# Patient Record
Sex: FEMALE | Race: WHITE | HISPANIC OR LATINO | Employment: OTHER | URBAN - METROPOLITAN AREA
[De-identification: names, ages, dates, MRNs, and addresses within clinical notes are randomized per-mention and may not be internally consistent; named-entity substitution may affect disease eponyms.]

---

## 2017-01-03 ENCOUNTER — ANESTHESIA EVENT (OUTPATIENT)
Dept: SURGERY | Facility: HOSPITAL | Age: 67
DRG: 418 | End: 2017-01-03

## 2017-01-03 ENCOUNTER — ANESTHESIA (OUTPATIENT)
Dept: SURGERY | Facility: HOSPITAL | Age: 67
DRG: 418 | End: 2017-01-03

## 2017-01-03 PROBLEM — K42.0 INCARCERATED UMBILICAL HERNIA: Status: ACTIVE | Noted: 2017-01-03

## 2017-01-03 PROCEDURE — 63600175 PHARM REV CODE 636 W HCPCS: Performed by: NURSE ANESTHETIST, CERTIFIED REGISTERED

## 2017-01-03 PROCEDURE — 63600175 PHARM REV CODE 636 W HCPCS

## 2017-01-03 PROCEDURE — D9220A PRA ANESTHESIA: Mod: ,,, | Performed by: ANESTHESIOLOGY

## 2017-01-03 PROCEDURE — 25000003 PHARM REV CODE 250: Performed by: NURSE ANESTHETIST, CERTIFIED REGISTERED

## 2017-01-03 RX ORDER — ONDANSETRON 2 MG/ML
INJECTION INTRAMUSCULAR; INTRAVENOUS
Status: DISCONTINUED | OUTPATIENT
Start: 2017-01-03 | End: 2017-01-03

## 2017-01-03 RX ORDER — SODIUM CHLORIDE, SODIUM LACTATE, POTASSIUM CHLORIDE, CALCIUM CHLORIDE 600; 310; 30; 20 MG/100ML; MG/100ML; MG/100ML; MG/100ML
INJECTION, SOLUTION INTRAVENOUS CONTINUOUS PRN
Status: DISCONTINUED | OUTPATIENT
Start: 2017-01-03 | End: 2017-01-03

## 2017-01-03 RX ORDER — SUCCINYLCHOLINE CHLORIDE 20 MG/ML
INJECTION INTRAMUSCULAR; INTRAVENOUS
Status: DISCONTINUED | OUTPATIENT
Start: 2017-01-03 | End: 2017-01-03

## 2017-01-03 RX ORDER — GLYCOPYRROLATE 0.2 MG/ML
INJECTION INTRAMUSCULAR; INTRAVENOUS
Status: DISCONTINUED | OUTPATIENT
Start: 2017-01-03 | End: 2017-01-03

## 2017-01-03 RX ORDER — LIDOCAINE HCL/PF 100 MG/5ML
SYRINGE (ML) INTRAVENOUS
Status: DISCONTINUED | OUTPATIENT
Start: 2017-01-03 | End: 2017-01-03

## 2017-01-03 RX ORDER — NEOSTIGMINE METHYLSULFATE 1 MG/ML
INJECTION, SOLUTION INTRAVENOUS
Status: DISCONTINUED | OUTPATIENT
Start: 2017-01-03 | End: 2017-01-03

## 2017-01-03 RX ORDER — ROCURONIUM BROMIDE 10 MG/ML
INJECTION, SOLUTION INTRAVENOUS
Status: DISCONTINUED | OUTPATIENT
Start: 2017-01-03 | End: 2017-01-03

## 2017-01-03 RX ORDER — METOPROLOL TARTRATE 1 MG/ML
INJECTION, SOLUTION INTRAVENOUS
Status: DISCONTINUED | OUTPATIENT
Start: 2017-01-03 | End: 2017-01-03

## 2017-01-03 RX ORDER — PROPOFOL 10 MG/ML
VIAL (ML) INTRAVENOUS
Status: DISCONTINUED | OUTPATIENT
Start: 2017-01-03 | End: 2017-01-03

## 2017-01-03 RX ORDER — HYDRALAZINE HYDROCHLORIDE 20 MG/ML
INJECTION INTRAMUSCULAR; INTRAVENOUS
Status: DISCONTINUED | OUTPATIENT
Start: 2017-01-03 | End: 2017-01-03

## 2017-01-03 RX ADMIN — LIDOCAINE HYDROCHLORIDE 100 MG: 20 INJECTION, SOLUTION INTRAVENOUS at 10:01

## 2017-01-03 RX ADMIN — ONDANSETRON 4 MG: 2 INJECTION, SOLUTION INTRAMUSCULAR; INTRAVENOUS at 10:01

## 2017-01-03 RX ADMIN — GLYCOPYRROLATE 0.2 MG: 0.2 INJECTION, SOLUTION INTRAMUSCULAR; INTRAVENOUS at 10:01

## 2017-01-03 RX ADMIN — NEOSTIGMINE METHYLSULFATE 4 MG: 1 INJECTION INTRAVENOUS at 10:01

## 2017-01-03 RX ADMIN — METOPROLOL TARTRATE 1 MG: 1 INJECTION, SOLUTION INTRAVENOUS at 11:01

## 2017-01-03 RX ADMIN — ROCURONIUM BROMIDE 5 MG: 10 INJECTION, SOLUTION INTRAVENOUS at 10:01

## 2017-01-03 RX ADMIN — HYDRALAZINE HYDROCHLORIDE 5 MG: 20 INJECTION INTRAMUSCULAR; INTRAVENOUS at 11:01

## 2017-01-03 RX ADMIN — SODIUM CHLORIDE, SODIUM LACTATE, POTASSIUM CHLORIDE, AND CALCIUM CHLORIDE: .6; .31; .03; .02 INJECTION, SOLUTION INTRAVENOUS at 10:01

## 2017-01-03 RX ADMIN — ROCURONIUM BROMIDE 15 MG: 10 INJECTION, SOLUTION INTRAVENOUS at 10:01

## 2017-01-03 RX ADMIN — SUCCINYLCHOLINE CHLORIDE 120 MG: 20 INJECTION, SOLUTION INTRAMUSCULAR; INTRAVENOUS at 10:01

## 2017-01-03 RX ADMIN — PROPOFOL 170 MG: 10 INJECTION, EMULSION INTRAVENOUS at 10:01

## 2017-01-03 RX ADMIN — GLYCOPYRROLATE 0.6 MG: 0.2 INJECTION, SOLUTION INTRAMUSCULAR; INTRAVENOUS at 10:01

## 2017-01-03 RX ADMIN — FENTANYL CITRATE 100 MCG: 50 INJECTION INTRAMUSCULAR; INTRAVENOUS at 10:01

## 2017-01-03 RX ADMIN — MIDAZOLAM HYDROCHLORIDE 2 MG: 1 INJECTION, SOLUTION INTRAMUSCULAR; INTRAVENOUS at 10:01

## 2017-01-03 NOTE — ANESTHESIA POSTPROCEDURE EVALUATION
"Anesthesia Post Evaluation    Patient: Alba Reyes    Procedure(s) Performed: Procedure(s):  REPAIR OF INCARCERATED UMBILICAL HERNIA    Final Anesthesia Type: general  Patient location during evaluation: PACU  Patient participation: Yes- Able to Participate  Level of consciousness: awake  Post-procedure vital signs: reviewed and stable  Pain management: adequate  Airway patency: patent  PONV status at discharge: No PONV  Anesthetic complications: no      Cardiovascular status: stable  Respiratory status: unassisted  Hydration status: euvolemic  Follow-up not needed.        Visit Vitals    BP (!) 178/83 (BP Location: Left arm, Patient Position: Lying, BP Method: Automatic)    Pulse 76    Temp 36.8 °C (98.2 °F) (Oral)    Resp 18    Ht 5' 4" (1.626 m)    Wt 58.1 kg (128 lb 1.4 oz)    SpO2 99%    Breastfeeding No    BMI 21.99 kg/m2       Pain/Marina Score: Pain Assessment Performed: Yes (1/3/2017 11:10 AM)  Presence of Pain: denies (1/3/2017 11:10 AM)  Pain Rating Prior to Med Admin: 0 (1/3/2017 11:10 AM)  Pain Rating Post Med Admin: 8 (1/3/2017  7:45 AM)  Marina Score: 7 (1/3/2017 11:10 AM)      "

## 2017-01-03 NOTE — ANESTHESIA PREPROCEDURE EVALUATION
01/03/2017  Alba Reyes is a 66 y.o., female.    [facility] Anesthesia Evaluation    I have reviewed the Patient Summary Reports.     I have reviewed the Medications.     Review of Systems  Anesthesia Hx:  No problems with previous Anesthesia Denies Hx of Anesthetic complications  History of prior surgery of interest to airway management or planning: Denies Family Hx of Anesthesia complications.   Denies Personal Hx of Anesthesia complications.   Social:  Non-Smoker, No Alcohol Use    Hematology/Oncology:     Oncology Normal    -- Anemia:   EENT/Dental:EENT/Dental Normal   Cardiovascular:  Cardiovascular Normal Exercise tolerance: good     Pulmonary:  Pulmonary Normal    Renal/:  Renal/ Normal     Hepatic/GI:  Hepatic/GI Normal    Musculoskeletal:  Musculoskeletal Normal    Neurological:  Neurology Normal    Endocrine:  Endocrine Normal    Dermatological:  Skin Normal    Psych:  Psychiatric Normal           Physical Exam  General:  Well nourished    Airway/Jaw/Neck:  Airway Findings: Mouth Opening: Normal General Airway Assessment: Adult  Mallampati: II  TM Distance: Normal, at least 6 cm         Dental:  DENTAL FINDINGS: Normal   Chest/Lungs:  Chest/Lungs Clear    Heart/Vascular:  Heart Findings: Normal Heart murmur: negative       Mental Status:  Mental Status Findings:  Cooperative, Alert and Oriented         Anesthesia Plan  Type of Anesthesia, risks & benefits discussed:  Anesthesia Type:  general  Patient's Preference:   Intra-op Monitoring Plan:   Intra-op Monitoring Plan Comments:   Post Op Pain Control Plan:   Post Op Pain Control Plan Comments:   Induction:   IV  Beta Blocker:  Patient is not currently on a Beta-Blocker (No further documentation required).       Informed Consent: Patient understands risks and agrees with Anesthesia plan.  Questions answered. Anesthesia consent signed with patient.  ASA  Score: 2     Day of Surgery Review of History & Physical:    H&P update referred to the surgeon.     Anesthesia Plan Notes: npo        Ready For Surgery From Anesthesia Perspective.

## 2017-01-03 NOTE — TRANSFER OF CARE
"Anesthesia Transfer of Care Note    Patient: Alba Reyes    Procedure(s) Performed: Procedure(s):  REPAIR OF INCARCERATED UMBILICAL HERNIA    Patient location: PACU    Anesthesia Type: general    Transport from OR: Transported from OR on room air with adequate spontaneous ventilation    Post pain: adequate analgesia    Post assessment: no apparent anesthetic complications and tolerated procedure well    Post vital signs: stable    Level of consciousness: sedated and responds to stimulation    Nausea/Vomiting: no nausea/vomiting    Complications: none          Last vitals:   Visit Vitals    BP (!) 178/83 (BP Location: Left arm, Patient Position: Lying, BP Method: Automatic)    Pulse 76    Temp 36.8 °C (98.2 °F) (Oral)    Resp 18    Ht 5' 4" (1.626 m)    Wt 58.1 kg (128 lb 1.4 oz)    SpO2 99%    Breastfeeding No    BMI 21.99 kg/m2     "

## 2019-03-13 ENCOUNTER — OFFICE VISIT (OUTPATIENT)
Dept: FAMILY MEDICINE | Facility: CLINIC | Age: 69
End: 2019-03-13
Payer: MEDICARE

## 2019-03-13 VITALS
WEIGHT: 143.06 LBS | HEART RATE: 87 BPM | BODY MASS INDEX: 24.42 KG/M2 | DIASTOLIC BLOOD PRESSURE: 73 MMHG | OXYGEN SATURATION: 96 % | RESPIRATION RATE: 16 BRPM | HEIGHT: 64 IN | TEMPERATURE: 98 F | SYSTOLIC BLOOD PRESSURE: 130 MMHG

## 2019-03-13 DIAGNOSIS — M47.817 FACET ARTHRITIS OF LUMBOSACRAL REGION: ICD-10-CM

## 2019-03-13 DIAGNOSIS — M06.9 RHEUMATOID ARTHRITIS, INVOLVING UNSPECIFIED SITE, UNSPECIFIED RHEUMATOID FACTOR PRESENCE: ICD-10-CM

## 2019-03-13 DIAGNOSIS — M81.0 OSTEOPOROSIS, UNSPECIFIED OSTEOPOROSIS TYPE, UNSPECIFIED PATHOLOGICAL FRACTURE PRESENCE: ICD-10-CM

## 2019-03-13 DIAGNOSIS — M51.37 DDD (DEGENERATIVE DISC DISEASE), LUMBOSACRAL: Primary | ICD-10-CM

## 2019-03-13 DIAGNOSIS — M54.17 LUMBOSACRAL RADICULOPATHY: ICD-10-CM

## 2019-03-13 DIAGNOSIS — M48.07 FORAMINAL STENOSIS OF LUMBOSACRAL REGION: ICD-10-CM

## 2019-03-13 PROCEDURE — 99204 OFFICE O/P NEW MOD 45 MIN: CPT | Mod: S$GLB,,, | Performed by: FAMILY MEDICINE

## 2019-03-13 PROCEDURE — 99999 PR PBB SHADOW E&M-EST. PATIENT-LVL V: CPT | Mod: PBBFAC,,, | Performed by: FAMILY MEDICINE

## 2019-03-13 PROCEDURE — 99204 PR OFFICE/OUTPT VISIT, NEW, LEVL IV, 45-59 MIN: ICD-10-PCS | Mod: S$GLB,,, | Performed by: FAMILY MEDICINE

## 2019-03-13 PROCEDURE — 1101F PT FALLS ASSESS-DOCD LE1/YR: CPT | Mod: CPTII,S$GLB,, | Performed by: FAMILY MEDICINE

## 2019-03-13 PROCEDURE — 99999 PR PBB SHADOW E&M-EST. PATIENT-LVL V: ICD-10-PCS | Mod: PBBFAC,,, | Performed by: FAMILY MEDICINE

## 2019-03-13 PROCEDURE — 1101F PR PT FALLS ASSESS DOC 0-1 FALLS W/OUT INJ PAST YR: ICD-10-PCS | Mod: CPTII,S$GLB,, | Performed by: FAMILY MEDICINE

## 2019-03-13 RX ORDER — METHOTREXATE 2.5 MG/1
TABLET ORAL
COMMUNITY
Start: 2019-03-04 | End: 2021-04-08

## 2019-03-13 RX ORDER — FOLIC ACID 1 MG/1
TABLET ORAL
COMMUNITY
Start: 2019-01-10 | End: 2021-09-23

## 2019-03-13 RX ORDER — HYDROXYCHLOROQUINE SULFATE 200 MG/1
400 TABLET, FILM COATED ORAL
COMMUNITY
Start: 2015-02-12 | End: 2021-04-08

## 2019-03-13 RX ORDER — GABAPENTIN 300 MG/1
CAPSULE ORAL
COMMUNITY
Start: 2019-02-14 | End: 2022-08-08

## 2019-03-13 RX ORDER — LISINOPRIL 20 MG/1
TABLET ORAL
COMMUNITY
Start: 2018-07-19 | End: 2020-03-21 | Stop reason: SDUPTHER

## 2019-03-13 RX ORDER — NAPROXEN 500 MG/1
500 TABLET ORAL
COMMUNITY
Start: 2018-07-16 | End: 2019-07-16

## 2019-03-13 RX ORDER — LOVASTATIN 10 MG/1
TABLET ORAL
COMMUNITY
Start: 2019-03-01 | End: 2020-03-31 | Stop reason: SDUPTHER

## 2019-03-13 RX ORDER — LEVOFLOXACIN 750 MG/1
TABLET ORAL
COMMUNITY
Start: 2018-07-07 | End: 2019-03-13

## 2019-03-13 RX ORDER — GABAPENTIN 100 MG/1
100 CAPSULE ORAL
COMMUNITY
End: 2019-03-13 | Stop reason: DRUGHIGH

## 2019-03-13 RX ORDER — ALENDRONATE SODIUM 70 MG/1
70 TABLET ORAL
COMMUNITY
Start: 2015-02-12 | End: 2020-02-04

## 2019-03-13 RX ORDER — TRAMADOL HYDROCHLORIDE 50 MG/1
50 TABLET ORAL EVERY 6 HOURS PRN
COMMUNITY
End: 2020-02-04

## 2019-03-13 RX ORDER — CETIRIZINE HYDROCHLORIDE 10 MG/1
10 TABLET ORAL
COMMUNITY

## 2019-03-13 RX ORDER — ERGOCALCIFEROL 1.25 MG/1
50000 CAPSULE ORAL
COMMUNITY
Start: 2014-03-27 | End: 2020-01-23 | Stop reason: ALTCHOICE

## 2019-03-13 RX ORDER — HYDROGEN PEROXIDE 3 %
SOLUTION, NON-ORAL MISCELLANEOUS
COMMUNITY
Start: 2019-03-12 | End: 2020-04-22 | Stop reason: SDUPTHER

## 2019-03-13 NOTE — PROGRESS NOTES
Routine Office Visit    Patient Name: Alba Reyes    : 1950  MRN: [id number]    Subjective:  Nichole is a 68 y.o. female who presents today for:   Chief Complaint   Patient presents with    Sciatica     lower back     68-year-old female comes in for evaluation of low back pain radiating into her legs.  She has osteoporosis and rheumatoid arthritis.  She is seen at [facility] by Rheumatology.  She does not take her Plaquenil because she was told about the ophthalmic side effects of the medication.  She has not had an eye exam done.  She also reports that she skips her alendronate often.  She has no reason for this.  The patient has a lumbosacral degenerative disc disease, facet arthritis the lumbosacral region, a foraminal stenosis the lumbosacral region with radiculopathy.  The patient expresses a dissatisfaction with the pain management she has been receiving for her back pain. She is currently on gabapentin 300 mg 3 times a day for this however she skips that medicine often as well.      Past Medical History  Past Medical History:   Diagnosis Date    Arthritis        Past Surgical History  Past Surgical History:   Procedure Laterality Date    CHOLECYSTECTOMY-LAPAROSCOPIC N/A 2016    Per[facility] by [facility]    REPAIR OF INCARCERATED UMBILICAL HERNIA N/A 1/3/2017    Performed by Mahamed Maharaj MD at [facility] OR        Family History  History reviewed. No pertinent family history.    Social History  Social History     Socioeconomic History    Marital status:      Spouse name: Not on file    Number of children: Not on file    Years of education: Not on file    Highest education level: Not on file   Social Needs    Financial resource strain: Not on file    Food insecurity - worry: Not on file    Food insecurity - inability: Not on file    Transportation needs - medical: Not on file    Transportation needs - non-medical: Not on file   Occupational History     Not on file   Tobacco Use    Smoking status: Never Smoker   Substance and Sexual Activity    Alcohol use: Not on file    Drug use: Not on file    Sexual activity: Not on file   Other Topics Concern    Not on file   Social History Narrative    Not on file       Current Medications  Current Outpatient Medications on File Prior to Visit   Medication Sig Dispense Refill    cetirizine (ZYRTEC) 10 MG tablet Take 10 mg by mouth.      ergocalciferol (ERGOCALCIFEROL) 50,000 unit Cap Take 50,000 Units by mouth.      esomeprazole (NEXIUM) 20 MG capsule       folic acid (FOLVITE) 1 MG tablet       gabapentin (NEURONTIN) 300 MG capsule       hydroxychloroquine (PLAQUENIL) 200 mg tablet Take 400 mg by mouth.      lisinopril (PRINIVIL,ZESTRIL) 20 MG tablet       lovastatin (MEVACOR) 10 MG tablet       methotrexate 2.5 MG Tab       methotrexate, PF, (OTREXUP) 25 mg/0.4 mL AtIn Inject 25 mg into the skin.      naproxen (NAPROSYN) 500 MG tablet Take 500 mg by mouth.      traMADol (ULTRAM) 50 mg tablet Take 50 mg by mouth every 6 (six) hours as needed for Pain.      alendronate (FOSAMAX) 70 MG tablet Take 70 mg by mouth.       No current facility-administered medications on file prior to visit.        Allergies   Review of patient's allergies indicates:  No Known Allergies    Review of Systems   Constitutional: Negative for unexpected weight change.   Eyes: Negative for visual disturbance.   Respiratory: Negative for shortness of breath and wheezing.    Cardiovascular: Negative for chest pain and palpitations.   Gastrointestinal: Negative for abdominal pain, blood in stool, constipation, diarrhea and nausea.   Genitourinary: Negative for dysuria.   Musculoskeletal: Positive for arthralgias, back pain and gait problem.   Skin: Negative for rash.   Neurological: Positive for weakness. Negative for seizures.   Hematological: Negative for adenopathy.   Psychiatric/Behavioral: Positive for sleep disturbance. Negative for  "suicidal ideas.       /73 (BP Location: Left arm, Patient Position: Sitting, BP Method: Medium (Automatic))   Pulse 87   Temp 97.9 °F (36.6 °C) (Oral)   Resp 16   Ht 5' 4" (1.626 m)   Wt 64.9 kg (143 lb 1.3 oz)   SpO2 96%   BMI 24.56 kg/m²     Physical Exam   Constitutional: She appears well-developed and well-nourished.   HENT:   Head: Normocephalic and atraumatic.   Right Ear: External ear normal.   Left Ear: External ear normal.   Nose: Nose normal.   Mouth/Throat: Oropharynx is clear and moist. No oropharyngeal exudate.   Eyes: Conjunctivae and EOM are normal. Pupils are equal, round, and reactive to light. Right eye exhibits no discharge. Left eye exhibits no discharge.   Neck: Normal range of motion. Neck supple. No tracheal deviation present.   Cardiovascular: Normal rate, regular rhythm, normal heart sounds and intact distal pulses.   No murmur heard.  Pulmonary/Chest: Effort normal and breath sounds normal. She has no wheezes. She has no rales.   Abdominal: Soft. Bowel sounds are normal. She exhibits no mass. There is no tenderness.   Lymphadenopathy:     She has no cervical adenopathy.   Psychiatric: She has a normal mood and affect.   Vitals reviewed.    Assessment/Plan:  Nichole was seen today for sciatica.    Diagnoses and all orders for this visit:    DDD (degenerative disc disease), lumbosacral  -     Ambulatory Referral to Physical/Occupational Therapy  -     Ambulatory Consult to [facility]  Discussed with patient benefits of physical therapy.  A furthermore I will refer her to the back and Spine Clinic for an evaluation.    Facet arthritis of lumbosacral region  -     Ambulatory Referral to Physical/Occupational Therapy  -     Ambulatory Consult to [facility]    Foraminal stenosis of lumbosacral region  -     Ambulatory Referral to Physical/Occupational Therapy  -     Ambulatory Consult to [facility]    Lumbosacral radiculopathy  -     Ambulatory Referral to " Physical/Occupational Therapy  -     Ambulatory Consult to [facility]    Rheumatoid arthritis, involving unspecified site, unspecified rheumatoid factor presence  -     Ambulatory referral to Optometry  Discussed with patient the dangers of rheumatoid arthritis when not controlled.  I encouraged her to restart her medicine as per her rheumatologist recommendations, and to have an eye exam done.    Osteoporosis, unspecified osteoporosis type, unspecified pathological fracture presence  Discussed with patient how untreated osteoporosis can increase risk of fractures.  Advised patient to discuss this medicine with her prescriber        This office note has been dictated.  This dictation has been generated using M-Modal Fluency Direct dictation; some phonetic errors may occur.

## 2019-03-15 ENCOUNTER — TELEPHONE (OUTPATIENT)
Dept: OPTOMETRY | Facility: CLINIC | Age: 69
End: 2019-03-15

## 2019-03-22 ENCOUNTER — TELEPHONE (OUTPATIENT)
Dept: OPTOMETRY | Facility: CLINIC | Age: 69
End: 2019-03-22

## 2019-03-25 ENCOUNTER — TELEPHONE (OUTPATIENT)
Dept: SPINE | Facility: CLINIC | Age: 69
End: 2019-03-25

## 2019-03-25 DIAGNOSIS — M54.50 LUMBAR BACK PAIN: Primary | ICD-10-CM

## 2019-03-25 NOTE — PROGRESS NOTES
"Subjective:      Patient ID: Alba Reyes is a 68 y.o. female.    Chief Complaint: Low-back Pain      HPI  (Leslie)    She is a poor historian. She speaks English and she did not want an .     History of RA and osteoporosis. Has been seeing rheumatology at [facility] per PCP notes. Had severe left leg pain last year with slight improvement with local steroid injection.     She has constant LBP with constant left lateral leg pain to her knee x 1 year. LBP = left leg pain. No right leg pain. Pain is better with walking. Pain is worse when she gets up after prolonged sitting- she has to limp for awhile. She rates her pain as an 8 on a scale of 1-10. No numbness or tingling. She has some weakness in left leg. Pain is described as "lot of pain night and day."     She is set up to start PT next week. No injections in her back. No surgery on her back. She is on neurontin, naprosyn, and ultram.       Review of Systems   Constitution: Positive for weight gain. Negative for fever, malaise/fatigue, night sweats and weight loss.   HENT: Negative for hearing loss, nosebleeds and odynophagia.    Eyes: Negative for blurred vision and double vision.   Cardiovascular: Negative for chest pain, irregular heartbeat and palpitations.   Respiratory: Negative for cough, hemoptysis, shortness of breath and wheezing.    Endocrine: Negative for cold intolerance and polydipsia.   Hematologic/Lymphatic: Does not bruise/bleed easily.   Skin: Positive for dry skin and rash. Negative for poor wound healing and suspicious lesions.   Musculoskeletal: Positive for back pain.        See HPI for pertinent positives.   Gastrointestinal: Negative for bloating, abdominal pain, constipation, diarrhea, hematochezia, melena, nausea and vomiting.   Genitourinary: Negative for bladder incontinence, dysuria, hematuria, hesitancy and incomplete emptying.   Neurological: Negative for disturbances in coordination, dizziness, focal weakness, headaches, " loss of balance, numbness, paresthesias, seizures and weakness.   Psychiatric/Behavioral: Negative for depression and hallucinations. The patient is not nervous/anxious.            Objective:        General: Nichole is well-developed, well-nourished, appears stated age, in no acute distress, alert and oriented to time, place and person.     General    Vitals reviewed.  Constitutional: She is oriented to person, place, and time. She appears well-developed and well-nourished.   Pulmonary/Chest: Effort normal.   Abdominal: She exhibits no distension.   Neurological: She is alert and oriented to person, place, and time.   Psychiatric: She has a normal mood and affect. Her behavior is normal. Judgment and thought content normal.           Gait: normal and she is able to heel/toe stand.     On exam of the lumbar spine, Inspection of back is normal, mild left sided lower lumbar tenderness.     Skin in lumbar region is warm to the touch without visible rashes.     muscle tone normal without spasm, limited range of motion with pain  Pain in flexion.    Strength testing of the bilateral LEs shows  Right hip abduction:  +5/5  Left hip abduction:  +5/5  Right hip flexion:  +5/5  Left hip flexion:  +5/5  Right hip extensors:  +5/5  Left hip extensors:  +5/5  Right quadriceps:  +5/5  Left quadriceps:  +5/5  Right hamstring:  +5/5  Left hamstring:  +5/5  Right dorsiflexion:  +5/5  Left dorsiflexion:  +5/5  Right plantar flexion:  +5/5  Left plantar flexion:  +5/5   Right EHL:  +5/5   Left EHL:  +5/5    negative clonus of bilateral LEs.     negative straight leg raise on bilateral LEs.     DTRs:  Right patellar:  +2     Left patellar:  +2  Right achilles:  +2   Left achilles:  +2    Sensation is grossly intact in L2, L3, L4, L5, and S1 distribution.    Right hip has no pain with IR/ER. Left hip has mild knee pain with IR/ER of the hip.      On exam of bilateral UEs, patient has full painfree ROM with no signs of clubbing, laxity,  cyanosis, edema, instability, weakness, or tenderness.       XRAY INTERPRETATION:  X-rays of lumbar spine (AP/lat/flex/ext) dated 3/26/19 are personally reviewed and show slip L5-S1, moderate/severe DDD L4-S1 with facet hypertrophy.        Assessment:       1. Chronic bilateral low back pain with left-sided sciatica    2. Other spondylosis with radiculopathy, lumbar region    3. Facet hypertrophy of lumbar region    4. DDD (degenerative disc disease), lumbosacral    5. Thoracic and lumbosacral neuritis    6. Acquired spondylolisthesis           Plan:       Orders Placed This Encounter    Ambulatory referral to Physical Therapy - Lumbar       1 year history of constant LBP with constant left lateral leg pain to her knee. LBP = left leg pain. No right leg pain. Known slip L5-S1, moderate/severe DDD L4-S1 with facet hypertrophy. Pain likely from L5-S1, but L4-L5 may also be contributing. Also with known RA. Treatment options reviewed with patient along with above lumbar XRs. Following plan made:     - Agree with PT for lumbar spine. [facility].   - Continue with neurotin, naprosyn, and ultram from other providers.   - If no improvement with above, consider lumbar MRI and possible injections with pain management. She would want all of this on the [employer].     Follow-up: Follow up in 3 months (on 6/26/2019). If there are any questions prior to this, the patient was instructed to contact the office.

## 2019-03-26 ENCOUNTER — OFFICE VISIT (OUTPATIENT)
Dept: SPINE | Facility: CLINIC | Age: 69
End: 2019-03-26
Payer: MEDICARE

## 2019-03-26 ENCOUNTER — HOSPITAL ENCOUNTER (OUTPATIENT)
Dept: RADIOLOGY | Facility: OTHER | Age: 69
Discharge: HOME | End: 2019-03-26
Attending: PHYSICIAN ASSISTANT
Payer: MEDICARE

## 2019-03-26 VITALS
HEIGHT: 64 IN | HEART RATE: 84 BPM | WEIGHT: 143 LBS | DIASTOLIC BLOOD PRESSURE: 60 MMHG | BODY MASS INDEX: 24.41 KG/M2 | TEMPERATURE: 99 F | SYSTOLIC BLOOD PRESSURE: 132 MMHG

## 2019-03-26 DIAGNOSIS — M51.37 DDD (DEGENERATIVE DISC DISEASE), LUMBOSACRAL: ICD-10-CM

## 2019-03-26 DIAGNOSIS — M54.14 THORACIC AND LUMBOSACRAL NEURITIS: ICD-10-CM

## 2019-03-26 DIAGNOSIS — M47.816 FACET HYPERTROPHY OF LUMBAR REGION: ICD-10-CM

## 2019-03-26 DIAGNOSIS — M43.10 ACQUIRED SPONDYLOLISTHESIS: ICD-10-CM

## 2019-03-26 DIAGNOSIS — M54.42 CHRONIC BILATERAL LOW BACK PAIN WITH LEFT-SIDED SCIATICA: Primary | ICD-10-CM

## 2019-03-26 DIAGNOSIS — M54.17 THORACIC AND LUMBOSACRAL NEURITIS: ICD-10-CM

## 2019-03-26 DIAGNOSIS — M54.50 LUMBAR BACK PAIN: ICD-10-CM

## 2019-03-26 DIAGNOSIS — G89.29 CHRONIC BILATERAL LOW BACK PAIN WITH LEFT-SIDED SCIATICA: Primary | ICD-10-CM

## 2019-03-26 DIAGNOSIS — M47.26 OTHER SPONDYLOSIS WITH RADICULOPATHY, LUMBAR REGION: ICD-10-CM

## 2019-03-26 PROCEDURE — 99999 PR PBB SHADOW E&M-EST. PATIENT-LVL V: ICD-10-PCS | Mod: PBBFAC,,, | Performed by: PHYSICIAN ASSISTANT

## 2019-03-26 PROCEDURE — 72100 X-RAY EXAM L-S SPINE 2/3 VWS: CPT | Mod: 26,,, | Performed by: RADIOLOGY

## 2019-03-26 PROCEDURE — 72100 XR LUMBAR SPINE AP AND LAT WITH FLEX/EXT: ICD-10-PCS | Mod: 26,,, | Performed by: RADIOLOGY

## 2019-03-26 PROCEDURE — 1101F PT FALLS ASSESS-DOCD LE1/YR: CPT | Mod: CPTII,S$GLB,, | Performed by: PHYSICIAN ASSISTANT

## 2019-03-26 PROCEDURE — 99214 PR OFFICE/OUTPT VISIT, EST, LEVL IV, 30-39 MIN: ICD-10-PCS | Mod: S$GLB,,, | Performed by: PHYSICIAN ASSISTANT

## 2019-03-26 PROCEDURE — 72100 X-RAY EXAM L-S SPINE 2/3 VWS: CPT | Mod: TC,FY

## 2019-03-26 PROCEDURE — 72120 XR LUMBAR SPINE AP AND LAT WITH FLEX/EXT: ICD-10-PCS | Mod: 26,,, | Performed by: RADIOLOGY

## 2019-03-26 PROCEDURE — 72120 X-RAY BEND ONLY L-S SPINE: CPT | Mod: 26,,, | Performed by: RADIOLOGY

## 2019-03-26 PROCEDURE — 99214 OFFICE O/P EST MOD 30 MIN: CPT | Mod: S$GLB,,, | Performed by: PHYSICIAN ASSISTANT

## 2019-03-26 PROCEDURE — 1101F PR PT FALLS ASSESS DOC 0-1 FALLS W/OUT INJ PAST YR: ICD-10-PCS | Mod: CPTII,S$GLB,, | Performed by: PHYSICIAN ASSISTANT

## 2019-03-26 PROCEDURE — 99999 PR PBB SHADOW E&M-EST. PATIENT-LVL V: CPT | Mod: PBBFAC,,, | Performed by: PHYSICIAN ASSISTANT

## 2019-03-26 NOTE — LETTER
March 26, 2019      Rosendo Quinones Jr., MD  [address]  [place] [address]           [facility] [address]  [address], [address]  [facility] [address]  Phone: [phone]  Fax: [phone]          Patient: Alba Reyes   MR Number: [id number]   YOB: 1950   Date of Visit: 3/26/2019       Dear Dr. Rosendo Quinones Jr.:    Thank you for referring Alba Reyes to me for evaluation. Attached you will find relevant portions of my assessment and plan of care.    If you have questions, please do not hesitate to call me. I look forward to following Alba Reyes along with you.    Sincerely,    GLADIS Brody  CC:  No Recipients    If you would like to receive this communication electronically, please contact [email or web address] or [phone] to request more information on [facility] access.    For providers and/or their staff who would like to refer a patient to [facility], please contact us through our one-stop-shop provider referral line, [email or web address], at [phone].    If you feel you have received this communication in error or would no longer like to receive these types of communications, please e-mail [email or web address]

## 2019-03-28 ENCOUNTER — TELEPHONE (OUTPATIENT)
Dept: SPINE | Facility: CLINIC | Age: 69
End: 2019-03-28

## 2019-04-03 ENCOUNTER — CLINICAL SUPPORT (OUTPATIENT)
Dept: REHABILITATION | Facility: HOSPITAL | Age: 69
End: 2019-04-03
Attending: FAMILY MEDICINE
Payer: MEDICARE

## 2019-04-03 DIAGNOSIS — R53.1 WEAKNESS: ICD-10-CM

## 2019-04-03 DIAGNOSIS — R68.89 DECREASED FUNCTIONAL ACTIVITY TOLERANCE: ICD-10-CM

## 2019-04-03 DIAGNOSIS — R26.9 ABNORMALITY OF GAIT AND MOBILITY: ICD-10-CM

## 2019-04-03 PROCEDURE — 97162 PT EVAL MOD COMPLEX 30 MIN: CPT | Mod: PN

## 2019-04-03 PROCEDURE — 97140 MANUAL THERAPY 1/> REGIONS: CPT | Mod: PN

## 2019-04-04 NOTE — PLAN OF CARE
Physical Therapy Initial Evaluation     Name: Alba Reyes  [facility] Number: [id number]    PT Diagnosis:   Encounter Diagnoses   Name Primary?    Decreased functional activity tolerance     Weakness     Abnormality of gait and mobility        Physician: Rosendo Quinones Jr., MD    Medical Diagnoses:  M47.26 (ICD-10-CM) - Other spondylosis with radiculopathy, lumbar region  M47.896 (ICD-10-CM) - Facet hypertrophy of lumbar region  M51.37 (ICD-10-CM) - DDD (degenerative disc disease), lumbosacral  M54.14,M54.17 (ICD-10-CM) - Thoracic and lumbosacral neuritis  M43.10 (ICD-10-CM) - Acquired spondylolisthesis  M54.42,G89.29 (ICD-10-CM) - Chronic bilateral low back pain with left-sided sciatica    Treatment Orders: PT Eval and Treat  Note:   Lumbar protocol with home exercises.  ROM, stretching lumbar and abdominal musculature. Aerobic conditioning, aqua therapy, with modalities including ultrasound and massage PRN. Dry needling if helpful. 3 x week for 6 weeks.      Past Medical History:   Diagnosis Date    Arthritis      Current Outpatient Medications   Medication Sig    alendronate (FOSAMAX) 70 MG tablet Take 70 mg by mouth.    cetirizine (ZYRTEC) 10 MG tablet Take 10 mg by mouth.    ergocalciferol (ERGOCALCIFEROL) 50,000 unit Cap Take 50,000 Units by mouth.    esomeprazole (NEXIUM) 20 MG capsule     folic acid (FOLVITE) 1 MG tablet     gabapentin (NEURONTIN) 300 MG capsule     hydroxychloroquine (PLAQUENIL) 200 mg tablet Take 400 mg by mouth.    lisinopril (PRINIVIL,ZESTRIL) 20 MG tablet     lovastatin (MEVACOR) 10 MG tablet     methotrexate 2.5 MG Tab     methotrexate, PF, (OTREXUP) 25 mg/0.4 mL AtIn Inject 25 mg into the skin.    naproxen (NAPROSYN) 500 MG tablet Take 500 mg by mouth.    traMADol (ULTRAM) 50 mg tablet Take 50 mg by mouth every 6 (six) hours as needed for Pain.     No current facility-administered medications for this visit.      Review of  "patient's allergies indicates:  No Known Allergies    Precautions: Fall     Evaluation Date: 4/3/2019  Authorization Period Expiration: 3/25/2020  Plan of Care Expiration: 7/3/19  Reassessment Due: 5/3/19  Visit # / Visits authorized: 1/ 1    Time In: 1405  Time Out: 1500  Total Billable Time: 55  minutes     HISTORY     History of Present Illness: Patient arrives to PT and reports difficulty walking, and severe LBP. It started last June 2018 with sciatica and it has gotten worst since then. The Pain is across the lower back and wrapping at both hip. She has radicular pain to L LE as well. Patient walks with a forward trunk flexion and abnormal gait pattern. She has not work since oct 2018. She has difficulty accessing car in and out. She does not have numbness at her feet, but has tingling at lower legs B. Her pain is worst in the morning. Patient has had 3x falls  Due to legs giving out on her.     MD's Notes:  She is a poor historian. She speaks English and she did not want an .   History of RA and osteoporosis. Has been seeing rheumatology at [facility] per PCP notes. Had severe left leg pain last year with slight improvement with local steroid injection.   She has constant LBP with constant left lateral leg pain to her knee x 1 year. LBP = left leg pain. No right leg pain. Pain is better with walking. Pain is worse when she gets up after prolonged sitting- she has to limp for awhile. She rates her pain as an 8 on a scale of 1-10. No numbness or tingling. She has some weakness in left leg. Pain is described as "lot of pain night and day."   She is set up to start PT next week. No injections in her back. No surgery on her back. She is on neurontin, naprosyn, and ultram.     XRAY INTERPRETATION:  X-rays of lumbar spine (AP/lat/flex/ext) dated 3/26/19 are personally reviewed and show slip L5-S1, moderate/severe DDD L4-S1 with facet hypertrophy.      Pain Scale: Nichole rates pain on a scale of 0-10 to be 10 " "at worst; 9 currently; 8 at best using VAS.     Pain location: Lower back and Both hip    Aggravating factors: Morning, prolonged sitting  Easing Factors: Heat , hot shower, walking movement, tramadol  Disturbed Sleep: Yes significantly     Pattern of pain questions:  1.  Where is your pain the worst? Across lower back and hips.  2.  Is your pain constant or intermittent? Varies  3.  Does bending forward make your typical pain worse? Yes  4.  Since the start of your back pain, has there been a change in your bowel or bladder? Yes Bladder  5.  What can't you do now that you use to be able to do? Work, and all activities     Prior Treatment: NO  Prior functional status: Unlimited  DME owned/used: Cane, walker  Occupation:  Not working now   Leisure: Non significant, TV                     Pts goals:  "get better, improve with pain".     Red Flag Screening:   Cough  Sneeze  Strain: (+)  Bladder/ bowel: (--)  Falls: (+) 3x since last summer.  Night pain: (+)  Unexplained weight loss: (--)  General health: "Good"    OBJECTIVE     Postural examination/scapula alignment: Rounded shoulder, Head forward, Abnormal trunk flexion and Slouched posture     Joint integrity: Lumbosacral joint abnormality per Xray     Skin integrity: Normal skin for age    Edema: Sometimes, but not today.     Correction of posture: no effect with lumbar roll with slim roll    MOVEMENT LOSS    ROM Loss   Flexion major loss   Extension major loss   Side bending Right major loss   Side bending Left major loss   Rotation Right major loss   Rotation Left major loss     Lower Extremity Strength  Right LE  Left LE    Hip flexion: 4/5 Hip flexion: 4/5   Hip extension:  4/5 Hip extension: 4/5   Hip abduction: 4/5 Hip abduction: 4/5   Hip adduction:  4/5 Hip adduction:  4/5   Hip Internal rotation   4/5 Hip Internal rotation 4/5   Knee Flexion 4/5 Knee Flexion 4/5   Knee Extension 4/5 Knee Extension 4/5   Ankle dorsiflexion: 4+/5 Ankle dorsiflexion: 4+/5 "   Ankle plantarflexion: 4+/5 Ankle plantarflexion: 4+/5     Functional Legs Strength with 5x Sit<>Stand test: Attempted, unable due to pain.     Timed up and Go for Mobility, strength and balance: DNT (assess at next visit).    GAIT:  Assistive Device used: none  Level of Assistance: independent  Patient displays the following gait deviations:  unsteady gait, decreased step length, increased base of support, decreased weight shift and antalgic gait.     Special Tests:   Test Name  Test Result   Prone Instability Test (+)   SI Joint Provocation Test (--)   Straight Leg Raise (+)   Neural Tension Test DNT   Crossed Straight Leg Raise DNT   Walking on toes (--)   Walking on heels  (--)     Piriformis and Jennifer (+) B LE's with increased pain.     NEUROLOGICAL SCREENING     Sensory deficit: Sensation dermatomes WFL B.     Reflexes: DNT    REPEATED TEST MOVEMENTS: DNT today.     CMS Impairment/Limitation/Restriction for FOTO Lumbar Spine Survey  Status Limitation G-Code CMS Severity Modifier  Intake 39% 61% Current Status CL - At least 60 percent but less than 80 percent  Predicted 48% 52% Goal Status+ CK - At least 40 percent but less than 60 percent  D/C Status CL **only report if this is a one time visit    Treatment       PT Evaluation Completed? Yes  Discussed Plan of Care with patient: Yes      Home Exercise Program as follows:   Handouts were given to the patient. Pt demo fair understanding of the education provided. Nichole demonstrated fair return demonstration of activities.     Nichole received therapeutic exercises to develop/improve posture, lumbar/cervical ROM, strength and muscular endurance for 5 minutes including the following exercises:     BOYD with ball x15  PPT x10 3-5 s/h  LTR x10    Nichole received the following manual therapy techniques: for 8 minutes.   STM/MFR and Xfibers massage with Rico to lower back in prone    Ice Pack in z-lie to lower back 8 minutes.     Assessment     This is a 68 y.o. female  referred to [facility] and presents with multiple medical diagnoses (see above). Patient was in a lot of pain (9/10) upon arrival, and displayed significant difficulty with ambulation (fwd trunk lean; decrease step/stride length and pelvic rotation, with mild shuffling). She responded fairly well to manual therapy with Rico, as it decreased her pain to 6/10 per report. Imaging studies revealed slip of  L5-S1, and moderate/severe DDD L4-S1 with facet hypertrophy. She displayed major deficit with LE's strength and lumbar spine AROM due to pain and stiffness. Lumbar spine instability was present with assessment. She will benefit greatly from PT to improve aerobic conditioning, lumbopelvic stabilization (with core and back muscles strengthening), functional legs strengthening, and balance/proprioception, to optimize capacity for mobility and functional activities tolerance.        Patient demonstrates limitations as described below in the problem list. Pt rehab potential is Fair to good.     Based on the above history and physical examination an active physical therapy program is recommended.  Pt will continue to benefit from skilled outpatient physical therapy to address the deficits listed below in the chart, provide pt/family education and to maximize pt's level of independence in the home and community environment. .     No environmental, cultural, spiritual, developmental or education needs expressed or noted    Medical necessity is demonstrated by the following problem list.    Pt presents with the following impairments:     History  Co-morbidities and personal factors that may impact the plan of care Co-morbidities:   Age and multiple Dx  M47.26 (ICD-10-CM) - Other spondylosis with radiculopathy, lumbar region  M47.896 (ICD-10-CM) - Facet hypertrophy of lumbar region  M51.37 (ICD-10-CM) - DDD (degenerative disc disease), lumbosacral  M54.14,M54.17 (ICD-10-CM) - Thoracic and lumbosacral  neuritis  M43.10 (ICD-10-CM) - Acquired spondylolisthesis  M54.42,G89.29 (ICD-10-CM) - Chronic bilateral low back pain with left-sided sciatica    Personal Factors:   age  Decreased fucntional activity tolerance     moderate   Examination  Body Structures and Functions, activity limitations and participation restrictions that may impact the plan of care Body Regions:   back  lower extremities  trunk    Body Systems:    ROM  strength  balance  gait  transfers  transitions    Participation Restrictions:   NA    Activity limitations:   Learning and applying knowledge  no deficits    General Tasks and Commands  no deficits    Communication  Speaks-Romansh; but understands English fair to good.    Mobility  lifting and carrying objects  walking  driving (bike, car, motorcycle)  Stairs    Self care  washing oneself (bathing, drying, washing hands)  toileting  dressing Pants, shoes, and socks.    Domestic Life  shopping  cooking  doing house work (cleaning house, washing dishes, laundry)  assisting others    Interactions/Relationships  no deficits    Life Areas  no deficits    Community and Social Life  recreation and leisure         moderate   Clinical Presentation stable and uncomplicated moderate   Decision Making/ Complexity Score: moderate       GOALS: Pt is in agreement with the following goals.    STG 6 Weeks:  1. Patient will display at least 80% competency and adherence to all HEP and progressions, to display progress towards independence with ability to control and reduce their pain through posture positioning and mechanical movements throughout a typical day.   (with minimal cueing or supervision for therapist).  2. Patient will ambulate for 3/4 to 1/2 mile with least restrictive AD (or none), w/o increasing pain, to demonstrate increasing ambulation tolerance w/ decreasing difficulty.  3. Patient will improve all L LE MMT to a 4+/5 or better, to demonstrate progress towards full Lower Extremity strength.  4.  "Patient will demonstrate improving overall function per FOTO Survey progressing towards CK = at least 40% but < 60% impaired, limited or restricted score or less in order to show subjective improvement of condition.  5. Pt will report LBP of less than 4/10 (at worst) when compared to the initial value resulting in improved ability to perform bending, lifting, and carrying activities safely, confidently.    LTG 13 Weeks:  1. Patient will display 100% competency and adherence to all HEP and progressions, to display independence with ability to control and reduce their pain through posture positioning and mechanical movements throughout a typical day. (W/o cueing or supervision).  2. Patient will improve 5x STS test for functional legs strength to 12 seconds or less, w/o increasing pain, to demonstrate improved functional legs strength.  3. Patient will ambulate for 1/2-1 mile with least restrictive AD (or none), w/o increasing pain, to demonstrate increasing ambulation tolerance w/o limitation.  4. Patient will complete TUG activity in less than 10 seconds, to demonstrate improved mobility, balance, walking ability decreased fall risk at functional gait speed.  5. Patient will improve all L LE MMT to 5/5, to demonstrate full Lower Extremity strength.  6. Patient will demonstrate improved overall function per FOTO Survey to CK = at least 40% but < 60% impaired, limited or restricted score or less in order to show subjective improvement of condition.  7. Patient will display WFL (80-90% of full range) Lumbar spine AROM with decreased pain during movement.    Plan   Outpatient physical therapy 2x week for 13 weeks or 20 visits to include the following:   - Patient education  - Therapeutic exercise  - Manual therapy  - Performance testing   - Neuromuscular Re-education  - Therapeutic activity   - Modalities    Pt may be seen by PTA as part of the rehabilitation team.     Therapist: Melina Reynolds, PT  4/3/2019    "I " "certify the need for these services furnished under this plan of treatment and while under my care."    ____________________________________  Physician/Referring Practitioner    _______________  Date of Signature      "

## 2019-04-18 ENCOUNTER — CLINICAL SUPPORT (OUTPATIENT)
Dept: REHABILITATION | Facility: HOSPITAL | Age: 69
End: 2019-04-18
Attending: FAMILY MEDICINE
Payer: MEDICARE

## 2019-04-18 DIAGNOSIS — R68.89 DECREASED FUNCTIONAL ACTIVITY TOLERANCE: ICD-10-CM

## 2019-04-18 DIAGNOSIS — R26.9 ABNORMALITY OF GAIT AND MOBILITY: ICD-10-CM

## 2019-04-18 DIAGNOSIS — R53.1 WEAKNESS: ICD-10-CM

## 2019-04-18 PROCEDURE — 97110 THERAPEUTIC EXERCISES: CPT | Mod: PN

## 2019-04-18 NOTE — PROGRESS NOTES
Physical Therapy Daily Treatment Note     Name: Alba Reyes  Clinic Number: [id number]    Therapy Diagnosis:   Encounter Diagnoses   Name Primary?    Decreased functional activity tolerance     Weakness     Abnormality of gait and mobility      Physician: Rosendo Quinones Jr., MD    Visit Date: 4/18/2019    Physician: Rosendo Quinones Jr., MD     Medical Diagnoses:  M47.26 (ICD-10-CM) - Other spondylosis with radiculopathy, lumbar region  M47.896 (ICD-10-CM) - Facet hypertrophy of lumbar region  M51.37 (ICD-10-CM) - DDD (degenerative disc disease), lumbosacral  M54.14,M54.17 (ICD-10-CM) - Thoracic and lumbosacral neuritis  M43.10 (ICD-10-CM) - Acquired spondylolisthesis  M54.42,G89.29 (ICD-10-CM) - Chronic bilateral low back pain with left-sided sciatica     Treatment Orders: PT Eval and Treat  Note:     Lumbar protocol with home exercises.  ROM, stretching lumbar and abdominal musculature. Aerobic conditioning, aqua therapy, with modalities including ultrasound and massage PRN. Dry needling if helpful. 3 x week for 6 weeks    Evaluation Date: 4/3/2019  Authorization Period Expiration: 3/25/2020  Plan of Care Expiration: 7/3/19  Reassessment Due: 5/3/19  Visit # / Visits authorized: 2 of 20       Time In: 1330  Time Out: 1426  Total Billable Time: 56 minutes    Precautions: Fall    Subjective     Pt reports: that she is having some pain today in her low back and hips.  Pt reports that the cream that was used on her last session made her it and she would not like to have it again.   She was not compliant with home exercise program.  Response to previous treatment: sore, itchy from the Rico  Functional change: none to report    Pain: 5/10  Location: bilateral back  And hips     Objective     Nichole received therapeutic exercises to develop strength, endurance, ROM, flexibility, posture and core stabilization for 46 minutes including:    Nustep x 8'   LTR with PB x 2'   BOYD with ball x 2'   PPT x10 3-5 s/h  Supine  hamstring stretch 3 x 30'' with strap   HL hip abduction Red TB 2'   HL hip adduction ball squeeze x 2'  Piriformis stretch 3 x 30'' ea       Nichole received the following manual therapy techniques: for 0 minutes.   STM/MFR and Xfibers massage with Rico to lower back in prone      Ice Pack in z-lie to lower back/hip 10 minutes.       Home Exercises Provided and Patient Education Provided     Education provided:   - compliance with HEP    Written Home Exercises Provided: Patient instructed to cont prior HEP.  Exercises were reviewed and Nichole was able to demonstrate them prior to the end of the session.  Nichole demonstrated good  understanding of the education provided.     See EMR under Patient Instructions for exercises provided prior visit.    Assessment     Nichole tolerated treatment session fairly.  Patient with fair tolerance to exercises without provocation of pain.  Pt required minimal cues to perform exercises with proper form.   Nichole is progressing well towards her goals.   Pt prognosis is Good.     Pt will continue to benefit from skilled outpatient physical therapy to address the deficits listed in the problem list box on initial evaluation, provide pt/family education and to maximize pt's level of independence in the home and community environment.     Pt's spiritual, cultural and educational needs considered and pt agreeable to plan of care and goals.     Anticipated barriers to physical therapy: none    STG 6 Weeks:  1. Patient will display at least 80% competency and adherence to all HEP and progressions, to display progress towards independence with ability to control and reduce their pain through posture positioning and mechanical movements throughout a typical day.   (with minimal cueing or supervision for therapist).  2. Patient will ambulate for 3/4 to 1/2 mile with least restrictive AD (or none), w/o increasing pain, to demonstrate increasing ambulation tolerance w/ decreasing difficulty.  3. Patient  will improve all L LE MMT to a 4+/5 or better, to demonstrate progress towards full Lower Extremity strength.  4. Patient will demonstrate improving overall function per FOTO Survey progressing towards CK = at least 40% but < 60% impaired, limited or restricted score or less in order to show subjective improvement of condition.  5. Pt will report LBP of less than 4/10 (at worst) when compared to the initial value resulting in improved ability to perform bending, lifting, and carrying activities safely, confidently.     LTG 13 Weeks:  1. Patient will display 100% competency and adherence to all HEP and progressions, to display independence with ability to control and reduce their pain through posture positioning and mechanical movements throughout a typical day. (W/o cueing or supervision).  2. Patient will improve 5x STS test for functional legs strength to 12 seconds or less, w/o increasing pain, to demonstrate improved functional legs strength.  3. Patient will ambulate for 1/2-1 mile with least restrictive AD (or none), w/o increasing pain, to demonstrate increasing ambulation tolerance w/o limitation.  4. Patient will complete TUG activity in less than 10 seconds, to demonstrate improved mobility, balance, walking ability decreased fall risk at functional gait speed.  5. Patient will improve all L LE MMT to 5/5, to demonstrate full Lower Extremity strength.  6. Patient will demonstrate improved overall function per FOTO Survey to CK = at least 40% but < 60% impaired, limited or restricted score or less in order to show subjective improvement of condition.  7. Patient will display WFL (80-90% of full range) Lumbar spine AROM with decreased pain during movement.      Plan     Continue with current Plan of care    Ryann Grande PTA

## 2019-04-25 ENCOUNTER — CLINICAL SUPPORT (OUTPATIENT)
Dept: REHABILITATION | Facility: HOSPITAL | Age: 69
End: 2019-04-25
Attending: FAMILY MEDICINE
Payer: MEDICARE

## 2019-04-25 DIAGNOSIS — R53.1 WEAKNESS: ICD-10-CM

## 2019-04-25 DIAGNOSIS — R26.9 ABNORMALITY OF GAIT AND MOBILITY: ICD-10-CM

## 2019-04-25 DIAGNOSIS — R68.89 DECREASED FUNCTIONAL ACTIVITY TOLERANCE: ICD-10-CM

## 2019-04-25 PROCEDURE — 97110 THERAPEUTIC EXERCISES: CPT | Mod: PN

## 2019-04-25 PROCEDURE — 97140 MANUAL THERAPY 1/> REGIONS: CPT | Mod: PN

## 2019-04-25 NOTE — PROGRESS NOTES
Physical Therapy Daily Treatment Note     Name: Alba Reyes  Clinic Number: [id number]    Therapy Diagnosis:   Encounter Diagnoses   Name Primary?    Decreased functional activity tolerance     Weakness     Abnormality of gait and mobility      Physician: Rosendo Quinones Jr., MD    Visit Date: 4/25/2019    Physician: Rosendo Quinones Jr., MD     Medical Diagnoses:  M47.26 (ICD-10-CM) - Other spondylosis with radiculopathy, lumbar region  M47.896 (ICD-10-CM) - Facet hypertrophy of lumbar region  M51.37 (ICD-10-CM) - DDD (degenerative disc disease), lumbosacral  M54.14,M54.17 (ICD-10-CM) - Thoracic and lumbosacral neuritis  M43.10 (ICD-10-CM) - Acquired spondylolisthesis  M54.42,G89.29 (ICD-10-CM) - Chronic bilateral low back pain with left-sided sciatica     Treatment Orders: PT Eval and Treat  Note:     Lumbar protocol with home exercises.  ROM, stretching lumbar and abdominal musculature. Aerobic conditioning, aqua therapy, with modalities including ultrasound and massage PRN. Dry needling if helpful. 3 x week for 6 weeks    Evaluation Date: 4/3/2019  Authorization Period Expiration: 3/25/2020  Plan of Care Expiration: 7/3/19  Reassessment Due: 5/3/19  Visit # / Visits authorized: 3 of 20       Time In: 1400  Time Out: 1500  Total Billable Time: 30 minutes    Precautions: Fall    Subjective     Pt reports: that she is having a lot of pain today.   She was not compliant with home exercise program.  Response to previous treatment: sore, itchy from the [place]  Functional change: none to report    Pain: 8/10  Location: bilateral back  And hips     Objective     Nichole received therapeutic exercises to develop strength, endurance, ROM, flexibility, posture and core stabilization for 42 minutes including:    Nustep x 8'   LTR with PB x 2'   BOYD with ball x 2'   PPT x10 3-5 s/h  Supine hamstring stretch 3 x 30'' with strap   HL hip abduction Red TB 2'   HL hip adduction ball squeeze x 2'  Piriformis stretch 3 x 30'' ea        Nichole received the following manual therapy techniques: STM to left superior gluteals and IT bandfor 8 minutes.       Ice Pack in z-lie to lower back/hip 10 minutes.       Home Exercises Provided and Patient Education Provided     Education provided:   - compliance with HEP    Written Home Exercises Provided: Patient instructed to cont prior HEP.  Exercises were reviewed and Nichole was able to demonstrate them prior to the end of the session.  Nichole demonstrated good  understanding of the education provided.     See EMR under Patient Instructions for exercises provided prior visit.    Assessment     Nichole tolerated treatment session fairly.  Patient with fair tolerance to exercises with provocation of pain throughout the left hip and IT band.  Pt responded well to manual care with decreased pain achieved.   Pt required minimal cues to perform exercises with proper form.   Nichole is progressing well towards her goals.   Pt prognosis is Good.     Pt will continue to benefit from skilled outpatient physical therapy to address the deficits listed in the problem list box on initial evaluation, provide pt/family education and to maximize pt's level of independence in the home and community environment.     Pt's spiritual, cultural and educational needs considered and pt agreeable to plan of care and goals.     Anticipated barriers to physical therapy: none    STG 6 Weeks:  1. Patient will display at least 80% competency and adherence to all HEP and progressions, to display progress towards independence with ability to control and reduce their pain through posture positioning and mechanical movements throughout a typical day.   (with minimal cueing or supervision for therapist).  2. Patient will ambulate for 3/4 to 1/2 mile with least restrictive AD (or none), w/o increasing pain, to demonstrate increasing ambulation tolerance w/ decreasing difficulty.  3. Patient will improve all L LE MMT to a 4+/5 or better, to  demonstrate progress towards full Lower Extremity strength.  4. Patient will demonstrate improving overall function per FOTO Survey progressing towards CK = at least 40% but < 60% impaired, limited or restricted score or less in order to show subjective improvement of condition.  5. Pt will report LBP of less than 4/10 (at worst) when compared to the initial value resulting in improved ability to perform bending, lifting, and carrying activities safely, confidently.     LTG 13 Weeks:  1. Patient will display 100% competency and adherence to all HEP and progressions, to display independence with ability to control and reduce their pain through posture positioning and mechanical movements throughout a typical day. (W/o cueing or supervision).  2. Patient will improve 5x STS test for functional legs strength to 12 seconds or less, w/o increasing pain, to demonstrate improved functional legs strength.  3. Patient will ambulate for 1/2-1 mile with least restrictive AD (or none), w/o increasing pain, to demonstrate increasing ambulation tolerance w/o limitation.  4. Patient will complete TUG activity in less than 10 seconds, to demonstrate improved mobility, balance, walking ability decreased fall risk at functional gait speed.  5. Patient will improve all L LE MMT to 5/5, to demonstrate full Lower Extremity strength.  6. Patient will demonstrate improved overall function per FOTO Survey to CK = at least 40% but < 60% impaired, limited or restricted score or less in order to show subjective improvement of condition.  7. Patient will display WFL (80-90% of full range) Lumbar spine AROM with decreased pain during movement.      Plan     Continue with current Plan of care    Ryann Grande PTA

## 2019-04-30 ENCOUNTER — CLINICAL SUPPORT (OUTPATIENT)
Dept: REHABILITATION | Facility: HOSPITAL | Age: 69
End: 2019-04-30
Attending: FAMILY MEDICINE
Payer: MEDICARE

## 2019-04-30 DIAGNOSIS — R26.9 ABNORMALITY OF GAIT AND MOBILITY: ICD-10-CM

## 2019-04-30 DIAGNOSIS — R53.1 WEAKNESS: ICD-10-CM

## 2019-04-30 DIAGNOSIS — R68.89 DECREASED FUNCTIONAL ACTIVITY TOLERANCE: ICD-10-CM

## 2019-04-30 PROCEDURE — 97110 THERAPEUTIC EXERCISES: CPT | Mod: PN

## 2019-04-30 NOTE — PROGRESS NOTES
Physical Therapy Daily Treatment Note     Name: Alba Reyes  Clinic Number: [id number]    Therapy Diagnosis:   Encounter Diagnoses   Name Primary?    Decreased functional activity tolerance     Weakness     Abnormality of gait and mobility      Physician: Rosendo Quinones Jr., MD    Visit Date: 4/30/2019    Physician: Rosendo Quinones Jr., MD     Medical Diagnoses:  M47.26 (ICD-10-CM) - Other spondylosis with radiculopathy, lumbar region  M47.896 (ICD-10-CM) - Facet hypertrophy of lumbar region  M51.37 (ICD-10-CM) - DDD (degenerative disc disease), lumbosacral  M54.14,M54.17 (ICD-10-CM) - Thoracic and lumbosacral neuritis  M43.10 (ICD-10-CM) - Acquired spondylolisthesis  M54.42,G89.29 (ICD-10-CM) - Chronic bilateral low back pain with left-sided sciatica     Treatment Orders: PT Eval and Treat  Note:     Lumbar protocol with home exercises.  ROM, stretching lumbar and abdominal musculature. Aerobic conditioning, aqua therapy, with modalities including ultrasound and massage PRN. Dry needling if helpful. 3 x week for 6 weeks    Evaluation Date: 4/3/2019  Authorization Period Expiration: 3/25/2020  Plan of Care Expiration: 7/3/19  Reassessment Due: 5/3/19  Visit # / Visits authorized: 4 of 20       Time In: 1405  Time Out: 1500  Total Billable Time: 39 minutes    Precautions: Fall    Subjective     Nichole arrives to PT and reports 7-8/10 LBP currently, and L quad pain coming from the groin and down to the knee. She reports feeling sore the IT band massage from last visit. She reports having itching with [place].     She was not compliant with home exercise program.  Response to previous treatment: sore, itchy from the [place]  Functional change: none to report    Pain: 7-8/10  Location: bilateral back  And hips     Objective     Nichole received therapeutic exercises to develop strength, endurance, ROM, flexibility, posture and core stabilization for 25 minutes including:    Nustep x 8'      LTR with PB x 2'   BOYD with ball  "x 2'   PPT x10 3-5 s/h  +Bridges 2x 10  Supine hamstring stretch 3 x 30'' with strap   HL hip abduction Red TB 2'   HL hip adduction ball squeeze x 2'  Piriformis stretch 3 x 30'' ea   +Supine hip flex and quad stretches 3x 10"  + Matrix:  -Rows at 20# x10    -HS curls 15# x10  -Quad 10# x10    Nichole received the following manual therapy techniques:   MFR/Xfibers massage with Biofreeze and cocoa butter to lumbosacral region 9 minutes in prone  STM to left superior gluteals and IT [id number] minutes.       Ice Pack in z-lie to lower back/hip 10 minutes.       Home Exercises Provided and Patient Education Provided     Education provided:   - compliance with HEP    Written Home Exercises Provided: Patient instructed to cont prior HEP.  Exercises were reviewed and Nichole was able to demonstrate them prior to the end of the session.  Nichole demonstrated good  understanding of the education provided.     See EMR under Patient Instructions for exercises provided prior visit.    Assessment     Patient reported improved LBP and hip groin pain following manual and stretches. She reported the hip/quad stretch was uncomfortable bu it helped a lot. Patient tolerated today's session well today. Continue with today's exercises at next session.     Nichole is progressing well towards her goals.     Pt prognosis is Good.     Pt will continue to benefit from skilled outpatient physical therapy to address the deficits listed in the problem list box on initial evaluation, provide pt/family education and to maximize pt's level of independence in the home and community environment.     Pt's spiritual, cultural and educational needs considered and pt agreeable to plan of care and goals.     Anticipated barriers to physical therapy: none    STG 6 Weeks:  1. Patient will display at least 80% competency and adherence to all HEP and progressions, to display progress towards independence with ability to control and reduce their pain through posture " positioning and mechanical movements throughout a typical day.   (with minimal cueing or supervision for therapist).  2. Patient will ambulate for 3/4 to 1/2 mile with least restrictive AD (or none), w/o increasing pain, to demonstrate increasing ambulation tolerance w/ decreasing difficulty.  3. Patient will improve all L LE MMT to a 4+/5 or better, to demonstrate progress towards full Lower Extremity strength.  4. Patient will demonstrate improving overall function per FOTO Survey progressing towards CK = at least 40% but < 60% impaired, limited or restricted score or less in order to show subjective improvement of condition.  5. Pt will report LBP of less than 4/10 (at worst) when compared to the initial value resulting in improved ability to perform bending, lifting, and carrying activities safely, confidently.     LTG 13 Weeks:  1. Patient will display 100% competency and adherence to all HEP and progressions, to display independence with ability to control and reduce their pain through posture positioning and mechanical movements throughout a typical day. (W/o cueing or supervision).  2. Patient will improve 5x STS test for functional legs strength to 12 seconds or less, w/o increasing pain, to demonstrate improved functional legs strength.  3. Patient will ambulate for 1/2-1 mile with least restrictive AD (or none), w/o increasing pain, to demonstrate increasing ambulation tolerance w/o limitation.  4. Patient will complete TUG activity in less than 10 seconds, to demonstrate improved mobility, balance, walking ability decreased fall risk at functional gait speed.  5. Patient will improve all L LE MMT to 5/5, to demonstrate full Lower Extremity strength.  6. Patient will demonstrate improved overall function per FOTO Survey to CK = at least 40% but < 60% impaired, limited or restricted score or less in order to show subjective improvement of condition.  7. Patient will display WFL (80-90% of full range)  Lumbar spine AROM with decreased pain during movement.      Plan     Continue with current Plan of care    Melina Reynolds, PT

## 2019-05-02 ENCOUNTER — CLINICAL SUPPORT (OUTPATIENT)
Dept: REHABILITATION | Facility: HOSPITAL | Age: 69
End: 2019-05-02
Attending: FAMILY MEDICINE
Payer: MEDICARE

## 2019-05-02 DIAGNOSIS — R26.9 ABNORMALITY OF GAIT AND MOBILITY: ICD-10-CM

## 2019-05-02 DIAGNOSIS — R53.1 WEAKNESS: ICD-10-CM

## 2019-05-02 DIAGNOSIS — R68.89 DECREASED FUNCTIONAL ACTIVITY TOLERANCE: ICD-10-CM

## 2019-05-02 PROCEDURE — 97110 THERAPEUTIC EXERCISES: CPT | Mod: PN

## 2019-05-02 NOTE — PROGRESS NOTES
Physical Therapy Daily Treatment Note     Name: Alba Reyes  Clinic Number: [id number]    Therapy Diagnosis:   Encounter Diagnoses   Name Primary?    Decreased functional activity tolerance     Weakness     Abnormality of gait and mobility      Physician: Rosendo Quinones Jr., MD    Visit Date: 5/2/2019    Physician: Rosendo Quinones Jr., MD     Medical Diagnoses:  M47.26 (ICD-10-CM) - Other spondylosis with radiculopathy, lumbar region  M47.896 (ICD-10-CM) - Facet hypertrophy of lumbar region  M51.37 (ICD-10-CM) - DDD (degenerative disc disease), lumbosacral  M54.14,M54.17 (ICD-10-CM) - Thoracic and lumbosacral neuritis  M43.10 (ICD-10-CM) - Acquired spondylolisthesis  M54.42,G89.29 (ICD-10-CM) - Chronic bilateral low back pain with left-sided sciatica     Treatment Orders: PT Eval and Treat  Note:     Lumbar protocol with home exercises.  ROM, stretching lumbar and abdominal musculature. Aerobic conditioning, aqua therapy, with modalities including ultrasound and massage PRN. Dry needling if helpful. 3 x week for 6 weeks    Evaluation Date: 4/3/2019  Authorization Period Expiration: 3/25/2020  Plan of Care Expiration: 7/3/19  Reassessment Due: 5/3/19  Visit # / Visits authorized: 5 of 20       Time In: 1340  Time Out: 1430  Total Billable Time: 38 minutes    Precautions: Fall    Subjective     Nichole reports that she is still having the same pain in her back and hips.      She was not compliant with home exercise program.  Response to previous treatment: sore, itchy from the [place]  Functional change: none to report    Pain: 7-8/10  Location: bilateral back  And hips     Objective     Nichole received therapeutic exercises to develop strength, endurance, ROM, flexibility, posture and core stabilization for 25 minutes including:    Nustep x 8'  - np today     LTR with PB x 2'   BOYD with ball x 2'   PPT x10 3-5 s/h  Bridges 2x 10  Supine hamstring stretch 3 x 30'' with strap   HL hip abduction Red TB 2'   HL hip  "adduction ball squeeze x 2'  Piriformis stretch 3 x 30'' ea   Supine hip flex and quad stretches 3x 10"     Matrix:  -Rows at 20# x10    -HS curls 15# x10  -Quad 10# x10    Nichole received the following manual therapy techniques:   MFR/Xfibers massage with Biofreeze and cocoa butter to lumbosacral region 8 minutes in prone  STM to left superior gluteals and IT [id number] minutes.       Ice Pack in z-lie to lower back/hip 10 minutes.       Home Exercises Provided and Patient Education Provided     Education provided:   - compliance with HEP    Written Home Exercises Provided: Patient instructed to cont prior HEP.  Exercises were reviewed and Nichole was able to demonstrate them prior to the end of the session.  Nichole demonstrated good  understanding of the education provided.     See EMR under Patient Instructions for exercises provided prior visit.    Assessment     Patient tolerated treatment session well today.  Pt with good response to exercises with appropriate training effect achieved.  Patient with good tolerance to manual care with increased tissue restrictions palpated throughout the superior gluteals.  Pt with decreased pain and increased tissue mobility post treatment session.     Nichole is progressing well towards her goals.     Pt prognosis is Good.     Pt will continue to benefit from skilled outpatient physical therapy to address the deficits listed in the problem list box on initial evaluation, provide pt/family education and to maximize pt's level of independence in the home and community environment.     Pt's spiritual, cultural and educational needs considered and pt agreeable to plan of care and goals.     Anticipated barriers to physical therapy: none    STG 6 Weeks:  1. Patient will display at least 80% competency and adherence to all HEP and progressions, to display progress towards independence with ability to control and reduce their pain through posture positioning and mechanical movements throughout " a typical day.   (with minimal cueing or supervision for therapist).  2. Patient will ambulate for 3/4 to 1/2 mile with least restrictive AD (or none), w/o increasing pain, to demonstrate increasing ambulation tolerance w/ decreasing difficulty.  3. Patient will improve all L LE MMT to a 4+/5 or better, to demonstrate progress towards full Lower Extremity strength.  4. Patient will demonstrate improving overall function per FOTO Survey progressing towards CK = at least 40% but < 60% impaired, limited or restricted score or less in order to show subjective improvement of condition.  5. Pt will report LBP of less than 4/10 (at worst) when compared to the initial value resulting in improved ability to perform bending, lifting, and carrying activities safely, confidently.     LTG 13 Weeks:  1. Patient will display 100% competency and adherence to all HEP and progressions, to display independence with ability to control and reduce their pain through posture positioning and mechanical movements throughout a typical day. (W/o cueing or supervision).  2. Patient will improve 5x STS test for functional legs strength to 12 seconds or less, w/o increasing pain, to demonstrate improved functional legs strength.  3. Patient will ambulate for 1/2-1 mile with least restrictive AD (or none), w/o increasing pain, to demonstrate increasing ambulation tolerance w/o limitation.  4. Patient will complete TUG activity in less than 10 seconds, to demonstrate improved mobility, balance, walking ability decreased fall risk at functional gait speed.  5. Patient will improve all L LE MMT to 5/5, to demonstrate full Lower Extremity strength.  6. Patient will demonstrate improved overall function per FOTO Survey to CK = at least 40% but < 60% impaired, limited or restricted score or less in order to show subjective improvement of condition.  7. Patient will display WFL (80-90% of full range) Lumbar spine AROM with decreased pain during  movement.      Plan     Continue with current Plan of care    Ryann Grande, PTA

## 2019-05-07 ENCOUNTER — TELEPHONE (OUTPATIENT)
Dept: SPINE | Facility: CLINIC | Age: 69
End: 2019-05-07

## 2019-05-08 ENCOUNTER — CLINICAL SUPPORT (OUTPATIENT)
Dept: REHABILITATION | Facility: HOSPITAL | Age: 69
End: 2019-05-08
Attending: FAMILY MEDICINE
Payer: MEDICARE

## 2019-05-08 DIAGNOSIS — R26.9 ABNORMALITY OF GAIT AND MOBILITY: ICD-10-CM

## 2019-05-08 DIAGNOSIS — R68.89 DECREASED FUNCTIONAL ACTIVITY TOLERANCE: ICD-10-CM

## 2019-05-08 DIAGNOSIS — R53.1 WEAKNESS: ICD-10-CM

## 2019-05-08 PROCEDURE — 97110 THERAPEUTIC EXERCISES: CPT | Mod: PN

## 2019-05-08 NOTE — TELEPHONE ENCOUNTER
----- Message from Danielle Deluca MA sent at 5/7/2019  3:22 PM CDT -----  Patient says she is feeling much better.  ----- Message -----  From: Jillian Sood PA-C  Sent: 5/7/2019   8:08 AM  To: [facility] Staff    Please call and see how she is doing with PT. If no improvement, then recommend MRI and possible injections.     Let me know.     Thanks.

## 2019-05-08 NOTE — PROGRESS NOTES
Physical Therapy Daily Treatment Note     Name: Alba Reyes  [facility] Number: [id number]    Therapy Diagnosis:   Encounter Diagnoses   Name Primary?    Decreased functional activity tolerance     Weakness     Abnormality of gait and mobility      Physician: Rosendo Quinones Jr., MD    Visit Date: 5/8/2019    Physician: Rosendo Quinones Jr., MD     Medical Diagnoses:  M47.26 (ICD-10-CM) - Other spondylosis with radiculopathy, lumbar region  M47.896 (ICD-10-CM) - Facet hypertrophy of lumbar region  M51.37 (ICD-10-CM) - DDD (degenerative disc disease), lumbosacral  M54.14,M54.17 (ICD-10-CM) - Thoracic and lumbosacral neuritis  M43.10 (ICD-10-CM) - Acquired spondylolisthesis  M54.42,G89.29 (ICD-10-CM) - Chronic bilateral low back pain with left-sided sciatica     Treatment Orders: PT Eval and Treat  Note:     Lumbar protocol with home exercises.  ROM, stretching lumbar and abdominal musculature. Aerobic conditioning, aqua therapy, with modalities including ultrasound and massage PRN. Dry needling if helpful. 3 x week for 6 weeks    Evaluation Date: 4/3/2019  Authorization Period Expiration: 3/25/2020  Plan of Care Expiration: 7/3/19  Reassessment Due: 5/3/19  Visit # / Visits authorized: 6 of 20       Time In: 1403  Time Out: 1500  Total Billable Time: 35 minutes    Precautions: Fall    Subjective     Nichole arrives to PT and reports a 9./10 L hip/ITB pain on 9/10. She is having some difficulty with ambulation.     She was not compliant with home exercise program.  Response to previous treatment: sore, itchy from the [place]  Functional change: none to report    Pain: 9/10 L hip/ITB today    Location: bilateral back  And hips     Objective     Nichole received therapeutic exercises to develop strength, endurance, ROM, flexibility, posture and core stabilization for 30 minutes including:    Nustep x 8'  - np today     LTR with PB x 2'    BOYD with ball x 5   PPT x10 3-5 s/h  Bridges 2x 10    Passive Stretches L LE: 8  "Minutes  HS  Piriformis  PKB==>Prone hip ext with PKB  IR    Standing with Tr A: 2x 10 reps ea  Hip flex  Hip Ext  Hip Abd  Hip Ad    Supine hamstring stretch 3 x 30'' with strap   HL hip abduction Red TB 2'   HL hip adduction ball squeeze x 2'  Piriformis stretch 3 x 30'' ea   Supine hip flex and quad stretches 3x 10"     Matrix:  -Rows at 20# x10  -HS curls 15# x10  -Quad 10# x10    Nichole received the following manual therapy techniques:   MFR/Xfibers massage with Biofreeze and cocoa butter to lumbosacral region 00 minutes in prone  Roller massage  to left superior gluteals and IT band for 3 minutes.     Hot Pack in R SL, to L hip/ITB for 6 minutes    Ice Pack in z-lie to lower back/hip 10 minutes.       Home Exercises Provided and Patient Education Provided     Education provided:   - compliance with HEP    Written Home Exercises Provided: Patient instructed to cont prior HEP.  Exercises were reviewed and Nichole was able to demonstrate them prior to the end of the session.  Nichole demonstrated good  understanding of the education provided.     See EMR under Patient Instructions for exercises provided prior visit.    Assessment     Patient reported improved pain today with hot pack and passive stretches.  Additional standing exercises were added w/o significant difficulties. Re-assess patient at next visit.     Nichole is progressing well towards her goals.     Pt prognosis is Good.     Pt will continue to benefit from skilled outpatient physical therapy to address the deficits listed in the problem list box on initial evaluation, provide pt/family education and to maximize pt's level of independence in the home and community environment.     Pt's spiritual, cultural and educational needs considered and pt agreeable to plan of care and goals.     Anticipated barriers to physical therapy: none    STG 6 Weeks:  1. Patient will display at least 80% competency and adherence to all HEP and progressions, to display progress " towards independence with ability to control and reduce their pain through posture positioning and mechanical movements throughout a typical day.   (with minimal cueing or supervision for therapist).  2. Patient will ambulate for 3/4 to 1/2 mile with least restrictive AD (or none), w/o increasing pain, to demonstrate increasing ambulation tolerance w/ decreasing difficulty.  3. Patient will improve all L LE MMT to a 4+/5 or better, to demonstrate progress towards full Lower Extremity strength.  4. Patient will demonstrate improving overall function per FOTO Survey progressing towards CK = at least 40% but < 60% impaired, limited or restricted score or less in order to show subjective improvement of condition.  5. Pt will report LBP of less than 4/10 (at worst) when compared to the initial value resulting in improved ability to perform bending, lifting, and carrying activities safely, confidently.     LTG 13 Weeks:  1. Patient will display 100% competency and adherence to all HEP and progressions, to display independence with ability to control and reduce their pain through posture positioning and mechanical movements throughout a typical day. (W/o cueing or supervision).  2. Patient will improve 5x STS test for functional legs strength to 12 seconds or less, w/o increasing pain, to demonstrate improved functional legs strength.  3. Patient will ambulate for 1/2-1 mile with least restrictive AD (or none), w/o increasing pain, to demonstrate increasing ambulation tolerance w/o limitation.  4. Patient will complete TUG activity in less than 10 seconds, to demonstrate improved mobility, balance, walking ability decreased fall risk at functional gait speed.  5. Patient will improve all L LE MMT to 5/5, to demonstrate full Lower Extremity strength.  6. Patient will demonstrate improved overall function per FOTO Survey to CK = at least 40% but < 60% impaired, limited or restricted score or less in order to show subjective  improvement of condition.  7. Patient will display WFL (80-90% of full range) Lumbar spine AROM with decreased pain during movement.      Plan     Continue with current Plan of care    Melina Reynolds, PT

## 2019-12-02 ENCOUNTER — TELEPHONE (OUTPATIENT)
Dept: FAMILY MEDICINE | Facility: CLINIC | Age: 69
End: 2019-12-02

## 2019-12-02 NOTE — TELEPHONE ENCOUNTER
----- Message from Leigh Ramirez sent at 12/2/2019  9:16 AM CST -----  Contact: pt  Type: Patient Call Back    Who called:pt    What is the request in detail:pt wants to discuss problems with her veins and her leg is red. Call pt    Can the clinic reply by [facility]?    Would the patient rather a call back or a response via [facility]? call    Best call back number:[phone]    Additional Information:

## 2019-12-03 ENCOUNTER — TELEPHONE (OUTPATIENT)
Dept: FAMILY MEDICINE | Facility: CLINIC | Age: 69
End: 2019-12-03

## 2019-12-03 ENCOUNTER — OFFICE VISIT (OUTPATIENT)
Dept: FAMILY MEDICINE | Facility: CLINIC | Age: 69
End: 2019-12-03
Payer: MEDICARE

## 2019-12-03 ENCOUNTER — TELEPHONE (OUTPATIENT)
Dept: ADMINISTRATIVE | Facility: HOSPITAL | Age: 69
End: 2019-12-03

## 2019-12-03 VITALS
HEIGHT: 64 IN | SYSTOLIC BLOOD PRESSURE: 137 MMHG | RESPIRATION RATE: 17 BRPM | HEART RATE: 77 BPM | BODY MASS INDEX: 23.64 KG/M2 | OXYGEN SATURATION: 98 % | WEIGHT: 138.44 LBS | DIASTOLIC BLOOD PRESSURE: 77 MMHG | TEMPERATURE: 98 F

## 2019-12-03 DIAGNOSIS — I87.2 VENOUS INSUFFICIENCY OF BOTH LOWER EXTREMITIES: Primary | ICD-10-CM

## 2019-12-03 PROCEDURE — 1159F PR MEDICATION LIST DOCUMENTED IN MEDICAL RECORD: ICD-10-PCS | Mod: S$GLB,,, | Performed by: NURSE PRACTITIONER

## 2019-12-03 PROCEDURE — 99214 PR OFFICE/OUTPT VISIT, EST, LEVL IV, 30-39 MIN: ICD-10-PCS | Mod: S$GLB,,, | Performed by: NURSE PRACTITIONER

## 2019-12-03 PROCEDURE — 99999 PR PBB SHADOW E&M-EST. PATIENT-LVL IV: ICD-10-PCS | Mod: PBBFAC,,, | Performed by: NURSE PRACTITIONER

## 2019-12-03 PROCEDURE — 99214 OFFICE O/P EST MOD 30 MIN: CPT | Mod: S$GLB,,, | Performed by: NURSE PRACTITIONER

## 2019-12-03 PROCEDURE — 1101F PR PT FALLS ASSESS DOC 0-1 FALLS W/OUT INJ PAST YR: ICD-10-PCS | Mod: CPTII,S$GLB,, | Performed by: NURSE PRACTITIONER

## 2019-12-03 PROCEDURE — 99999 PR PBB SHADOW E&M-EST. PATIENT-LVL IV: CPT | Mod: PBBFAC,,, | Performed by: NURSE PRACTITIONER

## 2019-12-03 PROCEDURE — 1101F PT FALLS ASSESS-DOCD LE1/YR: CPT | Mod: CPTII,S$GLB,, | Performed by: NURSE PRACTITIONER

## 2019-12-03 PROCEDURE — 1159F MED LIST DOCD IN RCRD: CPT | Mod: S$GLB,,, | Performed by: NURSE PRACTITIONER

## 2019-12-03 PROCEDURE — 1125F AMNT PAIN NOTED PAIN PRSNT: CPT | Mod: S$GLB,,, | Performed by: NURSE PRACTITIONER

## 2019-12-03 PROCEDURE — 1125F PR PAIN SEVERITY QUANTIFIED, PAIN PRESENT: ICD-10-PCS | Mod: S$GLB,,, | Performed by: NURSE PRACTITIONER
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
## 2019-12-03 NOTE — TELEPHONE ENCOUNTER
----- Message from Ashli Alejo RN sent at 12/3/2019  2:16 PM CST -----  Good afternoon,    Alba Reyes has been scheduled to see Dr Rob Yates on 12/6/2019.  This is an error Dr Yates is a Pediatric CV surgeon not vascular.  Please reschedule appropriately.    Thank you,  Vandana Alejo RN

## 2019-12-03 NOTE — TELEPHONE ENCOUNTER
Left message for patient to call clinic regarding rescheduling her appt. The initial appt was scheduled incorrectly.

## 2019-12-05 ENCOUNTER — TELEPHONE (OUTPATIENT)
Dept: VASCULAR SURGERY | Facility: CLINIC | Age: 69
End: 2019-12-05

## 2019-12-05 DIAGNOSIS — I87.2 VENOUS INSUFFICIENCY: Primary | ICD-10-CM

## 2019-12-05 NOTE — TELEPHONE ENCOUNTER
----- Message from Yousuf Torrez RN sent at 12/5/2019  2:07 PM CST -----  Contact: Pt   Yaquelin...     This message is for leg problems, which is Vascular Surgery, not CV Surgery.... Thanks.... Diego Torrez RN      Vascular Surgery Support Staff:  Called patient to see what type appt she was seeking.... She is having leg issues.....The message originally came to CV Surgery      ----- Message -----  From: Yaquelin Contreras  Sent: 12/5/2019   1:49 PM CST  To: , #    Reason: Pt is calling to schedule appt she has a order from PRANAY Vallejo     Communication: [phone]

## 2020-01-03 ENCOUNTER — HOSPITAL ENCOUNTER (OUTPATIENT)
Dept: CARDIOLOGY | Facility: HOSPITAL | Age: 70
Discharge: HOME | End: 2020-01-03
Attending: SURGERY
Payer: MEDICARE

## 2020-01-03 DIAGNOSIS — I87.2 VENOUS INSUFFICIENCY: ICD-10-CM

## 2020-01-03 PROCEDURE — 93970 EXTREMITY STUDY: CPT | Mod: 26,HCNC,, | Performed by: SURGERY

## 2020-01-03 PROCEDURE — 93970 EXTREMITY STUDY: CPT | Mod: 50,TC

## 2020-01-03 PROCEDURE — 93970 CV US LOWER VENOUS INSUFFICIENCY BILATERAL (CUPID ONLY): ICD-10-PCS | Mod: 26,HCNC,, | Performed by: SURGERY

## 2020-01-06 ENCOUNTER — OFFICE VISIT (OUTPATIENT)
Dept: VASCULAR SURGERY | Facility: CLINIC | Age: 70
End: 2020-01-06
Payer: MEDICARE

## 2020-01-06 VITALS
HEART RATE: 73 BPM | BODY MASS INDEX: 24.01 KG/M2 | HEIGHT: 64 IN | SYSTOLIC BLOOD PRESSURE: 138 MMHG | DIASTOLIC BLOOD PRESSURE: 80 MMHG | WEIGHT: 140.63 LBS

## 2020-01-06 DIAGNOSIS — I87.2 VENOUS INSUFFICIENCY: Primary | ICD-10-CM

## 2020-01-06 DIAGNOSIS — I83.93 SPIDER VEINS OF BOTH LOWER EXTREMITIES: ICD-10-CM

## 2020-01-06 DIAGNOSIS — I83.813 VARICOSE VEINS OF BILATERAL LOWER EXTREMITIES WITH PAIN: ICD-10-CM

## 2020-01-06 LAB
LEFT GREAT SAPHENOUS DISTAL THIGH DIA: 0.6 CM
LEFT GREAT SAPHENOUS DISTAL THIGH REFLUX: 3109 MS
LEFT GREAT SAPHENOUS JUNCTION DIA: 1.2 CM
LEFT GREAT SAPHENOUS JUNCTION REFLUX: 1039 MS
LEFT GREAT SAPHENOUS KNEE DIA: 0.3 CM
LEFT GREAT SAPHENOUS KNEE REFLUX: 1796 MS
LEFT GREAT SAPHENOUS MIDDLE THIGH DIA: 0.6 CM
LEFT GREAT SAPHENOUS MIDDLE THIGH REFLUX: 2530 MS
LEFT GREAT SAPHENOUS PROXIMAL CALF DIA: 0.2 CM
LEFT GREAT SAPHENOUS PROXIMAL CALF REFLUX: 0 MS
LEFT SMALL SAPHENOUS KNEE DIA: 0.3 CM
LEFT SMALL SAPHENOUS KNEE REFLUX: 0 MS
LEFT SMALL SAPHENOUS SPJ DIA: 0.3 CM
LEFT SMALL SAPHENOUS SPJ REFLUX: 0 MS
RIGHT GREAT SAPHENOUS DISTAL THIGH DIA: 0.6 CM
RIGHT GREAT SAPHENOUS DISTAL THIGH REFLUX: 1807 MS
RIGHT GREAT SAPHENOUS JUNCTION DIA: 1.2 CM
RIGHT GREAT SAPHENOUS JUNCTION REFLUX: 1016 MS
RIGHT GREAT SAPHENOUS KNEE DIA: 0.7 CM
RIGHT GREAT SAPHENOUS KNEE REFLUX: 1743 MS
RIGHT GREAT SAPHENOUS MIDDLE THIGH DIA: 0.6 CM
RIGHT GREAT SAPHENOUS MIDDLE THIGH REFLUX: 897 MS
RIGHT SMALL SAPHENOUS KNEE DIA: 0.2 CM
RIGHT SMALL SAPHENOUS KNEE REFLUX: 0 MS

## 2020-01-06 PROCEDURE — 1125F PR PAIN SEVERITY QUANTIFIED, PAIN PRESENT: ICD-10-PCS | Mod: HCNC,S$GLB,, | Performed by: SURGERY

## 2020-01-06 PROCEDURE — 1101F PT FALLS ASSESS-DOCD LE1/YR: CPT | Mod: HCNC,CPTII,S$GLB, | Performed by: SURGERY

## 2020-01-06 PROCEDURE — 1125F AMNT PAIN NOTED PAIN PRSNT: CPT | Mod: HCNC,S$GLB,, | Performed by: SURGERY

## 2020-01-06 PROCEDURE — 99999 PR PBB SHADOW E&M-EST. PATIENT-LVL III: ICD-10-PCS | Mod: PBBFAC,,, | Performed by: SURGERY

## 2020-01-06 PROCEDURE — 1159F PR MEDICATION LIST DOCUMENTED IN MEDICAL RECORD: ICD-10-PCS | Mod: HCNC,S$GLB,, | Performed by: SURGERY

## 2020-01-06 PROCEDURE — 1101F PR PT FALLS ASSESS DOC 0-1 FALLS W/OUT INJ PAST YR: ICD-10-PCS | Mod: HCNC,CPTII,S$GLB, | Performed by: SURGERY

## 2020-01-06 PROCEDURE — 99204 OFFICE O/P NEW MOD 45 MIN: CPT | Mod: HCNC,S$GLB,, | Performed by: SURGERY

## 2020-01-06 PROCEDURE — 99204 PR OFFICE/OUTPT VISIT, NEW, LEVL IV, 45-59 MIN: ICD-10-PCS | Mod: HCNC,S$GLB,, | Performed by: SURGERY

## 2020-01-06 PROCEDURE — 1159F MED LIST DOCD IN RCRD: CPT | Mod: HCNC,S$GLB,, | Performed by: SURGERY

## 2020-01-06 PROCEDURE — 99999 PR PBB SHADOW E&M-EST. PATIENT-LVL III: CPT | Mod: PBBFAC,,, | Performed by: SURGERY

## 2020-01-06 NOTE — PATIENT INSTRUCTIONS
Putting on Compression Stockings     Turn the stocking inside-out, then fit it over your toes and heel.          Roll the stocking up your leg.            Once stockings are on, make sure the top of the stocking is about two fingers width below the crease of the knee (or the groin if you wear thigh-high stockings).          Use equipment, such as a stocking frantz, or wear rubber gloves to make it easier to put on compression stockings.         Elastic compression stockings are prescribed to treat many vein problems. Wearing them may be the most important thing you do to manage your symptoms. The stockings fit tightly around your ankle, gradually reducing in pressure as they go up your legs. This helps keep blood flowing to your heart. As a result, swelling is reduced. Your healthcare provider will prescribe stockings at a safe pressure for you. He or she will also tell you how often to wear and remove the stockings. Follow these instructions closely. Also, do not buy or wear compression stockings without first seeing your healthcare provider.  Tips for wear and care  To wear stockings safely and to get the most benefit:  · Wear the length prescribed by your healthcare provider.  · Pull them to the designated height and no farther. Dont let them bunch at the top. This can restrict blood flow and increase swelling.  · Wear the stockings for the amount of time your healthcare provider recommends. Replace them when they start to feel loose. This will likely be every 3 to 6 months.  · Remove them as your healthcare provider directs. When removed, wash your legs. Then check your legs and feet for sores. Call your healthcare provider if you find a sore. Dont put the stockings back on unless your healthcare provider directs.   · Wash the stockings as instructed. They may need to be hand-washed.  Date Last Reviewed: 5/1/2016  © 5180-6094 [employer]. [address] [place] [address], [place] [address]. All rights  reserved. This information is not intended as a substitute for professional medical care. Always follow your healthcare professional's instructions.        Understanding Chronic Venous Insufficiency  Problems with the veins in the legs may lead to chronic venous insufficiency (CVI). CVI means that there is a long-term problem with the veins not being able to pump blood back to your heart. When this happens, blood stays in the legs and causes swelling and aching.   Two problems that may lead to chronic venous insufficiency are:  · Damaged valves. Valves keep blood flowing from the legs through the blood vessels and back to the heart. When the valves are damaged, blood does not flow as well.   · Deep vein thrombosis (DVT). Blood clots may form in the deep veins of the legs. This may cause pain, redness, and swelling in the legs. It may also block the flow of blood back to the heart. Seek immediate medical care if you have these symptoms.  · A blood clot in the leg can also break off and travel to the lungs. This is called pulmonary embolism (PE). In the lungs, the clot can cut off the flow of blood. This may cause chest pain, trouble breathing, sweating, a fast heartbeat, coughing (may cough up blood), and fainting. It is a medical emergency and may cause death. Call 911 if you have these symptoms.  · Healthcare providers call the two conditions, DVT and PE, venous thromboembolism (VTE).  CVI cant be cured, but you can control leg swelling to reduce the likelihood of ulcers (sores).  Recognizing the symptoms  Be aware of the following:  · If you stand or sit with your feet down for long periods, your legs may ache or feel heavy.  · Swollen ankles are possibly the most common symptom of CVI.  · As swelling increases, the skin over your ankles may show red spots or a brownish tinge. The skin may feel leathery or scaly, and may start to itch.  · If swelling is not controlled, an ulcer (open wound) may form.  What you  can do  Reduce your risk of developing ulcers by doing the following:  · Increase blood flow back to your heart by elevating your legs, exercising daily, and wearing elastic stockings.  · Boost blood flow in your legs by losing excess weight.  · If you must stand or sit in one place for a period of time, keep your blood moving by wiggling your toes, shifting your body position, and rising up on the balls of your feet.    Date Last Reviewed: 5/1/2016 © 2000-2017 [employer]. [address] [place] [address], [place] [address]. All rights reserved. This information is not intended as a substitute for professional medical care. Always follow your healthcare professional's instructions.        Tips for Using Less Salt    Most people with heart problems need to eat less salt (sodium). Reducing the amount of salt you eat may help control your blood pressure. The higher your blood pressure, the greater your risk for heart disease, stroke, blindness, and kidney problems.  At the store  · Make low-salt choices by reading labels carefully. Look for the total amount of sodium per serving.  · Use more fresh food. Buy more fruits and vegetables. Select lean meats, fish, and poultry.  · Use fewer frozen, canned, and packaged foods which often contain a lot of sodium.  · Use plain frozen vegetables without sauces or toppings. These products are often low- or no-sodium.  · Opt for reduced-sodium or no-salt-added versions of canned vegetables and soups.  In the kitchen  · Don't add salt to food when you're cooking. Season with flavorings such as onion, garlic, pepper, salt-free herbal blends, and lemon or lime juice.  · Use a cookbook containing low-salt recipes. It can give you ideas for tasty meals that are healthy for your heart.  · Sprinkle salt-free herbal blends on vegetables and meat.  · Drain and rinse canned foods, such as canned beans and vegetables, before cooking or eating.  Eating out  · Tell the  you're on a  low-salt diet. Ask questions about the menu.  · Order fish, chicken, and meat broiled, baked, poached, or grilled without salt, butter, or breading.  · Use lemon, pepper, and salt-free herb mixes to add flavor.  · Choose plain steamed rice, boiled noodles, and baked or boiled potatoes. Top potatoes with chives and a little sour cream.     Beware! Salt goes by many other names. Limit foods with these words listed as ingredients: salt, sodium, soy sauce, baking soda, baking powder, MSG, monosodium, Na (the chemical symbol for sodium). Some antacids are also high in salt.   Date Last Reviewed: 6/19/2015 © 2000-2017 [employer]. [address] [place] [address]. All rights reserved. This information is not intended as a substitute for professional medical care. Always follow your healthcare professional's instructions.        Low-Salt Diet  This diet removes foods that are high in salt. It also limits the amount of salt you use when cooking. It is most often used for people with high blood pressure, edema (fluid retention), and kidney, liver, or heart disease.  Table salt contains the mineral sodium. Your body needs sodium to work normally. But too much sodium can make your health problems worse. Your healthcare provider is recommending a low-salt (also called low-sodium) diet for you. Your total daily allowance of salt is 1,500 to 2,300 milligrams (mg). It is less than 1 teaspoon of table salt. This means you can have only about 500 to 700 mg of sodium at each meal. People with certain health problems should limit salt intake to the lower end of the recommended range.    When you cook, dont add much salt. If you can cook without using salt, even better. Dont add salt to your food at the table.  When shopping, read food labels. Salt is often called sodium on the label. Choose foods that are salt-free, low salt, or very low salt. Note that foods with reduced salt may not lower your salt intake  enough.    Beans, potatoes, and pasta  Ok: Dry beans, split peas, lentils, potatoes, rice, macaroni, pasta, spaghetti without added salt  Avoid: Potato chips, tortilla chips, and similar products  Breads and cereals  Ok: Low-sodium breads, rolls, cereals, and cakes; low-salt crackers, matzo crackers  Avoid: Salted crackers, pretzels, popcorn, Belarusian toast, pancakes, muffins  Dairy  Ok: Milk, chocolate milk, hot chocolate mix, low-salt cheeses, and yogurt  Avoid: Processed cheese and cheese spreads; Roquefort, Camembert, and cottage cheese; buttermilk, instant breakfast drink  Desserts  Ok: Ice cream, frozen yogurt, juice bars, gelatin, cookies and pies, sugar, honey, jelly, hard candy  Avoid: Most pies, cakes and cookies prepared or processed with salt; instant pudding  Drinks  Ok: Tea, coffee, fizzy (carbonated) drinks, juices  Avoid: Flavored coffees, electrolyte replacement drinks, sports drinks  Meats  Ok: All fresh meat, fish, poultry, low-salt tuna, eggs, egg substitute  Avoid: Smoked, pickled, brine-cured, or salted meats and fish. This includes ca, chipped beef, corned beef, hot dogs, deli meats, ham, kosher meats, salt pork, sausage, canned tuna, salted codfish, smoked salmon, herring, sardines, or anchovies.  Seasonings and spices  Ok: Most seasonings are okay. Good substitutes for salt include: fresh herb blends, hot sauce, lemon, garlic, ibarra, vinegar, dry mustard, parsley, cilantro, horseradish, tomato paste, regular margarine, mayonnaise, unsalted butter, cream cheese, vegetable oil, cream, low-salt salad dressing and gravy.  Avoid: Regular ketchup, relishes, pickles, soy sauce, teriyaki sauce, Worcestershire sauce, BBQ sauce, tartar sauce, meat tenderizer, chili sauce, regular gravy, regular salad dressing, salted butter  Soups  Ok: Low-salt soups and broths made with allowed foods  Avoid: Bouillon cubes, soups with smoked or salted meats, regular soup and broth  Vegetables  Ok: Most vegetables  are okay; also low-salt tomato and vegetable juices  Avoid: Sauerkraut and other brine-soaked vegetables; pickles and other pickled vegetables; tomato juice, olives  Date Last Reviewed: 8/1/2016 © 2000-2017 [employer]. [address] [place] [address]. All rights reserved. This information is not intended as a substitute for professional medical care. Always follow your healthcare professional's instructions.        Low-Salt Choices  Eating salt (sodium) can make your body retain too much water. Excess water makes your heart work harder. Canned, packaged, and frozen foods are easy to prepare, but they are often high in sodium. Here are some ideas for low-salt foods you can easily prepare yourself.    For breakfast  · Fruit or 100% fruit juice  · Whole-wheat bread or an English muffin. Compare sodium content on labels.  · Low-fat milk or yogurt  · Unsalted eggs  · Shredded wheat  · Corn tortillas  · Unsalted steamed rice  · Regular (not instant) hot cereal, made without salt  Stay away from:  · Sausage, ca, and ham  · Flour tortillas  · Packaged muffins, pancakes, and biscuits  · Instant hot cereals  · Cottage cheese  For lunch and dinner  · Fresh fish, chicken, turkey, or meat--baked, broiled, or roasted without salt  · Dry beans, cooked without salt  · Tofu, stir-fried without salt  · Unsalted fresh fruit and vegetables, or frozen or canned fruit and vegetables with no added salt  Stay away from:  · Lunch or deli meat that is cured or smoked  · Cheese  · Tomato juice and catsup  · Canned vegetables, soups, and fish not labeled as no-salt-added or reduced sodium  · Packaged gravies and sauces  · Olives, pickles, and relish  · Bottled salad dressings  For snacks and desserts  · Yogurt  · Unsalted, air popped popcorn  · Unsalted nuts or seeds  Stay away from:  · Pies and cakes  · Packaged dessert mixes  · Pizza  · Canned and packaged puddings  · Pretzels, chips, crackers, and nuts--unless  the label says unsalted  Date Last Reviewed: 6/17/2015  © 2953-4444 [employer]. [address] [place] [address], [place] [address]. All rights reserved. This information is not intended as a substitute for professional medical care. Always follow your healthcare professional's instructions.

## 2020-01-06 NOTE — PROGRESS NOTES
Yunior Cantor MD, RPVI                                 [facility]                                                        01/06/2020    HPI:  Alba M Reyes is a 69 y.o. female with   Patient Active Problem List   Diagnosis    Right upper quadrant pain    Acute cholecystitis due to biliary calculus    Incarcerated umbilical hernia    Decreased functional activity tolerance    Weakness    Abnormality of gait and mobility    being managed by PCP and specialists who is here today for evaluation of BLE pain and edema.  Patient states location is BLE from knee distally occurring for 5 yrs.  Associated signs and symptoms include discoloration, varicose veins and spider veins.  Quality is throbbing and severity is 9/10.  Symptoms began 2015.  Alleviating factors include antibiotics that she was started on last week.  Worsening factors include high Na diet.  Patient is not wearing compression stockings daily.  No FH of venous disease.  Symptoms do not limit patient's functional status and daily activities.  No DVT history.  No venous interventions.  No low sodium diet.  No excessive water intake.    Migraine with aura: no  PFO/ASD/right to left shunt: no  Pregnant: No  Breastfeeding: No    no MI  no Stroke  Tobacco use: denies    Past Medical History:   Diagnosis Date    Arthritis      History reviewed. No pertinent surgical history.  History reviewed. No pertinent family history.  Social History     Socioeconomic History    Marital status:      Spouse name: Not on file    Number of children: Not on file    Years of education: Not on file    Highest education level: Not on file   Occupational History    Not on file   Social Needs    Financial resource strain: Not on file    Food insecurity:     Worry: Not on file     Inability: Not on file    Transportation needs:     Medical: Not on file     Non-medical: Not on file   Tobacco Use    Smoking status: Never Smoker   Substance  "and Sexual Activity    Alcohol use: Not on file    Drug use: Not on file    Sexual activity: Not on file   Lifestyle    Physical activity:     Days per week: Not on file     Minutes per session: Not on file    Stress: Not on file   Relationships    Social connections:     Talks on phone: Not on file     Gets together: Not on file     Attends Christian service: Not on file     Active member of club or organization: Not on file     Attends meetings of clubs or organizations: Not on file     Relationship status: Not on file   Other Topics Concern    Not on file   Social History Narrative    Not on file       Current Outpatient Medications:     alendronate (FOSAMAX) 70 MG tablet, Take 70 mg by mouth., Disp: , Rfl:     cetirizine (ZYRTEC) 10 MG tablet, Take 10 mg by mouth., Disp: , Rfl:     ergocalciferol (ERGOCALCIFEROL) 50,000 unit Cap, Take 50,000 Units by mouth., Disp: , Rfl:     esomeprazole (NEXIUM) 20 MG capsule, , Disp: , Rfl:     folic acid (FOLVITE) 1 MG tablet, , Disp: , Rfl:     gabapentin (NEURONTIN) 300 MG capsule, , Disp: , Rfl:     hydroxychloroquine (PLAQUENIL) 200 mg tablet, Take 400 mg by mouth., Disp: , Rfl:     lisinopril (PRINIVIL,ZESTRIL) 20 MG tablet, , Disp: , Rfl:     lovastatin (MEVACOR) 10 MG tablet, , Disp: , Rfl:     methotrexate, PF, (OTREXUP) 25 mg/0.4 mL AtIn, Inject 25 mg into the skin., Disp: , Rfl:     sharps bin-insulin syrin-needl 1 mL 29 x 1/2" Syrg, 1 kit., Disp: , Rfl:     traMADol (ULTRAM) 50 mg tablet, Take 50 mg by mouth every 6 (six) hours as needed for Pain., Disp: , Rfl:     methotrexate 2.5 MG Tab, , Disp: , Rfl:     REVIEW OF SYSTEMS:  General: No fevers or chills; ENT: No sore throat; Allergy and Immunology: no persistent infections; Hematological and Lymphatic: No history of bleeding or easy bruising; Endocrine: negative; Respiratory: no cough, shortness of breath, or wheezing; Cardiovascular: no chest pain or dyspnea on exertion; Gastrointestinal: " no abdominal pain/back, change in bowel habits, or bloody stools; Genito-Urinary: no dysuria, trouble voiding, or hematuria; Musculoskeletal: pain and edema; Neurological: no TIA or stroke symptoms; Psychiatric: no nervousness, anxiety or depression.    PHYSICAL EXAM:      Pulse: 73         General appearance:  Alert, well-appearing, and in no distress.  Oriented to person, place, and time                    Neurological: Normal speech, no focal findings noted; CN II - XII grossly intact. RLE with sensation to light touch, LLE with sensation to light touch.            Musculoskeletal: Digits/nail without cyanosis/clubbing.  Strength 5/5 BLE.                    Neck: Supple, no significant adenopathy                  Chest:  No wheezes, symmetric air entry. No use of accessory muscles               Cardiac: Normal rate and regular rhythm            Abdomen: Soft, nontender, nondistended      Extremities:   2+ R DP pulse, 2+ L DP pulse      2+ RLE edema, 2+ LLE edema    Skin:  RLE no ulcer; LLE no ulcer      RLE + spider veins, LLE + spider veins      RLE + varicose veins, LLE + varicose veins    CEAP 3/3    LAB RESULTS:  No results found for: CBC  No results found for: LABPROT, INR  Lab Results   Component Value Date     01/06/2017    K 3.0 (L) 01/06/2017     01/06/2017    CO2 30 (H) 01/06/2017     (H) 01/06/2017    BUN 2 (L) 01/06/2017    CREATININE 0.6 01/06/2017    CALCIUM 8.3 (L) 01/06/2017    ANIONGAP 7 (L) 01/06/2017    EGFRNONAA >60 01/06/2017     Lab Results   Component Value Date    WBC 5.64 01/04/2017    RBC 3.31 (L) 01/04/2017    HGB 10.0 (L) 01/04/2017    HCT 30.5 (L) 01/04/2017    MCV 92 01/04/2017    MCH 30.2 01/04/2017    MCHC 32.8 01/04/2017    RDW 15.7 (H) 01/04/2017     01/04/2017    MPV 9.8 01/04/2017    GRAN 4.0 01/04/2017    GRAN 70.7 01/04/2017    LYMPH 1.0 01/04/2017    LYMPH 18.1 01/04/2017    MONO 0.5 01/04/2017    MONO 8.9 01/04/2017    EOS 0.1 01/04/2017    PHILLIP  0.00 01/04/2017    EOSINOPHIL 2.3 01/04/2017    BASOPHIL 0.0 01/04/2017    DIFFMETHOD Automated 01/04/2017     .No results found for: HGBA1C    IMAGING:  All pertinent imaging has been reviewed and interpreted independently.    Venous US 1/3/20 Impression:  Right lower extremity venous ultrasound shows greater saphenous vein reflux from SFJ to knee.  There is no lesser saphenous vein reflux.  There is no anterior accessory saphenous venous reflux.  There is no DVT.  Left lower extremity venous ultrasound shows greater saphenous vein reflux from SFJ to knee.  There is no lesser saphenous vein reflux.  There is no anterior accessory saphenous venous reflux.  There is no DVT.      IMP/PLAN:  69 y.o. female with   Patient Active Problem List   Diagnosis    Right upper quadrant pain    Acute cholecystitis due to biliary calculus    Incarcerated umbilical hernia    Decreased functional activity tolerance    Weakness    Abnormality of gait and mobility    being managed by PCP and specialists who is here today for evaluation of BLE edema and pain.    -Bilateral GSV insufficiency-recommend compression with Rx stockings, elevation, dietary changes associated with water and sodium intake discussed at length with patient  -Exercise   -RTC 3 months for further evaluation    I spent 20 minutes evaluating this patient and greater than 50% of the time was spent counseling, coordinator care and discussing the plan of care.  All questions were answered and patient stated understanding with agreement with the above treatment plan.    Yunior Cantor MD [facility]  Vascular and Endovascular Surgery

## 2020-01-06 NOTE — LETTER
January 6, 2020      Gustabo Vallejo, NP  [address]  [facility] [address]           [facility]  [address]  [facility] [address]  Phone: [phone]  Fax: [phone]          Patient: Alba M Reyes   MR Number: [id number]   YOB: 1950   Date of Visit: 1/6/2020       Dear Gustabo Vallejo:    Thank you for referring Alba Reyes to me for evaluation. Attached you will find relevant portions of my assessment and plan of care.    If you have questions, please do not hesitate to call me. I look forward to following Alba Reyes along with you.    Sincerely,    Yunior Cantor MD    Enclosure  CC:  No Recipients    If you would like to receive this communication electronically, please contact [email or web address] or [phone] to request more information on [employer] Link access.    For providers and/or their staff who would like to refer a patient to [facility], please contact us through our one-stop-shop provider referral line, [facility], at [phone].    If you feel you have received this communication in error or would no longer like to receive these types of communications, please e-mail [email or web address]

## 2020-01-23 ENCOUNTER — OFFICE VISIT (OUTPATIENT)
Dept: URGENT CARE | Facility: CLINIC | Age: 70
End: 2020-01-23
Payer: MEDICARE

## 2020-01-23 VITALS
RESPIRATION RATE: 16 BRPM | HEART RATE: 82 BPM | BODY MASS INDEX: 23.9 KG/M2 | WEIGHT: 140 LBS | OXYGEN SATURATION: 95 % | TEMPERATURE: 97 F | HEIGHT: 64 IN | SYSTOLIC BLOOD PRESSURE: 152 MMHG | DIASTOLIC BLOOD PRESSURE: 78 MMHG

## 2020-01-23 DIAGNOSIS — J06.9 UPPER RESPIRATORY TRACT INFECTION, UNSPECIFIED TYPE: Primary | ICD-10-CM

## 2020-01-23 PROCEDURE — 99214 PR OFFICE/OUTPT VISIT, EST, LEVL IV, 30-39 MIN: ICD-10-PCS | Mod: 25,S$GLB,, | Performed by: INTERNAL MEDICINE

## 2020-01-23 PROCEDURE — 99214 OFFICE O/P EST MOD 30 MIN: CPT | Mod: 25,S$GLB,, | Performed by: INTERNAL MEDICINE

## 2020-01-23 PROCEDURE — 99499 UNLISTED E&M SERVICE: CPT | Mod: S$GLB,,, | Performed by: INTERNAL MEDICINE

## 2020-01-23 PROCEDURE — 71046 X-RAY EXAM CHEST 2 VIEWS: CPT | Mod: S$GLB,,, | Performed by: RADIOLOGY

## 2020-01-23 PROCEDURE — 96372 THER/PROPH/DIAG INJ SC/IM: CPT | Mod: S$GLB,,, | Performed by: FAMILY MEDICINE

## 2020-01-23 PROCEDURE — 71046 XR CHEST PA AND LATERAL: ICD-10-PCS | Mod: S$GLB,,, | Performed by: RADIOLOGY

## 2020-01-23 PROCEDURE — 99499 RISK ADDL DX/OHS AUDIT: ICD-10-PCS | Mod: S$GLB,,, | Performed by: INTERNAL MEDICINE

## 2020-01-23 PROCEDURE — 96372 PR INJECTION,THERAP/PROPH/DIAG2ST, IM OR SUBCUT: ICD-10-PCS | Mod: S$GLB,,, | Performed by: FAMILY MEDICINE

## 2020-01-23 RX ORDER — BETAMETHASONE SODIUM PHOSPHATE AND BETAMETHASONE ACETATE 3; 3 MG/ML; MG/ML
6 INJECTION, SUSPENSION INTRA-ARTICULAR; INTRALESIONAL; INTRAMUSCULAR; SOFT TISSUE ONCE
Qty: 1 ML | Refills: 0 | Status: SHIPPED | OUTPATIENT
Start: 2020-01-23 | End: 2020-01-23 | Stop reason: CLARIF

## 2020-01-23 RX ORDER — ALBUTEROL SULFATE 90 UG/1
2 AEROSOL, METERED RESPIRATORY (INHALATION) EVERY 6 HOURS PRN
Qty: 6.7 G | Refills: 0 | Status: SHIPPED | OUTPATIENT
Start: 2020-01-23

## 2020-01-23 RX ORDER — BETAMETHASONE SODIUM PHOSPHATE AND BETAMETHASONE ACETATE 3; 3 MG/ML; MG/ML
6 INJECTION, SUSPENSION INTRA-ARTICULAR; INTRALESIONAL; INTRAMUSCULAR; SOFT TISSUE
Status: COMPLETED | OUTPATIENT
Start: 2020-01-23 | End: 2020-01-23

## 2020-01-23 RX ORDER — PROMETHAZINE HYDROCHLORIDE AND CODEINE PHOSPHATE 6.25; 1 MG/5ML; MG/5ML
5 SOLUTION ORAL NIGHTLY PRN
Qty: 25 ML | Refills: 0 | Status: SHIPPED | OUTPATIENT
Start: 2020-01-23 | End: 2020-01-28

## 2020-01-23 RX ADMIN — BETAMETHASONE SODIUM PHOSPHATE AND BETAMETHASONE ACETATE 6 MG: 3; 3 INJECTION, SUSPENSION INTRA-ARTICULAR; INTRALESIONAL; INTRAMUSCULAR; SOFT TISSUE at 04:01

## 2020-01-23 NOTE — PROGRESS NOTES
"Subjective:       Patient ID: Alba M Reyes is a 69 y.o. female.    Vitals:  height is 5' 4" (1.626 m) and weight is 63.5 kg (140 lb). Her temperature is 97 °F (36.1 °C). Her blood pressure is 152/78 (abnormal) and her pulse is 82. Her respiration is 16 and oxygen saturation is 95%.     Chief Complaint: URI    Pt c/o cough and congestion for the past few days, especially at night she has severe coughing and runny nose. Her chest is also hurting from the coughing and congestion. She's tried thera flu with no relief. She feels she has some kind of infection that may require antibiotics.     Provider note begins here:     68 y/o female with no pertinent PMHx presents to urgent care c/o nasal congestion, chest congestion, and productive cough x 3 days. Symptoms have been constant and moderate since onset. Pt has been taking theraflu with no relief of symptoms. Pt denies receiving the flu shot this year. Pt denies recent sick contacts/illness/travel, fever, chills, ear pain, sore throat, difficulty swallowing,  sinus pressure, SOB, chest pain, leg pain/swelling, palpitations, N/V/D, abdominal pain, back pain, neck pain, headache, vision changes, and rash.      URI    This is a new problem. The current episode started in the past 7 days. The problem has been unchanged. There has been no fever. Associated symptoms include congestion and coughing. Pertinent negatives include no abdominal pain, chest pain, diarrhea, dysuria, ear pain, headaches, nausea, neck pain, rash, sore throat, vomiting or wheezing.       Constitution: Negative for chills, fatigue and fever.   HENT: Positive for congestion. Negative for ear pain, sinus pressure, sore throat and trouble swallowing.    Neck: Negative for neck pain and painful lymph nodes.   Cardiovascular: Negative for chest pain, leg swelling, palpitations and sob on exertion.   Eyes: Negative for double vision and blurred vision.   Respiratory: Positive for cough. Negative for shortness " of breath and wheezing.    Gastrointestinal: Negative for abdominal pain, nausea, vomiting and diarrhea.   Genitourinary: Negative for dysuria, frequency, urgency and history of kidney stones.   Musculoskeletal: Negative for joint pain, joint swelling, back pain, muscle cramps and muscle ache.   Skin: Negative for color change, pale, rash and bruising.   Allergic/Immunologic: Negative for seasonal allergies.   Neurological: Negative for dizziness, history of vertigo, light-headedness, passing out and headaches.   Hematologic/Lymphatic: Negative for swollen lymph nodes.   Psychiatric/Behavioral: Negative for nervous/anxious, sleep disturbance and depression. The patient is not nervous/anxious.        Objective:      Physical Exam   Constitutional: She is oriented to person, place, and time. She appears well-developed and well-nourished. She is cooperative.  Non-toxic appearance. She does not have a sickly appearance. She does not appear ill. No distress.   Sitting comfortably on exam table, can speak full sentences.    HENT:   Head: Normocephalic and atraumatic.   Right Ear: Hearing, tympanic membrane, external ear and ear canal normal.   Left Ear: Hearing, tympanic membrane, external ear and ear canal normal.   Nose: Mucosal edema present. No rhinorrhea or nasal deformity. No epistaxis. Right sinus exhibits no maxillary sinus tenderness and no frontal sinus tenderness. Left sinus exhibits no maxillary sinus tenderness and no frontal sinus tenderness.   Mouth/Throat: Uvula is midline, oropharynx is clear and moist and mucous membranes are normal. No trismus in the jaw. Normal dentition. No uvula swelling. No oropharyngeal exudate, posterior oropharyngeal edema or posterior oropharyngeal erythema.   Eyes: Conjunctivae, EOM and lids are normal. No scleral icterus.   Neck: Trachea normal, full passive range of motion without pain and phonation normal. Neck supple. No neck rigidity. No edema and no erythema present.    Cardiovascular: Normal rate, regular rhythm, normal heart sounds and normal pulses.   Pulmonary/Chest: Effort normal and breath sounds normal. No respiratory distress. She has no decreased breath sounds. She has no wheezes. She has no rhonchi. She has no rales. She exhibits no tenderness.   Mildly hypoxic at 95% on RA.    Abdominal: Soft. Normal appearance. She exhibits no distension. There is no tenderness.   Musculoskeletal: Normal range of motion. She exhibits no edema, tenderness or deformity.   Lymphadenopathy:     She has no cervical adenopathy.   Neurological: She is alert and oriented to person, place, and time. She exhibits normal muscle tone. Coordination normal.   Skin: Skin is warm, dry, intact, not diaphoretic and not pale.   Psychiatric: She has a normal mood and affect. Her speech is normal and behavior is normal. Judgment and thought content normal. Cognition and memory are normal.   Nursing note and vitals reviewed.        Assessment:       1. Upper respiratory tract infection, unspecified type        Plan:         Upper respiratory tract infection, unspecified type  -     XR CHEST PA AND LATERAL; Future; Expected date: 01/23/2020  -     promethazine-codeine 6.25-10 mg/5 ml (PHENERGAN WITH CODEINE) 6.25-10 mg/5 mL syrup; Take 5 mLs by mouth nightly as needed.  Dispense: 25 mL; Refill: 0  -     albuterol (VENTOLIN HFA) 90 mcg/actuation inhaler; Inhale 2 puffs into the lungs every 6 (six) hours as needed for Wheezing. Rescue  Dispense: 6.7 g; Refill: 0  -     betamethasone acetate-betamethasone sodium phosphate (CELESTONE) 6 mg/mL injection; Inject 1 mL (6 mg total) into the muscle once. for 1 dose  Dispense: 1 mL; Refill: 0    Xr Chest Pa And Lateral    Result Date: 1/23/2020  EXAMINATION: XR CHEST PA AND LATERAL CLINICAL HISTORY: Acute upper respiratory infection, unspecified TECHNIQUE: PA and lateral views of the chest were performed. COMPARISON: None FINDINGS: Heart size and pulmonary  vascularity are within normal limits.  Mild tortuosity of the thoracic aorta and brachiocephalic vessels.  Lungs are well expanded and appear free of active disease.  No pleural fluid or pneumothorax.  Mild curvature of the thoracic spine.     No active cardiopulmonary disease Electronically signed by: Dontrell Pérez MD Date:    01/23/2020 Time:    15:52       Patient Instructions     Viral Upper Respiratory Illness (Adult)  You have a viral upper respiratory illness (URI), which is another term for the common cold. This illness is contagious during the first few days. It is spread through the air by coughing and sneezing. It may also be spread by direct contact (touching the sick person and then touching your own eyes, nose, or mouth). Frequent handwashing will decrease risk of spread. Most viral illnesses go away within 7 to 10 days with rest and simple home remedies. Sometimes the illness may last for several weeks. Antibiotics will not kill a virus, and they are generally not prescribed for this condition.    Home care  · If symptoms are severe, rest at home for the first 2 to 3 days. When you resume activity, don't let yourself get too tired.  · Avoid being exposed to cigarette smoke (yours or others).  · You may use acetaminophen or ibuprofen to control pain and fever, unless another medicine was prescribed. (Note: If you have chronic liver or kidney disease, have ever had a stomach ulcer or gastrointestinal bleeding, or are taking blood-thinning medicines, talk with your healthcare provider before using these medicines.) Aspirin should never be given to anyone under 18 years of age who is ill with a viral infection or fever. It may cause severe liver or brain damage.  · Your appetite may be poor, so a light diet is fine. Avoid dehydration by drinking 6 to 8 glasses of fluids per day (water, soft drinks, juices, tea, or soup). Extra fluids will help loosen secretions in the nose and lungs.  · Over-the-counter  cold medicines will not shorten the length of time youre sick, but they may be helpful for the following symptoms: cough, sore throat, and nasal and sinus congestion. (Note: Do not use decongestants if you have high blood pressure.)  Follow-up care  Follow up with your healthcare provider, or as advised.  When to seek medical advice  Call your healthcare provider right away if any of these occur:  · Cough with lots of colored sputum (mucus)  · Severe headache; face, neck, or ear pain  · Difficulty swallowing due to throat pain  · Fever of 100.4°F (38°C)  Call 911, or get immediate medical care  Call emergency services right away if any of these occur:  · Chest pain, shortness of breath, wheezing, or difficulty breathing  · Coughing up blood  · Inability to swallow due to throat pain  Date Last Reviewed: 9/13/2015 © 2000-2017 [employer]. [address] [place] [address]. All rights reserved. This information is not intended as a substitute for professional medical care. Always follow your healthcare professional's instructions.    PLEASE READ YOUR DISCHARGE INSTRUCTIONS ENTIRELY AS IT CONTAINS IMPORTANT INFORMATION.    Please drink plenty of fluids.    Please get plenty of rest.    Please return here or go to the Emergency Department for any concerns or worsening of condition.    Please take an over the counter antihistamine medication (allegra/Claritin/Zyrtec) of your choice as directed.    Try an over the counter decongestant like Mucinex D or Sudafed. You buy this behind the pharmacy counter    If you do have Hypertension or palpitations, it is safe to take Coricidin HBP for relief of sinus symptoms.    If not allergic, please take over the counter Tylenol (Acetaminophen) and/or Motrin (Ibuprofen) as directed for control of pain and/or fever.  Please follow up with your primary care doctor or specialist as needed.    Sore throat recommendations: Warm fluids, warm salt water gargles,  throat lozenges, tea, honey, soup, rest, hydration.    Use over the counter flonase: one spray each nostril twice daily OR two sprays each nostril once daily.     Sinus rinses DO NOT USE TAP WATER, if you must, water must be a rolling boil for 1 minute, let it cool, then use.  May use distilled water, or over the counter nasal saline rinses.  Vics vapor rub in shower to help open nasal passages.  May use nasal gel to keep passages moisturized.  May use Nasal saline sprays during the day for added relief of congestion.   For those who go to the gym, please do not use the sauna or steam room now to clear sinuses.    If you smoke, please stop smoking.    Please return or see your primary care doctor if you develop new or worsening symptoms.     Please arrange follow up with your primary medical clinic as soon as possible. You must understand that you've received an Urgent Care treatment only and that you may be released before all of your medical problems are known or treated. You, the patient, will arrange for follow up as instructed. If your symptoms worsen or fail to improve you should go to the Emergency Room.    You received a steroid today - this can elevate your blood pressure, elevate your blood sugar, water weight gain, nervous energy, redness to the face and dimpling of the skin where the shot goes in if you had an injection.   Do not use steroids more than 3 times per year.   If you have diabetes, please check you blood sugar frequently.  If you have high blood pressure, please check your blood pressure frequently.     ORAL STEROIDS   A short course of prednisone or methylprednisolone will almost certainly make you feel better. Steroids boast your energy level, alleviate pain and nausea, block allergies, reduce swelling, shrink nasal polyps, alleviate asthma, and can even restore hearing in some patients with sudden deafness. However, steroids must be used with caution, because they can have significant  addictive potential and cause serious side effects - especially with long-term use. For this reason, oral or systemic steroids are reserved for the most urgent uses, and TOPICAL or LOCAL steroids are preferred.     RISKS OF SYSTEMIC STEROIDS     Steroids are the most effective anti-inflammatory drugs available, and are derivatives of natural hormones which the body creates to help the body cope with injury or stress.  However, prolonged use of oral or systemic steroids can result in suppression of normal steroid levels in the body.  Therefore, these medications should be taken exactly as prescribed, usually in a gradually decreasing dose, to avoid sudden withdrawal.  Withdrawal symptoms are uncommon in patients who have used steroids for less than two weeks at a time.  Continued or repeated use of steroids can reduce your ability to fight infection and can result in weight gain, fluid retention, acne, increased body hair, purple marks on the abdomen, collection of fatty deposits under the skin, and easy bruising.  High doses of steroids will frequently cause nervousness, sleeplessness, excitation, and sometimes depression or confusion.  Steroids can also cause elevation of blood sugar or blood pressure or change in salt balance.  Prolonged steroids can cause thinning of the bones, muscle weakness, glaucoma, and cataracts.  They can aggravate ulcers.  Patients who are pregnant, have a history of stomach ulcers, glaucoma, diabetes, high blood pressure, tuberculosis, osteoporosis, or recent vaccination, should not take steroids unless absolutely necessary.  A very rare complication of steroids is interruption of the blood supply to the hip bone which can result in a fracture that requires a hip replacement.   Fortunately, all of these complications are extremely rare in patients treated with short-term doses of steroids.  If your doctor has prescribed systemic steroids, he or she has likely judged that the risk of  these complications is outweighed by the potential benefit for the treatment of your disease.  If you have any questions about this information or the instructions on how to take your steroids, please speak with your doctor before you begin the medication.   Alternatives to systemic steroids include topical applications to the nose, skin, lung or ear, so that the systemic dose - that which distributes through the body - is greatly reduced. Topical steroids greatly reduce the risk of prolonged use of steroids.

## 2020-01-23 NOTE — PATIENT INSTRUCTIONS
Viral Upper Respiratory Illness (Adult)  You have a viral upper respiratory illness (URI), which is another term for the common cold. This illness is contagious during the first few days. It is spread through the air by coughing and sneezing. It may also be spread by direct contact (touching the sick person and then touching your own eyes, nose, or mouth). Frequent handwashing will decrease risk of spread. Most viral illnesses go away within 7 to 10 days with rest and simple home remedies. Sometimes the illness may last for several weeks. Antibiotics will not kill a virus, and they are generally not prescribed for this condition.    Home care  · If symptoms are severe, rest at home for the first 2 to 3 days. When you resume activity, don't let yourself get too tired.  · Avoid being exposed to cigarette smoke (yours or others).  · You may use acetaminophen or ibuprofen to control pain and fever, unless another medicine was prescribed. (Note: If you have chronic liver or kidney disease, have ever had a stomach ulcer or gastrointestinal bleeding, or are taking blood-thinning medicines, talk with your healthcare provider before using these medicines.) Aspirin should never be given to anyone under 18 years of age who is ill with a viral infection or fever. It may cause severe liver or brain damage.  · Your appetite may be poor, so a light diet is fine. Avoid dehydration by drinking 6 to 8 glasses of fluids per day (water, soft drinks, juices, tea, or soup). Extra fluids will help loosen secretions in the nose and lungs.  · Over-the-counter cold medicines will not shorten the length of time youre sick, but they may be helpful for the following symptoms: cough, sore throat, and nasal and sinus congestion. (Note: Do not use decongestants if you have high blood pressure.)  Follow-up care  Follow up with your healthcare provider, or as advised.  When to seek medical advice  Call your healthcare provider right away if any  of these occur:  · Cough with lots of colored sputum (mucus)  · Severe headache; face, neck, or ear pain  · Difficulty swallowing due to throat pain  · Fever of 100.4°F (38°C)  Call 911, or get immediate medical care  Call emergency services right away if any of these occur:  · Chest pain, shortness of breath, wheezing, or difficulty breathing  · Coughing up blood  · Inability to swallow due to throat pain  Date Last Reviewed: 9/13/2015 © 2000-2017 [employer]. [address] [place] [address]. All rights reserved. This information is not intended as a substitute for professional medical care. Always follow your healthcare professional's instructions.    PLEASE READ YOUR DISCHARGE INSTRUCTIONS ENTIRELY AS IT CONTAINS IMPORTANT INFORMATION.    Please drink plenty of fluids.    Please get plenty of rest.    Please return here or go to the Emergency Department for any concerns or worsening of condition.    Please take an over the counter antihistamine medication (allegra/Claritin/Zyrtec) of your choice as directed.    Try an over the counter decongestant like Mucinex D or Sudafed. You buy this behind the pharmacy counter    If you do have Hypertension or palpitations, it is safe to take Coricidin HBP for relief of sinus symptoms.    If not allergic, please take over the counter Tylenol (Acetaminophen) and/or Motrin (Ibuprofen) as directed for control of pain and/or fever.  Please follow up with your primary care doctor or specialist as needed.    Sore throat recommendations: Warm fluids, warm salt water gargles, throat lozenges, tea, honey, soup, rest, hydration.    Use over the counter flonase: one spray each nostril twice daily OR two sprays each nostril once daily.     Sinus rinses DO NOT USE TAP WATER, if you must, water must be a rolling boil for 1 minute, let it cool, then use.  May use distilled water, or over the counter nasal saline rinses.  Vics vapor rub in shower to help open nasal  passages.  May use nasal gel to keep passages moisturized.  May use Nasal saline sprays during the day for added relief of congestion.   For those who go to the gym, please do not use the sauna or steam room now to clear sinuses.    If you smoke, please stop smoking.    Please return or see your primary care doctor if you develop new or worsening symptoms.     Please arrange follow up with your primary medical clinic as soon as possible. You must understand that you've received an Urgent Care treatment only and that you may be released before all of your medical problems are known or treated. You, the patient, will arrange for follow up as instructed. If your symptoms worsen or fail to improve you should go to the Emergency Room.    You received a steroid today - this can elevate your blood pressure, elevate your blood sugar, water weight gain, nervous energy, redness to the face and dimpling of the skin where the shot goes in if you had an injection.   Do not use steroids more than 3 times per year.   If you have diabetes, please check you blood sugar frequently.  If you have high blood pressure, please check your blood pressure frequently.     ORAL STEROIDS   A short course of prednisone or methylprednisolone will almost certainly make you feel better. Steroids boast your energy level, alleviate pain and nausea, block allergies, reduce swelling, shrink nasal polyps, alleviate asthma, and can even restore hearing in some patients with sudden deafness. However, steroids must be used with caution, because they can have significant addictive potential and cause serious side effects - especially with long-term use. For this reason, oral or systemic steroids are reserved for the most urgent uses, and TOPICAL or LOCAL steroids are preferred.     RISKS OF SYSTEMIC STEROIDS     Steroids are the most effective anti-inflammatory drugs available, and are derivatives of natural hormones which the body creates to help the body  cope with injury or stress.  However, prolonged use of oral or systemic steroids can result in suppression of normal steroid levels in the body.  Therefore, these medications should be taken exactly as prescribed, usually in a gradually decreasing dose, to avoid sudden withdrawal.  Withdrawal symptoms are uncommon in patients who have used steroids for less than two weeks at a time.  Continued or repeated use of steroids can reduce your ability to fight infection and can result in weight gain, fluid retention, acne, increased body hair, purple marks on the abdomen, collection of fatty deposits under the skin, and easy bruising.  High doses of steroids will frequently cause nervousness, sleeplessness, excitation, and sometimes depression or confusion.  Steroids can also cause elevation of blood sugar or blood pressure or change in salt balance.  Prolonged steroids can cause thinning of the bones, muscle weakness, glaucoma, and cataracts.  They can aggravate ulcers.  Patients who are pregnant, have a history of stomach ulcers, glaucoma, diabetes, high blood pressure, tuberculosis, osteoporosis, or recent vaccination, should not take steroids unless absolutely necessary.  A very rare complication of steroids is interruption of the blood supply to the hip bone which can result in a fracture that requires a hip replacement.   Fortunately, all of these complications are extremely rare in patients treated with short-term doses of steroids.  If your doctor has prescribed systemic steroids, he or she has likely judged that the risk of these complications is outweighed by the potential benefit for the treatment of your disease.  If you have any questions about this information or the instructions on how to take your steroids, please speak with your doctor before you begin the medication.   Alternatives to systemic steroids include topical applications to the nose, skin, lung or ear, so that the systemic dose - that which  distributes through the body - is greatly reduced. Topical steroids greatly reduce the risk of prolonged use of steroids.

## 2020-01-28 ENCOUNTER — OFFICE VISIT (OUTPATIENT)
Dept: FAMILY MEDICINE | Facility: CLINIC | Age: 70
End: 2020-01-28
Payer: MEDICARE

## 2020-01-28 VITALS
DIASTOLIC BLOOD PRESSURE: 90 MMHG | BODY MASS INDEX: 23.92 KG/M2 | SYSTOLIC BLOOD PRESSURE: 150 MMHG | OXYGEN SATURATION: 98 % | RESPIRATION RATE: 18 BRPM | TEMPERATURE: 99 F | HEART RATE: 75 BPM | WEIGHT: 139.31 LBS

## 2020-01-28 DIAGNOSIS — N39.46 MIXED STRESS AND URGE URINARY INCONTINENCE: ICD-10-CM

## 2020-01-28 DIAGNOSIS — R06.2 WHEEZING: ICD-10-CM

## 2020-01-28 DIAGNOSIS — R22.9 SOFT TISSUE SWELLING: ICD-10-CM

## 2020-01-28 DIAGNOSIS — J20.9 ACUTE BRONCHITIS, UNSPECIFIED ORGANISM: Primary | ICD-10-CM

## 2020-01-28 DIAGNOSIS — M06.9 RHEUMATOID ARTHRITIS, INVOLVING UNSPECIFIED SITE, UNSPECIFIED RHEUMATOID FACTOR PRESENCE: ICD-10-CM

## 2020-01-28 PROCEDURE — 99499 RISK ADDL DX/OHS AUDIT: ICD-10-PCS | Mod: S$GLB,,, | Performed by: FAMILY MEDICINE

## 2020-01-28 PROCEDURE — 99999 PR PBB SHADOW E&M-EST. PATIENT-LVL IV: CPT | Mod: PBBFAC,HCNC,, | Performed by: FAMILY MEDICINE

## 2020-01-28 PROCEDURE — 94640 PR INHAL RX, AIRWAY OBST/DX SPUTUM INDUCT: ICD-10-PCS | Mod: HCNC,S$GLB,, | Performed by: FAMILY MEDICINE

## 2020-01-28 PROCEDURE — 99214 PR OFFICE/OUTPT VISIT, EST, LEVL IV, 30-39 MIN: ICD-10-PCS | Mod: 25,HCNC,S$GLB, | Performed by: FAMILY MEDICINE

## 2020-01-28 PROCEDURE — 99214 OFFICE O/P EST MOD 30 MIN: CPT | Mod: 25,HCNC,S$GLB, | Performed by: FAMILY MEDICINE

## 2020-01-28 PROCEDURE — 1101F PR PT FALLS ASSESS DOC 0-1 FALLS W/OUT INJ PAST YR: ICD-10-PCS | Mod: HCNC,CPTII,S$GLB, | Performed by: FAMILY MEDICINE

## 2020-01-28 PROCEDURE — 94640 AIRWAY INHALATION TREATMENT: CPT | Mod: HCNC,S$GLB,, | Performed by: FAMILY MEDICINE

## 2020-01-28 PROCEDURE — 96372 PR INJECTION,THERAP/PROPH/DIAG2ST, IM OR SUBCUT: ICD-10-PCS | Mod: 59,HCNC,S$GLB, | Performed by: FAMILY MEDICINE

## 2020-01-28 PROCEDURE — 99499 UNLISTED E&M SERVICE: CPT | Mod: S$GLB,,, | Performed by: FAMILY MEDICINE

## 2020-01-28 PROCEDURE — 1159F MED LIST DOCD IN RCRD: CPT | Mod: HCNC,S$GLB,, | Performed by: FAMILY MEDICINE

## 2020-01-28 PROCEDURE — 1159F PR MEDICATION LIST DOCUMENTED IN MEDICAL RECORD: ICD-10-PCS | Mod: HCNC,S$GLB,, | Performed by: FAMILY MEDICINE

## 2020-01-28 PROCEDURE — 99999 PR PBB SHADOW E&M-EST. PATIENT-LVL IV: ICD-10-PCS | Mod: PBBFAC,HCNC,, | Performed by: FAMILY MEDICINE

## 2020-01-28 PROCEDURE — 96372 THER/PROPH/DIAG INJ SC/IM: CPT | Mod: 59,HCNC,S$GLB, | Performed by: FAMILY MEDICINE

## 2020-01-28 PROCEDURE — 1101F PT FALLS ASSESS-DOCD LE1/YR: CPT | Mod: HCNC,CPTII,S$GLB, | Performed by: FAMILY MEDICINE

## 2020-01-28 RX ORDER — IPRATROPIUM BROMIDE AND ALBUTEROL SULFATE 2.5; .5 MG/3ML; MG/3ML
3 SOLUTION RESPIRATORY (INHALATION)
Status: COMPLETED | OUTPATIENT
Start: 2020-01-28 | End: 2020-01-28

## 2020-01-28 RX ORDER — METHYLPREDNISOLONE SOD SUCC 125 MG
125 VIAL (EA) INJECTION
Status: COMPLETED | OUTPATIENT
Start: 2020-01-28 | End: 2020-01-28

## 2020-01-28 RX ORDER — AZITHROMYCIN 250 MG/1
TABLET, FILM COATED ORAL
Qty: 6 TABLET | Refills: 0 | Status: SHIPPED | OUTPATIENT
Start: 2020-01-28 | End: 2020-02-02

## 2020-01-28 RX ORDER — PROMETHAZINE HYDROCHLORIDE AND CODEINE PHOSPHATE 6.25; 1 MG/5ML; MG/5ML
5 SOLUTION ORAL EVERY 4 HOURS PRN
Qty: 240 ML | Refills: 0 | Status: SHIPPED | OUTPATIENT
Start: 2020-01-28 | End: 2020-02-07

## 2020-01-28 RX ADMIN — Medication 125 MG: at 02:01

## 2020-01-28 RX ADMIN — IPRATROPIUM BROMIDE AND ALBUTEROL SULFATE 3 ML: 2.5; .5 SOLUTION RESPIRATORY (INHALATION) at 02:01

## 2020-01-28 NOTE — PROGRESS NOTES
Pt tolerated Solu-medrol injection well. Instructed to wait in the clinic for 15 minutes and report any adverse effects immediately to the nurse. Verbalized understanding.    Duo-neb given via Nebulizer as ordered. Pt tolerating well.

## 2020-02-03 NOTE — PROGRESS NOTES
Subjective:       Patient ID: Alba M Reyes is a 69 y.o. female.    Chief Complaint: Cough    Cough   This is a new problem. The current episode started 1 to 4 weeks ago. The problem has been gradually worsening. The problem occurs every few minutes. The cough is non-productive. Associated symptoms include chills, a fever, rhinorrhea, shortness of breath and wheezing. Pertinent negatives include no chest pain, ear congestion, headaches, heartburn, hemoptysis, myalgias or sore throat. Treatments tried: medication from urgent care. The treatment provided mild relief.   Went to urgent care last week. Prescribed albuterol and promethazine with codeine.    Review of Systems   Constitutional: Positive for chills and fever.   HENT: Positive for rhinorrhea. Negative for sore throat.    Respiratory: Positive for cough, shortness of breath and wheezing. Negative for hemoptysis.    Cardiovascular: Negative for chest pain.   Gastrointestinal: Negative for heartburn.   Musculoskeletal: Negative for myalgias.   Neurological: Negative for headaches.       Objective:     BP (!) 150/90 (BP Location: Right arm, Patient Position: Sitting, BP Method: Medium (Manual))   Pulse 75   Temp 98.5 °F (36.9 °C) (Oral)   Resp 18   Wt 63.2 kg (139 lb 5.3 oz)   SpO2 98%   BMI 23.92 kg/m²     Physical Exam   Constitutional:  Non-toxic appearance. She appears ill.   HENT:   Right Ear: Tympanic membrane, external ear and ear canal normal.   Left Ear: Tympanic membrane, external ear and ear canal normal.   Nose: Rhinorrhea present.   Mouth/Throat: Posterior oropharyngeal erythema present. No oropharyngeal exudate. No tonsillar exudate.   Neck: Trachea normal and normal range of motion. Neck supple. No thyromegaly present.   Cardiovascular: Normal rate, regular rhythm, S1 normal and S2 normal.   Pulses:       Radial pulses are 2+ on the right side, and 2+ on the left side.   Pulmonary/Chest: No accessory muscle usage. No tachypnea. No respiratory  distress. She has wheezes (scattered). She has rhonchi (scattered). She has no rales.   Abdominal: Soft. Bowel sounds are normal. There is no hepatosplenomegaly. There is no tenderness.   Lymphadenopathy:     She has no cervical adenopathy.   Skin: Capillary refill takes less than 2 seconds.   Vitals reviewed.      Assessment:       1. Acute bronchitis, unspecified organism    2. Wheezing    3. Rheumatoid arthritis, involving unspecified site, unspecified rheumatoid factor presence    4. Mixed stress and urge urinary incontinence    5. Soft tissue swelling        Plan:       Nichole was seen today for cough.    Diagnoses and all orders for this visit:    Acute bronchitis, unspecified organism  -     azithromycin (Z-CANDY) 250 MG tablet; Take 2 tablets by mouth on day 1; Take 1 tablet by mouth on days 2-5  -     promethazine-codeine 6.25-10 mg/5 ml (PHENERGAN WITH CODEINE) 6.25-10 mg/5 mL syrup; Take 5 mLs by mouth every 4 (four) hours as needed for Cough.  Given how long symptoms have been going on, will start on antibiotic.   Patient felt better after nebulizer and her blood pressure also improved.  Continue albuterol prn  Promethazine-codeine for symptom relief.  Chest xray done to rule out other causes    Wheezing  -     X-Ray Chest PA And Lateral; Future  -     albuterol-ipratropium 2.5 mg-0.5 mg/3 mL nebulizer solution 3 mL  -     methylPREDNISolone sodium succinate injection 125 mg  As above    Rheumatoid arthritis, involving unspecified site, unspecified rheumatoid factor presence  Management per rheumatology    Mixed stress and urge urinary incontinence  -     Ambulatory referral to Urology  Patient requested urology consult for mixed urinary incontinence. Previously seen by urologist at [facility]. Wants a second opinion on management as she states she was only offered surgical management.  I discussed with her pelvic physical therapy and she declined     Soft tissue swelling  -     US Soft  "Tissue Head Neck Thyroid; Future  Xray showed soft tissue swelling and recommendation for further studies.  "Prominence of the right paratracheal soft tissues in the region of the thoracic inlet as discussed above.  To rule out a mass a CT scan of the lower neck and mediastinum or an ultrasound of the thyroid is suggested for further evaluation."  US ordered       "

## 2020-02-04 ENCOUNTER — OFFICE VISIT (OUTPATIENT)
Dept: SPINE | Facility: CLINIC | Age: 70
End: 2020-02-04
Payer: MEDICARE

## 2020-02-04 VITALS
HEART RATE: 83 BPM | HEIGHT: 64 IN | SYSTOLIC BLOOD PRESSURE: 181 MMHG | BODY MASS INDEX: 23.73 KG/M2 | WEIGHT: 139 LBS | DIASTOLIC BLOOD PRESSURE: 72 MMHG

## 2020-02-04 DIAGNOSIS — M51.36 DDD (DEGENERATIVE DISC DISEASE), LUMBAR: ICD-10-CM

## 2020-02-04 DIAGNOSIS — M54.42 CHRONIC BILATERAL LOW BACK PAIN WITH LEFT-SIDED SCIATICA: Primary | ICD-10-CM

## 2020-02-04 DIAGNOSIS — M47.26 OTHER SPONDYLOSIS WITH RADICULOPATHY, LUMBAR REGION: ICD-10-CM

## 2020-02-04 DIAGNOSIS — G89.29 CHRONIC BILATERAL LOW BACK PAIN WITH LEFT-SIDED SCIATICA: Primary | ICD-10-CM

## 2020-02-04 DIAGNOSIS — M43.16 SPONDYLOLISTHESIS, LUMBAR REGION: ICD-10-CM

## 2020-02-04 DIAGNOSIS — M25.552 LEFT HIP PAIN: ICD-10-CM

## 2020-02-04 DIAGNOSIS — M54.16 LUMBAR RADICULOPATHY: ICD-10-CM

## 2020-02-04 PROCEDURE — 1101F PR PT FALLS ASSESS DOC 0-1 FALLS W/OUT INJ PAST YR: ICD-10-PCS | Mod: HCNC,CPTII,S$GLB, | Performed by: PHYSICIAN ASSISTANT

## 2020-02-04 PROCEDURE — 1101F PT FALLS ASSESS-DOCD LE1/YR: CPT | Mod: HCNC,CPTII,S$GLB, | Performed by: PHYSICIAN ASSISTANT

## 2020-02-04 PROCEDURE — 1159F MED LIST DOCD IN RCRD: CPT | Mod: HCNC,S$GLB,, | Performed by: PHYSICIAN ASSISTANT

## 2020-02-04 PROCEDURE — 1125F PR PAIN SEVERITY QUANTIFIED, PAIN PRESENT: ICD-10-PCS | Mod: HCNC,S$GLB,, | Performed by: PHYSICIAN ASSISTANT

## 2020-02-04 PROCEDURE — 99214 OFFICE O/P EST MOD 30 MIN: CPT | Mod: HCNC,S$GLB,, | Performed by: PHYSICIAN ASSISTANT

## 2020-02-04 PROCEDURE — 1125F AMNT PAIN NOTED PAIN PRSNT: CPT | Mod: HCNC,S$GLB,, | Performed by: PHYSICIAN ASSISTANT

## 2020-02-04 PROCEDURE — 99214 PR OFFICE/OUTPT VISIT, EST, LEVL IV, 30-39 MIN: ICD-10-PCS | Mod: HCNC,S$GLB,, | Performed by: PHYSICIAN ASSISTANT

## 2020-02-04 PROCEDURE — 99999 PR PBB SHADOW E&M-EST. PATIENT-LVL IV: CPT | Mod: PBBFAC,HCNC,, | Performed by: PHYSICIAN ASSISTANT

## 2020-02-04 PROCEDURE — 1159F PR MEDICATION LIST DOCUMENTED IN MEDICAL RECORD: ICD-10-PCS | Mod: HCNC,S$GLB,, | Performed by: PHYSICIAN ASSISTANT

## 2020-02-04 PROCEDURE — 99999 PR PBB SHADOW E&M-EST. PATIENT-LVL IV: ICD-10-PCS | Mod: PBBFAC,HCNC,, | Performed by: PHYSICIAN ASSISTANT

## 2020-02-04 NOTE — PROGRESS NOTES
"Subjective:     Patient ID:  Alba M Reyes is a 69 y.o. female.    [place]    Chief Complaint: Back and left leg pain. Left hip pain    HPI (Patient is a poor historian.  Encounter done through a )    Alba M Reyes is a 69 y.o. female who presents for follow up.  The patient is a new patient to me today and is a patient of Jillian Sood PA-C [facility].    The PT did not help.  She continues to have pain across the low back into the left hip and groin and down the left lateral leg to the knee.  No right leg pain.  Pain worse with sitting and better with walking.  Back and left leg pain bothers her equally.    No LYNNETTE.  Left knee injection that did not help.  No spine surgery.  Takes gabapentin TID.    Patient denies any recent accidents or trauma, no saddle anesthesias, and no bowel or bladder incontinence.      Review of Systems:  Constitution: Negative for chills, fever, night sweats and weight loss.   Musculoskeletal: Negative for falls.   Gastrointestinal: Negative for bowel incontinence, nausea and vomiting.   Genitourinary: Negative for bladder incontinence.   Neurological: Negative for disturbances in coordination and loss of balance.       Objective:      Vitals:    02/04/20 1306   BP: (!) 181/72   Pulse: 83   Weight: 63 kg (139 lb)   Height: 5' 4" (1.626 m)   PainSc:   8   PainLoc: Leg         Physical Exam:    General:  Alba M Reyes is well-developed, well-nourished, appears stated age, in no acute distress, alert and oriented to person, place, and time.    Pulmonary/Chest:  Respiratory effort normal  Abdominal: Exhibits no distension  Psychiatric:  Normal mood and affect.  Behavior is normal.  Judgement and thought content normal    Musculoskeletal:    Patient arises from a sitting to standing position without difficulty.  Patient walks to the door without evidence of limp, pain, or abnormality of gait. Patient is able to walk on heels and toes without " difficulty.    Lumbar ROM:   Pain in lumbar flexion, extension, right lateral bending, and left lateral bending.    Lumbar Spine Inspection:  Normal with no surgical scars with no visible rashes.    Lumbar Spine Palpation:  No tenderness to low back palpation.    SI Joint Palpation:  No tenderness to left SI Joint palpation.  Mild tenderness to right SI joint palpation.    Straight Leg Raise:  Negative right and positive left SLR.    Pain in the left hip and going in left hip internal and external rotation, adduction, abduction, flexion and extension.    Pain on palpation of the left greater trochanter    Neurological:  Alert and oriented to person, place, and time    Muscle strength against resistance:     Right Left   Hip flexion  5 / 5 5 / 5   Hip extension 5 / 5 5 / 5   Hip abduction 5 / 5 5 / 5   Hip adduction  5 / 5 5 / 5   Knee extension  5 / 5 5 / 5   Knee flexion 5 / 5 5 / 5   Dorsiflexion  5 / 5 5 / 5   EHL  5 / 5 5 / 5   Plantar flexion  5 / 5 5 / 5   Inversion of the feet 5 / 5 5 / 5   Eversion of the feet  5 / 5 5 / 5     Reflexes:     Right Left   Patellar 2+ 2+   Achilles 2+ 2+     Clonus:  Negative bilaterally    On gross examination of the bilateral upper extremities, patient has full painfree ROM with no signs of clubbing, cyanosis, edema, or weakness.     XRAY INTERPRETATION:  X-rays of lumbar spine (AP/lat/flex/ext) dated 3/26/19 are personally reviewed and show slip L5-S1, moderate/severe DDD L4-S1 with facet hypertrophy.    Assessment:          1. Chronic bilateral low back pain with left-sided sciatica    2. DDD (degenerative disc disease), lumbar    3. Other spondylosis with radiculopathy, lumbar region    4. Lumbar radiculopathy    5. Spondylolisthesis, lumbar region    6. Left hip pain            Plan:          Orders Placed This Encounter    MRI Lumbar Spine Without Contrast    Ambulatory referral/consult to Orthopedics       L4-S1 DDD and spondylosis.  Slip L5-S1    -MRI lumbar spine  and will call with results  -Will order L[facility]NNETTE at [facility] if needed    Left hip pain    -States she needed surgery in the past but does not know any details  -Refer to ortho for formal eval  -Seem most of her pain is hip mediated not lumbar radiculopathy    Follow-Up:  Follow up if symptoms worsen or fail to improve. If there are any questions prior to this, the patient was instructed to contact the office.       JUAN Dominguez, PA-C  Neurosurgery  [facility]  [facility]

## 2020-02-05 ENCOUNTER — OFFICE VISIT (OUTPATIENT)
Dept: ORTHOPEDICS | Facility: CLINIC | Age: 70
End: 2020-02-05
Payer: MEDICARE

## 2020-02-05 ENCOUNTER — HOSPITAL ENCOUNTER (OUTPATIENT)
Dept: RADIOLOGY | Facility: HOSPITAL | Age: 70
Discharge: HOME | End: 2020-02-05
Attending: NURSE PRACTITIONER
Payer: MEDICARE

## 2020-02-05 VITALS
HEART RATE: 82 BPM | BODY MASS INDEX: 22.86 KG/M2 | WEIGHT: 133.94 LBS | DIASTOLIC BLOOD PRESSURE: 75 MMHG | SYSTOLIC BLOOD PRESSURE: 127 MMHG | HEIGHT: 64 IN

## 2020-02-05 DIAGNOSIS — Z87.39 HISTORY OF RHEUMATOID ARTHRITIS: ICD-10-CM

## 2020-02-05 DIAGNOSIS — M25.552 LEFT HIP PAIN: Primary | ICD-10-CM

## 2020-02-05 DIAGNOSIS — M25.552 PAIN OF LEFT HIP JOINT: Primary | ICD-10-CM

## 2020-02-05 DIAGNOSIS — G89.29 CHRONIC LEFT SHOULDER PAIN: ICD-10-CM

## 2020-02-05 DIAGNOSIS — M25.512 CHRONIC LEFT SHOULDER PAIN: ICD-10-CM

## 2020-02-05 DIAGNOSIS — M25.552 PAIN OF LEFT HIP JOINT: ICD-10-CM

## 2020-02-05 PROCEDURE — 1101F PT FALLS ASSESS-DOCD LE1/YR: CPT | Mod: HCNC,CPTII,S$GLB, | Performed by: NURSE PRACTITIONER

## 2020-02-05 PROCEDURE — 99999 PR PBB SHADOW E&M-EST. PATIENT-LVL IV: ICD-10-PCS | Mod: PBBFAC,HCNC,, | Performed by: NURSE PRACTITIONER

## 2020-02-05 PROCEDURE — 99214 PR OFFICE/OUTPT VISIT, EST, LEVL IV, 30-39 MIN: ICD-10-PCS | Mod: HCNC,S$GLB,, | Performed by: NURSE PRACTITIONER

## 2020-02-05 PROCEDURE — 73030 X-RAY EXAM OF SHOULDER: CPT | Mod: 26,HCNC,LT, | Performed by: RADIOLOGY

## 2020-02-05 PROCEDURE — 1159F MED LIST DOCD IN RCRD: CPT | Mod: HCNC,S$GLB,, | Performed by: NURSE PRACTITIONER

## 2020-02-05 PROCEDURE — 1125F AMNT PAIN NOTED PAIN PRSNT: CPT | Mod: HCNC,S$GLB,, | Performed by: NURSE PRACTITIONER

## 2020-02-05 PROCEDURE — 73502 XR HIP 2 VIEW LEFT: ICD-10-PCS | Mod: 26,HCNC,LT, | Performed by: RADIOLOGY

## 2020-02-05 PROCEDURE — 73502 X-RAY EXAM HIP UNI 2-3 VIEWS: CPT | Mod: 26,HCNC,LT, | Performed by: RADIOLOGY

## 2020-02-05 PROCEDURE — 73502 X-RAY EXAM HIP UNI 2-3 VIEWS: CPT | Mod: TC,HCNC,LT

## 2020-02-05 PROCEDURE — 1159F PR MEDICATION LIST DOCUMENTED IN MEDICAL RECORD: ICD-10-PCS | Mod: HCNC,S$GLB,, | Performed by: NURSE PRACTITIONER

## 2020-02-05 PROCEDURE — 73030 X-RAY EXAM OF SHOULDER: CPT | Mod: TC,HCNC,LT

## 2020-02-05 PROCEDURE — 99214 OFFICE O/P EST MOD 30 MIN: CPT | Mod: HCNC,S$GLB,, | Performed by: NURSE PRACTITIONER

## 2020-02-05 PROCEDURE — 99999 PR PBB SHADOW E&M-EST. PATIENT-LVL IV: CPT | Mod: PBBFAC,HCNC,, | Performed by: NURSE PRACTITIONER

## 2020-02-05 PROCEDURE — 1125F PR PAIN SEVERITY QUANTIFIED, PAIN PRESENT: ICD-10-PCS | Mod: HCNC,S$GLB,, | Performed by: NURSE PRACTITIONER

## 2020-02-05 PROCEDURE — 73030 XR SHOULDER COMPLETE 2 OR MORE VIEWS LEFT: ICD-10-PCS | Mod: 26,HCNC,LT, | Performed by: RADIOLOGY

## 2020-02-05 PROCEDURE — 1101F PR PT FALLS ASSESS DOC 0-1 FALLS W/OUT INJ PAST YR: ICD-10-PCS | Mod: HCNC,CPTII,S$GLB, | Performed by: NURSE PRACTITIONER

## 2020-02-05 NOTE — LETTER
February 5, 2020      Yuliya Unger PA-C  [address]  [facility] [address]           [facility] - Orthopedics  [address]  [facility] [address]  Phone: [phone]          Patient: Alba M Reyes   MR Number: [id number]   YOB: 1950   Date of Visit: 2/5/2020       Dear Yuliya Unger:    Thank you for referring Alba Reyes to me for evaluation. Attached you will find relevant portions of my assessment and plan of care.    If you have questions, please do not hesitate to call me. I look forward to following Alba Reyes along with you.    Sincerely,    Timo Hansen NP    Enclosure  CC:  No Recipients    If you would like to receive this communication electronically, please contact [email or web address] or [phone] to request more information on [employer] Link access.    For providers and/or their staff who would like to refer a patient to [facility], please contact us through our one-stop-shop provider referral line, [facility] , at [phone].    If you feel you have received this communication in error or would no longer like to receive these types of communications, please e-mail [email or web address]

## 2020-02-05 NOTE — PROGRESS NOTES
SUBJECTIVE:     Chief Complaint & History of Present Illness:  Alba M Reyes is a Established 69 y.o. year old female patient presenting today for constant left hip and shoulder pain which started over 3 months ago.  There is not a history of trauma.  The pain is located in the global aspect of the hip and to the glenohumeral area of the left shoulder.  The pain is described as achy, 9/10.  It is worsens with activity.  There is radiation into the back.  Previous treatments include steroid injections given in urgent care 1 week ago which have provided minimal relief.  There is not a history of previous injury or surgery to the hip.  The patient does not use an assistive device.    In summary, patient with PMH of RA, previously followed by Dr. Muñoz at [facility].  She was treated in past with MTX but it upset her stomach and therefore stopped the medication over a year ago.  She now on plaquenil but still has significant pain to her left shoulder and hip.  She wants to try Enbrel.    Of note, patient primary language is Malay and therefore history was obtained with assistance of an interpretor through [facility].         Past Medical History:   Diagnosis Date    Arthritis        No past surgical history on file.    No family history on file.    Review of patient's allergies indicates:  No Known Allergies      Current Outpatient Medications:     albuterol (VENTOLIN HFA) 90 mcg/actuation inhaler, Inhale 2 puffs into the lungs every 6 (six) hours as needed for Wheezing. Rescue, Disp: 6.7 g, Rfl: 0    cetirizine (ZYRTEC) 10 MG tablet, Take 10 mg by mouth., Disp: , Rfl:     esomeprazole (NEXIUM) 20 MG capsule, , Disp: , Rfl:     folic acid (FOLVITE) 1 MG tablet, , Disp: , Rfl:     gabapentin (NEURONTIN) 300 MG capsule, , Disp: , Rfl:     hydroxychloroquine (PLAQUENIL) 200 mg tablet, Take 400 mg by mouth., Disp: , Rfl:     lisinopril (PRINIVIL,ZESTRIL) 20 MG tablet, , Disp: , Rfl:     lovastatin (MEVACOR) 10 MG tablet,  ", Disp: , Rfl:     methotrexate 2.5 MG Tab, , Disp: , Rfl:     methotrexate, PF, (OTREXUP) 25 mg/0.4 mL AtIn, Inject 25 mg into the skin., Disp: , Rfl:     promethazine-codeine 6.25-10 mg/5 ml (PHENERGAN WITH CODEINE) 6.25-10 mg/5 mL syrup, Take 5 mLs by mouth every 4 (four) hours as needed for Cough. (Patient not taking: Reported on 2/5/2020), Disp: 240 mL, Rfl: 0    sharps bin-insulin syrin-needl 1 mL 29 x 1/2" Syrg, 1 kit., Disp: , Rfl:     Review of Systems:  ROS:  Constitutional: no fever or chills  Eyes: no visual changes  ENT: no nasal congestion or sore throat  Respiratory: no cough or shortness of breath  Cardiovascular: no chest pain or palpitations  Gastrointestinal: no nausea or vomiting, tolerating diet  Genitourinary: no hematuria or dysuria  Integument/Breast: no rash or pruritis  Hematologic/Lymphatic: no easy bruising or lymphadenopathy  Musculoskeletal: positive for arthralgias  Neurological: no seizures or tremors  Behavioral/Psych: no auditory or visual hallucinations  Endocrine: no heat or cold intolerance      PE:  /75   Pulse 82   Ht 5' 4" (1.626 m)   Wt 60.7 kg (133 lb 14.9 oz)   BMI 22.99 kg/m²   General: Pleasant, cooperative, NAD   HEENT: NCAT, sclera nonicteric   Lungs: Respirations are equal and unlabored.   Abdomen: Soft and non-tender.  CV: 2+ bilateral upper and lower extremity pulses.   Skin: Intact throughout LE with no rashes, erythema, or lesions  Extremities: No LE edema, NVI lower extremities      Hip Exam:   leftpositives: pain with flexion and negatives: pulses full    80 degrees flexion  0 degrees extension   20 degrees internal rotation  20 degrees external rotation  10 degrees abduction  10 degrees adduction     Shoulder exam: left  Tenderness: globally  ROM: forward flexion 180/180, extension 45/45  Shoulder Strength: biceps 5/5, triceps 5/5, abduction 5/5, adduction 5/5  non-specific diffuse tenderness about the shoulder, sensory exam normal, motor exam " normal and radial pulse intact  Stability tests: normal      RADIOGRAPHS:  X-ray of left hip and left shoulder obtained and personally reviewed by me.  She has no acute fractures or dislocations seen in the hip or shoulder studies.  There is DJD in both the hip and shoulder region with more noticeable narrowing in the left hip.    ASSESSMENT/PLAN:       ICD-10-CM ICD-9-CM   1. Left hip pain M25.552 719.45   2. Chronic left shoulder pain M25.512 719.41    G89.29 338.29   3. History of rheumatoid arthritis Z87.39 V13.4       Plan: We discussed with the patient at length all the different treatment options available for arthrosis of the hip including anti-inflammatories, acetaminophen, rest, ice, lower extremity strengthening exercise, occasional cortisone injections for temporary relief, and finally total hip arthroplasty.     -Patient presents with chronic pain to left hip and shoulder for several months.  Previously on MTX back in 2014 and did well until medication started to upset her stomach.  Now on Plaquenil.  Was previously seen by Dr. Muñoz at \[facility]".  -X-ray as above.  -Patient wants to start Enbrel.  Advised patient she needs to get this from Rheumatology.   -Will refer to Rheumatology for evaluation and workup as the RA could be causing her symptoms.      Future Appointments   Date Time Provider Department Center   2/5/2020  2:30 PM Timo Hansen NP [facility] ORTHO [facility]   2/6/2020  2:00 PM Carolyn Jolly MD [facility] Cli   2/11/2020  2:00 PM [facility] MRI1 [address] [facility] MRI [facility]   2/17/2020  9:00 AM Ana Luisa Cortes MD [facility] RHEUM [facility] y   4/6/2020  9:40 AM Yunior Cantor MD [facility] VAS LYNN [facility] Cli

## 2020-02-05 NOTE — PROGRESS NOTES
"  Subjective:       Alba M Reyes is a 69 y.o. female who is a new patient who was referred by Dr Quinones for evaluation of JACINTO.      She reports UI - both RITA and UUI. Seeing urology at  ([facility]) but wanted 2nd opinion. She was offered surgery by urology and PFPT by PCP, declined both in the past. Recommended for urodynamics on last note from Dr [facility] (3/19) - she did not get this completed.     Stress and urge component both bothersome. Nocturia x 2-3. Daytime void q3-4h. Leaks "all day." Recent bronchitis has made her RITA worse. Wears pads all day.       , vaginal deliveries. Still has uterus.     PVR (bladder scan) today - 0cc      The following portions of the patient's history were reviewed and updated as appropriate: allergies, current medications, past family history, past medical history, past social history, past surgical history and problem list.    Review of Systems  Constitutional: no fever or chills  ENT: no nasal congestion or sore throat  Respiratory: no cough or shortness of breath  Cardiovascular: no chest pain or palpitations  Gastrointestinal: no nausea or vomiting, tolerating diet  Genitourinary: as per HPI  Hematologic/Lymphatic: no easy bruising or lymphadenopathy  Musculoskeletal: no arthralgias or myalgias  Skin: no rashes or lesions  Neurological: no seizures or tremors  Behavioral/Psych: no auditory or visual hallucinations        Objective:    Vitals: /78 (BP Location: Right arm, Patient Position: Sitting)   Ht 5' 4" (1.626 m)   Wt 62.8 kg (138 lb 7.2 oz)   BMI 23.76 kg/m²     Physical Exam   General: well developed, well nourished in no acute distress  Head: normocephalic, atraumatic  Neck: supple, trachea midline, no obvious enlargement of thyroid  HEENT: EOMI, mucus membranes moist, sclera anicteric, no hearing impairment  Lungs: symmetric expansion, non-labored breathing  Cardiovascular: regular rate and rhythm, normal pulses  Abdomen: soft, non tender, non distended, " no palpable masses, no hepatosplenomegaly, no hernias, no CVA tenderness  Musculoskeletal: no peripheral edema, normal ROM in bilateral upper and lower extremities  Lymphatics: no cervical or inguinal lymphadenopathy  Skin: no rashes or lesions  Neuro: alert and oriented x 3, no gross deficits  Psych: normal judgment and insight, normal mood/affect and non-anxious  Genitourinary:   patient declined exam      Lab Review   Urine analysis today in clinic shows positive for protein trace    Lab Results   Component Value Date    WBC 5.64 01/04/2017    HGB 10.0 (L) 01/04/2017    HCT 30.5 (L) 01/04/2017    MCV 92 01/04/2017     01/04/2017     Lab Results   Component Value Date    CREATININE 0.6 01/06/2017    BUN 2 (L) 01/06/2017       Imaging  NA       Assessment/Plan:      1. Mixed incontinence urge and stress    - Discussed difference of UUI and RITA components. Reviewed etiology and workup of each.   - RITA: Kegels, PFPT, pessary, bulking agent, MUS.   - UUI: Behavioral changes, PFPT, anticholinergics, mirabegron. Botox/InterStim for refractory UUI.   - URI making RITA worse   - Suspect she needs urodynamics - she has been recommended for this in the past. She is not inclined to having a procedure. Briefly discussed MUS.   - PFPT referral placed     2. Nocturia    - PFPT   - Consider ACh, declines today         Follow up in 6 months

## 2020-02-06 ENCOUNTER — OFFICE VISIT (OUTPATIENT)
Dept: UROLOGY | Facility: CLINIC | Age: 70
End: 2020-02-06
Payer: MEDICARE

## 2020-02-06 VITALS
WEIGHT: 138.44 LBS | DIASTOLIC BLOOD PRESSURE: 78 MMHG | HEIGHT: 64 IN | SYSTOLIC BLOOD PRESSURE: 130 MMHG | BODY MASS INDEX: 23.64 KG/M2

## 2020-02-06 DIAGNOSIS — N39.46 MIXED INCONTINENCE URGE AND STRESS: Primary | ICD-10-CM

## 2020-02-06 DIAGNOSIS — R35.1 NOCTURIA: ICD-10-CM

## 2020-02-06 PROCEDURE — 1126F PR PAIN SEVERITY QUANTIFIED, NO PAIN PRESENT: ICD-10-PCS | Mod: HCNC,S$GLB,, | Performed by: UROLOGY

## 2020-02-06 PROCEDURE — 99204 PR OFFICE/OUTPT VISIT, NEW, LEVL IV, 45-59 MIN: ICD-10-PCS | Mod: HCNC,25,S$GLB, | Performed by: UROLOGY

## 2020-02-06 PROCEDURE — 1126F AMNT PAIN NOTED NONE PRSNT: CPT | Mod: HCNC,S$GLB,, | Performed by: UROLOGY

## 2020-02-06 PROCEDURE — 81002 POCT URINE DIPSTICK WITHOUT MICROSCOPE: ICD-10-PCS | Mod: HCNC,S$GLB,, | Performed by: UROLOGY

## 2020-02-06 PROCEDURE — 1159F MED LIST DOCD IN RCRD: CPT | Mod: HCNC,S$GLB,, | Performed by: UROLOGY

## 2020-02-06 PROCEDURE — 99999 PR PBB SHADOW E&M-EST. PATIENT-LVL III: ICD-10-PCS | Mod: PBBFAC,HCNC,, | Performed by: UROLOGY

## 2020-02-06 PROCEDURE — 1159F PR MEDICATION LIST DOCUMENTED IN MEDICAL RECORD: ICD-10-PCS | Mod: HCNC,S$GLB,, | Performed by: UROLOGY

## 2020-02-06 PROCEDURE — 81002 URINALYSIS NONAUTO W/O SCOPE: CPT | Mod: HCNC,S$GLB,, | Performed by: UROLOGY

## 2020-02-06 PROCEDURE — 1101F PT FALLS ASSESS-DOCD LE1/YR: CPT | Mod: HCNC,CPTII,S$GLB, | Performed by: UROLOGY

## 2020-02-06 PROCEDURE — 1101F PR PT FALLS ASSESS DOC 0-1 FALLS W/OUT INJ PAST YR: ICD-10-PCS | Mod: HCNC,CPTII,S$GLB, | Performed by: UROLOGY

## 2020-02-06 PROCEDURE — 51798 POCT BLADDER SCAN: ICD-10-PCS | Mod: HCNC,S$GLB,, | Performed by: UROLOGY

## 2020-02-06 PROCEDURE — 99204 OFFICE O/P NEW MOD 45 MIN: CPT | Mod: HCNC,25,S$GLB, | Performed by: UROLOGY

## 2020-02-06 PROCEDURE — 51798 US URINE CAPACITY MEASURE: CPT | Mod: HCNC,S$GLB,, | Performed by: UROLOGY

## 2020-02-06 PROCEDURE — 99999 PR PBB SHADOW E&M-EST. PATIENT-LVL III: CPT | Mod: PBBFAC,HCNC,, | Performed by: UROLOGY

## 2020-02-06 NOTE — LETTER
February 6, 2020      Rosendo Quinones Jr., MD  [address] [facility]  [place] [address]           [facility]  [address]  [facility] [address]  Phone: [phone]  Fax: [phone]          Patient: Alba M Reyes   MR Number: [id number]   YOB: 1950   Date of Visit: 2/6/2020       Dear Dr. Rosendo Quinones Jr.:    Thank you for referring Alba Reyes to me for evaluation. Attached you will find relevant portions of my assessment and plan of care.    If you have questions, please do not hesitate to call me. I look forward to following Alba Reyes along with you.    Sincerely,    Carolyn Jolly MD    Enclosure  CC:  No Recipients    If you would like to receive this communication electronically, please contact [email or web address] or [phone] to request more information on [employer] Link access.    For providers and/or their staff who would like to refer a patient to [facility], please contact us through our one-stop-shop provider referral line, [facility], at [phone].    If you feel you have received this communication in error or would no longer like to receive these types of communications, please e-mail [email or web address]

## 2020-02-07 ENCOUNTER — TELEPHONE (OUTPATIENT)
Dept: VASCULAR SURGERY | Facility: CLINIC | Age: 70
End: 2020-02-07

## 2020-02-07 LAB
BILIRUB SERPL-MCNC: NORMAL MG/DL
BLOOD URINE, POC: NORMAL
COLOR, POC UA: YELLOW
GLUCOSE UR QL STRIP: NORMAL
KETONES UR QL STRIP: NORMAL
LEUKOCYTE ESTERASE URINE, POC: NORMAL
NITRITE, POC UA: NORMAL
PH, POC UA: 5
POC RESIDUAL URINE VOLUME: 0 ML (ref 0–100)
PROTEIN, POC: NORMAL
SPECIFIC GRAVITY, POC UA: 1025
UROBILINOGEN, POC UA: NORMAL

## 2020-02-07 NOTE — TELEPHONE ENCOUNTER
----- Message from Yousuf Torrez RN sent at 2/6/2020  2:06 PM CST -----  Contact: enriqueta Grayson,    This message is for Vascular Surgery, not CardioVascular surgery...  I have included Vascular Clinical staff on this message.....    Thanks... Diego Torrez RN    ----- Message -----  From: Renuka Tejada  Sent: 2/6/2020   1:35 PM CST  To: , #    REYES, ALBA M          [address]  [place] [address]            MRN:  [id number]  Pt is calling to find a new vascular doctor because the one she got to dont do anything for her, and her legs is really bothering her and she is in a lot of pain and need to be seen today if possible. Pt is stating that her legs are bleeding and has been to the Er and they just stitch her leg up    [phone]

## 2020-02-07 NOTE — TELEPHONE ENCOUNTER
Spoke to the pt , she thought she could come to [facility] to see another provider to speed up the 3 month waiting period that her insurance requires for varicose veins. I explained to her the protocol she understood and the call ended.

## 2020-03-02 PROBLEM — R29.898 WEAKNESS OF BOTH HIPS: Status: ACTIVE | Noted: 2020-03-02

## 2020-03-02 PROBLEM — R27.9 LACK OF COORDINATION: Status: ACTIVE | Noted: 2020-03-02

## 2020-03-02 PROBLEM — R29.3 POSTURE ABNORMALITY: Status: ACTIVE | Noted: 2020-03-02

## 2020-03-23 RX ORDER — LISINOPRIL 20 MG/1
20 TABLET ORAL DAILY
Qty: 90 TABLET | Refills: 0 | Status: SHIPPED | OUTPATIENT
Start: 2020-03-23 | End: 2020-05-14

## 2020-03-24 PROBLEM — R26.9 ABNORMALITY OF GAIT AND MOBILITY: Status: RESOLVED | Noted: 2019-04-03 | Resolved: 2020-03-24

## 2020-03-24 PROBLEM — R53.1 WEAKNESS: Status: RESOLVED | Noted: 2019-04-03 | Resolved: 2020-03-24

## 2020-03-24 PROBLEM — R68.89 DECREASED FUNCTIONAL ACTIVITY TOLERANCE: Status: RESOLVED | Noted: 2019-04-03 | Resolved: 2020-03-24

## 2020-03-31 RX ORDER — LOVASTATIN 10 MG/1
10 TABLET ORAL NIGHTLY
Qty: 90 TABLET | Refills: 3 | Status: SHIPPED | OUTPATIENT
Start: 2020-03-31 | End: 2021-07-12 | Stop reason: SDUPTHER

## 2020-03-31 NOTE — TELEPHONE ENCOUNTER
I will send in her lovastatin but I cannot send in her methotrexate. That is a medication that requires supervision which I do not follow

## 2020-03-31 NOTE — TELEPHONE ENCOUNTER
----- Message from Leigh Ramirez sent at 3/31/2020 10:53 AM CDT -----  Contact: jaydenglen with [facility] [phone]  Type: RX Refill Request    Who Called:shayy with [facility] [phone]    Have you contacted your pharmacy:    Refill or New Rx:lovastatin (MEVACOR) 10 MG tablet     methotrexate, PF, (OTREXUP) 25 mg/0.4 mL AtIn     RX Name and Strength:    How is the patient currently taking it? (ex. 1XDay):    Is this a 30 day or 90 day RX:    Preferred Pharmacy with phone number:    Local or Mail Order:    Ordering Provider:    Would the patient rather a call back or a response via [facility]?     Best Call Back Number:    Additional Information:

## 2020-04-22 RX ORDER — HYDROGEN PEROXIDE 3 %
20 SOLUTION, NON-ORAL MISCELLANEOUS DAILY
Qty: 90 CAPSULE | Refills: 0 | Status: SHIPPED | OUTPATIENT
Start: 2020-04-22 | End: 2021-05-24 | Stop reason: SDUPTHER

## 2020-04-22 NOTE — TELEPHONE ENCOUNTER
----- Message from Selina Christopher sent at 4/22/2020  4:14 PM CDT -----  Contact: self [phone]  .Type: RX Refill Request    Who Called: self    Have you contacted your pharmacy: no    Refill or New Rx: refill     RX Name and Strength: esomeprazole (NEXIUM) 20 MG capsule    Is this a 30 day or 90 day RX: 90 day    Preferred Pharmacy with phone number: .  [facility] - [place], [place] - [address]  [address] ([place] [address]  Phone: [phone] Fax: [phone]    Local or Mail Order: local     Would the patient rather a call back or a response via [facility]?  Call back     Best Call Back Number: [phone]

## 2020-05-14 RX ORDER — LISINOPRIL 20 MG/1
TABLET ORAL
Qty: 90 TABLET | Refills: 0 | Status: SHIPPED | OUTPATIENT
Start: 2020-05-14 | End: 2022-08-08

## 2020-05-15 DIAGNOSIS — Z12.11 COLON CANCER SCREENING: ICD-10-CM

## 2020-05-15 DIAGNOSIS — Z11.59 NEED FOR HEPATITIS C SCREENING TEST: ICD-10-CM

## 2020-08-11 ENCOUNTER — OFFICE VISIT (OUTPATIENT)
Dept: URGENT CARE | Facility: CLINIC | Age: 70
End: 2020-08-11
Payer: MEDICARE

## 2020-08-11 VITALS
OXYGEN SATURATION: 95 % | RESPIRATION RATE: 16 BRPM | SYSTOLIC BLOOD PRESSURE: 131 MMHG | TEMPERATURE: 99 F | HEART RATE: 86 BPM | DIASTOLIC BLOOD PRESSURE: 66 MMHG

## 2020-08-11 DIAGNOSIS — M06.9 RHEUMATOID ARTHRITIS FLARE: Primary | ICD-10-CM

## 2020-08-11 DIAGNOSIS — M25.531 RIGHT WRIST PAIN: ICD-10-CM

## 2020-08-11 PROCEDURE — 99214 OFFICE O/P EST MOD 30 MIN: CPT | Mod: S$GLB,,, | Performed by: NURSE PRACTITIONER

## 2020-08-11 PROCEDURE — 99214 PR OFFICE/OUTPT VISIT, EST, LEVL IV, 30-39 MIN: ICD-10-PCS | Mod: S$GLB,,, | Performed by: NURSE PRACTITIONER

## 2020-08-11 RX ORDER — PREDNISONE 50 MG/1
50 TABLET ORAL DAILY
Qty: 5 TABLET | Refills: 0 | Status: SHIPPED | OUTPATIENT
Start: 2020-08-11 | End: 2020-08-16

## 2020-08-11 NOTE — PATIENT INSTRUCTIONS
"Start prednisone.  Follow up with rheumatologist in 3-5 days      Artritis reumatoide  Usted tiene artritis reumatoide (RA, por trinity siglas en inglés). Esta es perri enfermedad crónica que afecta principalmente las articulaciones. A veces también afecta otras partes del cuerpo. La RA es perri enfermedad autoinmunitaria. [place] significa que el sistema inmunitario del cuerpo, que normalmente lo protege, en cambio le causa daño. Cuando perri persona tiene RA, el sistema inmunitario ataca las articulaciones. Se desconoce cuál es la razón.     En la mayoría de los casos, la RA afecta pares de articulaciones a ambos lados del cuerpo. Pueden incluir las articulaciones de ambos codos, muñecas, vijaya, rodillas, pies o tobillos. Con frecuencia, esta enfermedad comienza lentamente. Los primeros síntomas incluyen rigidez, vinnie musculares y fatiga. Con el tiempo, es posible que comiencen a doler las articulaciones. También pueden calentarse, hincharse o ponerse sensibles. Los síntomas pueden ser más intensos por la mañana después del descanso nocturno y pueden aliviarse con la actividad.     Si usted tiene RA, puede pasar períodos de enfermedad activa (cuando trinity síntomas empeoran) seguidos de períodos de remisión (en los que mejoran o desaparecen). No se conoce perri torey para la artritis reumatoide. Shaggy el tratamiento médico puede retrasar o detener el avance de la enfermedad. También puede ayudar a aliviar los síntomas. En rafat de enfermedad avanzada, la cirugía, jovani el reemplazo de la articulación, puede ser la mejor opción.  Cuidados en la casa  · Si le recetaron medicamentos, tómelos exactamente jovani le indicaron.  · Para ayudar a controlar la hinchazón y el dolor, puede dileep paracetamol (acetaminofeno) o ibuprofeno, a menos que se le haya recetado otro medicamento antiinflamatorio no esteroide ("NSAID", por trinity siglas en inglés). Nota: Si usted tiene perri enfermedad crónica del hígado o de los riñones o ha tenido alguna vez " perri úlcera de estómago o hemorragia gastrointestinal (GI), hable con renteria proveedor de atención médica antes de usar estos medicamentos.  · Algunas personas encuentran alivio con calor (ducha caliente, baño caliente o almohadilla térmica). Otras prefieren compresas frías (hielo en perri bolsa plástica, envuelta en perri toalla). Pruebe con ambos métodos y utilice el que le resulte mejor james 20 minutos varias veces al día.  · El ejercicio es fundamental en el tratamiento de la RA. Ayuda a disminuir el dolor. También puede mejorar la flexibilidad. Nahid todo lo posible por manterse físicamente activo todos los días. Mueva todas las mañanas trinity articulaciones utilizando toda la amplitud de movimiento. Evite permanecer en perri misma posición james períodos prolongados. Tómese descansos a lo sorin del día y muévase. Además, pregunte a renteria proveedor de atención médica o fisioterapeuta cuáles son los ejercicios más adecuados para usted.  · Si está excedido de peso, adelgace. El peso extra aumenta la presión sobre las articulaciones.  · Si usted fuma, deje el hábito. Fumar aumenta el riesgo de complicaciones asociadas con la artritis reumatoide.  · No se ha demostrado con certeza que ningún producto herbal ni suplemento nutricional ayude con la artritis reumatoide. Sin embargo, la acupuntura y el masaje pueden ser un alivio eficaz para el dolor.  · Hable con renteria proveedor de atención médica o terapeuta ocupacional acerca de las maneras más fáciles de llevar a cabo las labores diarias. Wiley puede incluir el uso de dispositivos de asistencia. Son herramientas especiales que pueden ayudarle con tareas jovani vestirse, bañarse, cocinar, conducir y movilizarse.  Visitas de control  Nahid el seguimiento con renteria proveedor de atención médica o jovani le hayan indicado.  Cuándo buscar atención médica  Llame enseguida a renteria proveedor de atención médica si se presenta alguna de las siguientes situaciones:  · Debilidad creciente, color pálido de  la piel o desmayos  · Dolor de pecho o dificultad para respirar  · Dionicio en el vómito o en las heces (de color sarah o christianson)  · Cambios en la visión  · Úlceras en la piel  · Fiebre de 101º F (38.3º C) o más ziyad, o según le haya indicado renteria proveedor de atención médica  · [place] dolor de articulaciones  · [place] salpullido  Recursos  Para obtener más información sobre la artritis reumatoide comuníquese con:  · Arthritis Foundation, [phone], www.arthritis.org  · National [place] of Arthritis and Musculoskeletal and Skin Diseases (NIAMS), 798.261.8307, www.niams.nih.gov  Date Last Reviewed: 4/13/2015  © 9892-7351 [employer], [employer]. [address] [place] [address], [place] [address]. Todos los derechos reservados. Esta información no pretende sustituir la atención médica profesional. Sólo renteria médico puede diagnosticar y tratar un problema de ekaterina.

## 2020-08-30 ENCOUNTER — HOSPITAL ENCOUNTER (EMERGENCY)
Facility: HOSPITAL | Age: 70
Discharge: HOME | End: 2020-08-30
Attending: EMERGENCY MEDICINE
Payer: MEDICARE

## 2020-08-30 VITALS
RESPIRATION RATE: 15 BRPM | SYSTOLIC BLOOD PRESSURE: 158 MMHG | WEIGHT: 138 LBS | TEMPERATURE: 98 F | BODY MASS INDEX: 25.4 KG/M2 | HEIGHT: 62 IN | OXYGEN SATURATION: 99 % | DIASTOLIC BLOOD PRESSURE: 75 MMHG | HEART RATE: 81 BPM

## 2020-08-30 DIAGNOSIS — K59.00 CONSTIPATION, UNSPECIFIED CONSTIPATION TYPE: Primary | ICD-10-CM

## 2020-08-30 DIAGNOSIS — N28.1 RENAL CYST: ICD-10-CM

## 2020-08-30 DIAGNOSIS — R10.9 ABDOMINAL PAIN: ICD-10-CM

## 2020-08-30 DIAGNOSIS — M16.0 BILATERAL HIP JOINT ARTHRITIS: ICD-10-CM

## 2020-08-30 DIAGNOSIS — K57.90 DIVERTICULOSIS: ICD-10-CM

## 2020-08-30 PROBLEM — M81.0 OSTEOPOROSIS: Status: ACTIVE | Noted: 2019-04-15

## 2020-08-30 PROBLEM — K21.9 GERD (GASTROESOPHAGEAL REFLUX DISEASE): Status: ACTIVE | Noted: 2019-04-15

## 2020-08-30 PROBLEM — I83.93 VARICOSE VEINS OF LEGS: Status: ACTIVE | Noted: 2018-11-20

## 2020-08-30 PROBLEM — N39.46 MIXED INCONTINENCE: Status: ACTIVE | Noted: 2019-03-20

## 2020-08-30 LAB
ALBUMIN SERPL-MCNC: 4.1 G/DL (ref 3.3–5.5)
ALBUMIN SERPL-MCNC: 4.1 G/DL (ref 3.3–5.5)
ALLENS TEST: ABNORMAL
ALP SERPL-CCNC: 76 U/L (ref 42–141)
ALP SERPL-CCNC: 80 U/L (ref 42–141)
BILIRUB SERPL-MCNC: 0.9 MG/DL (ref 0.2–1.6)
BILIRUB SERPL-MCNC: 0.9 MG/DL (ref 0.2–1.6)
BILIRUBIN, POC UA: NEGATIVE
BLOOD, POC UA: ABNORMAL
BUN SERPL-MCNC: 14 MG/DL (ref 7–22)
CALCIUM SERPL-MCNC: 9.6 MG/DL (ref 8–10.3)
CHLORIDE SERPL-SCNC: 102 MMOL/L (ref 98–108)
CLARITY, POC UA: CLEAR
COLOR, POC UA: YELLOW
CREAT SERPL-MCNC: 0.5 MG/DL (ref 0.6–1.2)
CTP QC/QA: YES
GLUCOSE SERPL-MCNC: 107 MG/DL (ref 73–118)
GLUCOSE, POC UA: NEGATIVE
HCO3 UR-SCNC: 27.6 MMOL/L (ref 24–28)
KETONES, POC UA: ABNORMAL
LDH SERPL L TO P-CCNC: 1.04 MMOL/L (ref 0.5–2.2)
LEUKOCYTE EST, POC UA: NEGATIVE
NITRITE, POC UA: NEGATIVE
PCO2 BLDA: 41.6 MMHG (ref 35–45)
PH SMN: 7.43 [PH] (ref 7.35–7.45)
PH UR STRIP: 7 [PH]
PO2 BLDA: 36 MMHG (ref 40–60)
POC ALT (SGPT): 14 U/L (ref 10–47)
POC ALT (SGPT): 14 U/L (ref 10–47)
POC AMYLASE: 197 U/L (ref 14–97)
POC AST (SGOT): 26 U/L (ref 11–38)
POC AST (SGOT): 27 U/L (ref 11–38)
POC BE: 3 MMOL/L
POC CARDIAC TROPONIN I: 0.02 NG/ML
POC GGT: 12 U/L (ref 5–65)
POC SATURATED O2: 70 % (ref 95–100)
POC TCO2: 27 MMOL/L (ref 18–33)
POC TCO2: 29 MMOL/L (ref 24–29)
POTASSIUM BLD-SCNC: 3.4 MMOL/L (ref 3.6–5.1)
PROTEIN, POC UA: ABNORMAL
PROTEIN, POC: 7.3 G/DL (ref 6.4–8.1)
PROTEIN, POC: 7.5 G/DL (ref 6.4–8.1)
SAMPLE: ABNORMAL
SAMPLE: NORMAL
SARS-COV-2 RDRP RESP QL NAA+PROBE: NEGATIVE
SITE: ABNORMAL
SODIUM BLD-SCNC: 145 MMOL/L (ref 128–145)
SPECIFIC GRAVITY, POC UA: 1.02
UROBILINOGEN, POC UA: 4 E.U./DL

## 2020-08-30 PROCEDURE — 25000003 PHARM REV CODE 250: Mod: ER | Performed by: NURSE PRACTITIONER

## 2020-08-30 PROCEDURE — 96375 TX/PRO/DX INJ NEW DRUG ADDON: CPT | Mod: ER

## 2020-08-30 PROCEDURE — 85025 COMPLETE CBC W/AUTO DIFF WBC: CPT | Mod: ER

## 2020-08-30 PROCEDURE — 84484 ASSAY OF TROPONIN QUANT: CPT | Mod: ER

## 2020-08-30 PROCEDURE — 63600175 PHARM REV CODE 636 W HCPCS: Mod: ER | Performed by: EMERGENCY MEDICINE

## 2020-08-30 PROCEDURE — 96374 THER/PROPH/DIAG INJ IV PUSH: CPT | Mod: ER

## 2020-08-30 PROCEDURE — 25500020 PHARM REV CODE 255: Mod: ER | Performed by: EMERGENCY MEDICINE

## 2020-08-30 PROCEDURE — 63600175 PHARM REV CODE 636 W HCPCS: Mod: ER | Performed by: NURSE PRACTITIONER

## 2020-08-30 PROCEDURE — 81003 URINALYSIS AUTO W/O SCOPE: CPT | Mod: ER

## 2020-08-30 PROCEDURE — U0002 COVID-19 LAB TEST NON-CDC: HCPCS | Mod: ER | Performed by: NURSE PRACTITIONER

## 2020-08-30 PROCEDURE — 80053 COMPREHEN METABOLIC PANEL: CPT | Mod: ER

## 2020-08-30 PROCEDURE — 99285 EMERGENCY DEPT VISIT HI MDM: CPT | Mod: 25,ER

## 2020-08-30 PROCEDURE — 82150 ASSAY OF AMYLASE: CPT | Mod: ER

## 2020-08-30 PROCEDURE — 82803 BLOOD GASES ANY COMBINATION: CPT | Mod: ER

## 2020-08-30 RX ORDER — LISINOPRIL 20 MG/1
20 TABLET ORAL
Status: DISCONTINUED | OUTPATIENT
Start: 2020-08-30 | End: 2020-08-30

## 2020-08-30 RX ORDER — ONDANSETRON 2 MG/ML
4 INJECTION INTRAMUSCULAR; INTRAVENOUS
Status: COMPLETED | OUTPATIENT
Start: 2020-08-30 | End: 2020-08-30

## 2020-08-30 RX ORDER — MORPHINE SULFATE 4 MG/ML
4 INJECTION, SOLUTION INTRAMUSCULAR; INTRAVENOUS
Status: COMPLETED | OUTPATIENT
Start: 2020-08-30 | End: 2020-08-30

## 2020-08-30 RX ORDER — ONDANSETRON 4 MG/1
4 TABLET, FILM COATED ORAL EVERY 6 HOURS PRN
Qty: 12 TABLET | Refills: 0 | Status: SHIPPED | OUTPATIENT
Start: 2020-08-30 | End: 2021-04-08

## 2020-08-30 RX ORDER — DOCUSATE SODIUM 100 MG/1
100 CAPSULE, LIQUID FILLED ORAL 2 TIMES DAILY PRN
Qty: 60 CAPSULE | Refills: 0 | Status: SHIPPED | OUTPATIENT
Start: 2020-08-30 | End: 2021-04-08

## 2020-08-30 RX ORDER — POLYETHYLENE GLYCOL 3350 17 G/17G
17 POWDER, FOR SOLUTION ORAL DAILY
Qty: 116 G | Refills: 0 | Status: SHIPPED | OUTPATIENT
Start: 2020-08-30 | End: 2021-04-08

## 2020-08-30 RX ADMIN — MORPHINE SULFATE 4 MG: 4 INJECTION INTRAVENOUS at 05:08

## 2020-08-30 RX ADMIN — PROMETHAZINE HYDROCHLORIDE 12.5 MG: 25 INJECTION INTRAMUSCULAR; INTRAVENOUS at 08:08

## 2020-08-30 RX ADMIN — ONDANSETRON 4 MG: 2 INJECTION INTRAMUSCULAR; INTRAVENOUS at 05:08

## 2020-08-30 RX ADMIN — IOHEXOL 75 ML: 350 INJECTION, SOLUTION INTRAVENOUS at 06:08

## 2020-08-30 RX ADMIN — MORPHINE SULFATE 4 MG: 4 INJECTION INTRAVENOUS at 06:08

## 2020-08-30 NOTE — ED PROVIDER NOTES
Encounter Date: 8/30/2020    SCRIBE #1 NOTE: I, Valentine Parisi, am scribing for, and in the presence of,  Sabina Long NP. I have scribed the following portions of the note - Other sections scribed: HPI, ROS, PE.       History     Chief Complaint   Patient presents with    Abdominal Pain     states right sided abdomen pain. states hot/cold flashes. pt states mild nausea and vomiting. DENIES diarrhe and urinary complaints, Tried immodium, seltzers     Alba M Reyes is a 69 y.o. female who presents to the ED complaining of right sided abdominal pain for 2 days. Patient also complains of hot/cold flashes for 2 days, decreased appetite and a headache since yesterday, and nausea and vomiting since earlier today. She reports a decrease in appetite since yesterday.  Attempted treatment with [facility] with no relief. Her last bowel movement was 2 days ago. Patient had her gallbladder removed 3 years ago. She denies alcohol or drug use. No known allergies. Denies fever, chills, dysuria, hematuria, constipation, arm pain, or leg pain.    The history is provided by the patient. No  was used.     Review of patient's allergies indicates:  No Known Allergies  Past Medical History:   Diagnosis Date    Arthritis     Hypertension      No past surgical history on file.  No family history on file.  Social History     Tobacco Use    Smoking status: Never Smoker    Smokeless tobacco: Never Used   Substance Use Topics    Alcohol use: Not Currently    Drug use: Not Currently     Review of Systems   Constitutional: Positive for appetite change (decrease). Negative for chills and fever.   Gastrointestinal: Positive for abdominal pain, nausea and vomiting. Negative for constipation and diarrhea.   Genitourinary: Negative for dysuria and hematuria.   Musculoskeletal: Negative for myalgias (arm or leg pain).   Neurological: Negative for weakness and numbness.   All other systems reviewed and are  negative.      Physical Exam     Initial Vitals [08/30/20 1710]   BP Pulse Resp Temp SpO2   (!) 170/47 74 18 98.1 °F (36.7 °C) 96 %      MAP       --         Physical Exam    Nursing note and vitals reviewed.  Constitutional: She appears well-developed.   HENT:   Head: Normocephalic.   Right Ear: External ear normal.   Left Ear: External ear normal.   Nose: Nose normal.   Mouth/Throat: Oropharynx is clear and moist.   Eyes: Conjunctivae and EOM are normal. Pupils are equal, round, and reactive to light.   Neck: Normal range of motion. Neck supple.   Cardiovascular: Normal rate, regular rhythm, S1 normal, S2 normal and normal heart sounds. Exam reveals no gallop and no friction rub.    No murmur heard.  Pulmonary/Chest: Breath sounds normal. No respiratory distress. She has no wheezes. She has no rhonchi. She has no rales.   Abdominal: Soft. There is no abdominal tenderness.   Musculoskeletal: Normal range of motion.   Neurological: She is alert and oriented to person, place, and time.   Skin: Skin is warm and dry. Capillary refill takes less than 2 seconds.   Psychiatric: She has a normal mood and affect. Her behavior is normal.         ED Course   Procedures  Labs Reviewed   POCT URINALYSIS W/O SCOPE - Abnormal; Notable for the following components:       Result Value    Ketones, UA 3+ (*)     Blood, UA Trace-intact (*)     Protein, UA Trace (*)     Urobilinogen, UA 4.0 (*)     All other components within normal limits   ISTAT PROCEDURE - Abnormal; Notable for the following components:    POC PO2 36 (*)     POC SATURATED O2 70 (*)     All other components within normal limits   POCT CMP - Abnormal; Notable for the following components:    POC Creatinine 0.5 (*)     POC Potassium 3.4 (*)     All other components within normal limits   POCT LIVER PANEL - Abnormal; Notable for the following components:    Amylase,  (*)     All other components within normal limits   TROPONIN ISTAT   POCT CBC   SARS-COV-2 RDRP  GENE   POCT URINALYSIS DIPSTICK (CULTURE IF INDICATED)   POCT CMP   POCT AMYLASE   POCT TROPONIN         EKG Readings: (Independently Interpreted)   No STEMI. Rate of 76. Normal Sinus Rhythm. Left Axis Deviation. Normal EKG. QTc normal at 456. When compared to prior EKG dated 12/20/16 rate increased by 10 bpm today.          Imaging Results          CT Abdomen Pelvis With Contrast (Final result)  Result time 08/30/20 18:46:01    Final result by Casimiro Bueno MD (08/30/20 18:46:01)                 Impression:      1. No acute abnormality.  2. Mild diverticulosis of the sigmoid colon.  3. Few small renal cysts bilaterally.  4. Severe probable osteoarthritic changes of the hips bilaterally, left greater than right.      Electronically signed by: Casimiro Bueno  Date:    08/30/2020  Time:    18:46             Narrative:    EXAMINATION:  CT ABDOMEN PELVIS WITH CONTRAST    CLINICAL HISTORY:  Abdominal pain, acute, nonlocalized;    TECHNIQUE:  Low dose axial images, sagittal and coronal reformations were obtained from the lung bases to the pubic symphysis following the IV administration of 75 mL of Omnipaque 350 .  Oral contrast was not administered.    COMPARISON:  01/01/2017    FINDINGS:  Abdomen:    - Lower thorax:    - Lung bases: No infiltrates and no nodules.    - Liver: No focal mass.    - Gallbladder: Status post cholecystectomy.    - Bile Ducts: No evidence of intra or extra hepatic biliary ductal dilation.    - Spleen: Negative.    - Kidneys: No mass or hydronephrosis.  Few small probable renal cysts bilaterally.    - Adrenals: Unremarkable.    - Pancreas: No mass or peripancreatic fat stranding.    - Retroperitoneum:  No significant adenopathy.    - Vascular: No abdominal aortic aneurysm.    - Abdominal wall:  Unremarkable.    The appendix is within normal limits.    Pelvis:    No pelvic mass, adenopathy, or free fluid.    The uterus is present.    Bowel/Mesentery:    No evidence of bowel obstruction or  inflammation.  Mild diverticulosis of the sigmoid colon.  No evidence of acute diverticulitis.  Retained feces in the colon.    Bones:  No acute osseous abnormality and no suspicious lytic or blastic lesion.    Severe joint space narrowing of the hips bilaterally, left greater than right.  Subcortical cystic changes in the left femoral head and acetabulum suggesting prominent osteoarthritic changes.    Severe disc space narrowing and vacuum disc phenomena at L5-S1.                                 Medical Decision Making:   History:   Old Medical Records: I decided to obtain old medical records.  Independently Interpreted Test(s):   I have ordered and independently interpreted X-rays - see prior notes.  I have ordered and independently interpreted EKG Reading(s) - see prior notes  Clinical Tests:   Lab Tests: Ordered and Reviewed  Radiological Study: Ordered and Reviewed  Medical Tests: Ordered and Reviewed  ED Management:  69-year-old female presenting to ED with right-sided abdominal pain with associated nausea and vomiting.  On exam, she is well-appearing.  Abdomen is soft and nontender without guarding or rigidity.  No tenderness in the right lower quadrant.  Doubt acute appendicitis.  CBC and CMP reassuring.  CT scan was obtained.  No acute intra-abdominal process.  Mild Diverticulosis.  Few small renal cysts bilaterally.  No other acute process.  Patient has significant retained feces in the colon.  She had significant improvement in symptoms following pain medication and antiemetics.  Repeat abdominal exam remains benign.  Patient is hypertensive.  She is not taking her BP meds.  She is asymptomatic.  Patient will take her blood pressure medications when she gets home.  Strict abdominal pain precautions were given and the patient verbalized understanding.  She will return to the emergency department for any new or worsening symptoms.            Scribe Attestation:   Scribe #1: I performed the above scribed  service and the documentation accurately describes the services I performed. I attest to the accuracy of the note.     Scribe attestation: I, ESTUARDO Long, personally performed the services described in this documentation. All medical record entries made by the scribe were at my direction and in my presence.  I have reviewed the chart and agree that the record reflects my personal performance and is accurate and complete.                      Clinical Impression:     1. Constipation, unspecified constipation type    2. Abdominal pain    3. Renal cyst    4. Bilateral hip joint arthritis    5. Diverticulosis            Disposition:   Disposition: Discharged  Condition: Stable     ED Disposition Condition    Discharge Stable        ED Prescriptions     Medication Sig Dispense Start Date End Date Auth. Provider    ondansetron (ZOFRAN) 4 MG tablet Take 1 tablet (4 mg total) by mouth every 6 (six) hours as needed for Nausea. 12 tablet 8/30/2020  Sabina Long NP    polyethylene glycol (GLYCOLAX) 17 gram/dose powder Take 17 g by mouth once daily. 116 g 8/30/2020  Sabina Long NP    docusate sodium (COLACE) 100 MG capsule Take 1 capsule (100 mg total) by mouth 2 (two) times daily as needed for Constipation. 60 capsule 8/30/2020  Sabina Long NP        Follow-up Information     Follow up With Specialties Details Why Contact Info    Rosendo Quinones Jr., MD Family Medicine Schedule an appointment as soon as possible for a visit  For follow-up [address]  [facility] [address]  [phone]      Jim Hermosillo MD General Surgery, Bariatrics Schedule an appointment as soon as possible for a visit  For follow-up [address] [facility] [address]  [phone]      [facility] Emergency Department Emergency Medicine Go to  If symptoms worsen [address]  [facility] [address] [phone]                                     Sabina Long NP  08/30/20 3480

## 2020-08-31 NOTE — DISCHARGE INSTRUCTIONS
Please return to the Emergency Department for any new or worsening symptoms including: worsening abdominal pain, dark\black\bloody bowel movements, vomiting blood, hard abdomen, fever, chest pain, shortness of breath, loss of consciousness or any other concerns.     Please follow up with your Primary Care Provider within in the week. If you do not have a Primary Care Provider, you may contact the one listed on your discharge paperwork or you may also call the [facility] Appointment Desk at [phone] to schedule an appointment with a Primary Care Provider.

## 2021-03-16 ENCOUNTER — IMMUNIZATION (OUTPATIENT)
Dept: OBSTETRICS AND GYNECOLOGY | Facility: CLINIC | Age: 71
End: 2021-03-16
Payer: MEDICARE

## 2021-03-16 DIAGNOSIS — Z23 NEED FOR VACCINATION: Primary | ICD-10-CM

## 2021-03-16 PROCEDURE — 91300 COVID-19, MRNA, LNP-S, PF, 30 MCG/0.3 ML DOSE VACCINE: CPT | Mod: S$GLB,,, | Performed by: FAMILY MEDICINE

## 2021-03-16 PROCEDURE — 0001A COVID-19, MRNA, LNP-S, PF, 30 MCG/0.3 ML DOSE VACCINE: CPT | Mod: CV19,S$GLB,, | Performed by: FAMILY MEDICINE

## 2021-03-16 PROCEDURE — 0001A COVID-19, MRNA, LNP-S, PF, 30 MCG/0.3 ML DOSE VACCINE: ICD-10-PCS | Mod: CV19,S$GLB,, | Performed by: FAMILY MEDICINE

## 2021-03-16 PROCEDURE — 91300 COVID-19, MRNA, LNP-S, PF, 30 MCG/0.3 ML DOSE VACCINE: ICD-10-PCS | Mod: S$GLB,,, | Performed by: FAMILY MEDICINE

## 2021-04-06 ENCOUNTER — IMMUNIZATION (OUTPATIENT)
Dept: OBSTETRICS AND GYNECOLOGY | Facility: CLINIC | Age: 71
End: 2021-04-06
Payer: MEDICARE

## 2021-04-06 DIAGNOSIS — Z23 NEED FOR VACCINATION: Primary | ICD-10-CM

## 2021-04-06 PROCEDURE — 91300 COVID-19, MRNA, LNP-S, PF, 30 MCG/0.3 ML DOSE VACCINE: CPT | Mod: PBBFAC | Performed by: FAMILY MEDICINE

## 2021-04-06 PROCEDURE — 0002A COVID-19, MRNA, LNP-S, PF, 30 MCG/0.3 ML DOSE VACCINE: CPT | Mod: PBBFAC | Performed by: FAMILY MEDICINE

## 2021-04-08 ENCOUNTER — PATIENT OUTREACH (OUTPATIENT)
Dept: ADMINISTRATIVE | Facility: HOSPITAL | Age: 71
End: 2021-04-08

## 2021-04-08 ENCOUNTER — OFFICE VISIT (OUTPATIENT)
Dept: FAMILY MEDICINE | Facility: CLINIC | Age: 71
End: 2021-04-08
Payer: MEDICARE

## 2021-04-08 VITALS
DIASTOLIC BLOOD PRESSURE: 70 MMHG | HEIGHT: 62 IN | OXYGEN SATURATION: 96 % | BODY MASS INDEX: 23.49 KG/M2 | RESPIRATION RATE: 18 BRPM | TEMPERATURE: 99 F | SYSTOLIC BLOOD PRESSURE: 132 MMHG | HEART RATE: 87 BPM | WEIGHT: 127.63 LBS

## 2021-04-08 DIAGNOSIS — Z11.59 NEED FOR HEPATITIS C SCREENING TEST: ICD-10-CM

## 2021-04-08 DIAGNOSIS — I10 HYPERTENSION, BENIGN: ICD-10-CM

## 2021-04-08 DIAGNOSIS — M06.9 RHEUMATOID ARTHRITIS, INVOLVING UNSPECIFIED SITE, UNSPECIFIED WHETHER RHEUMATOID FACTOR PRESENT: Primary | ICD-10-CM

## 2021-04-08 DIAGNOSIS — Z78.0 POSTMENOPAUSAL: ICD-10-CM

## 2021-04-08 DIAGNOSIS — Z12.12 SCREENING FOR COLORECTAL CANCER: ICD-10-CM

## 2021-04-08 DIAGNOSIS — H53.8 BLURRED VISION, BILATERAL: ICD-10-CM

## 2021-04-08 DIAGNOSIS — R35.0 URINARY FREQUENCY: ICD-10-CM

## 2021-04-08 DIAGNOSIS — E78.5 DYSLIPIDEMIA: ICD-10-CM

## 2021-04-08 DIAGNOSIS — Z12.11 SCREENING FOR COLORECTAL CANCER: ICD-10-CM

## 2021-04-08 PROCEDURE — 3008F BODY MASS INDEX DOCD: CPT | Mod: CPTII,S$GLB,, | Performed by: FAMILY MEDICINE

## 2021-04-08 PROCEDURE — 1101F PT FALLS ASSESS-DOCD LE1/YR: CPT | Mod: CPTII,S$GLB,, | Performed by: FAMILY MEDICINE

## 2021-04-08 PROCEDURE — 3008F PR BODY MASS INDEX (BMI) DOCUMENTED: ICD-10-PCS | Mod: CPTII,S$GLB,, | Performed by: FAMILY MEDICINE

## 2021-04-08 PROCEDURE — 1125F PR PAIN SEVERITY QUANTIFIED, PAIN PRESENT: ICD-10-PCS | Mod: S$GLB,,, | Performed by: FAMILY MEDICINE

## 2021-04-08 PROCEDURE — 1101F PR PT FALLS ASSESS DOC 0-1 FALLS W/OUT INJ PAST YR: ICD-10-PCS | Mod: CPTII,S$GLB,, | Performed by: FAMILY MEDICINE

## 2021-04-08 PROCEDURE — 99214 PR OFFICE/OUTPT VISIT, EST, LEVL IV, 30-39 MIN: ICD-10-PCS | Mod: S$GLB,,, | Performed by: FAMILY MEDICINE

## 2021-04-08 PROCEDURE — 99999 PR PBB SHADOW E&M-EST. PATIENT-LVL V: CPT | Mod: PBBFAC,,, | Performed by: FAMILY MEDICINE

## 2021-04-08 PROCEDURE — 99999 PR PBB SHADOW E&M-EST. PATIENT-LVL V: ICD-10-PCS | Mod: PBBFAC,,, | Performed by: FAMILY MEDICINE

## 2021-04-08 PROCEDURE — 3288F PR FALLS RISK ASSESSMENT DOCUMENTED: ICD-10-PCS | Mod: CPTII,S$GLB,, | Performed by: FAMILY MEDICINE

## 2021-04-08 PROCEDURE — 3288F FALL RISK ASSESSMENT DOCD: CPT | Mod: CPTII,S$GLB,, | Performed by: FAMILY MEDICINE

## 2021-04-08 PROCEDURE — 1125F AMNT PAIN NOTED PAIN PRSNT: CPT | Mod: S$GLB,,, | Performed by: FAMILY MEDICINE

## 2021-04-08 PROCEDURE — 1159F PR MEDICATION LIST DOCUMENTED IN MEDICAL RECORD: ICD-10-PCS | Mod: S$GLB,,, | Performed by: FAMILY MEDICINE

## 2021-04-08 PROCEDURE — 1159F MED LIST DOCD IN RCRD: CPT | Mod: S$GLB,,, | Performed by: FAMILY MEDICINE

## 2021-04-08 PROCEDURE — 99214 OFFICE O/P EST MOD 30 MIN: CPT | Mod: S$GLB,,, | Performed by: FAMILY MEDICINE

## 2021-04-08 RX ORDER — ERGOCALCIFEROL 1.25 MG/1
CAPSULE ORAL
COMMUNITY
Start: 2021-03-10

## 2021-04-08 RX ORDER — DULOXETIN HYDROCHLORIDE 20 MG/1
20 CAPSULE, DELAYED RELEASE ORAL 2 TIMES DAILY
Qty: 60 CAPSULE | Refills: 0 | Status: SHIPPED | OUTPATIENT
Start: 2021-04-08 | End: 2022-04-08

## 2021-04-08 RX ORDER — IBUPROFEN 800 MG/1
TABLET ORAL
COMMUNITY
Start: 2021-03-10 | End: 2021-04-08

## 2021-04-08 RX ORDER — TRIAMCINOLONE ACETONIDE 1 MG/G
CREAM TOPICAL
COMMUNITY
Start: 2021-01-20 | End: 2021-04-08

## 2021-04-09 ENCOUNTER — OFFICE VISIT (OUTPATIENT)
Dept: RHEUMATOLOGY | Facility: CLINIC | Age: 71
End: 2021-04-09
Payer: MEDICARE

## 2021-04-09 VITALS
TEMPERATURE: 99 F | HEIGHT: 62 IN | WEIGHT: 129.19 LBS | HEART RATE: 86 BPM | SYSTOLIC BLOOD PRESSURE: 124 MMHG | OXYGEN SATURATION: 97 % | BODY MASS INDEX: 23.77 KG/M2 | DIASTOLIC BLOOD PRESSURE: 68 MMHG | RESPIRATION RATE: 18 BRPM

## 2021-04-09 DIAGNOSIS — M81.0 OSTEOPOROSIS, UNSPECIFIED OSTEOPOROSIS TYPE, UNSPECIFIED PATHOLOGICAL FRACTURE PRESENCE: ICD-10-CM

## 2021-04-09 DIAGNOSIS — M06.9 RHEUMATOID ARTHRITIS, INVOLVING UNSPECIFIED SITE, UNSPECIFIED WHETHER RHEUMATOID FACTOR PRESENT: Primary | ICD-10-CM

## 2021-04-09 DIAGNOSIS — Z79.899 ENCOUNTER FOR LONG-TERM (CURRENT) USE OF OTHER MEDICATIONS: ICD-10-CM

## 2021-04-09 DIAGNOSIS — M75.51 SUBACROMIAL BURSITIS OF RIGHT SHOULDER JOINT: ICD-10-CM

## 2021-04-09 DIAGNOSIS — Z71.89 COUNSELING AND COORDINATION OF CARE: ICD-10-CM

## 2021-04-09 PROCEDURE — 3008F PR BODY MASS INDEX (BMI) DOCUMENTED: ICD-10-PCS | Mod: CPTII,S$GLB,, | Performed by: INTERNAL MEDICINE

## 2021-04-09 PROCEDURE — 99999 PR PBB SHADOW E&M-EST. PATIENT-LVL IV: CPT | Mod: PBBFAC,,, | Performed by: INTERNAL MEDICINE

## 2021-04-09 PROCEDURE — 1101F PR PT FALLS ASSESS DOC 0-1 FALLS W/OUT INJ PAST YR: ICD-10-PCS | Mod: CPTII,S$GLB,, | Performed by: INTERNAL MEDICINE

## 2021-04-09 PROCEDURE — 20605 DRAIN/INJ JOINT/BURSA W/O US: CPT | Mod: RT,S$GLB,, | Performed by: INTERNAL MEDICINE

## 2021-04-09 PROCEDURE — 99204 PR OFFICE/OUTPT VISIT, NEW, LEVL IV, 45-59 MIN: ICD-10-PCS | Mod: 25,S$GLB,, | Performed by: INTERNAL MEDICINE

## 2021-04-09 PROCEDURE — 1125F PR PAIN SEVERITY QUANTIFIED, PAIN PRESENT: ICD-10-PCS | Mod: S$GLB,,, | Performed by: INTERNAL MEDICINE

## 2021-04-09 PROCEDURE — 99204 OFFICE O/P NEW MOD 45 MIN: CPT | Mod: 25,S$GLB,, | Performed by: INTERNAL MEDICINE

## 2021-04-09 PROCEDURE — 20605 PR DRAIN/INJECT INTERMEDIATE JOINT/BURSA: ICD-10-PCS | Mod: RT,S$GLB,, | Performed by: INTERNAL MEDICINE

## 2021-04-09 PROCEDURE — 99999 PR PBB SHADOW E&M-EST. PATIENT-LVL IV: ICD-10-PCS | Mod: PBBFAC,,, | Performed by: INTERNAL MEDICINE

## 2021-04-09 PROCEDURE — 1159F MED LIST DOCD IN RCRD: CPT | Mod: S$GLB,,, | Performed by: INTERNAL MEDICINE

## 2021-04-09 PROCEDURE — 3288F FALL RISK ASSESSMENT DOCD: CPT | Mod: CPTII,S$GLB,, | Performed by: INTERNAL MEDICINE

## 2021-04-09 PROCEDURE — 1125F AMNT PAIN NOTED PAIN PRSNT: CPT | Mod: S$GLB,,, | Performed by: INTERNAL MEDICINE

## 2021-04-09 PROCEDURE — 1101F PT FALLS ASSESS-DOCD LE1/YR: CPT | Mod: CPTII,S$GLB,, | Performed by: INTERNAL MEDICINE

## 2021-04-09 PROCEDURE — 3288F PR FALLS RISK ASSESSMENT DOCUMENTED: ICD-10-PCS | Mod: CPTII,S$GLB,, | Performed by: INTERNAL MEDICINE

## 2021-04-09 PROCEDURE — 1159F PR MEDICATION LIST DOCUMENTED IN MEDICAL RECORD: ICD-10-PCS | Mod: S$GLB,,, | Performed by: INTERNAL MEDICINE

## 2021-04-09 PROCEDURE — 3008F BODY MASS INDEX DOCD: CPT | Mod: CPTII,S$GLB,, | Performed by: INTERNAL MEDICINE

## 2021-04-09 RX ORDER — TRIAMCINOLONE ACETONIDE 40 MG/ML
40 INJECTION, SUSPENSION INTRA-ARTICULAR; INTRAMUSCULAR
Status: COMPLETED | OUTPATIENT
Start: 2021-04-09 | End: 2021-04-09

## 2021-04-09 RX ORDER — LIDOCAINE HYDROCHLORIDE 10 MG/ML
1 INJECTION INFILTRATION; PERINEURAL
Status: COMPLETED | OUTPATIENT
Start: 2021-04-09 | End: 2021-04-09

## 2021-04-09 RX ADMIN — LIDOCAINE HYDROCHLORIDE 1 ML: 10 INJECTION INFILTRATION; PERINEURAL at 02:04

## 2021-04-09 RX ADMIN — TRIAMCINOLONE ACETONIDE 40 MG: 40 INJECTION, SUSPENSION INTRA-ARTICULAR; INTRAMUSCULAR at 02:04

## 2021-04-15 ENCOUNTER — TELEPHONE (OUTPATIENT)
Dept: FAMILY MEDICINE | Facility: CLINIC | Age: 71
End: 2021-04-15

## 2021-05-24 RX ORDER — HYDROGEN PEROXIDE 3 %
20 SOLUTION, NON-ORAL MISCELLANEOUS DAILY
Qty: 90 CAPSULE | Refills: 0 | Status: SHIPPED | OUTPATIENT
Start: 2021-05-24 | End: 2022-01-12

## 2021-07-21 ENCOUNTER — NURSE TRIAGE (OUTPATIENT)
Dept: ADMINISTRATIVE | Facility: CLINIC | Age: 71
End: 2021-07-21

## 2021-07-21 RX ORDER — LOVASTATIN 10 MG/1
10 TABLET ORAL NIGHTLY
Qty: 90 TABLET | Refills: 3 | Status: SHIPPED | OUTPATIENT
Start: 2021-07-21 | End: 2022-08-08

## 2021-09-23 ENCOUNTER — LAB VISIT (OUTPATIENT)
Dept: LAB | Facility: HOSPITAL | Age: 71
End: 2021-09-23
Attending: INTERNAL MEDICINE
Payer: MEDICARE

## 2021-09-23 ENCOUNTER — OFFICE VISIT (OUTPATIENT)
Dept: RHEUMATOLOGY | Facility: CLINIC | Age: 71
End: 2021-09-23
Payer: MEDICARE

## 2021-09-23 VITALS
TEMPERATURE: 98 F | RESPIRATION RATE: 18 BRPM | DIASTOLIC BLOOD PRESSURE: 83 MMHG | HEART RATE: 86 BPM | SYSTOLIC BLOOD PRESSURE: 138 MMHG | OXYGEN SATURATION: 96 %

## 2021-09-23 DIAGNOSIS — M05.79 RHEUMATOID ARTHRITIS INVOLVING MULTIPLE SITES WITH POSITIVE RHEUMATOID FACTOR: ICD-10-CM

## 2021-09-23 DIAGNOSIS — Z79.899 ENCOUNTER FOR LONG-TERM (CURRENT) USE OF OTHER MEDICATIONS: ICD-10-CM

## 2021-09-23 DIAGNOSIS — M81.0 OSTEOPOROSIS WITHOUT CURRENT PATHOLOGICAL FRACTURE, UNSPECIFIED OSTEOPOROSIS TYPE: ICD-10-CM

## 2021-09-23 DIAGNOSIS — M05.79 RHEUMATOID ARTHRITIS INVOLVING MULTIPLE SITES WITH POSITIVE RHEUMATOID FACTOR: Primary | ICD-10-CM

## 2021-09-23 DIAGNOSIS — Z71.89 COUNSELING AND COORDINATION OF CARE: ICD-10-CM

## 2021-09-23 DIAGNOSIS — M15.9 PRIMARY OSTEOARTHRITIS INVOLVING MULTIPLE JOINTS: ICD-10-CM

## 2021-09-23 LAB
ALBUMIN SERPL BCP-MCNC: 3.7 G/DL (ref 3.5–5.2)
ALP SERPL-CCNC: 86 U/L (ref 55–135)
ALT SERPL W/O P-5'-P-CCNC: 14 U/L (ref 10–44)
ANION GAP SERPL CALC-SCNC: 10 MMOL/L (ref 8–16)
AST SERPL-CCNC: 18 U/L (ref 10–40)
BASOPHILS # BLD AUTO: 0.02 K/UL (ref 0–0.2)
BASOPHILS NFR BLD: 0.5 % (ref 0–1.9)
BILIRUB SERPL-MCNC: 0.4 MG/DL (ref 0.1–1)
BUN SERPL-MCNC: 20 MG/DL (ref 8–23)
CALCIUM SERPL-MCNC: 9.1 MG/DL (ref 8.7–10.5)
CHLORIDE SERPL-SCNC: 107 MMOL/L (ref 95–110)
CO2 SERPL-SCNC: 24 MMOL/L (ref 23–29)
CREAT SERPL-MCNC: 0.7 MG/DL (ref 0.5–1.4)
CRP SERPL-MCNC: 10.8 MG/L (ref 0–8.2)
DIFFERENTIAL METHOD: ABNORMAL
EOSINOPHIL # BLD AUTO: 0.2 K/UL (ref 0–0.5)
EOSINOPHIL NFR BLD: 4.1 % (ref 0–8)
ERYTHROCYTE [DISTWIDTH] IN BLOOD BY AUTOMATED COUNT: 14.8 % (ref 11.5–14.5)
ERYTHROCYTE [SEDIMENTATION RATE] IN BLOOD BY WESTERGREN METHOD: 30 MM/HR (ref 0–36)
EST. GFR  (AFRICAN AMERICAN): >60 ML/MIN/1.73 M^2
EST. GFR  (NON AFRICAN AMERICAN): >60 ML/MIN/1.73 M^2
GLUCOSE SERPL-MCNC: 101 MG/DL (ref 70–110)
HCT VFR BLD AUTO: 31.3 % (ref 37–48.5)
HGB BLD-MCNC: 10.3 G/DL (ref 12–16)
IMM GRANULOCYTES # BLD AUTO: 0.01 K/UL (ref 0–0.04)
IMM GRANULOCYTES NFR BLD AUTO: 0.3 % (ref 0–0.5)
LYMPHOCYTES # BLD AUTO: 1.5 K/UL (ref 1–4.8)
LYMPHOCYTES NFR BLD: 40.5 % (ref 18–48)
MCH RBC QN AUTO: 30.3 PG (ref 27–31)
MCHC RBC AUTO-ENTMCNC: 32.9 G/DL (ref 32–36)
MCV RBC AUTO: 92 FL (ref 82–98)
MONOCYTES # BLD AUTO: 0.4 K/UL (ref 0.3–1)
MONOCYTES NFR BLD: 10.1 % (ref 4–15)
NEUTROPHILS # BLD AUTO: 1.6 K/UL (ref 1.8–7.7)
NEUTROPHILS NFR BLD: 44.5 % (ref 38–73)
NRBC BLD-RTO: 0 /100 WBC
PLATELET # BLD AUTO: 128 K/UL (ref 150–450)
PMV BLD AUTO: 12.9 FL (ref 9.2–12.9)
POTASSIUM SERPL-SCNC: 3.8 MMOL/L (ref 3.5–5.1)
PROT SERPL-MCNC: 7.2 G/DL (ref 6–8.4)
RBC # BLD AUTO: 3.4 M/UL (ref 4–5.4)
SODIUM SERPL-SCNC: 141 MMOL/L (ref 136–145)
WBC # BLD AUTO: 3.65 K/UL (ref 3.9–12.7)

## 2021-09-23 PROCEDURE — 80053 COMPREHEN METABOLIC PANEL: CPT | Performed by: INTERNAL MEDICINE

## 2021-09-23 PROCEDURE — 4010F PR ACE/ARB THEARPY RXD/TAKEN: ICD-10-PCS | Mod: CPTII,S$GLB,, | Performed by: INTERNAL MEDICINE

## 2021-09-23 PROCEDURE — 87340 HEPATITIS B SURFACE AG IA: CPT | Performed by: INTERNAL MEDICINE

## 2021-09-23 PROCEDURE — 3079F PR MOST RECENT DIASTOLIC BLOOD PRESSURE 80-89 MM HG: ICD-10-PCS | Mod: CPTII,S$GLB,, | Performed by: INTERNAL MEDICINE

## 2021-09-23 PROCEDURE — 85652 RBC SED RATE AUTOMATED: CPT | Performed by: INTERNAL MEDICINE

## 2021-09-23 PROCEDURE — 3079F DIAST BP 80-89 MM HG: CPT | Mod: CPTII,S$GLB,, | Performed by: INTERNAL MEDICINE

## 2021-09-23 PROCEDURE — 99999 PR PBB SHADOW E&M-EST. PATIENT-LVL III: CPT | Mod: PBBFAC,,, | Performed by: INTERNAL MEDICINE

## 2021-09-23 PROCEDURE — 85025 COMPLETE CBC W/AUTO DIFF WBC: CPT | Performed by: INTERNAL MEDICINE

## 2021-09-23 PROCEDURE — 3075F SYST BP GE 130 - 139MM HG: CPT | Mod: CPTII,S$GLB,, | Performed by: INTERNAL MEDICINE

## 2021-09-23 PROCEDURE — 99214 OFFICE O/P EST MOD 30 MIN: CPT | Mod: S$GLB,,, | Performed by: INTERNAL MEDICINE

## 2021-09-23 PROCEDURE — 86706 HEP B SURFACE ANTIBODY: CPT | Performed by: INTERNAL MEDICINE

## 2021-09-23 PROCEDURE — 99999 PR PBB SHADOW E&M-EST. PATIENT-LVL III: ICD-10-PCS | Mod: PBBFAC,,, | Performed by: INTERNAL MEDICINE

## 2021-09-23 PROCEDURE — 86803 HEPATITIS C AB TEST: CPT | Performed by: INTERNAL MEDICINE

## 2021-09-23 PROCEDURE — 87389 HIV-1 AG W/HIV-1&-2 AB AG IA: CPT | Performed by: INTERNAL MEDICINE

## 2021-09-23 PROCEDURE — 1125F AMNT PAIN NOTED PAIN PRSNT: CPT | Mod: CPTII,S$GLB,, | Performed by: INTERNAL MEDICINE

## 2021-09-23 PROCEDURE — 1101F PT FALLS ASSESS-DOCD LE1/YR: CPT | Mod: CPTII,S$GLB,, | Performed by: INTERNAL MEDICINE

## 2021-09-23 PROCEDURE — 99214 PR OFFICE/OUTPT VISIT, EST, LEVL IV, 30-39 MIN: ICD-10-PCS | Mod: S$GLB,,, | Performed by: INTERNAL MEDICINE

## 2021-09-23 PROCEDURE — 3075F PR MOST RECENT SYSTOLIC BLOOD PRESS GE 130-139MM HG: ICD-10-PCS | Mod: CPTII,S$GLB,, | Performed by: INTERNAL MEDICINE

## 2021-09-23 PROCEDURE — 3288F FALL RISK ASSESSMENT DOCD: CPT | Mod: CPTII,S$GLB,, | Performed by: INTERNAL MEDICINE

## 2021-09-23 PROCEDURE — 1101F PR PT FALLS ASSESS DOC 0-1 FALLS W/OUT INJ PAST YR: ICD-10-PCS | Mod: CPTII,S$GLB,, | Performed by: INTERNAL MEDICINE

## 2021-09-23 PROCEDURE — 36415 COLL VENOUS BLD VENIPUNCTURE: CPT | Mod: PO | Performed by: INTERNAL MEDICINE

## 2021-09-23 PROCEDURE — 1125F PR PAIN SEVERITY QUANTIFIED, PAIN PRESENT: ICD-10-PCS | Mod: CPTII,S$GLB,, | Performed by: INTERNAL MEDICINE

## 2021-09-23 PROCEDURE — 1159F MED LIST DOCD IN RCRD: CPT | Mod: CPTII,S$GLB,, | Performed by: INTERNAL MEDICINE

## 2021-09-23 PROCEDURE — 4010F ACE/ARB THERAPY RXD/TAKEN: CPT | Mod: CPTII,S$GLB,, | Performed by: INTERNAL MEDICINE

## 2021-09-23 PROCEDURE — 1159F PR MEDICATION LIST DOCUMENTED IN MEDICAL RECORD: ICD-10-PCS | Mod: CPTII,S$GLB,, | Performed by: INTERNAL MEDICINE

## 2021-09-23 PROCEDURE — 86140 C-REACTIVE PROTEIN: CPT | Performed by: INTERNAL MEDICINE

## 2021-09-23 PROCEDURE — 3288F PR FALLS RISK ASSESSMENT DOCUMENTED: ICD-10-PCS | Mod: CPTII,S$GLB,, | Performed by: INTERNAL MEDICINE

## 2021-09-23 RX ORDER — FOLIC ACID 1 MG/1
1 TABLET ORAL DAILY
Qty: 30 TABLET | Refills: 4 | Status: SHIPPED | OUTPATIENT
Start: 2021-09-23 | End: 2021-12-14 | Stop reason: SDUPTHER

## 2021-09-23 RX ORDER — METHOTREXATE 20 MG/.4ML
20 INJECTION, SOLUTION SUBCUTANEOUS WEEKLY
Qty: 4 SYRINGE | Refills: 6 | Status: SHIPPED | OUTPATIENT
Start: 2021-09-23 | End: 2021-12-09 | Stop reason: SDUPTHER

## 2021-09-24 LAB
HBV SURFACE AB SER-ACNC: POSITIVE M[IU]/ML
HBV SURFACE AG SERPL QL IA: NEGATIVE
HCV AB SERPL QL IA: NEGATIVE
HIV 1+2 AB+HIV1 P24 AG SERPL QL IA: NEGATIVE

## 2021-09-29 RX ORDER — ADALIMUMAB 40MG/0.4ML
40 KIT SUBCUTANEOUS
Qty: 2 PEN | Refills: 11 | Status: SHIPPED | OUTPATIENT
Start: 2021-09-29 | End: 2021-12-09 | Stop reason: SDUPTHER

## 2021-11-11 ENCOUNTER — TELEPHONE (OUTPATIENT)
Dept: RHEUMATOLOGY | Facility: CLINIC | Age: 71
End: 2021-11-11
Payer: MEDICARE

## 2021-12-07 ENCOUNTER — TELEPHONE (OUTPATIENT)
Dept: FAMILY MEDICINE | Facility: CLINIC | Age: 71
End: 2021-12-07
Payer: MEDICARE

## 2021-12-09 DIAGNOSIS — M05.79 RHEUMATOID ARTHRITIS INVOLVING MULTIPLE SITES WITH POSITIVE RHEUMATOID FACTOR: ICD-10-CM

## 2021-12-09 RX ORDER — ADALIMUMAB 40MG/0.4ML
40 KIT SUBCUTANEOUS
Qty: 2 PEN | Refills: 11 | Status: SHIPPED | OUTPATIENT
Start: 2021-12-09 | End: 2021-12-14 | Stop reason: SDUPTHER

## 2021-12-09 RX ORDER — METHOTREXATE 20 MG/.4ML
20 INJECTION, SOLUTION SUBCUTANEOUS WEEKLY
Qty: 4 EACH | Refills: 6 | Status: SHIPPED | OUTPATIENT
Start: 2021-12-09 | End: 2022-07-08

## 2021-12-14 ENCOUNTER — OFFICE VISIT (OUTPATIENT)
Dept: RHEUMATOLOGY | Facility: CLINIC | Age: 71
End: 2021-12-14
Payer: MEDICARE

## 2021-12-14 VITALS
RESPIRATION RATE: 18 BRPM | DIASTOLIC BLOOD PRESSURE: 69 MMHG | HEART RATE: 85 BPM | OXYGEN SATURATION: 96 % | SYSTOLIC BLOOD PRESSURE: 125 MMHG

## 2021-12-14 DIAGNOSIS — M12.812 ROTATOR CUFF ARTHROPATHY OF BOTH SHOULDERS: ICD-10-CM

## 2021-12-14 DIAGNOSIS — M15.9 PRIMARY OSTEOARTHRITIS INVOLVING MULTIPLE JOINTS: ICD-10-CM

## 2021-12-14 DIAGNOSIS — Z79.899 ENCOUNTER FOR LONG-TERM (CURRENT) USE OF OTHER MEDICATIONS: ICD-10-CM

## 2021-12-14 DIAGNOSIS — M81.0 OSTEOPOROSIS WITHOUT CURRENT PATHOLOGICAL FRACTURE, UNSPECIFIED OSTEOPOROSIS TYPE: ICD-10-CM

## 2021-12-14 DIAGNOSIS — Z71.89 COUNSELING AND COORDINATION OF CARE: ICD-10-CM

## 2021-12-14 DIAGNOSIS — M05.79 RHEUMATOID ARTHRITIS INVOLVING MULTIPLE SITES WITH POSITIVE RHEUMATOID FACTOR: Primary | ICD-10-CM

## 2021-12-14 DIAGNOSIS — M12.811 ROTATOR CUFF ARTHROPATHY OF BOTH SHOULDERS: ICD-10-CM

## 2021-12-14 PROCEDURE — 4010F PR ACE/ARB THEARPY RXD/TAKEN: ICD-10-PCS | Mod: CPTII,S$GLB,, | Performed by: INTERNAL MEDICINE

## 2021-12-14 PROCEDURE — 99214 PR OFFICE/OUTPT VISIT, EST, LEVL IV, 30-39 MIN: ICD-10-PCS | Mod: S$GLB,,, | Performed by: INTERNAL MEDICINE

## 2021-12-14 PROCEDURE — 99999 PR PBB SHADOW E&M-EST. PATIENT-LVL III: ICD-10-PCS | Mod: PBBFAC,,, | Performed by: INTERNAL MEDICINE

## 2021-12-14 PROCEDURE — 4010F ACE/ARB THERAPY RXD/TAKEN: CPT | Mod: CPTII,S$GLB,, | Performed by: INTERNAL MEDICINE

## 2021-12-14 PROCEDURE — 99214 OFFICE O/P EST MOD 30 MIN: CPT | Mod: S$GLB,,, | Performed by: INTERNAL MEDICINE

## 2021-12-14 PROCEDURE — 99999 PR PBB SHADOW E&M-EST. PATIENT-LVL III: CPT | Mod: PBBFAC,,, | Performed by: INTERNAL MEDICINE

## 2021-12-14 RX ORDER — METHOTREXATE 25 MG/ML
25 INJECTION INTRA-ARTERIAL; INTRAMUSCULAR; INTRATHECAL; INTRAVENOUS
Qty: 8 ML | Refills: 6 | Status: SHIPPED | OUTPATIENT
Start: 2021-12-14 | End: 2022-07-08

## 2021-12-14 RX ORDER — FOLIC ACID 1 MG/1
1 TABLET ORAL DAILY
Qty: 30 TABLET | Refills: 4 | Status: SHIPPED | OUTPATIENT
Start: 2021-12-14 | End: 2022-04-08 | Stop reason: SDUPTHER

## 2021-12-14 RX ORDER — ADALIMUMAB 40MG/0.4ML
40 KIT SUBCUTANEOUS
Qty: 2 PEN | Refills: 11 | Status: SHIPPED | OUTPATIENT
Start: 2021-12-14 | End: 2022-07-08 | Stop reason: SDUPTHER

## 2021-12-17 RX ORDER — METHOTREXATE 25 MG/ML
25 INJECTION, SOLUTION INTRA-ARTERIAL; INTRAMUSCULAR; INTRAVENOUS
Qty: 4 ML | Refills: 11 | Status: SHIPPED | OUTPATIENT
Start: 2021-12-17 | End: 2022-04-08 | Stop reason: SDUPTHER

## 2021-12-30 ENCOUNTER — SPECIALTY PHARMACY (OUTPATIENT)
Dept: PHARMACY | Facility: CLINIC | Age: 71
End: 2021-12-30
Payer: MEDICARE

## 2021-12-30 NOTE — TELEPHONE ENCOUNTER
Humira PA submitted via [facility] - (Key: [id number])  PA# [id number]. Humira approved until 12/31/2022. Copay $[id number]. Forwarding to benefits investigation and financial assisatnce

## 2021-12-31 NOTE — TELEPHONE ENCOUNTER
BENEFIT INVESTIGATION:  [facility] Medicare plan.   [facility] in network  Patient is in the donut hole  Estimated co pay: $1872.26  Co pay assistance required.   Forwarded to FA team for review.           LORRAINE

## 2022-01-03 ENCOUNTER — PATIENT MESSAGE (OUTPATIENT)
Dept: RHEUMATOLOGY | Facility: CLINIC | Age: 72
End: 2022-01-03
Payer: MEDICARE

## 2022-01-07 ENCOUNTER — IMMUNIZATION (OUTPATIENT)
Dept: OBSTETRICS AND GYNECOLOGY | Facility: CLINIC | Age: 72
End: 2022-01-07
Payer: MEDICARE

## 2022-01-07 DIAGNOSIS — Z23 NEED FOR VACCINATION: Primary | ICD-10-CM

## 2022-01-07 PROCEDURE — 0003A COVID-19, MRNA, LNP-S, PF, 30 MCG/0.3 ML DOSE VACCINE: CPT | Mod: PBBFAC | Performed by: FAMILY MEDICINE

## 2022-01-11 ENCOUNTER — LAB VISIT (OUTPATIENT)
Dept: PRIMARY CARE CLINIC | Facility: OTHER | Age: 72
End: 2022-01-11
Attending: INTERNAL MEDICINE
Payer: MEDICARE

## 2022-01-11 ENCOUNTER — DOCUMENTATION ONLY (OUTPATIENT)
Dept: REHABILITATION | Facility: HOSPITAL | Age: 72
End: 2022-01-11
Payer: MEDICARE

## 2022-01-11 DIAGNOSIS — Z20.822 ENCOUNTER FOR LABORATORY TESTING FOR COVID-19 VIRUS: ICD-10-CM

## 2022-01-11 PROCEDURE — U0003 INFECTIOUS AGENT DETECTION BY NUCLEIC ACID (DNA OR RNA); SEVERE ACUTE RESPIRATORY SYNDROME CORONAVIRUS 2 (SARS-COV-2) (CORONAVIRUS DISEASE [COVID-19]), AMPLIFIED PROBE TECHNIQUE, MAKING USE OF HIGH THROUGHPUT TECHNOLOGIES AS DESCRIBED BY CMS-2020-01-R: HCPCS | Performed by: INTERNAL MEDICINE

## 2022-01-11 NOTE — PROGRESS NOTES
No Show Note/Documentation    Patient: Alba M Reyes  Date of Session: 1/11/2022  MRN: [id number]    Alba M Reyes did not attend her scheduled therapy evaluation today. She did not call to cancel nor reschedule. This is the first appointment that Nichole has not attended.  No charges have been posted today.     Contacted Nichole stating she has been in much pain with her back and being exhausted.  She was unaware of her appointment today that was scheduled back in December.  Phone number provided to call to reschedule her evaluation.    LISETH Kirkpatrick  1/11/2022

## 2022-01-13 ENCOUNTER — PATIENT MESSAGE (OUTPATIENT)
Dept: FAMILY MEDICINE | Facility: CLINIC | Age: 72
End: 2022-01-13
Payer: MEDICARE

## 2022-01-13 DIAGNOSIS — U07.1 COVID-19 VIRUS DETECTED: ICD-10-CM

## 2022-01-13 LAB
SARS-COV-2 RNA RESP QL NAA+PROBE: DETECTED
SARS-COV-2- CYCLE NUMBER: 16

## 2022-02-22 NOTE — TELEPHONE ENCOUNTER
----- Message from Janessa Laureano sent at 2/22/2022 11:48 AM CST -----  Contact: Patient [phone]  Type: RX Refill Request    Who Called: Patient     Have you contacted your pharmacy: Yes. Was told to call Dr. Ross or New Rx: Refill     RX Name and Strength: lisinopriL (PRINIVIL,ZESTRIL) 20 MG tablet, esomeprazole (NEXIUM) 20 MG capsule,     Is this a 30 day or 90 day RX: 90 day    Preferred Pharmacy with phone number: .  [facility] [address] [place] [address] [address] [facility]  [address] [facility] [address]  Phone: [phone] Fax: [phone]    Local or Mail Order: Local    Would the patient rather a call back or a response via [facility]? Call back    Best Call Back Number: [phone]

## 2022-02-22 NOTE — TELEPHONE ENCOUNTER
Care Due:                  Date            Visit Type   Department     Provider  --------------------------------------------------------------------------------                                [facility] FAMILY                              PRIMARY      MEDICINE/  Last Visit: 04-      CARE ([facility])   INTERNAL MED   Rosendo Quinones  Next Visit: None Scheduled  None         None Found                                                            Last  Test          Frequency    Reason                     Performed    Due Date  --------------------------------------------------------------------------------    Lipid Panel.  12 months..  lovastatin...............  04- 04-    Powered by [employer]. Reference number: [id number].   2/22/2022 1:20:39 PM CST

## 2022-02-23 RX ORDER — LISINOPRIL 20 MG/1
20 TABLET ORAL DAILY
Qty: 90 TABLET | Refills: 0 | OUTPATIENT
Start: 2022-02-23

## 2022-02-23 RX ORDER — HYDROGEN PEROXIDE 3 %
20 SOLUTION, NON-ORAL MISCELLANEOUS DAILY
Qty: 90 CAPSULE | Refills: 0 | OUTPATIENT
Start: 2022-02-23

## 2022-02-23 NOTE — TELEPHONE ENCOUNTER
Shaheed   Called patient and informed for further refills an ov is required . Offered first opening 5/31 at 3 pm . Patient accept and explained hadn't been able to come into the office due her  . He was ill and she had to tend to him while also dealing with arthritis pains .

## 2022-02-24 ENCOUNTER — PATIENT MESSAGE (OUTPATIENT)
Dept: PHARMACY | Facility: CLINIC | Age: 72
End: 2022-02-24
Payer: MEDICARE

## 2022-02-24 NOTE — TELEPHONE ENCOUNTER
Reached out to patient's daughter to see if they were able to get approved for Humira PAP. States that she needed to date the application as well as fax one document that was missing. [facility] will assist in faxing completed application and follow up to make sure patient is approved.

## 2022-03-14 ENCOUNTER — HOSPITAL ENCOUNTER (OUTPATIENT)
Dept: RADIOLOGY | Facility: CLINIC | Age: 72
Discharge: HOME | End: 2022-03-14
Attending: INTERNAL MEDICINE
Payer: MEDICARE

## 2022-03-14 DIAGNOSIS — M81.0 OSTEOPOROSIS WITHOUT CURRENT PATHOLOGICAL FRACTURE, UNSPECIFIED OSTEOPOROSIS TYPE: ICD-10-CM

## 2022-03-14 PROCEDURE — 77080 DXA BONE DENSITY AXIAL: CPT | Mod: 26,,, | Performed by: INTERNAL MEDICINE

## 2022-03-14 PROCEDURE — 77080 DXA BONE DENSITY AXIAL: CPT | Mod: TC,PO

## 2022-03-14 PROCEDURE — 77080 DEXA BONE DENSITY SPINE HIP: ICD-10-PCS | Mod: 26,,, | Performed by: INTERNAL MEDICINE

## 2022-03-18 ENCOUNTER — PATIENT MESSAGE (OUTPATIENT)
Dept: PHARMACY | Facility: CLINIC | Age: 72
End: 2022-03-18
Payer: MEDICARE

## 2022-03-21 ENCOUNTER — PATIENT MESSAGE (OUTPATIENT)
Dept: ADMINISTRATIVE | Facility: HOSPITAL | Age: 72
End: 2022-03-21
Payer: MEDICARE

## 2022-03-21 NOTE — TELEPHONE ENCOUNTER
Received patient portion of PAP application and income documents. Provider portion completed. MDO will fax to [facility]. Will fax all documents to [employer] once received.

## 2022-03-28 ENCOUNTER — PATIENT MESSAGE (OUTPATIENT)
Dept: RHEUMATOLOGY | Facility: CLINIC | Age: 72
End: 2022-03-28
Payer: MEDICARE

## 2022-04-07 NOTE — TELEPHONE ENCOUNTER
Patient has been approved for Humira PAP through 12/31/2022. Has already scheduled shipment, arriving 4/22. Requested approval letter faxed to [facility] to have for documentation. Closing patient out at [facility].

## 2022-04-08 ENCOUNTER — OFFICE VISIT (OUTPATIENT)
Dept: RHEUMATOLOGY | Facility: CLINIC | Age: 72
End: 2022-04-08
Payer: MEDICARE

## 2022-04-08 ENCOUNTER — LAB VISIT (OUTPATIENT)
Dept: LAB | Facility: HOSPITAL | Age: 72
End: 2022-04-08
Attending: INTERNAL MEDICINE
Payer: MEDICARE

## 2022-04-08 VITALS
HEART RATE: 93 BPM | RESPIRATION RATE: 20 BRPM | WEIGHT: 132.06 LBS | DIASTOLIC BLOOD PRESSURE: 68 MMHG | SYSTOLIC BLOOD PRESSURE: 117 MMHG | HEIGHT: 62 IN | BODY MASS INDEX: 24.3 KG/M2 | OXYGEN SATURATION: 97 %

## 2022-04-08 DIAGNOSIS — M05.79 RHEUMATOID ARTHRITIS INVOLVING MULTIPLE SITES WITH POSITIVE RHEUMATOID FACTOR: ICD-10-CM

## 2022-04-08 DIAGNOSIS — M05.79 RHEUMATOID ARTHRITIS INVOLVING MULTIPLE SITES WITH POSITIVE RHEUMATOID FACTOR: Primary | ICD-10-CM

## 2022-04-08 DIAGNOSIS — Z71.89 COUNSELING AND COORDINATION OF CARE: ICD-10-CM

## 2022-04-08 DIAGNOSIS — M19.91 PRIMARY OSTEOARTHRITIS, UNSPECIFIED SITE: ICD-10-CM

## 2022-04-08 DIAGNOSIS — Z79.899 ENCOUNTER FOR LONG-TERM (CURRENT) USE OF OTHER MEDICATIONS: ICD-10-CM

## 2022-04-08 DIAGNOSIS — M85.80 OSTEOPENIA, UNSPECIFIED LOCATION: ICD-10-CM

## 2022-04-08 LAB
ALBUMIN SERPL BCP-MCNC: 3.6 G/DL (ref 3.5–5.2)
ALP SERPL-CCNC: 91 U/L (ref 55–135)
ALT SERPL W/O P-5'-P-CCNC: 16 U/L (ref 10–44)
ANION GAP SERPL CALC-SCNC: 7 MMOL/L (ref 8–16)
AST SERPL-CCNC: 24 U/L (ref 10–40)
BASOPHILS # BLD AUTO: 0.01 K/UL (ref 0–0.2)
BASOPHILS NFR BLD: 0.4 % (ref 0–1.9)
BILIRUB SERPL-MCNC: 0.4 MG/DL (ref 0.1–1)
BUN SERPL-MCNC: 23 MG/DL (ref 8–23)
CALCIUM SERPL-MCNC: 9.7 MG/DL (ref 8.7–10.5)
CHLORIDE SERPL-SCNC: 104 MMOL/L (ref 95–110)
CO2 SERPL-SCNC: 28 MMOL/L (ref 23–29)
CREAT SERPL-MCNC: 0.7 MG/DL (ref 0.5–1.4)
DIFFERENTIAL METHOD: ABNORMAL
EOSINOPHIL # BLD AUTO: 0 K/UL (ref 0–0.5)
EOSINOPHIL NFR BLD: 1.1 % (ref 0–8)
ERYTHROCYTE [DISTWIDTH] IN BLOOD BY AUTOMATED COUNT: 15.2 % (ref 11.5–14.5)
EST. GFR  (AFRICAN AMERICAN): >60 ML/MIN/1.73 M^2
EST. GFR  (NON AFRICAN AMERICAN): >60 ML/MIN/1.73 M^2
GLUCOSE SERPL-MCNC: 103 MG/DL (ref 70–110)
HCT VFR BLD AUTO: 34.7 % (ref 37–48.5)
HGB BLD-MCNC: 11 G/DL (ref 12–16)
IMM GRANULOCYTES # BLD AUTO: 0.01 K/UL (ref 0–0.04)
IMM GRANULOCYTES NFR BLD AUTO: 0.4 % (ref 0–0.5)
LYMPHOCYTES # BLD AUTO: 0.8 K/UL (ref 1–4.8)
LYMPHOCYTES NFR BLD: 29.4 % (ref 18–48)
MCH RBC QN AUTO: 30.4 PG (ref 27–31)
MCHC RBC AUTO-ENTMCNC: 31.7 G/DL (ref 32–36)
MCV RBC AUTO: 96 FL (ref 82–98)
MONOCYTES # BLD AUTO: 0.2 K/UL (ref 0.3–1)
MONOCYTES NFR BLD: 7 % (ref 4–15)
NEUTROPHILS # BLD AUTO: 1.7 K/UL (ref 1.8–7.7)
NEUTROPHILS NFR BLD: 61.7 % (ref 38–73)
NRBC BLD-RTO: 0 /100 WBC
PLATELET # BLD AUTO: 129 K/UL (ref 150–450)
PMV BLD AUTO: 13 FL (ref 9.2–12.9)
POTASSIUM SERPL-SCNC: 4.1 MMOL/L (ref 3.5–5.1)
PROT SERPL-MCNC: 7.8 G/DL (ref 6–8.4)
RBC # BLD AUTO: 3.62 M/UL (ref 4–5.4)
SODIUM SERPL-SCNC: 139 MMOL/L (ref 136–145)
WBC # BLD AUTO: 2.72 K/UL (ref 3.9–12.7)

## 2022-04-08 PROCEDURE — 1100F PTFALLS ASSESS-DOCD GE2>/YR: CPT | Mod: CPTII,S$GLB,, | Performed by: INTERNAL MEDICINE

## 2022-04-08 PROCEDURE — 99999 PR PBB SHADOW E&M-EST. PATIENT-LVL IV: CPT | Mod: PBBFAC,,, | Performed by: INTERNAL MEDICINE

## 2022-04-08 PROCEDURE — 80053 COMPREHEN METABOLIC PANEL: CPT | Performed by: INTERNAL MEDICINE

## 2022-04-08 PROCEDURE — 3078F DIAST BP <80 MM HG: CPT | Mod: CPTII,S$GLB,, | Performed by: INTERNAL MEDICINE

## 2022-04-08 PROCEDURE — 1100F PR PT FALLS ASSESS DOC 2+ FALLS/FALL W/INJURY/YR: ICD-10-PCS | Mod: CPTII,S$GLB,, | Performed by: INTERNAL MEDICINE

## 2022-04-08 PROCEDURE — 1125F AMNT PAIN NOTED PAIN PRSNT: CPT | Mod: CPTII,S$GLB,, | Performed by: INTERNAL MEDICINE

## 2022-04-08 PROCEDURE — 99214 OFFICE O/P EST MOD 30 MIN: CPT | Mod: S$GLB,,, | Performed by: INTERNAL MEDICINE

## 2022-04-08 PROCEDURE — 3008F BODY MASS INDEX DOCD: CPT | Mod: CPTII,S$GLB,, | Performed by: INTERNAL MEDICINE

## 2022-04-08 PROCEDURE — 3074F PR MOST RECENT SYSTOLIC BLOOD PRESSURE < 130 MM HG: ICD-10-PCS | Mod: CPTII,S$GLB,, | Performed by: INTERNAL MEDICINE

## 2022-04-08 PROCEDURE — 1125F PR PAIN SEVERITY QUANTIFIED, PAIN PRESENT: ICD-10-PCS | Mod: CPTII,S$GLB,, | Performed by: INTERNAL MEDICINE

## 2022-04-08 PROCEDURE — 4010F ACE/ARB THERAPY RXD/TAKEN: CPT | Mod: CPTII,S$GLB,, | Performed by: INTERNAL MEDICINE

## 2022-04-08 PROCEDURE — 3074F SYST BP LT 130 MM HG: CPT | Mod: CPTII,S$GLB,, | Performed by: INTERNAL MEDICINE

## 2022-04-08 PROCEDURE — 1159F MED LIST DOCD IN RCRD: CPT | Mod: CPTII,S$GLB,, | Performed by: INTERNAL MEDICINE

## 2022-04-08 PROCEDURE — 4010F PR ACE/ARB THEARPY RXD/TAKEN: ICD-10-PCS | Mod: CPTII,S$GLB,, | Performed by: INTERNAL MEDICINE

## 2022-04-08 PROCEDURE — 99214 PR OFFICE/OUTPT VISIT, EST, LEVL IV, 30-39 MIN: ICD-10-PCS | Mod: S$GLB,,, | Performed by: INTERNAL MEDICINE

## 2022-04-08 PROCEDURE — 85025 COMPLETE CBC W/AUTO DIFF WBC: CPT | Performed by: INTERNAL MEDICINE

## 2022-04-08 PROCEDURE — 3078F PR MOST RECENT DIASTOLIC BLOOD PRESSURE < 80 MM HG: ICD-10-PCS | Mod: CPTII,S$GLB,, | Performed by: INTERNAL MEDICINE

## 2022-04-08 PROCEDURE — 3288F FALL RISK ASSESSMENT DOCD: CPT | Mod: CPTII,S$GLB,, | Performed by: INTERNAL MEDICINE

## 2022-04-08 PROCEDURE — 99999 PR PBB SHADOW E&M-EST. PATIENT-LVL IV: ICD-10-PCS | Mod: PBBFAC,,, | Performed by: INTERNAL MEDICINE

## 2022-04-08 PROCEDURE — 1159F PR MEDICATION LIST DOCUMENTED IN MEDICAL RECORD: ICD-10-PCS | Mod: CPTII,S$GLB,, | Performed by: INTERNAL MEDICINE

## 2022-04-08 PROCEDURE — 36415 COLL VENOUS BLD VENIPUNCTURE: CPT | Mod: PN | Performed by: INTERNAL MEDICINE

## 2022-04-08 PROCEDURE — 3008F PR BODY MASS INDEX (BMI) DOCUMENTED: ICD-10-PCS | Mod: CPTII,S$GLB,, | Performed by: INTERNAL MEDICINE

## 2022-04-08 PROCEDURE — 3288F PR FALLS RISK ASSESSMENT DOCUMENTED: ICD-10-PCS | Mod: CPTII,S$GLB,, | Performed by: INTERNAL MEDICINE

## 2022-04-08 RX ORDER — FOLIC ACID 1 MG/1
1 TABLET ORAL DAILY
Qty: 30 TABLET | Refills: 4 | Status: SHIPPED | OUTPATIENT
Start: 2022-04-08 | End: 2023-04-08

## 2022-04-08 RX ORDER — METHOTREXATE 25 MG/ML
25 INJECTION, SOLUTION INTRA-ARTERIAL; INTRAMUSCULAR; INTRAVENOUS
Qty: 4 ML | Refills: 11 | Status: SHIPPED | OUTPATIENT
Start: 2022-04-08 | End: 2022-07-08 | Stop reason: SDUPTHER

## 2022-04-08 NOTE — PROGRESS NOTES
RHEUMATOLOGY OUTPATIENT CLINIC NOTE    4/8/2022    Attending Rheumatologist: Kyle Youssef  Primary Care Provider: Rosendo Quinones Jr, MD   Physician Requesting Consultation: No referring provider defined for this encounter.  Chief Complaint/Reason For Consultation:  No chief complaint on file.      Subjective:       HPI  Alba M Reyes is a 71 y.o. White female with medical history noted below presents for evaluation of rheumatoid arthritis.  Has seen by Rheumatology at [facility] April 2019 at which point methotrexate continued.  Patient reports since then having to stop methotrexate approximately January of this year due to lab abnormalities she reports.  She notes only taking ibuprofen for pain.  She reports her right wrist right shoulder and knees.  Are worst areas.  Reports swelling of her wrists and hands.  Notes about an hour morning stiffness.  In addition to this she does not recall a therapy for osteoporosis.    Today  Patient here for follow up.   Last visit restarted on MTX and Humira. She notes she finally got approved the Humira. She notes her main is is L-spine pain. At times radiates down her legs and make it difficult to walk, she would like to know her options. Tolerating meds. No other issues.       Review of Systems   Constitutional: Negative for appetite change, chills, fatigue, fever and unexpected weight change.   HENT: Negative for nasal congestion, ear discharge, ear pain, hearing loss, mouth sores, nosebleeds, sneezing, sore throat, tinnitus and trouble swallowing.    Eyes: Negative for photophobia, pain, discharge, redness, itching and visual disturbance.   Respiratory: Negative for cough, chest tightness, shortness of breath and wheezing.    Cardiovascular: Negative for chest pain, palpitations and leg swelling.   Gastrointestinal: Negative for abdominal distention, abdominal pain, blood in stool, constipation, diarrhea, nausea and vomiting.   Endocrine: Negative for cold intolerance,  heat intolerance, polydipsia, polyphagia and polyuria.   Genitourinary: Negative for difficulty urinating, dyspareunia, dysuria, flank pain, frequency, genital sores, hematuria, menstrual problem, pelvic pain, urgency, vaginal bleeding, vaginal discharge, vaginal pain and vaginal dryness.   Musculoskeletal: Positive for back pain. Negative for arthralgias, gait problem, joint swelling, leg pain, myalgias, neck pain, neck stiffness and joint deformity.   Integumentary:  Negative for pallor and rash.   Neurological: Negative for dizziness, seizures, weakness, light-headedness, numbness and headaches.   Hematological: Negative for adenopathy. Does not bruise/bleed easily.   Psychiatric/Behavioral: Negative for confusion, decreased concentration and sleep disturbance. The patient is not nervous/anxious.    All other systems reviewed and are negative.       Chronic comorbid conditions affecting medical decision making today:  Past Medical History:   Diagnosis Date    Arthritis     Hypertension      No past surgical history on file.  No family history on file.  Social History     Substance and Sexual Activity   Alcohol Use Not Currently     Social History     Tobacco Use   Smoking Status Never Smoker   Smokeless Tobacco Never Used     Social History     Substance and Sexual Activity   Drug Use Not Currently       Current Outpatient Medications:     adalimumab (HUMIRA,CF, PEN) 40 mg/0.4 mL PnKt, Inject 0.4 mLs (40 mg total) into the skin every 14 (fourteen) days., Disp: 2 pen, Rfl: 11    albuterol (VENTOLIN HFA) 90 mcg/actuation inhaler, Inhale 2 puffs into the lungs every 6 (six) hours as needed for Wheezing. Rescue, Disp: 6.7 g, Rfl: 0    cetirizine (ZYRTEC) 10 MG tablet, Take 10 mg by mouth., Disp: , Rfl:     DULoxetine (CYMBALTA) 20 MG capsule, Take 1 capsule (20 mg total) by mouth 2 (two) times daily., Disp: 60 capsule, Rfl: 0    ergocalciferol (ERGOCALCIFEROL) 50,000 unit Cap, , Disp: , Rfl:     esomeprazole  (NEXIUM) 20 MG capsule, Take 1 capsule by mouth once daily, Disp: 90 capsule, Rfl: 0    folic acid (FOLVITE) 1 MG tablet, Take 1 tablet (1 mg total) by mouth once daily., Disp: 30 tablet, Rfl: 4    gabapentin (NEURONTIN) 300 MG capsule, , Disp: , Rfl:     lisinopriL (PRINIVIL,ZESTRIL) 20 MG tablet, TAKE 1 TABLET EVERY DAY, Disp: 90 tablet, Rfl: 0    lovastatin (MEVACOR) 10 MG tablet, Take 1 tablet (10 mg total) by mouth every evening., Disp: 90 tablet, Rfl: 3    methotrexate 25 mg/mL injection, Inject 1 mL (25 mg total) into the skin every 7 days., Disp: 4 mL, Rfl: 11    methotrexate, PF, (RASUVO, PF,) 20 mg/0.4 mL AtIn, Inject 0.4 mLs (20 mg total) into the skin once a week., Disp: 4 each, Rfl: 6    methotrexate, PF, 25 mg/mL Soln, Inject 1 mL (25 mg total) into the skin every 7 days., Disp: 8 mL, Rfl: 6     Objective:         Vitals:    04/08/22 1037   BP: 117/68   Pulse: 93   Resp: 20     Physical Exam   Musculoskeletal:      Comments: Can make fist, no synovitis  Prominent ulnar styloid   Knee crepitus        Reviewed old and all outside pertinent medical records available.    All lab results personally reviewed and interpreted by me.  Lab Results   Component Value Date    WBC 4.74 03/14/2022    HGB 10.2 (L) 03/14/2022    HCT 33.0 (L) 03/14/2022    MCV 99 (H) 03/14/2022    MCH 30.4 03/14/2022    MCHC 30.9 (L) 03/14/2022    RDW 15.9 (H) 03/14/2022     (L) 03/14/2022    MPV 12.7 03/14/2022    PLTEST Clumped (A) 04/08/2021       Lab Results   Component Value Date     03/14/2022    K 3.9 03/14/2022     03/14/2022    CO2 28 03/14/2022    GLU 97 03/14/2022    BUN 21 03/14/2022    CALCIUM 9.2 03/14/2022    PROT 6.7 03/14/2022    ALBUMIN 3.5 03/14/2022    BILITOT 0.3 03/14/2022    AST 17 03/14/2022    ALKPHOS 76 03/14/2022    ALT 14 03/14/2022       Lab Results   Component Value Date    COLORU Yellow 04/08/2021    APPEARANCEUA Clear 04/08/2021    SPECGRAV >1.030 (A) 04/08/2021    PHUR 6.0  04/08/2021    PROTEINUA Negative 04/08/2021    KETONESU Negative 04/08/2021    LEUKOCYTESUR Negative 04/08/2021    NITRITE Negative 04/08/2021    UROBILINOGEN Negative 04/08/2021       Lab Results   Component Value Date    CRP 10.8 (H) 09/23/2021       Lab Results   Component Value Date    SEDRATE 30 09/23/2021       Lab Results   Component Value Date    .0 (H) 04/08/2021    SEDRATE 30 09/23/2021       No components found for: 25OHVITDTOT, 29JGQSGU0, 61RMSMGZ2, METHODNOTE    No results found for: URICACID    No components found for: TSPOTTB        Imaging:  All imaging reviewed and independently interpreted by me.         ASSESSMENT / PLAN:     Alba M Reyes is a 71 y.o. White female with:      1. Rheumatoid arthritis, involving unspecified site, unspecified whether rheumatoid factor present  - patient with Hx of RA  - she has been doing well since restarting medications  - discussed medication complaince  - continue MTX 25mg subq, Humira, Daily FA  - update labs  - reassurance    2. Osteoporosis, unspecified osteoporosis type, unspecified pathological fracture presence   - New DEXA with Osteopenia   - continue Brennen +D  - wt bearing exercise  - reassurance     3. Osteoarthritis  - in addition to RA she has OA  - she notes l-spine pain, update films  - refer to Pain for Spinal Injection eval.   - NSAIDs PRN  - reassurance and exercise     4. DMARD Toxicity Monitoring  - daily FA  - labs today   - no live vaccines     5. Other specified counseling  - over 10 minutes spent regarding below topics:  - Immunization counseling done.  - Weight loss counseling done.  - Nutrition and exercise counseling.  - Limitation of alcohol consumption.  - Regular exercise:  Aerobic and resistance.  - Medication counseling provided.      Follow up in about 3 months (around 7/8/2022).    Method of contact with patient concerns: Abby attn Rheumatology    Disclaimer:  This note is prepared using voice recognition software and as such  is likely to have errors and has not been proof read. Please contact me for questions.     Time spent: 30 minutes in face to face discussion concerning diagnosis, prognosis, review of lab and test results, benefits of treatment as well as management of disease, counseling of patient and coordination of care between various health care providers.  Greater than half the time spent was used for coordination of care and counseling of patient.    Kyle Youssef M.D.  Rheumatology Department   [facility]

## 2022-04-14 ENCOUNTER — PATIENT MESSAGE (OUTPATIENT)
Dept: RHEUMATOLOGY | Facility: CLINIC | Age: 72
End: 2022-04-14
Payer: MEDICARE

## 2022-04-18 ENCOUNTER — PATIENT MESSAGE (OUTPATIENT)
Dept: ADMINISTRATIVE | Facility: OTHER | Age: 72
End: 2022-04-18
Payer: MEDICARE

## 2022-05-31 ENCOUNTER — PATIENT MESSAGE (OUTPATIENT)
Dept: ADMINISTRATIVE | Facility: HOSPITAL | Age: 72
End: 2022-05-31
Payer: MEDICARE

## 2022-06-08 DIAGNOSIS — Z12.11 COLON CANCER SCREENING: ICD-10-CM

## 2022-07-08 ENCOUNTER — LAB VISIT (OUTPATIENT)
Dept: LAB | Facility: HOSPITAL | Age: 72
End: 2022-07-08
Attending: INTERNAL MEDICINE
Payer: MEDICARE

## 2022-07-08 ENCOUNTER — OFFICE VISIT (OUTPATIENT)
Dept: RHEUMATOLOGY | Facility: CLINIC | Age: 72
End: 2022-07-08
Payer: MEDICARE

## 2022-07-08 VITALS
WEIGHT: 136.88 LBS | BODY MASS INDEX: 25.19 KG/M2 | HEART RATE: 75 BPM | HEIGHT: 62 IN | DIASTOLIC BLOOD PRESSURE: 62 MMHG | OXYGEN SATURATION: 96 % | SYSTOLIC BLOOD PRESSURE: 100 MMHG | RESPIRATION RATE: 20 BRPM

## 2022-07-08 DIAGNOSIS — Z79.899 ENCOUNTER FOR LONG-TERM (CURRENT) USE OF OTHER MEDICATIONS: ICD-10-CM

## 2022-07-08 DIAGNOSIS — M05.79 RHEUMATOID ARTHRITIS INVOLVING MULTIPLE SITES WITH POSITIVE RHEUMATOID FACTOR: ICD-10-CM

## 2022-07-08 DIAGNOSIS — Z71.89 COUNSELING AND COORDINATION OF CARE: ICD-10-CM

## 2022-07-08 DIAGNOSIS — M05.79 RHEUMATOID ARTHRITIS INVOLVING MULTIPLE SITES WITH POSITIVE RHEUMATOID FACTOR: Primary | ICD-10-CM

## 2022-07-08 DIAGNOSIS — M15.9 PRIMARY OSTEOARTHRITIS INVOLVING MULTIPLE JOINTS: ICD-10-CM

## 2022-07-08 DIAGNOSIS — M85.80 OSTEOPENIA, UNSPECIFIED LOCATION: ICD-10-CM

## 2022-07-08 LAB
ALBUMIN SERPL BCP-MCNC: 3.9 G/DL (ref 3.5–5.2)
ALP SERPL-CCNC: 62 U/L (ref 55–135)
ALT SERPL W/O P-5'-P-CCNC: 22 U/L (ref 10–44)
ANION GAP SERPL CALC-SCNC: 6 MMOL/L (ref 8–16)
AST SERPL-CCNC: 30 U/L (ref 10–40)
BASOPHILS # BLD AUTO: 0.02 K/UL (ref 0–0.2)
BASOPHILS NFR BLD: 0.9 % (ref 0–1.9)
BILIRUB SERPL-MCNC: 1 MG/DL (ref 0.1–1)
BUN SERPL-MCNC: 18 MG/DL (ref 8–23)
CALCIUM SERPL-MCNC: 9.7 MG/DL (ref 8.7–10.5)
CHLORIDE SERPL-SCNC: 106 MMOL/L (ref 95–110)
CO2 SERPL-SCNC: 28 MMOL/L (ref 23–29)
CREAT SERPL-MCNC: 0.7 MG/DL (ref 0.5–1.4)
DIFFERENTIAL METHOD: ABNORMAL
EOSINOPHIL # BLD AUTO: 0 K/UL (ref 0–0.5)
EOSINOPHIL NFR BLD: 1.8 % (ref 0–8)
ERYTHROCYTE [DISTWIDTH] IN BLOOD BY AUTOMATED COUNT: 15.5 % (ref 11.5–14.5)
EST. GFR  (AFRICAN AMERICAN): >60 ML/MIN/1.73 M^2
EST. GFR  (NON AFRICAN AMERICAN): >60 ML/MIN/1.73 M^2
GLUCOSE SERPL-MCNC: 106 MG/DL (ref 70–110)
HCT VFR BLD AUTO: 32.2 % (ref 37–48.5)
HGB BLD-MCNC: 10.4 G/DL (ref 12–16)
IMM GRANULOCYTES # BLD AUTO: 0.01 K/UL (ref 0–0.04)
IMM GRANULOCYTES NFR BLD AUTO: 0.5 % (ref 0–0.5)
LYMPHOCYTES # BLD AUTO: 1.2 K/UL (ref 1–4.8)
LYMPHOCYTES NFR BLD: 55.5 % (ref 18–48)
MCH RBC QN AUTO: 30.8 PG (ref 27–31)
MCHC RBC AUTO-ENTMCNC: 32.3 G/DL (ref 32–36)
MCV RBC AUTO: 95 FL (ref 82–98)
MONOCYTES # BLD AUTO: 0.3 K/UL (ref 0.3–1)
MONOCYTES NFR BLD: 15.6 % (ref 4–15)
NEUTROPHILS # BLD AUTO: 0.6 K/UL (ref 1.8–7.7)
NEUTROPHILS NFR BLD: 25.7 % (ref 38–73)
NRBC BLD-RTO: 0 /100 WBC
PLATELET # BLD AUTO: 124 K/UL (ref 150–450)
PMV BLD AUTO: 12.9 FL (ref 9.2–12.9)
POTASSIUM SERPL-SCNC: 3.7 MMOL/L (ref 3.5–5.1)
PROT SERPL-MCNC: 7.6 G/DL (ref 6–8.4)
RBC # BLD AUTO: 3.38 M/UL (ref 4–5.4)
SODIUM SERPL-SCNC: 140 MMOL/L (ref 136–145)
WBC # BLD AUTO: 2.18 K/UL (ref 3.9–12.7)

## 2022-07-08 PROCEDURE — 3008F BODY MASS INDEX DOCD: CPT | Mod: CPTII,S$GLB,, | Performed by: INTERNAL MEDICINE

## 2022-07-08 PROCEDURE — 3008F PR BODY MASS INDEX (BMI) DOCUMENTED: ICD-10-PCS | Mod: CPTII,S$GLB,, | Performed by: INTERNAL MEDICINE

## 2022-07-08 PROCEDURE — 99214 PR OFFICE/OUTPT VISIT, EST, LEVL IV, 30-39 MIN: ICD-10-PCS | Mod: S$GLB,,, | Performed by: INTERNAL MEDICINE

## 2022-07-08 PROCEDURE — 80053 COMPREHEN METABOLIC PANEL: CPT | Performed by: INTERNAL MEDICINE

## 2022-07-08 PROCEDURE — 99214 OFFICE O/P EST MOD 30 MIN: CPT | Mod: S$GLB,,, | Performed by: INTERNAL MEDICINE

## 2022-07-08 PROCEDURE — 99999 PR PBB SHADOW E&M-EST. PATIENT-LVL III: ICD-10-PCS | Mod: PBBFAC,,, | Performed by: INTERNAL MEDICINE

## 2022-07-08 PROCEDURE — 1125F AMNT PAIN NOTED PAIN PRSNT: CPT | Mod: CPTII,S$GLB,, | Performed by: INTERNAL MEDICINE

## 2022-07-08 PROCEDURE — 3074F PR MOST RECENT SYSTOLIC BLOOD PRESSURE < 130 MM HG: ICD-10-PCS | Mod: CPTII,S$GLB,, | Performed by: INTERNAL MEDICINE

## 2022-07-08 PROCEDURE — 36415 COLL VENOUS BLD VENIPUNCTURE: CPT | Mod: PN | Performed by: INTERNAL MEDICINE

## 2022-07-08 PROCEDURE — 99999 PR PBB SHADOW E&M-EST. PATIENT-LVL III: CPT | Mod: PBBFAC,,, | Performed by: INTERNAL MEDICINE

## 2022-07-08 PROCEDURE — 3078F DIAST BP <80 MM HG: CPT | Mod: CPTII,S$GLB,, | Performed by: INTERNAL MEDICINE

## 2022-07-08 PROCEDURE — 4010F ACE/ARB THERAPY RXD/TAKEN: CPT | Mod: CPTII,S$GLB,, | Performed by: INTERNAL MEDICINE

## 2022-07-08 PROCEDURE — 3078F PR MOST RECENT DIASTOLIC BLOOD PRESSURE < 80 MM HG: ICD-10-PCS | Mod: CPTII,S$GLB,, | Performed by: INTERNAL MEDICINE

## 2022-07-08 PROCEDURE — 4010F PR ACE/ARB THEARPY RXD/TAKEN: ICD-10-PCS | Mod: CPTII,S$GLB,, | Performed by: INTERNAL MEDICINE

## 2022-07-08 PROCEDURE — 3074F SYST BP LT 130 MM HG: CPT | Mod: CPTII,S$GLB,, | Performed by: INTERNAL MEDICINE

## 2022-07-08 PROCEDURE — 1125F PR PAIN SEVERITY QUANTIFIED, PAIN PRESENT: ICD-10-PCS | Mod: CPTII,S$GLB,, | Performed by: INTERNAL MEDICINE

## 2022-07-08 PROCEDURE — 85025 COMPLETE CBC W/AUTO DIFF WBC: CPT | Performed by: INTERNAL MEDICINE

## 2022-07-08 RX ORDER — ADALIMUMAB 40MG/0.4ML
40 KIT SUBCUTANEOUS
Qty: 2 PEN | Refills: 11 | Status: SHIPPED | OUTPATIENT
Start: 2022-07-08 | End: 2022-07-20 | Stop reason: SDUPTHER

## 2022-07-08 RX ORDER — METHOTREXATE 25 MG/ML
25 INJECTION, SOLUTION INTRA-ARTERIAL; INTRAMUSCULAR; INTRAVENOUS
Qty: 4 ML | Refills: 11 | Status: SHIPPED | OUTPATIENT
Start: 2022-07-08 | End: 2023-03-15 | Stop reason: SDUPTHER

## 2022-07-08 NOTE — PROGRESS NOTES
RHEUMATOLOGY OUTPATIENT CLINIC NOTE    7/8/2022    Attending Rheumatologist: Kyle Youssef  Primary Care Provider: Rosendo Quinones Jr, MD   Physician Requesting Consultation: No referring provider defined for this encounter.  Chief Complaint/Reason For Consultation:  No chief complaint on file.      Subjective:       HPI  Alba M Reyes is a 71 y.o. White female with medical history noted below presents for evaluation of rheumatoid arthritis.  Has seen by Rheumatology at [facility] April 2019 at which point methotrexate continued.  Patient reports since then having to stop methotrexate approximately January of this year due to lab abnormalities she reports.  She notes only taking ibuprofen for pain.  She reports her right wrist right shoulder and knees.  Are worst areas.  Reports swelling of her wrists and hands.  Notes about an hour morning stiffness.  In addition to this she does not recall a therapy for osteoporosis.    Today  Patient here for follow up.   Last visit care for RA continued. She was sent to [facility] for possible spinal intervention but missed appointment. Main issue continues to be back pain radiating down her left leg. Otherwise doing well, tolerating meds.       Review of Systems   Constitutional: Negative for appetite change, chills, fatigue, fever and unexpected weight change.   HENT: Negative for nasal congestion, ear discharge, ear pain, hearing loss, mouth sores, nosebleeds, sneezing, sore throat, tinnitus and trouble swallowing.    Eyes: Negative for photophobia, pain, discharge, redness, itching and visual disturbance.   Respiratory: Negative for cough, chest tightness, shortness of breath and wheezing.    Cardiovascular: Negative for chest pain, palpitations and leg swelling.   Gastrointestinal: Negative for abdominal distention, abdominal pain, blood in stool, constipation, diarrhea, nausea and vomiting.   Endocrine: Negative for cold intolerance, heat intolerance, polydipsia, polyphagia  and polyuria.   Genitourinary: Negative for difficulty urinating, dyspareunia, dysuria, flank pain, frequency, genital sores, hematuria, menstrual problem, pelvic pain, urgency, vaginal bleeding, vaginal discharge, vaginal pain and vaginal dryness.   Musculoskeletal: Positive for back pain. Negative for arthralgias, gait problem, joint swelling, leg pain, myalgias, neck pain, neck stiffness and joint deformity.   Integumentary:  Negative for pallor and rash.   Neurological: Negative for dizziness, seizures, weakness, light-headedness, numbness and headaches.   Hematological: Negative for adenopathy. Does not bruise/bleed easily.   Psychiatric/Behavioral: Negative for confusion, decreased concentration and sleep disturbance. The patient is not nervous/anxious.    All other systems reviewed and are negative.       Chronic comorbid conditions affecting medical decision making today:  Past Medical History:   Diagnosis Date    Arthritis     Hypertension      No past surgical history on file.  No family history on file.  Social History     Substance and Sexual Activity   Alcohol Use Not Currently     Social History     Tobacco Use   Smoking Status Never Smoker   Smokeless Tobacco Never Used     Social History     Substance and Sexual Activity   Drug Use Not Currently       Current Outpatient Medications:     adalimumab (HUMIRA,CF, PEN) 40 mg/0.4 mL PnKt, Inject 0.4 mLs (40 mg total) into the skin every 14 (fourteen) days., Disp: 2 pen, Rfl: 11    albuterol (VENTOLIN HFA) 90 mcg/actuation inhaler, Inhale 2 puffs into the lungs every 6 (six) hours as needed for Wheezing. Rescue, Disp: 6.7 g, Rfl: 0    cetirizine (ZYRTEC) 10 MG tablet, Take 10 mg by mouth., Disp: , Rfl:     DULoxetine (CYMBALTA) 20 MG capsule, Take 1 capsule (20 mg total) by mouth 2 (two) times daily., Disp: 60 capsule, Rfl: 0    ergocalciferol (ERGOCALCIFEROL) 50,000 unit Cap, , Disp: , Rfl:     esomeprazole (NEXIUM) 20 MG capsule, Take 1 capsule by  mouth once daily, Disp: 90 capsule, Rfl: 0    folic acid (FOLVITE) 1 MG tablet, Take 1 tablet (1 mg total) by mouth once daily., Disp: 30 tablet, Rfl: 4    gabapentin (NEURONTIN) 300 MG capsule, , Disp: , Rfl:     lisinopriL (PRINIVIL,ZESTRIL) 20 MG tablet, TAKE 1 TABLET EVERY DAY, Disp: 90 tablet, Rfl: 0    lovastatin (MEVACOR) 10 MG tablet, Take 1 tablet (10 mg total) by mouth every evening., Disp: 90 tablet, Rfl: 3    methotrexate 25 mg/mL injection, Inject 1 mL (25 mg total) into the skin every 7 days., Disp: 4 mL, Rfl: 11     Objective:         There were no vitals filed for this visit.  Physical Exam   Musculoskeletal:      Comments: Can make fist, no synovitis  Prominent ulnar styloid   Knee crepitus        Reviewed old and all outside pertinent medical records available.    All lab results personally reviewed and interpreted by me.  Lab Results   Component Value Date    WBC 2.72 (L) 04/08/2022    HGB 11.0 (L) 04/08/2022    HCT 34.7 (L) 04/08/2022    MCV 96 04/08/2022    MCH 30.4 04/08/2022    MCHC 31.7 (L) 04/08/2022    RDW 15.2 (H) 04/08/2022     (L) 04/08/2022    MPV 13.0 (H) 04/08/2022    PLTEST Clumped (A) 04/08/2021       Lab Results   Component Value Date     04/08/2022    K 4.1 04/08/2022     04/08/2022    CO2 28 04/08/2022     04/08/2022    BUN 23 04/08/2022    CALCIUM 9.7 04/08/2022    PROT 7.8 04/08/2022    ALBUMIN 3.6 04/08/2022    BILITOT 0.4 04/08/2022    AST 24 04/08/2022    ALKPHOS 91 04/08/2022    ALT 16 04/08/2022       Lab Results   Component Value Date    COLORU Yellow 04/08/2021    APPEARANCEUA Clear 04/08/2021    SPECGRAV >1.030 (A) 04/08/2021    PHUR 6.0 04/08/2021    PROTEINUA Negative 04/08/2021    KETONESU Negative 04/08/2021    LEUKOCYTESUR Negative 04/08/2021    NITRITE Negative 04/08/2021    UROBILINOGEN Negative 04/08/2021       Lab Results   Component Value Date    CRP 10.8 (H) 09/23/2021       Lab Results   Component Value Date    SEDRATE 30  09/23/2021       Lab Results   Component Value Date    .0 (H) 04/08/2021    SEDRATE 30 09/23/2021       No components found for: 25OHVITDTOT, 92LBJHOI7, 53DOOPGN1, METHODNOTE    No results found for: URICACID    No components found for: TSPOTTB        Imaging:  All imaging reviewed and independently interpreted by me.         ASSESSMENT / PLAN:     Alba M Reyes is a 71 y.o. White female with:      1. Rheumatoid arthritis, involving unspecified site, unspecified whether rheumatoid factor present  - patient with Hx of RA  - doing well  - continue MTX 25mg subq, Humira, Daily FA  - update labs  - reassurance    2. Osteoporosis, unspecified osteoporosis type, unspecified pathological fracture presence   - New DEXA with Osteopenia   - continue Brennen +D  - wt bearing exercise  - reassurance     3. Osteoarthritis  - in addition to RA she has OA  - Pending Pain Eval.   - NSAIDs PRN  - reassurance and exercise     4. DMARD Toxicity Monitoring  - daily FA  - labs today   - no live vaccines     5. Other specified counseling  - over 10 minutes spent regarding below topics:  - Immunization counseling done.  - Weight loss counseling done.  - Nutrition and exercise counseling.  - Limitation of alcohol consumption.  - Regular exercise:  Aerobic and resistance.  - Medication counseling provided.      Follow up in about 3 months (around 10/8/2022).    Method of contact with patient concerns: Abby pineda Rheumatology    Disclaimer:  This note is prepared using voice recognition software and as such is likely to have errors and has not been proof read. Please contact me for questions.     Time spent: 30 minutes in face to face discussion concerning diagnosis, prognosis, review of lab and test results, benefits of treatment as well as management of disease, counseling of patient and coordination of care between various health care providers.  Greater than half the time spent was used for coordination of care and counseling of  patient.    Kyle Youssef M.D.  Rheumatology Department   [facility]

## 2022-07-15 ENCOUNTER — TELEPHONE (OUTPATIENT)
Dept: RHEUMATOLOGY | Facility: CLINIC | Age: 72
End: 2022-07-15
Payer: MEDICARE

## 2022-07-15 NOTE — TELEPHONE ENCOUNTER
Called [facility] to confirm wether patient was requesting refill to go through them . She has 11 refills on file with [facility] , said they didn't know .     Inter[facility]ter [id number]  called patient and she said she she's fine with either but [facility] is cheaper .     Called [facility] back and their canceling the request

## 2022-07-15 NOTE — TELEPHONE ENCOUNTER
----- Message from Leigh Ramirez sent at 7/15/2022 10:44 AM CDT -----  Type: RX Refill Request    Who Called: [facility] [phone]    Have you contacted your pharmacy:    Refill or New Rx:adalimumab (HUMIRA,CF, PEN) 40 mg/0.4 mL PnKt - fax [phone]    RX Name and Strength:    How is the patient currently taking it? (ex. 1XDay):    Is this a 30 day or 90 day RX:    Preferred Pharmacy with phone number:    [facility] (Now [facility]) - [place], [place] - [address]  [address] [address]  Phone: [phone] Fax: [phone]         Local or Mail Order:    Ordering Provider:    Would the patient rather a call back or a response via [facility]? call    Best Call Back Number:[phone] (home) [phone] (work)      Additional Information:

## 2022-07-20 DIAGNOSIS — M05.79 RHEUMATOID ARTHRITIS INVOLVING MULTIPLE SITES WITH POSITIVE RHEUMATOID FACTOR: ICD-10-CM

## 2022-07-20 RX ORDER — ADALIMUMAB 40MG/0.4ML
40 KIT SUBCUTANEOUS
Qty: 2 PEN | Refills: 11 | Status: SHIPPED | OUTPATIENT
Start: 2022-07-20 | End: 2022-10-12

## 2022-07-20 NOTE — TELEPHONE ENCOUNTER
----- Message from Paola Posada sent at 7/20/2022  9:04 AM CDT -----  Type: Patient Call Back    Who called: [facility]/ [facility] pharmacy tech.    What is the request in detail:  Yesterday at 4 pm member called and wanted to get adalimumab (HUMIRA,CF, PEN) 40 mg/0.4 mL PnKt filled through [facility] pharmacy. [facility] is requesting a new prescription be sent to them. Thanks.     [facility] (Now [facility]) - [place], [place] - [address]  [address] [address]  Phone: [phone] Fax: [phone]    Can the clinic reply by [facility]? No     Would the patient rather a call back or a response via [facility]? Call back     Best call back number: [phone]

## 2022-07-25 ENCOUNTER — TELEPHONE (OUTPATIENT)
Dept: RHEUMATOLOGY | Facility: CLINIC | Age: 72
End: 2022-07-25
Payer: MEDICARE

## 2022-07-25 NOTE — TELEPHONE ENCOUNTER
Contacted pt in regards to her Humira rx with [facility]. Pt has to call manufacture([employer]) to set up her delivery of her medication. [facility] [phone].

## 2022-07-27 NOTE — TELEPHONE ENCOUNTER
Pt notified of recent lab results. Pt expressed understanding verbally with no further questions at this time. Pt has been given the number to contact [employer] to set up delivery of Humira.

## 2022-08-08 ENCOUNTER — OFFICE VISIT (OUTPATIENT)
Dept: PAIN MEDICINE | Facility: CLINIC | Age: 72
End: 2022-08-08
Payer: MEDICARE

## 2022-08-08 VITALS — HEIGHT: 62 IN | OXYGEN SATURATION: 99 % | BODY MASS INDEX: 25.62 KG/M2 | HEART RATE: 89 BPM | WEIGHT: 139.19 LBS

## 2022-08-08 DIAGNOSIS — M54.16 LUMBAR RADICULOPATHY: ICD-10-CM

## 2022-08-08 DIAGNOSIS — M51.36 DDD (DEGENERATIVE DISC DISEASE), LUMBAR: Primary | ICD-10-CM

## 2022-08-08 DIAGNOSIS — M54.9 DORSALGIA, UNSPECIFIED: ICD-10-CM

## 2022-08-08 DIAGNOSIS — M47.816 LUMBAR SPONDYLOSIS: ICD-10-CM

## 2022-08-08 DIAGNOSIS — M25.552 LEFT HIP PAIN: ICD-10-CM

## 2022-08-08 PROBLEM — M51.369 DDD (DEGENERATIVE DISC DISEASE), LUMBAR: Status: ACTIVE | Noted: 2022-08-08

## 2022-08-08 PROCEDURE — 3008F PR BODY MASS INDEX (BMI) DOCUMENTED: ICD-10-PCS | Mod: CPTII,S$GLB,, | Performed by: PAIN MEDICINE

## 2022-08-08 PROCEDURE — 99204 PR OFFICE/OUTPT VISIT, NEW, LEVL IV, 45-59 MIN: ICD-10-PCS | Mod: S$GLB,,, | Performed by: PAIN MEDICINE

## 2022-08-08 PROCEDURE — 99999 PR PBB SHADOW E&M-EST. PATIENT-LVL IV: ICD-10-PCS | Mod: PBBFAC,,, | Performed by: PAIN MEDICINE

## 2022-08-08 PROCEDURE — 99999 PR PBB SHADOW E&M-EST. PATIENT-LVL IV: CPT | Mod: PBBFAC,,, | Performed by: PAIN MEDICINE

## 2022-08-08 PROCEDURE — 1125F PR PAIN SEVERITY QUANTIFIED, PAIN PRESENT: ICD-10-PCS | Mod: CPTII,S$GLB,, | Performed by: PAIN MEDICINE

## 2022-08-08 PROCEDURE — 3008F BODY MASS INDEX DOCD: CPT | Mod: CPTII,S$GLB,, | Performed by: PAIN MEDICINE

## 2022-08-08 PROCEDURE — 4010F ACE/ARB THERAPY RXD/TAKEN: CPT | Mod: CPTII,S$GLB,, | Performed by: PAIN MEDICINE

## 2022-08-08 PROCEDURE — 4010F PR ACE/ARB THEARPY RXD/TAKEN: ICD-10-PCS | Mod: CPTII,S$GLB,, | Performed by: PAIN MEDICINE

## 2022-08-08 PROCEDURE — 3288F FALL RISK ASSESSMENT DOCD: CPT | Mod: CPTII,S$GLB,, | Performed by: PAIN MEDICINE

## 2022-08-08 PROCEDURE — 1159F PR MEDICATION LIST DOCUMENTED IN MEDICAL RECORD: ICD-10-PCS | Mod: CPTII,S$GLB,, | Performed by: PAIN MEDICINE

## 2022-08-08 PROCEDURE — 1100F PR PT FALLS ASSESS DOC 2+ FALLS/FALL W/INJURY/YR: ICD-10-PCS | Mod: CPTII,S$GLB,, | Performed by: PAIN MEDICINE

## 2022-08-08 PROCEDURE — 99204 OFFICE O/P NEW MOD 45 MIN: CPT | Mod: S$GLB,,, | Performed by: PAIN MEDICINE

## 2022-08-08 PROCEDURE — 3288F PR FALLS RISK ASSESSMENT DOCUMENTED: ICD-10-PCS | Mod: CPTII,S$GLB,, | Performed by: PAIN MEDICINE

## 2022-08-08 PROCEDURE — 1125F AMNT PAIN NOTED PAIN PRSNT: CPT | Mod: CPTII,S$GLB,, | Performed by: PAIN MEDICINE

## 2022-08-08 PROCEDURE — 1159F MED LIST DOCD IN RCRD: CPT | Mod: CPTII,S$GLB,, | Performed by: PAIN MEDICINE

## 2022-08-08 PROCEDURE — 1100F PTFALLS ASSESS-DOCD GE2>/YR: CPT | Mod: CPTII,S$GLB,, | Performed by: PAIN MEDICINE

## 2022-08-08 RX ORDER — CLINDAMYCIN HYDROCHLORIDE 150 MG/1
CAPSULE ORAL
COMMUNITY
Start: 2022-06-15

## 2022-08-08 RX ORDER — MEMANTINE HYDROCHLORIDE 7 MG/1
CAPSULE, EXTENDED RELEASE ORAL
COMMUNITY

## 2022-08-08 RX ORDER — GABAPENTIN 300 MG/1
CAPSULE ORAL
COMMUNITY
End: 2023-03-02

## 2022-08-08 RX ORDER — TRIAMCINOLONE ACETONIDE 1 MG/G
CREAM TOPICAL
COMMUNITY
Start: 2022-05-23

## 2022-08-08 RX ORDER — PREDNISONE 10 MG/1
TABLET ORAL
COMMUNITY
Start: 2022-03-04

## 2022-08-08 RX ORDER — LANOLIN ALCOHOL/MO/W.PET/CERES
1 CREAM (GRAM) TOPICAL DAILY
COMMUNITY
Start: 2022-03-08

## 2022-08-08 RX ORDER — OMEGA-3-ACID ETHYL ESTERS 1 G/1
CAPSULE, LIQUID FILLED ORAL
COMMUNITY
Start: 2022-04-07

## 2022-08-08 RX ORDER — LOVASTATIN 10 MG/1
TABLET ORAL
COMMUNITY

## 2022-08-08 RX ORDER — LISINOPRIL 20 MG/1
TABLET ORAL
COMMUNITY

## 2022-08-09 ENCOUNTER — TELEPHONE (OUTPATIENT)
Dept: FAMILY MEDICINE | Facility: CLINIC | Age: 72
End: 2022-08-09
Payer: MEDICARE

## 2022-08-09 ENCOUNTER — HOSPITAL ENCOUNTER (OUTPATIENT)
Dept: RADIOLOGY | Facility: HOSPITAL | Age: 72
Discharge: HOME | End: 2022-08-09
Attending: PAIN MEDICINE
Payer: MEDICARE

## 2022-08-09 DIAGNOSIS — M54.16 LUMBAR RADICULOPATHY: ICD-10-CM

## 2022-08-09 DIAGNOSIS — M47.816 LUMBAR SPONDYLOSIS: ICD-10-CM

## 2022-08-09 DIAGNOSIS — M51.36 DDD (DEGENERATIVE DISC DISEASE), LUMBAR: ICD-10-CM

## 2022-08-09 DIAGNOSIS — M54.9 DORSALGIA, UNSPECIFIED: ICD-10-CM

## 2022-08-09 PROCEDURE — 72148 MRI LUMBAR SPINE W/O DYE: CPT | Mod: TC

## 2022-08-09 PROCEDURE — 72148 MRI LUMBAR SPINE W/O DYE: CPT | Mod: 26,,, | Performed by: RADIOLOGY

## 2022-08-09 PROCEDURE — 72148 MRI LUMBAR SPINE WITHOUT CONTRAST: ICD-10-PCS | Mod: 26,,, | Performed by: RADIOLOGY

## 2022-08-09 NOTE — TELEPHONE ENCOUNTER
----- Message from Afia Rosenthal sent at 8/9/2022 12:31 PM CDT -----  Regarding: Rx refill  Patient needs refill on her Lovastatin and Lisinopril. She needs 20 mgs ordered for the Lisinopril. She also need her other two medications refilled.

## 2022-09-19 ENCOUNTER — TELEPHONE (OUTPATIENT)
Dept: PAIN MEDICINE | Facility: CLINIC | Age: 72
End: 2022-09-19
Payer: MEDICARE

## 2022-09-19 NOTE — TELEPHONE ENCOUNTER
----- Message from Gilmar Tucker LPN sent at 9/16/2022  4:30 PM CDT -----  Regarding: FW: self  .[phone]    ----- Message -----  From: Selina White  Sent: 9/16/2022   3:42 PM CDT  To: Tawny Fernandez [facility]  Subject: self  .[phone]                              .Type: Patient Call Back    Who called: self    What is the request in detail: Pt is requesting to get results from MRI    Can the clinic reply by [facility]? Call back     Would the patient rather a call back or a response via [facility]?  Call back    Best call back number: .[phone]

## 2022-09-19 NOTE — TELEPHONE ENCOUNTER
VM left via language line asking pt to contact clinic to schedule a clinic f/u to review MRI imaging- phone number included.

## 2022-09-21 NOTE — TELEPHONE ENCOUNTER
Care Due:                  Date            Visit Type   Department     Provider  --------------------------------------------------------------------------------                                [facility] FAM[facility]                              PRIMARY      MEDICINE/  Last Visit: 04-      CARE ([facility])   INTERNAL MED   Rosendo Quinones  Next Visit: None Scheduled  None         None Found                                                            Last  Test          Frequency    Reason                     Performed    Due Date  --------------------------------------------------------------------------------    Office Visit  12 months..  DULoxetine, esomeprazole.  04- 04-    [facility] Embedded Care Gaps. Reference number: [id number]. 9/21/2022   3:50:21 PM CDT

## 2022-09-25 RX ORDER — LOVASTATIN 10 MG/1
TABLET ORAL
Qty: 90 TABLET | Refills: 0 | OUTPATIENT
Start: 2022-09-25

## 2022-09-25 NOTE — TELEPHONE ENCOUNTER
Patient has not been seen since 4/2021. Temporary refills previously sent and patient subsequently did not come for appts. Will need to be seen by NP

## 2022-10-03 NOTE — TELEPHONE ENCOUNTER
No new care gaps identified.  [facility] Embedded Care Gaps. Reference number: [id number]. 10/03/2022   3:40:35 PM CDT

## 2022-10-05 ENCOUNTER — TELEPHONE (OUTPATIENT)
Dept: PAIN MEDICINE | Facility: CLINIC | Age: 72
End: 2022-10-05
Payer: MEDICARE

## 2022-10-05 NOTE — TELEPHONE ENCOUNTER
----- Message from Leigh Ramirez sent at 10/5/2022  1:26 PM CDT -----  Type: Patient Call Back    Who called:pt     What is the request in detail:pt requesting to get pain medication for her leg and back pain. Call pt     Can the clinic reply by [facility]?    Would the patient rather a call back or a response via [facility]? call    Best call back number:[phone] (home) [phone] (work)      Additional Information:

## 2022-10-05 NOTE — TELEPHONE ENCOUNTER
Patient request pain medication. Explained that Dr Hector needs to see her at her F/U visit to discuss her MRI results and whatever treatment he recommends. Which is most likely a hip injection but there's no guarantee that it will be a hip injection. Patient became very loud and it seemed as if she was angry. However, I couldn't understand her so I relied on the . Patient started to talk about Dr Youssef and the things that he diagnosed her with and that she is now taking an injection every Monday because of the pain. She went from using a cane to now using a walker. The pain is unbearable. I am not sure what Dr told her this but it seems as though a dr told her that she needs hip surgery. Spoke to Yanique Zamudio MA for Dr Youssef, and she informed me that patient was a no show for her appointment with Dr Youssef today. I will ask Karen if we can move her appointment up to a sooner date so Dr Hector can evaluate her and discuss treatment. Nothing further discussed.  expressed that patient understood.

## 2022-10-07 RX ORDER — LOVASTATIN 10 MG/1
TABLET ORAL
Qty: 90 TABLET | Refills: 0 | OUTPATIENT
Start: 2022-10-07

## 2022-12-02 ENCOUNTER — OFFICE VISIT (OUTPATIENT)
Dept: PAIN MEDICINE | Facility: CLINIC | Age: 72
End: 2022-12-02
Payer: MEDICARE

## 2022-12-02 VITALS
HEIGHT: 62 IN | DIASTOLIC BLOOD PRESSURE: 66 MMHG | SYSTOLIC BLOOD PRESSURE: 143 MMHG | OXYGEN SATURATION: 97 % | BODY MASS INDEX: 25.46 KG/M2 | HEART RATE: 88 BPM

## 2022-12-02 DIAGNOSIS — M16.12 OSTEOARTHRITIS OF LEFT HIP, UNSPECIFIED OSTEOARTHRITIS TYPE: Primary | ICD-10-CM

## 2022-12-02 DIAGNOSIS — M47.816 LUMBAR SPONDYLOSIS: ICD-10-CM

## 2022-12-02 DIAGNOSIS — M51.36 DDD (DEGENERATIVE DISC DISEASE), LUMBAR: ICD-10-CM

## 2022-12-02 DIAGNOSIS — M25.552 LEFT HIP PAIN: ICD-10-CM

## 2022-12-02 DIAGNOSIS — M54.16 LUMBAR RADICULOPATHY: ICD-10-CM

## 2022-12-02 PROCEDURE — 4010F ACE/ARB THERAPY RXD/TAKEN: CPT | Mod: CPTII,S$GLB,,

## 2022-12-02 PROCEDURE — 1160F PR REVIEW ALL MEDS BY PRESCRIBER/CLIN PHARMACIST DOCUMENTED: ICD-10-PCS | Mod: CPTII,S$GLB,,

## 2022-12-02 PROCEDURE — 3288F FALL RISK ASSESSMENT DOCD: CPT | Mod: CPTII,S$GLB,,

## 2022-12-02 PROCEDURE — 1125F AMNT PAIN NOTED PAIN PRSNT: CPT | Mod: CPTII,S$GLB,,

## 2022-12-02 PROCEDURE — 3288F PR FALLS RISK ASSESSMENT DOCUMENTED: ICD-10-PCS | Mod: CPTII,S$GLB,,

## 2022-12-02 PROCEDURE — 1100F PTFALLS ASSESS-DOCD GE2>/YR: CPT | Mod: CPTII,S$GLB,,

## 2022-12-02 PROCEDURE — 3008F PR BODY MASS INDEX (BMI) DOCUMENTED: ICD-10-PCS | Mod: CPTII,S$GLB,,

## 2022-12-02 PROCEDURE — 3078F PR MOST RECENT DIASTOLIC BLOOD PRESSURE < 80 MM HG: ICD-10-PCS | Mod: CPTII,S$GLB,,

## 2022-12-02 PROCEDURE — 99999 PR PBB SHADOW E&M-EST. PATIENT-LVL IV: ICD-10-PCS | Mod: PBBFAC,,,

## 2022-12-02 PROCEDURE — 3077F PR MOST RECENT SYSTOLIC BLOOD PRESSURE >= 140 MM HG: ICD-10-PCS | Mod: CPTII,S$GLB,,

## 2022-12-02 PROCEDURE — 1159F MED LIST DOCD IN RCRD: CPT | Mod: CPTII,S$GLB,,

## 2022-12-02 PROCEDURE — 3078F DIAST BP <80 MM HG: CPT | Mod: CPTII,S$GLB,,

## 2022-12-02 PROCEDURE — 3008F BODY MASS INDEX DOCD: CPT | Mod: CPTII,S$GLB,,

## 2022-12-02 PROCEDURE — 99999 PR PBB SHADOW E&M-EST. PATIENT-LVL IV: CPT | Mod: PBBFAC,,,

## 2022-12-02 PROCEDURE — 1159F PR MEDICATION LIST DOCUMENTED IN MEDICAL RECORD: ICD-10-PCS | Mod: CPTII,S$GLB,,

## 2022-12-02 PROCEDURE — 1100F PR PT FALLS ASSESS DOC 2+ FALLS/FALL W/INJURY/YR: ICD-10-PCS | Mod: CPTII,S$GLB,,

## 2022-12-02 PROCEDURE — 1160F RVW MEDS BY RX/DR IN RCRD: CPT | Mod: CPTII,S$GLB,,

## 2022-12-02 PROCEDURE — 99215 OFFICE O/P EST HI 40 MIN: CPT | Mod: S$GLB,,,

## 2022-12-02 PROCEDURE — 1125F PR PAIN SEVERITY QUANTIFIED, PAIN PRESENT: ICD-10-PCS | Mod: CPTII,S$GLB,,

## 2022-12-02 PROCEDURE — 3077F SYST BP >= 140 MM HG: CPT | Mod: CPTII,S$GLB,,

## 2022-12-02 PROCEDURE — 4010F PR ACE/ARB THEARPY RXD/TAKEN: ICD-10-PCS | Mod: CPTII,S$GLB,,

## 2022-12-02 PROCEDURE — 99215 PR OFFICE/OUTPT VISIT, EST, LEVL V, 40-54 MIN: ICD-10-PCS | Mod: S$GLB,,,

## 2022-12-02 NOTE — H&P (VIEW-ONLY)
Subjective:     Patient ID: Alba M Reyes is a 72 y.o. female    Chief Complaint: No chief complaint on file.      Referred by: No ref. provider found      HPI:    Interval History PA (12/02/2022):  Patient returns to clinic for follow up of chronic left-sided lower back/lower extremity pain and MRI review.  States her pain has been severe and is having decreased ADLs.  Denies any new or worsening symptoms.  Continues to have majority of pain in her left hip radiating into her left anterior groin, also radiating into her left anterior thigh and to her knee.  Patient also notes having intermittent lower back pain, left worse than right.  She states her left leg feels like it is weak and will give out due to the pain.  Denies any numbness or tingling.  Denies any focal weakness, saddle paresthesias or bowel/bladder dysfunction.  Symptoms are exacerbated by any type of activity.  Difficulty standing up.  Alleviated with rest.  Currently taking gabapentin 300 mg q.h.s. with some relief, denies any adverse effects.  Upon arrival patient is blood pressure was greater than 180/100, denied any headache, nausea, vomiting, blurry vision.  BP retaken at end of visit was down to 143/66.  She reports not taking her BP medication today, nor has she had any food or liquid this morning.    Initial Encounter (8/8/22):  Alba M Reyes is a 72 y.o. female who presents today with chronic left-sided low back and lower extremity this pain has been present for roughly 2 years.  No specific inciting events or injury noted.  Pain is severe and very debilitating.  Patient localizes pain mainly to the left anterior groin and radiates along the anterior aspect of the left thigh to the knee.  This pain is much worse with standing and walking.  Patient does have some associated left-sided low back pain as well.  She does not endorse paresthesias.  She does feel that her left lower extremity is weak though denies any focal weakness.  She denies any  bowel or bladder dysfunction.   This pain is described in detail below.    Physical Therapy:  Yes.    Non-pharmacologic Treatment:  Rest helps         TENS?  No    Pain Medications:         Currently taking:  Gabapentin, Cymbalta    Has tried in the past:  NSAIDs, Tylenol    Has not tried:  Opioids, Muscle relaxants, TCAs, topical creams    Blood thinners:  None    Interventional Therapies:  None    Relevant Surgeries:  None    Affecting sleep?  Yes    Affecting daily activities? yes    Depressive symptoms? yes          SI/HI? No    Work status: Retired    Pain Scores:    Best:       9/10  Worst:     9/10  Usually:   9/10  Today:    9/10         Review of Systems   Constitutional:  Negative for activity change, appetite change, chills, fatigue, fever and unexpected weight change.   HENT:  Negative for hearing loss.    Eyes:  Negative for visual disturbance.   Respiratory:  Negative for chest tightness and shortness of breath.    Cardiovascular:  Negative for chest pain.   Gastrointestinal:  Negative for abdominal pain, constipation, diarrhea, nausea and vomiting.   Genitourinary:  Negative for difficulty urinating.   Musculoskeletal:  Positive for arthralgias, back pain, gait problem and myalgias. Negative for neck pain.   Skin:  Negative for rash.   Neurological:  Positive for weakness. Negative for dizziness, light-headedness, numbness and headaches.   Psychiatric/Behavioral:  Positive for sleep disturbance. Negative for hallucinations and suicidal ideas. The patient is not nervous/anxious.      Past Medical History:   Diagnosis Date    Arthritis     Hypertension        No past surgical history on file.    Social History     Socioeconomic History    Marital status:    Tobacco Use    Smoking status: Never    Smokeless tobacco: Never   Substance and Sexual Activity    Alcohol use: Not Currently    Drug use: Not Currently    Sexual activity: Not Currently       Review of patient's allergies indicates:  No  Known Allergies    Current Outpatient Medications on File Prior to Visit   Medication Sig Dispense Refill    adalimumab (HUMIRA,CF, PEN) 40 mg/0.4 mL PnKt Inject 0.4 mLs (40 mg total) into the skin every 14 (fourteen) days. 2 pen 11    albuterol (VENTOLIN HFA) 90 mcg/actuation inhaler Inhale 2 puffs into the lungs every 6 (six) hours as needed for Wheezing. Rescue 6.7 g 0    cetirizine (ZYRTEC) 10 MG tablet Take 10 mg by mouth.      clindamycin (CLEOCIN) 150 MG capsule       DULoxetine (CYMBALTA) 20 MG capsule Take 1 capsule (20 mg total) by mouth 2 (two) times daily. 60 capsule 0    ergocalciferol (ERGOCALCIFEROL) 50,000 unit Cap       esomeprazole (NEXIUM) 20 MG capsule Take 1 capsule by mouth once daily 90 capsule 0    folic acid (FOLVITE) 1 MG tablet Take 1 tablet (1 mg total) by mouth once daily. 30 tablet 4    gabapentin (NEURONTIN) 300 MG capsule gabapentin Take No date recorded No form recorded No frequency recorded No route recorded No set duration recorded No set duration amount recorded active No dosage strength recorded No dosage strength units of measure recorded      lisinopriL (PRINIVIL,ZESTRIL) 20 MG tablet lisinopril Take No date recorded No form recorded No frequency recorded No route recorded No set duration recorded No set duration amount recorded active No dosage strength recorded No dosage strength units of measure recorded      lovastatin (MEVACOR) 10 MG tablet lovastatin Take No date recorded No form recorded No frequency recorded No route recorded No set duration recorded No set duration amount recorded active No dosage strength recorded No dosage strength units of measure recorded      magnesium oxide (MAG-OX) 400 mg (241.3 mg magnesium) tablet Take 1 tablet by mouth once daily. for 90 days      memantine (NAMENDA) 7 mg CSpX Namenda Take No date recorded No form recorded No frequency recorded No route recorded No set duration recorded No set duration amount recorded active No dosage  "strength recorded No dosage strength units of measure recorded      methotrexate 25 mg/mL injection Inject 1 mL (25 mg total) into the skin every 7 days. 4 mL 11    omega-3 acid ethyl esters (LOVAZA) 1 gram capsule       predniSONE (DELTASONE) 10 mg tablet pack TAKE AS DIRECTED      triamcinolone acetonide 0.1% (KENALOG) 0.1 % cream APPLY CREAM EXTERNALLY TO AFFECTED AREA OF LEGS TWICE DAILY AS NEEDED FOR FLARES       No current facility-administered medications on file prior to visit.       Objective:      BP (!) 143/66 (BP Location: Left arm, Patient Position: Sitting, BP Method: Medium (Automatic))   Pulse 88   Ht 5' 2" (1.575 m)   SpO2 97%   BMI 25.46 kg/m²     Exam:  GEN:  Well developed, well nourished.  No acute distress. Normal pain behavior.  HEENT:  No trauma.  Mucous membranes moist.  Nares patent bilaterally.  PSYCH: Normal affect. Thought content appropriate.  CHEST:  Breathing symmetric.  No audible wheezing.  ABD: Soft, non-distended.  SKIN:  Warm, pink, dry.  No rash on exposed areas.    EXT:  No cyanosis, clubbing, or edema.  No color change or changes in nail or hair growth.  NEURO/MUSCULOSKELETAL:  Fully alert, oriented, and appropriate. Speech normal xiomara. No cranial nerve deficits.   Gait: Antalgic, in clinic with wheelchair, typically uses 4-point mildred to ambulate at home.  No trendelenburg sign bilaterally.   Motor Strength:  5/5 motor strength throughout lower extremities.   Sensory:  No sensory deficit in the lower extremities.   L-Spine:  Limited ROM with pain on flexion.  No pain with axial/facet loading bilaterally.  No SLR bilaterally.    SI Joint/Hip:  Limited internal rotation of left hip with reproduced pain.  Positive TTP over lumbar paraspinals, left hip      Imaging:  Narrative & Impression    EXAMINATION:  MRI LUMBAR SPINE WITHOUT CONTRAST     CLINICAL HISTORY:  Low back pain, symptoms persist with > 6wks conservative treatment; Other intervertebral disc degeneration, " lumbar region     TECHNIQUE:  Multiplanar, multisequence MR images were acquired from the thoracolumbar junction to the sacrum without the administration of contrast.     COMPARISON:  Radiograph 04/08/2022, CT abdomen pelvis 08/30/2020     FINDINGS:  Transition type lumbosacral segment with lumbarization of the S1 vertebral body and a well-formed but diminutive disc at the S1-2 level.  No evidence of fracture, osseous destructive process or aggressive bone marrow replacement process.     No significant spondylolisthesis.     Degenerative changes individual levels further discussed below:     T12-L1: Minimal disc bulging and facet arthropathy.  No spinal stenosis or neural foraminal narrowing.     L1-2: Mild loss of disc height with degenerative endplate changes and osteophyte formation anteriorly.  Mild circumferential disc bulging and facet arthropathy.  No spinal stenosis or neural foraminal narrowing.     L2-3: Mild disc bulging and endplate marginal osteophyte formation.  Mild facet arthropathy.  No spinal stenosis.  Modest left-sided neural foraminal narrowing.     L3-4: Mild disc bulging endplate marginal osteophyte formation.  Mild facet arthropathy.  No spinal stenosis or significant neural foraminal narrowing.     L4-5: Mild circumferential disc bulging and endplate marginal osteophyte formation.  Mild facet arthropathy and thickening ligamentum flavum.  No significant spinal canal stenosis or neural foraminal narrowing.     L5-S1: Advanced loss of disc height with Modic type degenerative endplate changes and endplate marginal osteophyte formation.  Mild facet arthropathy.  Bilateral lateral recess stenosis and neural foraminal narrowing (right more than left) without significant narrowing of the overall spinal canal diameter.     S1-2: Diminutive but well-formed disc without degenerative change.     The terminal spinal cord, conus, and cauda equina demonstrate normal caliber, morphology, and signal.     No  intraspinal mass or fluid collection.     No acute abnormalities in the visualized paraspinal soft tissue structures.     Several small round T2 hyperintense foci on both kidneys, likely simple cysts but not definitively characterized on a noncontrast exam.  Multiple round T2 hypointense foci in uterus, in keeping with fibroids.     Impression:     Multilevel degenerative change throughout the lumbar spine.  There is scattered lateral recess neural foraminal encroachment without significant spinal canal stenosis.  Individual levels further discussed above.     Please note there is a transition type lumbosacral segment with nomenclature as above.  Correlation with imaging suggested prior to any intervention.     Several small round T2 hyperintense foci on both kidneys, likely simple cysts but not definitively characterized on a noncontrast exam.        Electronically signed by: Chandan To MD  Date:                                            08/10/2022  Time:                                           08:51     Narrative & Impression    EXAMINATION:  XR HIPS BILATERAL 2 VIEW INCL AP PELVIS     CLINICAL HISTORY:  Pain in left hip     TECHNIQUE:  AP view of the pelvis and frogleg lateral views of both hips were performed.     COMPARISON:  Two thousand twenty     FINDINGS:  Some levoscoliosis with elevation right hemipelvis.  Evidence of facet joint arthropathy degenerative disc disease lumbosacral junction.  Moderate erosive DJD change left hip joint with joint space narrowing and less prominent DJD changes contralateral side.     Impression:     No new fracture dislocation.  Additional findings above.        Electronically signed by: Perry Stauffer MD  Date:                                            08/09/2022  Time:                                           12:59       Narrative & Impression    EXAMINATION:  XR LUMBAR SPINE AP AND LATERAL     CLINICAL HISTORY:  Primary osteoarthritis, unspecified site      TECHNIQUE:  AP, lateral and spot images were performed of the lumbar spine.     COMPARISON:  Lumbar spine radiograph dated 03/26/2019     FINDINGS:  Mild rightward curvature.  Multilevel discogenic change with variable disc space loss, most pronounced and advanced at L5-S1.  Lower facet DJD.  No evidence for lytic or blastic lesion.  Left greater than right hip degenerative change, partially visualized.     Impression:     As above.        Electronically signed by: Ricardo Baker  Date:                                            04/08/2022  Time:                                           11:30         Narrative & Impression    EXAMINATION:  XR HIP 2 VIEW LEFT     CLINICAL HISTORY:  Pain in left hip     FINDINGS:  Two views: There is moderate DJD and impingement change.  No fracture dislocation bone destruction seen.        Electronically signed by: Javier Meléndez MD  Date:                                            02/05/2020  Time:                                           14:01         Assessment:       Encounter Diagnoses   Name Primary?    Osteoarthritis of left hip, unspecified osteoarthritis type Yes    Left hip pain     Lumbar radiculopathy     DDD (degenerative disc disease), lumbar     Lumbar spondylosis            Plan:       Nichole was seen today for leg pain.    Diagnoses and all orders for this visit:    Osteoarthritis of left hip, unspecified osteoarthritis type  -     Ambulatory referral/consult to Orthopedics; Future  -     Procedure Order to Pain Management; Future    Left hip pain    Lumbar radiculopathy    DDD (degenerative disc disease), lumbar    Lumbar spondylosis        Alba M Reyes is a 72 y.o. female with chronic left-sided low back and lower extremity pain.  I suspect the pain is multifactorial.  The most overt source of her pain at this time appears to be related to the left hip joint.  Does have some back pain and lumbar radiculopathy cannot be ruled out.  Lumbar MRI showing multilevel  degenerative changes throughout her spine, most notable at L5-S1 with advanced disc height loss.  Also of note is transitional anatomy of well-formed disc at S1-S2.  Hip x-ray showing moderate degenerative changes and joint space narrowing of the left hip.    Prior records reviewed.  Pertinent imaging studies reviewed by me. Imaging results were discussed with patient.  Order placed to pain management for left hip intra-articular steroid injection at [facility].  Due to scheduling timing we will try to have patient complete injection over at [facility].  Referred to Orthopedic surgery for further evaluation of left hip pain and surgical consideration.  We will continue with plan of left hip intra-articular steroid injection in meantime.  Discussed that pain is likely multifactorial.  Appears the majority of her pain is associated with left hip joint dysfunction.  Likely patient would also benefit from lumbar epidural steroid injections in the future.  Once pain/symptoms at a more manageable level can consider referral to physical therapy for ROM, strengthening, stretching.  Continue gabapentin 300 mg q.h.s. patient taking with benefit, denies any adverse effects.  Return to clinic 2 weeks postprocedure.      Nasir Madsen PA-C  [facility]  Interventional Pain Management   12/02/2022    The total time spent for evaluation and management on 12/02/2022 including reviewing separately obtained history, performing a medically appropriate exam and evaluation, documenting clinical information in the health record, independently interpreting results and communicating them to the patient/family/caregiver, and ordering medications/tests/procedures was between 40-54 minutes.    This note was created by combination of typed  and M-Modal dictation.  Transcription and phonetic errors may be present.  If there are any questions, please contact me.

## 2022-12-02 NOTE — PROGRESS NOTES
Subjective:     Patient ID: Alba M Reyes is a 72 y.o. female    Chief Complaint: No chief complaint on file.      Referred by: No ref. provider found      HPI:    Interval History PA (12/02/2022):  Patient returns to clinic for follow up of chronic left-sided lower back/lower extremity pain and MRI review.  States her pain has been severe and is having decreased ADLs.  Denies any new or worsening symptoms.  Continues to have majority of pain in her left hip radiating into her left anterior groin, also radiating into her left anterior thigh and to her knee.  Patient also notes having intermittent lower back pain, left worse than right.  She states her left leg feels like it is weak and will give out due to the pain.  Denies any numbness or tingling.  Denies any focal weakness, saddle paresthesias or bowel/bladder dysfunction.  Symptoms are exacerbated by any type of activity.  Difficulty standing up.  Alleviated with rest.  Currently taking gabapentin 300 mg q.h.s. with some relief, denies any adverse effects.  Upon arrival patient is blood pressure was greater than 180/100, denied any headache, nausea, vomiting, blurry vision.  BP retaken at end of visit was down to 143/66.  She reports not taking her BP medication today, nor has she had any food or liquid this morning.    Initial Encounter (8/8/22):  Alba M Reyes is a 72 y.o. female who presents today with chronic left-sided low back and lower extremity this pain has been present for roughly 2 years.  No specific inciting events or injury noted.  Pain is severe and very debilitating.  Patient localizes pain mainly to the left anterior groin and radiates along the anterior aspect of the left thigh to the knee.  This pain is much worse with standing and walking.  Patient does have some associated left-sided low back pain as well.  She does not endorse paresthesias.  She does feel that her left lower extremity is weak though denies any focal weakness.  She denies any  bowel or bladder dysfunction.   This pain is described in detail below.    Physical Therapy:  Yes.    Non-pharmacologic Treatment:  Rest helps         TENS?  No    Pain Medications:         Currently taking:  Gabapentin, Cymbalta    Has tried in the past:  NSAIDs, Tylenol    Has not tried:  Opioids, Muscle relaxants, TCAs, topical creams    Blood thinners:  None    Interventional Therapies:  None    Relevant Surgeries:  None    Affecting sleep?  Yes    Affecting daily activities? yes    Depressive symptoms? yes          SI/HI? No    Work status: Retired    Pain Scores:    Best:       9/10  Worst:     9/10  Usually:   9/10  Today:    9/10         Review of Systems   Constitutional:  Negative for activity change, appetite change, chills, fatigue, fever and unexpected weight change.   HENT:  Negative for hearing loss.    Eyes:  Negative for visual disturbance.   Respiratory:  Negative for chest tightness and shortness of breath.    Cardiovascular:  Negative for chest pain.   Gastrointestinal:  Negative for abdominal pain, constipation, diarrhea, nausea and vomiting.   Genitourinary:  Negative for difficulty urinating.   Musculoskeletal:  Positive for arthralgias, back pain, gait problem and myalgias. Negative for neck pain.   Skin:  Negative for rash.   Neurological:  Positive for weakness. Negative for dizziness, light-headedness, numbness and headaches.   Psychiatric/Behavioral:  Positive for sleep disturbance. Negative for hallucinations and suicidal ideas. The patient is not nervous/anxious.      Past Medical History:   Diagnosis Date    Arthritis     Hypertension        No past surgical history on file.    Social History     Socioeconomic History    Marital status:    Tobacco Use    Smoking status: Never    Smokeless tobacco: Never   Substance and Sexual Activity    Alcohol use: Not Currently    Drug use: Not Currently    Sexual activity: Not Currently       Review of patient's allergies indicates:  No  Known Allergies    Current Outpatient Medications on File Prior to Visit   Medication Sig Dispense Refill    adalimumab (HUMIRA,CF, PEN) 40 mg/0.4 mL PnKt Inject 0.4 mLs (40 mg total) into the skin every 14 (fourteen) days. 2 pen 11    albuterol (VENTOLIN HFA) 90 mcg/actuation inhaler Inhale 2 puffs into the lungs every 6 (six) hours as needed for Wheezing. Rescue 6.7 g 0    cetirizine (ZYRTEC) 10 MG tablet Take 10 mg by mouth.      clindamycin (CLEOCIN) 150 MG capsule       DULoxetine (CYMBALTA) 20 MG capsule Take 1 capsule (20 mg total) by mouth 2 (two) times daily. 60 capsule 0    ergocalciferol (ERGOCALCIFEROL) 50,000 unit Cap       esomeprazole (NEXIUM) 20 MG capsule Take 1 capsule by mouth once daily 90 capsule 0    folic acid (FOLVITE) 1 MG tablet Take 1 tablet (1 mg total) by mouth once daily. 30 tablet 4    gabapentin (NEURONTIN) 300 MG capsule gabapentin Take No date recorded No form recorded No frequency recorded No route recorded No set duration recorded No set duration amount recorded active No dosage strength recorded No dosage strength units of measure recorded      lisinopriL (PRINIVIL,ZESTRIL) 20 MG tablet lisinopril Take No date recorded No form recorded No frequency recorded No route recorded No set duration recorded No set duration amount recorded active No dosage strength recorded No dosage strength units of measure recorded      lovastatin (MEVACOR) 10 MG tablet lovastatin Take No date recorded No form recorded No frequency recorded No route recorded No set duration recorded No set duration amount recorded active No dosage strength recorded No dosage strength units of measure recorded      magnesium oxide (MAG-OX) 400 mg (241.3 mg magnesium) tablet Take 1 tablet by mouth once daily. for 90 days      memantine (NAMENDA) 7 mg CSpX Namenda Take No date recorded No form recorded No frequency recorded No route recorded No set duration recorded No set duration amount recorded active No dosage  "strength recorded No dosage strength units of measure recorded      methotrexate 25 mg/mL injection Inject 1 mL (25 mg total) into the skin every 7 days. 4 mL 11    omega-3 acid ethyl esters (LOVAZA) 1 gram capsule       predniSONE (DELTASONE) 10 mg tablet pack TAKE AS DIRECTED      triamcinolone acetonide 0.1% (KENALOG) 0.1 % cream APPLY CREAM EXTERNALLY TO AFFECTED AREA OF LEGS TWICE DAILY AS NEEDED FOR FLARES       No current facility-administered medications on file prior to visit.       Objective:      BP (!) 143/66 (BP Location: Left arm, Patient Position: Sitting, BP Method: Medium (Automatic))   Pulse 88   Ht 5' 2" (1.575 m)   SpO2 97%   BMI 25.46 kg/m²     Exam:  GEN:  Well developed, well nourished.  No acute distress. Normal pain behavior.  HEENT:  No trauma.  Mucous membranes moist.  Nares patent bilaterally.  PSYCH: Normal affect. Thought content appropriate.  CHEST:  Breathing symmetric.  No audible wheezing.  ABD: Soft, non-distended.  SKIN:  Warm, pink, dry.  No rash on exposed areas.    EXT:  No cyanosis, clubbing, or edema.  No color change or changes in nail or hair growth.  NEURO/MUSCULOSKELETAL:  Fully alert, oriented, and appropriate. Speech normal xiomara. No cranial nerve deficits.   Gait: Antalgic, in clinic with wheelchair, typically uses 4-point mildred to ambulate at home.  No trendelenburg sign bilaterally.   Motor Strength:  5/5 motor strength throughout lower extremities.   Sensory:  No sensory deficit in the lower extremities.   L-Spine:  Limited ROM with pain on flexion.  No pain with axial/facet loading bilaterally.  No SLR bilaterally.    SI Joint/Hip:  Limited internal rotation of left hip with reproduced pain.  Positive TTP over lumbar paraspinals, left hip      Imaging:  Narrative & Impression    EXAMINATION:  MRI LUMBAR SPINE WITHOUT CONTRAST     CLINICAL HISTORY:  Low back pain, symptoms persist with > 6wks conservative treatment; Other intervertebral disc degeneration, " lumbar region     TECHNIQUE:  Multiplanar, multisequence MR images were acquired from the thoracolumbar junction to the sacrum without the administration of contrast.     COMPARISON:  Radiograph 04/08/2022, CT abdomen pelvis 08/30/2020     FINDINGS:  Transition type lumbosacral segment with lumbarization of the S1 vertebral body and a well-formed but diminutive disc at the S1-2 level.  No evidence of fracture, osseous destructive process or aggressive bone marrow replacement process.     No significant spondylolisthesis.     Degenerative changes individual levels further discussed below:     T12-L1: Minimal disc bulging and facet arthropathy.  No spinal stenosis or neural foraminal narrowing.     L1-2: Mild loss of disc height with degenerative endplate changes and osteophyte formation anteriorly.  Mild circumferential disc bulging and facet arthropathy.  No spinal stenosis or neural foraminal narrowing.     L2-3: Mild disc bulging and endplate marginal osteophyte formation.  Mild facet arthropathy.  No spinal stenosis.  Modest left-sided neural foraminal narrowing.     L3-4: Mild disc bulging endplate marginal osteophyte formation.  Mild facet arthropathy.  No spinal stenosis or significant neural foraminal narrowing.     L4-5: Mild circumferential disc bulging and endplate marginal osteophyte formation.  Mild facet arthropathy and thickening ligamentum flavum.  No significant spinal canal stenosis or neural foraminal narrowing.     L5-S1: Advanced loss of disc height with Modic type degenerative endplate changes and endplate marginal osteophyte formation.  Mild facet arthropathy.  Bilateral lateral recess stenosis and neural foraminal narrowing (right more than left) without significant narrowing of the overall spinal canal diameter.     S1-2: Diminutive but well-formed disc without degenerative change.     The terminal spinal cord, conus, and cauda equina demonstrate normal caliber, morphology, and signal.     No  intraspinal mass or fluid collection.     No acute abnormalities in the visualized paraspinal soft tissue structures.     Several small round T2 hyperintense foci on both kidneys, likely simple cysts but not definitively characterized on a noncontrast exam.  Multiple round T2 hypointense foci in uterus, in keeping with fibroids.     Impression:     Multilevel degenerative change throughout the lumbar spine.  There is scattered lateral recess neural foraminal encroachment without significant spinal canal stenosis.  Individual levels further discussed above.     Please note there is a transition type lumbosacral segment with nomenclature as above.  Correlation with imaging suggested prior to any intervention.     Several small round T2 hyperintense foci on both kidneys, likely simple cysts but not definitively characterized on a noncontrast exam.        Electronically signed by: Chandan To MD  Date:                                            08/10/2022  Time:                                           08:51     Narrative & Impression    EXAMINATION:  XR HIPS BILATERAL 2 VIEW INCL AP PELVIS     CLINICAL HISTORY:  Pain in left hip     TECHNIQUE:  AP view of the pelvis and frogleg lateral views of both hips were performed.     COMPARISON:  Two thousand twenty     FINDINGS:  Some levoscoliosis with elevation right hemipelvis.  Evidence of facet joint arthropathy degenerative disc disease lumbosacral junction.  Moderate erosive DJD change left hip joint with joint space narrowing and less prominent DJD changes contralateral side.     Impression:     No new fracture dislocation.  Additional findings above.        Electronically signed by: Perry Stauffer MD  Date:                                            08/09/2022  Time:                                           12:59       Narrative & Impression    EXAMINATION:  XR LUMBAR SPINE AP AND LATERAL     CLINICAL HISTORY:  Primary osteoarthritis, unspecified site      TECHNIQUE:  AP, lateral and spot images were performed of the lumbar spine.     COMPARISON:  Lumbar spine radiograph dated 03/26/2019     FINDINGS:  Mild rightward curvature.  Multilevel discogenic change with variable disc space loss, most pronounced and advanced at L5-S1.  Lower facet DJD.  No evidence for lytic or blastic lesion.  Left greater than right hip degenerative change, partially visualized.     Impression:     As above.        Electronically signed by: Ricardo Baker  Date:                                            04/08/2022  Time:                                           11:30         Narrative & Impression    EXAMINATION:  XR HIP 2 VIEW LEFT     CLINICAL HISTORY:  Pain in left hip     FINDINGS:  Two views: There is moderate DJD and impingement change.  No fracture dislocation bone destruction seen.        Electronically signed by: Javier Meléndez MD  Date:                                            02/05/2020  Time:                                           14:01         Assessment:       Encounter Diagnoses   Name Primary?    Osteoarthritis of left hip, unspecified osteoarthritis type Yes    Left hip pain     Lumbar radiculopathy     DDD (degenerative disc disease), lumbar     Lumbar spondylosis            Plan:       Nichole was seen today for leg pain.    Diagnoses and all orders for this visit:    Osteoarthritis of left hip, unspecified osteoarthritis type  -     Ambulatory referral/consult to Orthopedics; Future  -     Procedure Order to Pain Management; Future    Left hip pain    Lumbar radiculopathy    DDD (degenerative disc disease), lumbar    Lumbar spondylosis        Alba M Reyes is a 72 y.o. female with chronic left-sided low back and lower extremity pain.  I suspect the pain is multifactorial.  The most overt source of her pain at this time appears to be related to the left hip joint.  Does have some back pain and lumbar radiculopathy cannot be ruled out.  Lumbar MRI showing multilevel  degenerative changes throughout her spine, most notable at L5-S1 with advanced disc height loss.  Also of note is transitional anatomy of well-formed disc at S1-S2.  Hip x-ray showing moderate degenerative changes and joint space narrowing of the left hip.    Prior records reviewed.  Pertinent imaging studies reviewed by me. Imaging results were discussed with patient.  Order placed to pain management for left hip intra-articular steroid injection at [facility].  Due to scheduling timing we will try to have patient complete injection over at [facility].  Referred to Orthopedic surgery for further evaluation of left hip pain and surgical consideration.  We will continue with plan of left hip intra-articular steroid injection in meantime.  Discussed that pain is likely multifactorial.  Appears the majority of her pain is associated with left hip joint dysfunction.  Likely patient would also benefit from lumbar epidural steroid injections in the future.  Once pain/symptoms at a more manageable level can consider referral to physical therapy for ROM, strengthening, stretching.  Continue gabapentin 300 mg q.h.s. patient taking with benefit, denies any adverse effects.  Return to clinic 2 weeks postprocedure.      Nasir Madsen PA-C  [facility]  Interventional Pain Management   12/02/2022    The total time spent for evaluation and management on 12/02/2022 including reviewing separately obtained history, performing a medically appropriate exam and evaluation, documenting clinical information in the health record, independently interpreting results and communicating them to the patient/family/caregiver, and ordering medications/tests/procedures was between 40-54 minutes.    This note was created by combination of typed  and M-Modal dictation.  Transcription and phonetic errors may be present.  If there are any questions, please contact me.

## 2022-12-05 ENCOUNTER — TELEPHONE (OUTPATIENT)
Dept: PAIN MEDICINE | Facility: CLINIC | Age: 72
End: 2022-12-05
Payer: MEDICARE

## 2022-12-05 NOTE — TELEPHONE ENCOUNTER
LVM asking pt to contact clinic for assistance with scheduling consult with Ortho- phone number included.

## 2022-12-15 ENCOUNTER — PATIENT MESSAGE (OUTPATIENT)
Dept: PAIN MEDICINE | Facility: OTHER | Age: 72
End: 2022-12-15
Payer: MEDICARE

## 2022-12-19 ENCOUNTER — OFFICE VISIT (OUTPATIENT)
Dept: ORTHOPEDICS | Facility: CLINIC | Age: 72
End: 2022-12-19
Payer: MEDICARE

## 2022-12-19 ENCOUNTER — HOSPITAL ENCOUNTER (OUTPATIENT)
Facility: OTHER | Age: 72
Discharge: HOME | End: 2022-12-19
Attending: ANESTHESIOLOGY | Admitting: ANESTHESIOLOGY
Payer: MEDICARE

## 2022-12-19 VITALS
DIASTOLIC BLOOD PRESSURE: 71 MMHG | OXYGEN SATURATION: 98 % | SYSTOLIC BLOOD PRESSURE: 116 MMHG | HEIGHT: 62 IN | WEIGHT: 130 LBS | BODY MASS INDEX: 23.92 KG/M2 | RESPIRATION RATE: 16 BRPM | TEMPERATURE: 98 F | HEART RATE: 67 BPM

## 2022-12-19 VITALS — WEIGHT: 130 LBS | BODY MASS INDEX: 23.92 KG/M2 | HEIGHT: 62 IN

## 2022-12-19 DIAGNOSIS — G89.29 CHRONIC PAIN: ICD-10-CM

## 2022-12-19 DIAGNOSIS — M16.12 OSTEOARTHRITIS OF LEFT HIP, UNSPECIFIED OSTEOARTHRITIS TYPE: ICD-10-CM

## 2022-12-19 DIAGNOSIS — M25.552 CHRONIC LEFT HIP PAIN: ICD-10-CM

## 2022-12-19 DIAGNOSIS — M16.12 OSTEOARTHRITIS OF LEFT HIP, UNSPECIFIED OSTEOARTHRITIS TYPE: Primary | ICD-10-CM

## 2022-12-19 DIAGNOSIS — G89.29 CHRONIC LEFT HIP PAIN: ICD-10-CM

## 2022-12-19 PROCEDURE — 20610 DRAIN/INJ JOINT/BURSA W/O US: CPT | Mod: LT | Performed by: ANESTHESIOLOGY

## 2022-12-19 PROCEDURE — 99214 OFFICE O/P EST MOD 30 MIN: CPT | Mod: S$GLB,,, | Performed by: ORTHOPAEDIC SURGERY

## 2022-12-19 PROCEDURE — 20610 PR DRAIN/INJECT LARGE JOINT/BURSA: ICD-10-PCS | Mod: LT,,, | Performed by: ANESTHESIOLOGY

## 2022-12-19 PROCEDURE — 1159F MED LIST DOCD IN RCRD: CPT | Mod: CPTII,S$GLB,, | Performed by: ORTHOPAEDIC SURGERY

## 2022-12-19 PROCEDURE — 99999 PR PBB SHADOW E&M-EST. PATIENT-LVL III: ICD-10-PCS | Mod: PBBFAC,,, | Performed by: ORTHOPAEDIC SURGERY

## 2022-12-19 PROCEDURE — 25000003 PHARM REV CODE 250: Performed by: ANESTHESIOLOGY

## 2022-12-19 PROCEDURE — 77002 NEEDLE LOCALIZATION BY XRAY: CPT | Performed by: ANESTHESIOLOGY

## 2022-12-19 PROCEDURE — 1125F AMNT PAIN NOTED PAIN PRSNT: CPT | Mod: CPTII,S$GLB,, | Performed by: ORTHOPAEDIC SURGERY

## 2022-12-19 PROCEDURE — 77002 PR FLUOROSCOPIC GUIDANCE NEEDLE PLACEMENT: ICD-10-PCS | Mod: 26,,, | Performed by: ANESTHESIOLOGY

## 2022-12-19 PROCEDURE — 20610 DRAIN/INJ JOINT/BURSA W/O US: CPT | Mod: LT

## 2022-12-19 PROCEDURE — 99999 PR PBB SHADOW E&M-EST. PATIENT-LVL III: CPT | Mod: PBBFAC,,, | Performed by: ORTHOPAEDIC SURGERY

## 2022-12-19 PROCEDURE — 1125F PR PAIN SEVERITY QUANTIFIED, PAIN PRESENT: ICD-10-PCS | Mod: CPTII,S$GLB,, | Performed by: ORTHOPAEDIC SURGERY

## 2022-12-19 PROCEDURE — 1159F PR MEDICATION LIST DOCUMENTED IN MEDICAL RECORD: ICD-10-PCS | Mod: CPTII,S$GLB,, | Performed by: ORTHOPAEDIC SURGERY

## 2022-12-19 PROCEDURE — 20610 DRAIN/INJ JOINT/BURSA W/O US: CPT | Mod: LT,,, | Performed by: ANESTHESIOLOGY

## 2022-12-19 PROCEDURE — 99214 PR OFFICE/OUTPT VISIT, EST, LEVL IV, 30-39 MIN: ICD-10-PCS | Mod: S$GLB,,, | Performed by: ORTHOPAEDIC SURGERY

## 2022-12-19 PROCEDURE — 3008F BODY MASS INDEX DOCD: CPT | Mod: CPTII,S$GLB,, | Performed by: ORTHOPAEDIC SURGERY

## 2022-12-19 PROCEDURE — 4010F ACE/ARB THERAPY RXD/TAKEN: CPT | Mod: CPTII,S$GLB,, | Performed by: ORTHOPAEDIC SURGERY

## 2022-12-19 PROCEDURE — 4010F PR ACE/ARB THEARPY RXD/TAKEN: ICD-10-PCS | Mod: CPTII,S$GLB,, | Performed by: ORTHOPAEDIC SURGERY

## 2022-12-19 PROCEDURE — 77002 NEEDLE LOCALIZATION BY XRAY: CPT | Mod: 26,,, | Performed by: ANESTHESIOLOGY

## 2022-12-19 PROCEDURE — 3008F PR BODY MASS INDEX (BMI) DOCUMENTED: ICD-10-PCS | Mod: CPTII,S$GLB,, | Performed by: ORTHOPAEDIC SURGERY

## 2022-12-19 RX ORDER — SODIUM CHLORIDE 9 MG/ML
500 INJECTION, SOLUTION INTRAVENOUS CONTINUOUS
Status: DISCONTINUED | OUTPATIENT
Start: 2022-12-19 | End: 2022-12-19 | Stop reason: HOSPADM

## 2022-12-19 RX ORDER — ALPRAZOLAM 0.5 MG/1
0.5 TABLET ORAL ONCE
Status: COMPLETED | OUTPATIENT
Start: 2022-12-19 | End: 2022-12-19

## 2022-12-19 RX ADMIN — ALPRAZOLAM 0.5 MG: 0.5 TABLET ORAL at 08:12

## 2022-12-19 NOTE — PROGRESS NOTES
NEW PATIENT ORTHOPAEDIC: HIP    PRIMARY CARE PHYSICIAN: Rosendo Quinones Jr, MD   REFERRING PROVIDER: Nasir Madsen PA-C  [address]  [place] [address]     ASSESSMENT & PLAN:    Impression:  Left Hip Severe Degenerative Osteoarthritis, Primary    Follow Up Plan: PRN     Surgery:     Alba M Reyes has radiograph and physical exam evidence of the aforementioned impression and wishes to pursue surgery. This patient appears to have sufficient symptoms to warrant surgical intervention and is an appropriate candidate for left Primary Total Hip Arthroplasty as evidenced by months of unsuccessful non-operative treatment as outlined in the HPI below and progressive symptoms.    Non operative care:    Alba M Reyes has physical exam evidence of above and wishes to pursue an non-operative care. I am recommending the following: continued observation for effectiveness of intraaricular injection, follow up anytime after 6 weeks for discussion of RANJIT. Will follow up with pain management regarding persistent lumbar complaints.     Patient seen after referral by pain management for chronic left-sided lower back/lower extremity pain.  States her pain has been severe and is having decreased ADLs.  Continues to have majority of pain in her left hip radiating into her left anterior groin, also radiating into her left anterior thigh and to her knee.  Patient also notes having intermittent lower back pain, left worse than right.  Difficulty standing up.  Alleviated with rest. Decision was made to do intraarticular hip injection, this was completed today. Patient reports some improvement in her pain since the injection so far. She is able to distinguish her sciatic complaints from her hip complaints.     The patient has been ordered:  None    CONSULTS:   None    ACTIVE PROBLEM LIST  Patient Active Problem List   Diagnosis    Right upper quadrant pain    Acute cholecystitis due to biliary calculus    Incarcerated umbilical hernia    Lack  of coordination    Posture abnormality    Weakness of both hips    GERD (gastroesophageal reflux disease)    Hypovitaminosis D    Mixed incontinence    Osteopenia    Osteoporosis    Rheumatoid arthritis    Varicose veins of legs    Lumbar spondylosis    Lumbar radiculopathy    DDD (degenerative disc disease), lumbar    Osteoarthritis of left hip           SUBJECTIVE    CHIEF COMPLAINT: Hip Pain    HPI:   Alba M Reyes is a 72 y.o. female here for evaluation and management of left hip pain. There is not a specific incident that brought about this pain. she has had progressive problems with the hip(s) starting 3 years ago and is progressing which is now interfering with activities which include: walking, functional household ADL's, rising from a sitting position, standing for prolonged periods of time, and climbing stairs     Currently the pain in the joint is moderate with activity.  The pain is constant and is located in groin and thigh.  The pain is described as aching, severe, and stiffness. Relieving factors include ambulatory device, rest, prescription medication, over the counter medication , and repositioning. no associated Catching, Clicking, and Popping.     They do not report leg length inequality.     Alba M Reyes has no additional complaints.     PROGRESSIVE SYMPTOMS:  Pain worsened by weight bearing  Pain effecting living situation    FUNCTIONAL STATUS:   Limited most or all of the time (uses scooter, mobility device)    PREVIOUS TREATMENTS:  Medical: OTC NSAIDS, Steroid Injections, and Tylenol  Physical Therapy: Activities Modified   Previous Orthopaedic Surgery: None    REVIEW OF SYSTEMS:  PAIN ASSESSMENT:  See HPI.  MUSCULOSKELETAL: See HPI.  OTHER 10 point review of systems is negative except as stated in HPI above    PAST MEDICAL HISTORY   has a past medical history of Arthritis and Hypertension.     PAST SURGICAL HISTORY   has no past surgical history on file.     FAMILY HISTORY  family history is  not on file.     SOCIAL HISTORY   reports that she has never smoked. She has never used smokeless tobacco. She reports that she does not currently use alcohol. She reports that she does not currently use drugs.     ALLERGIES   Review of patient's allergies indicates:  No Known Allergies     MEDICATIONS   Current Outpatient Medications on File Prior to Visit   Medication Sig Dispense Refill    albuterol (VENTOLIN HFA) 90 mcg/actuation inhaler Inhale 2 puffs into the lungs every 6 (six) hours as needed for Wheezing. Rescue 6.7 g 0    cetirizine (ZYRTEC) 10 MG tablet Take 10 mg by mouth.      clindamycin (CLEOCIN) 150 MG capsule       ergocalciferol (ERGOCALCIFEROL) 50,000 unit Cap       esomeprazole (NEXIUM) 20 MG capsule Take 1 capsule by mouth once daily 90 capsule 0    folic acid (FOLVITE) 1 MG tablet Take 1 tablet (1 mg total) by mouth once daily. 30 tablet 4    gabapentin (NEURONTIN) 300 MG capsule gabapentin Take No date recorded No form recorded No frequency recorded No route recorded No set duration recorded No set duration amount recorded active No dosage strength recorded No dosage strength units of measure recorded      lisinopriL (PRINIVIL,ZESTRIL) 20 MG tablet lisinopril Take No date recorded No form recorded No frequency recorded No route recorded No set duration recorded No set duration amount recorded active No dosage strength recorded No dosage strength units of measure recorded      lovastatin (MEVACOR) 10 MG tablet lovastatin Take No date recorded No form recorded No frequency recorded No route recorded No set duration recorded No set duration amount recorded active No dosage strength recorded No dosage strength units of measure recorded      magnesium oxide (MAG-OX) 400 mg (241.3 mg magnesium) tablet Take 1 tablet by mouth once daily. for 90 days      memantine (NAMENDA) 7 mg CSpX Namenda Take No date recorded No form recorded No frequency recorded No route recorded No set duration recorded No set  "duration amount recorded active No dosage strength recorded No dosage strength units of measure recorded      methotrexate 25 mg/mL injection Inject 1 mL (25 mg total) into the skin every 7 days. 4 mL 11    omega-3 acid ethyl esters (LOVAZA) 1 gram capsule       predniSONE (DELTASONE) 10 mg tablet pack TAKE AS DIRECTED      triamcinolone acetonide 0.1% (KENALOG) 0.1 % cream APPLY CREAM EXTERNALLY TO AFFECTED AREA OF LEGS TWICE DAILY AS NEEDED FOR FLARES      adalimumab (HUMIRA,CF, PEN) 40 mg/0.4 mL PnKt Inject 0.4 mLs (40 mg total) into the skin every 14 (fourteen) days. 2 pen 11    DULoxetine (CYMBALTA) 20 MG capsule Take 1 capsule (20 mg total) by mouth 2 (two) times daily. 60 capsule 0     Current Facility-Administered Medications on File Prior to Visit   Medication Dose Route Frequency Provider Last Rate Last Admin    [COMPLETED] ALPRAZolam tablet 0.5 mg  0.5 mg Oral Once Hailey Valladares MD   0.5 mg at 12/19/22 0830    [DISCONTINUED] 0.9%  NaCl infusion  500 mL Intravenous Continuous Dalton Zepeda MD              PHYSICAL EXAM   height is 5' 2" (1.575 m) and weight is 59 kg (130 lb).   Body mass index is 23.78 kg/m².      All other systems deferred.  GENERAL:  No acute distress  HABITUS: Normal  GAIT: Antalgic  SKIN: Normal     HIP EXAM:    left:   ROM:   Flexion: 120 degrees    Internal Rotation: 30 degrees    External Rotation: 30 degrees    Abduction: 45 degrees    Adduction: 20 degrees  No apparent leg length discrepancy    Palpation: no tenderness over greater trochanter, SI joint, ilioposas, IT band, gluteal musculature   Pain is reproduced with IR and ER of the hip  Strength: 5/5 hip flexion, abduction, knee flexion and extension   Straight leg raise: Negative   Neurovascular Status: Sensation intact to light touch in Sural, saphenous, SPN, DPN, Tibial nerve distribution  2+ pulse DP/PT, normal capillary refill, foot has normal warmth    DATA:  Diagnostic tests reviewed for today's visit:     AP pelvis, " hip, and lateral radiographs shows narrowing of the superior and medial joint space with sclerosis and osteophyte formation. The femoral neck is in varus position. The femoral head is spherical at superior margin. There is no signficant evidence of acetabular dysplasia and the femoral head remains covered.

## 2022-12-19 NOTE — OP NOTE
Hip Joint Injection under Fluoroscopic Guidance    The procedure, risks, benefits, and options were discussed with the patient. There are no contraindications to the procedure. The patent expressed understanding and agreed to the procedure. Informed written consent was obtained prior to the start of the procedure and can be found in the patient's chart.    PATIENT NAME: Alba Reyes   MRN: [id number]     DATE OF PROCEDURE: 12/19/2022    PROCEDURE: Left Hip Joint Injection under Fluoroscopic Guidance    PRE-OP DIAGNOSIS: Osteoarthritis of left hip, unspecified osteoarthritis type [M16.12]    POST-OP DIAGNOSIS: Osteoarthritis of left hip, unspecified osteoarthritis type [M16.12]    PHYSICIAN: Hailey Valladares MD    ASSISTANTS: Miguel Aguilera, M.D. [facility]     MEDICATIONS INJECTED: Preservative-free Kenalog 40mg with 3cc of Bupivacine 0.25%     LOCAL ANESTHETIC INJECTED: Xylocaine 2%     SEDATION: 0.5mg Xanax    ESTIMATED BLOOD LOSS: None    COMPLICATIONS: None    TECHNIQUE: Time-out was performed to identify the patient and procedure to be performed. With the patient laying in a supine position, the surgical area was prepped and draped in the usual sterile fashion using ChloraPrep and a fenestrated drape. The area overlying the hip joint was determined under fluoroscopy guidance. Skin anesthesia was achieved by injecting Lidocaine 2% over the injection site. A 25 gauge, 3.5 inch spinal quinke needle was introduced under fluoroscopy until the tip reached the greater trochanter. The tip of the needle was hinged cephalad from the greater trochanter into the joint space.  When the needle tip was in the appropriate position, and there was no blood aspiration, Contrast dye  Omnipaque (240mg/mL) was injected to confirm placement and there was no vascular runoff. 4 mL of the medication mixture listed above was injected slowly. The needles were removed and bleeding was nil. A sterile dressing was applied. No specimens  collected. The patient tolerated the procedure well.    The patient was monitored after the procedure in the recovery area. They were given post-procedure and discharge instructions to follow at home. The patient was discharged in a stable condition.    PAIN BEFORE THE PROCEDURE: 10/10    PAIN AFTER THE PROCEDURE: 8/10      Hailey Valladares MD

## 2022-12-19 NOTE — DISCHARGE INSTRUCTIONS

## 2023-02-01 ENCOUNTER — PATIENT OUTREACH (OUTPATIENT)
Dept: ADMINISTRATIVE | Facility: HOSPITAL | Age: 73
End: 2023-02-01
Payer: MEDICARE

## 2023-03-02 ENCOUNTER — OFFICE VISIT (OUTPATIENT)
Dept: PAIN MEDICINE | Facility: CLINIC | Age: 73
End: 2023-03-02
Payer: MEDICARE

## 2023-03-02 VITALS — SYSTOLIC BLOOD PRESSURE: 132 MMHG | OXYGEN SATURATION: 99 % | DIASTOLIC BLOOD PRESSURE: 63 MMHG | HEART RATE: 75 BPM

## 2023-03-02 DIAGNOSIS — G89.29 CHRONIC LEFT HIP PAIN: ICD-10-CM

## 2023-03-02 DIAGNOSIS — M16.12 OSTEOARTHRITIS OF LEFT HIP, UNSPECIFIED OSTEOARTHRITIS TYPE: ICD-10-CM

## 2023-03-02 DIAGNOSIS — M47.816 LUMBAR SPONDYLOSIS: ICD-10-CM

## 2023-03-02 DIAGNOSIS — M54.9 DORSALGIA, UNSPECIFIED: ICD-10-CM

## 2023-03-02 DIAGNOSIS — M54.16 LUMBAR RADICULOPATHY: Primary | ICD-10-CM

## 2023-03-02 DIAGNOSIS — M51.36 DDD (DEGENERATIVE DISC DISEASE), LUMBAR: ICD-10-CM

## 2023-03-02 DIAGNOSIS — M25.552 CHRONIC LEFT HIP PAIN: ICD-10-CM

## 2023-03-02 PROCEDURE — 99214 PR OFFICE/OUTPT VISIT, EST, LEVL IV, 30-39 MIN: ICD-10-PCS | Mod: S$GLB,,,

## 2023-03-02 PROCEDURE — 99999 PR PBB SHADOW E&M-EST. PATIENT-LVL IV: CPT | Mod: PBBFAC,,,

## 2023-03-02 PROCEDURE — 1100F PTFALLS ASSESS-DOCD GE2>/YR: CPT | Mod: CPTII,S$GLB,,

## 2023-03-02 PROCEDURE — 1159F PR MEDICATION LIST DOCUMENTED IN MEDICAL RECORD: ICD-10-PCS | Mod: CPTII,S$GLB,,

## 2023-03-02 PROCEDURE — 4010F ACE/ARB THERAPY RXD/TAKEN: CPT | Mod: CPTII,S$GLB,,

## 2023-03-02 PROCEDURE — 1100F PR PT FALLS ASSESS DOC 2+ FALLS/FALL W/INJURY/YR: ICD-10-PCS | Mod: CPTII,S$GLB,,

## 2023-03-02 PROCEDURE — 3078F DIAST BP <80 MM HG: CPT | Mod: CPTII,S$GLB,,

## 2023-03-02 PROCEDURE — 99999 PR PBB SHADOW E&M-EST. PATIENT-LVL IV: ICD-10-PCS | Mod: PBBFAC,,,

## 2023-03-02 PROCEDURE — 3078F PR MOST RECENT DIASTOLIC BLOOD PRESSURE < 80 MM HG: ICD-10-PCS | Mod: CPTII,S$GLB,,

## 2023-03-02 PROCEDURE — 1159F MED LIST DOCD IN RCRD: CPT | Mod: CPTII,S$GLB,,

## 2023-03-02 PROCEDURE — 3075F SYST BP GE 130 - 139MM HG: CPT | Mod: CPTII,S$GLB,,

## 2023-03-02 PROCEDURE — 99214 OFFICE O/P EST MOD 30 MIN: CPT | Mod: S$GLB,,,

## 2023-03-02 PROCEDURE — 3288F PR FALLS RISK ASSESSMENT DOCUMENTED: ICD-10-PCS | Mod: CPTII,S$GLB,,

## 2023-03-02 PROCEDURE — 3075F PR MOST RECENT SYSTOLIC BLOOD PRESS GE 130-139MM HG: ICD-10-PCS | Mod: CPTII,S$GLB,,

## 2023-03-02 PROCEDURE — 1125F PR PAIN SEVERITY QUANTIFIED, PAIN PRESENT: ICD-10-PCS | Mod: CPTII,S$GLB,,

## 2023-03-02 PROCEDURE — 1125F AMNT PAIN NOTED PAIN PRSNT: CPT | Mod: CPTII,S$GLB,,

## 2023-03-02 PROCEDURE — 1160F RVW MEDS BY RX/DR IN RCRD: CPT | Mod: CPTII,S$GLB,,

## 2023-03-02 PROCEDURE — 4010F PR ACE/ARB THEARPY RXD/TAKEN: ICD-10-PCS | Mod: CPTII,S$GLB,,

## 2023-03-02 PROCEDURE — 3288F FALL RISK ASSESSMENT DOCD: CPT | Mod: CPTII,S$GLB,,

## 2023-03-02 PROCEDURE — 1160F PR REVIEW ALL MEDS BY PRESCRIBER/CLIN PHARMACIST DOCUMENTED: ICD-10-PCS | Mod: CPTII,S$GLB,,

## 2023-03-02 RX ORDER — GABAPENTIN 300 MG/1
600 CAPSULE ORAL NIGHTLY
Qty: 60 CAPSULE | Refills: 11 | Status: SHIPPED | OUTPATIENT
Start: 2023-03-02 | End: 2023-04-04

## 2023-03-02 NOTE — H&P (VIEW-ONLY)
Subjective:     Patient ID: Alba M Reyes is a 72 y.o. female    Chief Complaint: Follow-up (3mo F\U )        Referred by: No ref. provider found      HPI:    Interval History PA (03/02/2023):  Patient returns to clinic for follow up of multiple pain complaints.  Patient reports pain in her bilateral shoulders, bilateral hands, back, and hips.  Patient recently had left intra-articular hip steroid injection with only short-term relief noted.  Patient states pain has been severe and constant since.  Patient has followed up with Orthopedics who notes that she is a candidate for a left total hip replacement.  Patient states that she is not followed up with ortho.  Main concern is lower back and left hip pain.  States pain in her lower back as mainly left-sided with occasional radiation into her left lower extremity.  Unclear radicular pattern into her left lower extremity.  Patient also has severe left hip pain related to osteoarthritis.  Otherwise denies any changes in the quality or location of her pain.  Denies any new or worsening symptoms.  Denies any focal weakness, saddle paresthesias or bowel/bladder dysfunction.       Interval History PA (12/02/2022):  Patient returns to clinic for follow up of chronic left-sided lower back/lower extremity pain and MRI review.  States her pain has been severe and is having decreased ADLs.  Denies any new or worsening symptoms.  Continues to have majority of pain in her left hip radiating into her left anterior groin, also radiating into her left anterior thigh and to her knee.  Patient also notes having intermittent lower back pain, left worse than right.  She states her left leg feels like it is weak and will give out due to the pain.  Denies any numbness or tingling.  Denies any focal weakness, saddle paresthesias or bowel/bladder dysfunction.  Symptoms are exacerbated by any type of activity.  Difficulty standing up.  Alleviated with rest.  Currently taking gabapentin 300 mg  q.h.s. with some relief, denies any adverse effects.  Upon arrival patient is blood pressure was greater than 180/100, denied any headache, nausea, vomiting, blurry vision.  BP retaken at end of visit was down to 143/66.  She reports not taking her BP medication today, nor has she had any food or liquid this morning.    Initial Encounter (8/8/22):  Alba M Reyes is a 72 y.o. female who presents today with chronic left-sided low back and lower extremity this pain has been present for roughly 2 years.  No specific inciting events or injury noted.  Pain is severe and very debilitating.  Patient localizes pain mainly to the left anterior groin and radiates along the anterior aspect of the left thigh to the knee.  This pain is much worse with standing and walking.  Patient does have some associated left-sided low back pain as well.  She does not endorse paresthesias.  She does feel that her left lower extremity is weak though denies any focal weakness.  She denies any bowel or bladder dysfunction.   This pain is described in detail below.    Physical Therapy:  Yes.    Non-pharmacologic Treatment:  Rest helps         TENS?  No    Pain Medications:         Currently taking:  Gabapentin, Cymbalta    Has tried in the past:  NSAIDs, Tylenol    Has not tried:  Opioids, Muscle relaxants, TCAs, topical creams    Blood thinners:  None    Interventional Therapies:    12/19/2022 - left intra-articular hip steroid injection - minimal/no relief    Relevant Surgeries:  None    Affecting sleep?  Yes    Affecting daily activities? yes    Depressive symptoms? yes          SI/HI? No    Work status: Retired    Pain Scores:    Best:       5/10  Worst:    10/10  Usually:   9/10  Today:    10/10         Review of Systems   Constitutional:  Negative for activity change, appetite change, chills, fatigue, fever and unexpected weight change.   HENT:  Negative for hearing loss.    Eyes:  Negative for visual disturbance.   Respiratory:  Negative for  chest tightness and shortness of breath.    Cardiovascular:  Negative for chest pain.   Gastrointestinal:  Negative for abdominal pain, constipation, diarrhea, nausea and vomiting.   Genitourinary:  Negative for difficulty urinating.   Musculoskeletal:  Positive for arthralgias, back pain, gait problem and myalgias. Negative for neck pain.   Skin:  Negative for rash.   Neurological:  Positive for weakness. Negative for dizziness, light-headedness, numbness and headaches.   Psychiatric/Behavioral:  Positive for sleep disturbance. Negative for hallucinations and suicidal ideas. The patient is not nervous/anxious.      Past Medical History:   Diagnosis Date    Arthritis     Hypertension        Past Surgical History:   Procedure Laterality Date    INJECTION Left 12/19/2022    Procedure: INJECTION, LEFT HIP INTRA-ARTICULAR STEROID CONTRAST DIRECT REF  confirmed;  Surgeon: Hailey Valladares MD;  Location: [facility];  Service: Pain Management;  Laterality: Left;       Social History     Socioeconomic History    Marital status:    Tobacco Use    Smoking status: Never    Smokeless tobacco: Never   Substance and Sexual Activity    Alcohol use: Not Currently    Drug use: Not Currently    Sexual activity: Not Currently       Review of patient's allergies indicates:  No Known Allergies    Current Outpatient Medications on File Prior to Visit   Medication Sig Dispense Refill    albuterol (VENTOLIN HFA) 90 mcg/actuation inhaler Inhale 2 puffs into the lungs every 6 (six) hours as needed for Wheezing. Rescue 6.7 g 0    cetirizine (ZYRTEC) 10 MG tablet Take 10 mg by mouth.      clindamycin (CLEOCIN) 150 MG capsule       ergocalciferol (ERGOCALCIFEROL) 50,000 unit Cap       esomeprazole (NEXIUM) 20 MG capsule Take 1 capsule by mouth once daily 90 capsule 0    folic acid (FOLVITE) 1 MG tablet Take 1 tablet (1 mg total) by mouth once daily. 30 tablet 4    gabapentin (NEURONTIN) 300 MG capsule gabapentin Take No date  recorded No form recorded No frequency recorded No route recorded No set duration recorded No set duration amount recorded active No dosage strength recorded No dosage strength units of measure recorded      lisinopriL (PRINIVIL,ZESTRIL) 20 MG tablet lisinopril Take No date recorded No form recorded No frequency recorded No route recorded No set duration recorded No set duration amount recorded active No dosage strength recorded No dosage strength units of measure recorded      lovastatin (MEVACOR) 10 MG tablet lovastatin Take No date recorded No form recorded No frequency recorded No route recorded No set duration recorded No set duration amount recorded active No dosage strength recorded No dosage strength units of measure recorded      magnesium oxide (MAG-OX) 400 mg (241.3 mg magnesium) tablet Take 1 tablet by mouth once daily. for 90 days      memantine (NAMENDA) 7 mg CSpX Namenda Take No date recorded No form recorded No frequency recorded No route recorded No set duration recorded No set duration amount recorded active No dosage strength recorded No dosage strength units of measure recorded      methotrexate 25 mg/mL injection Inject 1 mL (25 mg total) into the skin every 7 days. 4 mL 11    omega-3 acid ethyl esters (LOVAZA) 1 gram capsule       predniSONE (DELTASONE) 10 mg tablet pack TAKE AS DIRECTED      triamcinolone acetonide 0.1% (KENALOG) 0.1 % cream APPLY CREAM EXTERNALLY TO AFFECTED AREA OF LEGS TWICE DAILY AS NEEDED FOR FLARES      adalimumab (HUMIRA,CF, PEN) 40 mg/0.4 mL PnKt Inject 0.4 mLs (40 mg total) into the skin every 14 (fourteen) days. 2 pen 11    DULoxetine (CYMBALTA) 20 MG capsule Take 1 capsule (20 mg total) by mouth 2 (two) times daily. 60 capsule 0     No current facility-administered medications on file prior to visit.       Objective:      /63 (BP Location: Left arm, Patient Position: Sitting, BP Method: Medium (Automatic))   Pulse 75   SpO2 99%     Exam:  GEN:  Well  developed, well nourished.  No acute distress. Normal pain behavior.  HEENT:  No trauma.  Mucous membranes moist.  Nares patent bilaterally.  PSYCH: Normal affect. Thought content appropriate.  CHEST:  Breathing symmetric.  No audible wheezing.  ABD: Soft, non-distended.  SKIN:  Warm, pink, dry.  No rash on exposed areas.    EXT:  No cyanosis, clubbing, or edema.  No color change or changes in nail or hair growth.  NEURO/MUSCULOSKELETAL:  Fully alert, oriented, and appropriate. Speech normal xiomara. No cranial nerve deficits.   Gait: Antalgic, ambulates with rolling walker..  No trendelenburg sign bilaterally.   Motor Strength:  5/5 motor strength throughout lower extremities.   Sensory:  No sensory deficit in the lower extremities.   L-Spine:  Limited ROM with pain on flexion.  No pain with axial/facet loading bilaterally.  Negative SLR bilaterally.    SI Joint/Hip:  Pain with both internal and external rotation of left hip.   Positive TTP over lumbar paraspinals, left hip      Imaging:  Narrative & Impression    EXAMINATION:  MRI LUMBAR SPINE WITHOUT CONTRAST     CLINICAL HISTORY:  Low back pain, symptoms persist with > 6wks conservative treatment; Other intervertebral disc degeneration, lumbar region     TECHNIQUE:  Multiplanar, multisequence MR images were acquired from the thoracolumbar junction to the sacrum without the administration of contrast.     COMPARISON:  Radiograph 04/08/2022, CT abdomen pelvis 08/30/2020     FINDINGS:  Transition type lumbosacral segment with lumbarization of the S1 vertebral body and a well-formed but diminutive disc at the S1-2 level.  No evidence of fracture, osseous destructive process or aggressive bone marrow replacement process.     No significant spondylolisthesis.     Degenerative changes individual levels further discussed below:     T12-L1: Minimal disc bulging and facet arthropathy.  No spinal stenosis or neural foraminal narrowing.     L1-2: Mild loss of disc height  with degenerative endplate changes and osteophyte formation anteriorly.  Mild circumferential disc bulging and facet arthropathy.  No spinal stenosis or neural foraminal narrowing.     L2-3: Mild disc bulging and endplate marginal osteophyte formation.  Mild facet arthropathy.  No spinal stenosis.  Modest left-sided neural foraminal narrowing.     L3-4: Mild disc bulging endplate marginal osteophyte formation.  Mild facet arthropathy.  No spinal stenosis or significant neural foraminal narrowing.     L4-5: Mild circumferential disc bulging and endplate marginal osteophyte formation.  Mild facet arthropathy and thickening ligamentum flavum.  No significant spinal canal stenosis or neural foraminal narrowing.     L5-S1: Advanced loss of disc height with Modic type degenerative endplate changes and endplate marginal osteophyte formation.  Mild facet arthropathy.  Bilateral lateral recess stenosis and neural foraminal narrowing (right more than left) without significant narrowing of the overall spinal canal diameter.     S1-2: Diminutive but well-formed disc without degenerative change.     The terminal spinal cord, conus, and cauda equina demonstrate normal caliber, morphology, and signal.     No intraspinal mass or fluid collection.     No acute abnormalities in the visualized paraspinal soft tissue structures.     Several small round T2 hyperintense foci on both kidneys, likely simple cysts but not definitively characterized on a noncontrast exam.  Multiple round T2 hypointense foci in uterus, in keeping with fibroids.     Impression:     Multilevel degenerative change throughout the lumbar spine.  There is scattered lateral recess neural foraminal encroachment without significant spinal canal stenosis.  Individual levels further discussed above.     Please note there is a transition type lumbosacral segment with nomenclature as above.  Correlation with imaging suggested prior to any intervention.     Several small  round T2 hyperintense foci on both kidneys, likely simple cysts but not definitively characterized on a noncontrast exam.        Electronically signed by: Chandan To MD  Date:                                            08/10/2022  Time:                                           08:51     Narrative & Impression    EXAMINATION:  XR HIPS BILATERAL 2 VIEW INCL AP PELVIS     CLINICAL HISTORY:  Pain in left hip     TECHNIQUE:  AP view of the pelvis and frogleg lateral views of both hips were performed.     COMPARISON:  Two thousand twenty     FINDINGS:  Some levoscoliosis with elevation right hemipelvis.  Evidence of facet joint arthropathy degenerative disc disease lumbosacral junction.  Moderate erosive DJD change left hip joint with joint space narrowing and less prominent DJD changes contralateral side.     Impression:     No new fracture dislocation.  Additional findings above.        Electronically signed by: Perry Stauffer MD  Date:                                            08/09/2022  Time:                                           12:59       Narrative & Impression    EXAMINATION:  XR LUMBAR SPINE AP AND LATERAL     CLINICAL HISTORY:  Primary osteoarthritis, unspecified site     TECHNIQUE:  AP, lateral and spot images were performed of the lumbar spine.     COMPARISON:  Lumbar spine radiograph dated 03/26/2019     FINDINGS:  Mild rightward curvature.  Multilevel discogenic change with variable disc space loss, most pronounced and advanced at L5-S1.  Lower facet DJD.  No evidence for lytic or blastic lesion.  Left greater than right hip degenerative change, partially visualized.     Impression:     As above.        Electronically signed by: Ricardo Baker  Date:                                            04/08/2022  Time:                                           11:30         Narrative & Impression    EXAMINATION:  XR HIP 2 VIEW LEFT     CLINICAL HISTORY:  Pain in left hip     FINDINGS:  Two views:  There is moderate DJD and impingement change.  No fracture dislocation bone destruction seen.        Electronically signed by: Javier Meléndez MD  Date:                                            02/05/2020  Time:                                           14:01         Assessment:       Encounter Diagnoses   Name Primary?    Lumbar radiculopathy Yes    DDD (degenerative disc disease), lumbar     Osteoarthritis of left hip, unspecified osteoarthritis type     Chronic left hip pain     Lumbar spondylosis     Dorsalgia, unspecified              Plan:       Nichole was seen today for follow-up.    Diagnoses and all orders for this visit:    Lumbar radiculopathy  -     gabapentin (NEURONTIN) 300 MG capsule; Take 2 capsules (600 mg total) by mouth every evening.  -     Case Request Endoscopy: Left L5 + S1 Transforaminal Epidural Steroid Injections    DDD (degenerative disc disease), lumbar  -     Case Request Endoscopy: Left L5 + S1 Transforaminal Epidural Steroid Injections    Osteoarthritis of left hip, unspecified osteoarthritis type    Chronic left hip pain    Lumbar spondylosis    Dorsalgia, unspecified            Alba M Reyes is a 72 y.o. female with chronic left-sided low back and lower extremity pain.  I suspect the pain is multifactorial.  The most overt source of her pain at this time appears to be related to the left hip joint.  Does have some back pain and lumbar radiculopathy cannot be ruled out.  Lumbar MRI showing multilevel degenerative changes throughout her spine, most notable at L5-S1 with advanced disc height loss.  Also of note is transitional anatomy of well-formed disc at S1-S2.  Hip x-ray showing moderate degenerative changes and joint space narrowing of the left hip.  No relief with left intra-articular hip steroid injection.    Prior records review.  Pertinent imaging studies reviewed by me. Imaging results were discussed with patient.  Patient is s/p left intra-articular hip steroid injection  without any significant relief noted.  Patient has seen orthopedic surgery for further evaluation of left hip pain and she is a candidate for total left hip replacement.  Advised patient to follow up with Orthopedics.   Schedule for left L5, S1 transforaminal epidural steroid injection.  Increase gabapentin to 600 mg q.h.s..  Patient currently taking 300 mg q.h.s. with benefit, denies any adverse effects.  Stressed the importance of maintaining regular home exercise program and being mindful of how they use their back throughout the day.  Patient expressed understanding and agreement.  Can consider referral to physical therapy once pain/symptoms at a more manageable level.  Return to clinic 2 weeks postprocedure.      Nasir Madsen PA-C  [facility]  Interventional Pain Management   03/02/2023    This note was created by combination of typed  and M-Modal dictation.  Transcription and phonetic errors may be present.  If there are any questions, please contact me.

## 2023-03-02 NOTE — PROGRESS NOTES
Ms. Varela will be scheduled for left L5 + S1 TF LYNNETTE on 3/22/23.  Reviewed the pre-procedure instructions listed below with the patient- copy also provided.  Instructed patient to notify clinic if she begins feeling ill, runs a fever, is prescribed antibiotics, and/or has any outpatient procedures (colonoscopy, endoscopy, OBGYN, dental work, etc.), and/or any vaccinations or booster shots within the two weeks leading up to this procedure.  Instructed patient to report to [facility], 2nd Floor Endoscopy Registration Desk.  All questions answered- patient verbalized understanding.     Day of Procedure  Ensure you have obtained an arrival time from the Pain Management Staff  You will be contacted the week before your scheduled date to review these instructions and receive your arrival time.  Please check any voicemails received.  If you arrive past your scheduled procedure time, you may be asked to reschedule your procedure.    Ensure you have a  with you.  If you arrive without a responsible adult to stay with you and drive you home, you may be asked to reschedule your procedure.     Take all of your prescribed medications that you routinely take for blood pressure, heart medications, thyroid, cholesterol, etc.  You will be informed if blood thinners should be held.    This is NOT a fasting procedure, you may eat the day of your procedure.    Wear comfortable clothing (loose fitting pants).  You may wear glasses, dentures, contact lenses, and/or hearing aids.     Notify the nurse during the intake process if you are allergic to any medications, if you are diabetic, or if you are not feeling well      Diabetic Patients  Please check your blood sugar prior to coming in for your procedure.  If the value is greater than 200, contact the clinic [phone] as the procedure may need to be rescheduled.  The procedure may not be performed if your blood sugar is above 200 even after checking into the  [facility].      IF you are taking blood thinners, please make sure our staff is aware so that we can obtain clearance and have you hold the medication accordingly.

## 2023-03-02 NOTE — PROGRESS NOTES
Subjective:     Patient ID: Alba M Reyes is a 72 y.o. female    Chief Complaint: Follow-up (3mo F\U )        Referred by: No ref. provider found      HPI:    Interval History PA (03/02/2023):  Patient returns to clinic for follow up of multiple pain complaints.  Patient reports pain in her bilateral shoulders, bilateral hands, back, and hips.  Patient recently had left intra-articular hip steroid injection with only short-term relief noted.  Patient states pain has been severe and constant since.  Patient has followed up with Orthopedics who notes that she is a candidate for a left total hip replacement.  Patient states that she is not followed up with ortho.  Main concern is lower back and left hip pain.  States pain in her lower back as mainly left-sided with occasional radiation into her left lower extremity.  Unclear radicular pattern into her left lower extremity.  Patient also has severe left hip pain related to osteoarthritis.  Otherwise denies any changes in the quality or location of her pain.  Denies any new or worsening symptoms.  Denies any focal weakness, saddle paresthesias or bowel/bladder dysfunction.       Interval History PA (12/02/2022):  Patient returns to clinic for follow up of chronic left-sided lower back/lower extremity pain and MRI review.  States her pain has been severe and is having decreased ADLs.  Denies any new or worsening symptoms.  Continues to have majority of pain in her left hip radiating into her left anterior groin, also radiating into her left anterior thigh and to her knee.  Patient also notes having intermittent lower back pain, left worse than right.  She states her left leg feels like it is weak and will give out due to the pain.  Denies any numbness or tingling.  Denies any focal weakness, saddle paresthesias or bowel/bladder dysfunction.  Symptoms are exacerbated by any type of activity.  Difficulty standing up.  Alleviated with rest.  Currently taking gabapentin 300 mg  q.h.s. with some relief, denies any adverse effects.  Upon arrival patient is blood pressure was greater than 180/100, denied any headache, nausea, vomiting, blurry vision.  BP retaken at end of visit was down to 143/66.  She reports not taking her BP medication today, nor has she had any food or liquid this morning.    Initial Encounter (8/8/22):  Alba M Reyes is a 72 y.o. female who presents today with chronic left-sided low back and lower extremity this pain has been present for roughly 2 years.  No specific inciting events or injury noted.  Pain is severe and very debilitating.  Patient localizes pain mainly to the left anterior groin and radiates along the anterior aspect of the left thigh to the knee.  This pain is much worse with standing and walking.  Patient does have some associated left-sided low back pain as well.  She does not endorse paresthesias.  She does feel that her left lower extremity is weak though denies any focal weakness.  She denies any bowel or bladder dysfunction.   This pain is described in detail below.    Physical Therapy:  Yes.    Non-pharmacologic Treatment:  Rest helps         TENS?  No    Pain Medications:         Currently taking:  Gabapentin, Cymbalta    Has tried in the past:  NSAIDs, Tylenol    Has not tried:  Opioids, Muscle relaxants, TCAs, topical creams    Blood thinners:  None    Interventional Therapies:    12/19/2022 - left intra-articular hip steroid injection - minimal/no relief    Relevant Surgeries:  None    Affecting sleep?  Yes    Affecting daily activities? yes    Depressive symptoms? yes          SI/HI? No    Work status: Retired    Pain Scores:    Best:       5/10  Worst:    10/10  Usually:   9/10  Today:    10/10         Review of Systems   Constitutional:  Negative for activity change, appetite change, chills, fatigue, fever and unexpected weight change.   HENT:  Negative for hearing loss.    Eyes:  Negative for visual disturbance.   Respiratory:  Negative for  chest tightness and shortness of breath.    Cardiovascular:  Negative for chest pain.   Gastrointestinal:  Negative for abdominal pain, constipation, diarrhea, nausea and vomiting.   Genitourinary:  Negative for difficulty urinating.   Musculoskeletal:  Positive for arthralgias, back pain, gait problem and myalgias. Negative for neck pain.   Skin:  Negative for rash.   Neurological:  Positive for weakness. Negative for dizziness, light-headedness, numbness and headaches.   Psychiatric/Behavioral:  Positive for sleep disturbance. Negative for hallucinations and suicidal ideas. The patient is not nervous/anxious.      Past Medical History:   Diagnosis Date    Arthritis     Hypertension        Past Surgical History:   Procedure Laterality Date    INJECTION Left 12/19/2022    Procedure: INJECTION, LEFT HIP INTRA-ARTICULAR STEROID CONTRAST DIRECT REF  confirmed;  Surgeon: Hailey Valladares MD;  Location: [facility];  Service: Pain Management;  Laterality: Left;       Social History     Socioeconomic History    Marital status:    Tobacco Use    Smoking status: Never    Smokeless tobacco: Never   Substance and Sexual Activity    Alcohol use: Not Currently    Drug use: Not Currently    Sexual activity: Not Currently       Review of patient's allergies indicates:  No Known Allergies    Current Outpatient Medications on File Prior to Visit   Medication Sig Dispense Refill    albuterol (VENTOLIN HFA) 90 mcg/actuation inhaler Inhale 2 puffs into the lungs every 6 (six) hours as needed for Wheezing. Rescue 6.7 g 0    cetirizine (ZYRTEC) 10 MG tablet Take 10 mg by mouth.      clindamycin (CLEOCIN) 150 MG capsule       ergocalciferol (ERGOCALCIFEROL) 50,000 unit Cap       esomeprazole (NEXIUM) 20 MG capsule Take 1 capsule by mouth once daily 90 capsule 0    folic acid (FOLVITE) 1 MG tablet Take 1 tablet (1 mg total) by mouth once daily. 30 tablet 4    gabapentin (NEURONTIN) 300 MG capsule gabapentin Take No date  recorded No form recorded No frequency recorded No route recorded No set duration recorded No set duration amount recorded active No dosage strength recorded No dosage strength units of measure recorded      lisinopriL (PRINIVIL,ZESTRIL) 20 MG tablet lisinopril Take No date recorded No form recorded No frequency recorded No route recorded No set duration recorded No set duration amount recorded active No dosage strength recorded No dosage strength units of measure recorded      lovastatin (MEVACOR) 10 MG tablet lovastatin Take No date recorded No form recorded No frequency recorded No route recorded No set duration recorded No set duration amount recorded active No dosage strength recorded No dosage strength units of measure recorded      magnesium oxide (MAG-OX) 400 mg (241.3 mg magnesium) tablet Take 1 tablet by mouth once daily. for 90 days      memantine (NAMENDA) 7 mg CSpX Namenda Take No date recorded No form recorded No frequency recorded No route recorded No set duration recorded No set duration amount recorded active No dosage strength recorded No dosage strength units of measure recorded      methotrexate 25 mg/mL injection Inject 1 mL (25 mg total) into the skin every 7 days. 4 mL 11    omega-3 acid ethyl esters (LOVAZA) 1 gram capsule       predniSONE (DELTASONE) 10 mg tablet pack TAKE AS DIRECTED      triamcinolone acetonide 0.1% (KENALOG) 0.1 % cream APPLY CREAM EXTERNALLY TO AFFECTED AREA OF LEGS TWICE DAILY AS NEEDED FOR FLARES      adalimumab (HUMIRA,CF, PEN) 40 mg/0.4 mL PnKt Inject 0.4 mLs (40 mg total) into the skin every 14 (fourteen) days. 2 pen 11    DULoxetine (CYMBALTA) 20 MG capsule Take 1 capsule (20 mg total) by mouth 2 (two) times daily. 60 capsule 0     No current facility-administered medications on file prior to visit.       Objective:      /63 (BP Location: Left arm, Patient Position: Sitting, BP Method: Medium (Automatic))   Pulse 75   SpO2 99%     Exam:  GEN:  Well  developed, well nourished.  No acute distress. Normal pain behavior.  HEENT:  No trauma.  Mucous membranes moist.  Nares patent bilaterally.  PSYCH: Normal affect. Thought content appropriate.  CHEST:  Breathing symmetric.  No audible wheezing.  ABD: Soft, non-distended.  SKIN:  Warm, pink, dry.  No rash on exposed areas.    EXT:  No cyanosis, clubbing, or edema.  No color change or changes in nail or hair growth.  NEURO/MUSCULOSKELETAL:  Fully alert, oriented, and appropriate. Speech normal xiomara. No cranial nerve deficits.   Gait: Antalgic, ambulates with rolling walker..  No trendelenburg sign bilaterally.   Motor Strength:  5/5 motor strength throughout lower extremities.   Sensory:  No sensory deficit in the lower extremities.   L-Spine:  Limited ROM with pain on flexion.  No pain with axial/facet loading bilaterally.  Negative SLR bilaterally.    SI Joint/Hip:  Pain with both internal and external rotation of left hip.   Positive TTP over lumbar paraspinals, left hip      Imaging:  Narrative & Impression    EXAMINATION:  MRI LUMBAR SPINE WITHOUT CONTRAST     CLINICAL HISTORY:  Low back pain, symptoms persist with > 6wks conservative treatment; Other intervertebral disc degeneration, lumbar region     TECHNIQUE:  Multiplanar, multisequence MR images were acquired from the thoracolumbar junction to the sacrum without the administration of contrast.     COMPARISON:  Radiograph 04/08/2022, CT abdomen pelvis 08/30/2020     FINDINGS:  Transition type lumbosacral segment with lumbarization of the S1 vertebral body and a well-formed but diminutive disc at the S1-2 level.  No evidence of fracture, osseous destructive process or aggressive bone marrow replacement process.     No significant spondylolisthesis.     Degenerative changes individual levels further discussed below:     T12-L1: Minimal disc bulging and facet arthropathy.  No spinal stenosis or neural foraminal narrowing.     L1-2: Mild loss of disc height  with degenerative endplate changes and osteophyte formation anteriorly.  Mild circumferential disc bulging and facet arthropathy.  No spinal stenosis or neural foraminal narrowing.     L2-3: Mild disc bulging and endplate marginal osteophyte formation.  Mild facet arthropathy.  No spinal stenosis.  Modest left-sided neural foraminal narrowing.     L3-4: Mild disc bulging endplate marginal osteophyte formation.  Mild facet arthropathy.  No spinal stenosis or significant neural foraminal narrowing.     L4-5: Mild circumferential disc bulging and endplate marginal osteophyte formation.  Mild facet arthropathy and thickening ligamentum flavum.  No significant spinal canal stenosis or neural foraminal narrowing.     L5-S1: Advanced loss of disc height with Modic type degenerative endplate changes and endplate marginal osteophyte formation.  Mild facet arthropathy.  Bilateral lateral recess stenosis and neural foraminal narrowing (right more than left) without significant narrowing of the overall spinal canal diameter.     S1-2: Diminutive but well-formed disc without degenerative change.     The terminal spinal cord, conus, and cauda equina demonstrate normal caliber, morphology, and signal.     No intraspinal mass or fluid collection.     No acute abnormalities in the visualized paraspinal soft tissue structures.     Several small round T2 hyperintense foci on both kidneys, likely simple cysts but not definitively characterized on a noncontrast exam.  Multiple round T2 hypointense foci in uterus, in keeping with fibroids.     Impression:     Multilevel degenerative change throughout the lumbar spine.  There is scattered lateral recess neural foraminal encroachment without significant spinal canal stenosis.  Individual levels further discussed above.     Please note there is a transition type lumbosacral segment with nomenclature as above.  Correlation with imaging suggested prior to any intervention.     Several small  round T2 hyperintense foci on both kidneys, likely simple cysts but not definitively characterized on a noncontrast exam.        Electronically signed by: Chandan To MD  Date:                                            08/10/2022  Time:                                           08:51     Narrative & Impression    EXAMINATION:  XR HIPS BILATERAL 2 VIEW INCL AP PELVIS     CLINICAL HISTORY:  Pain in left hip     TECHNIQUE:  AP view of the pelvis and frogleg lateral views of both hips were performed.     COMPARISON:  Two thousand twenty     FINDINGS:  Some levoscoliosis with elevation right hemipelvis.  Evidence of facet joint arthropathy degenerative disc disease lumbosacral junction.  Moderate erosive DJD change left hip joint with joint space narrowing and less prominent DJD changes contralateral side.     Impression:     No new fracture dislocation.  Additional findings above.        Electronically signed by: Perry Stauffer MD  Date:                                            08/09/2022  Time:                                           12:59       Narrative & Impression    EXAMINATION:  XR LUMBAR SPINE AP AND LATERAL     CLINICAL HISTORY:  Primary osteoarthritis, unspecified site     TECHNIQUE:  AP, lateral and spot images were performed of the lumbar spine.     COMPARISON:  Lumbar spine radiograph dated 03/26/2019     FINDINGS:  Mild rightward curvature.  Multilevel discogenic change with variable disc space loss, most pronounced and advanced at L5-S1.  Lower facet DJD.  No evidence for lytic or blastic lesion.  Left greater than right hip degenerative change, partially visualized.     Impression:     As above.        Electronically signed by: Ricardo Baker  Date:                                            04/08/2022  Time:                                           11:30         Narrative & Impression    EXAMINATION:  XR HIP 2 VIEW LEFT     CLINICAL HISTORY:  Pain in left hip     FINDINGS:  Two views:  There is moderate DJD and impingement change.  No fracture dislocation bone destruction seen.        Electronically signed by: Javier Meléndez MD  Date:                                            02/05/2020  Time:                                           14:01         Assessment:       Encounter Diagnoses   Name Primary?    Lumbar radiculopathy Yes    DDD (degenerative disc disease), lumbar     Osteoarthritis of left hip, unspecified osteoarthritis type     Chronic left hip pain     Lumbar spondylosis     Dorsalgia, unspecified              Plan:       Nichole was seen today for follow-up.    Diagnoses and all orders for this visit:    Lumbar radiculopathy  -     gabapentin (NEURONTIN) 300 MG capsule; Take 2 capsules (600 mg total) by mouth every evening.  -     Case Request Endoscopy: Left L5 + S1 Transforaminal Epidural Steroid Injections    DDD (degenerative disc disease), lumbar  -     Case Request Endoscopy: Left L5 + S1 Transforaminal Epidural Steroid Injections    Osteoarthritis of left hip, unspecified osteoarthritis type    Chronic left hip pain    Lumbar spondylosis    Dorsalgia, unspecified            Alba M Reyes is a 72 y.o. female with chronic left-sided low back and lower extremity pain.  I suspect the pain is multifactorial.  The most overt source of her pain at this time appears to be related to the left hip joint.  Does have some back pain and lumbar radiculopathy cannot be ruled out.  Lumbar MRI showing multilevel degenerative changes throughout her spine, most notable at L5-S1 with advanced disc height loss.  Also of note is transitional anatomy of well-formed disc at S1-S2.  Hip x-ray showing moderate degenerative changes and joint space narrowing of the left hip.  No relief with left intra-articular hip steroid injection.    Prior records review.  Pertinent imaging studies reviewed by me. Imaging results were discussed with patient.  Patient is s/p left intra-articular hip steroid injection  without any significant relief noted.  Patient has seen orthopedic surgery for further evaluation of left hip pain and she is a candidate for total left hip replacement.  Advised patient to follow up with Orthopedics.   Schedule for left L5, S1 transforaminal epidural steroid injection.  Increase gabapentin to 600 mg q.h.s..  Patient currently taking 300 mg q.h.s. with benefit, denies any adverse effects.  Stressed the importance of maintaining regular home exercise program and being mindful of how they use their back throughout the day.  Patient expressed understanding and agreement.  Can consider referral to physical therapy once pain/symptoms at a more manageable level.  Return to clinic 2 weeks postprocedure.      Nasir Madsen PA-C  [facility]  Interventional Pain Management   03/02/2023    This note was created by combination of typed  and M-Modal dictation.  Transcription and phonetic errors may be present.  If there are any questions, please contact me.

## 2023-03-14 ENCOUNTER — TELEPHONE (OUTPATIENT)
Dept: PAIN MEDICINE | Facility: CLINIC | Age: 73
End: 2023-03-14
Payer: MEDICARE

## 2023-03-14 NOTE — TELEPHONE ENCOUNTER
Language line used- no answer.  Will try again later today and LVM with procedure instructions & arrival time.

## 2023-03-15 ENCOUNTER — TELEPHONE (OUTPATIENT)
Dept: PAIN MEDICINE | Facility: CLINIC | Age: 73
End: 2023-03-15
Payer: MEDICARE

## 2023-03-15 ENCOUNTER — PATIENT MESSAGE (OUTPATIENT)
Dept: RHEUMATOLOGY | Facility: CLINIC | Age: 73
End: 2023-03-15
Payer: MEDICARE

## 2023-03-15 DIAGNOSIS — M05.79 RHEUMATOID ARTHRITIS INVOLVING MULTIPLE SITES WITH POSITIVE RHEUMATOID FACTOR: ICD-10-CM

## 2023-03-15 RX ORDER — METHOTREXATE 25 MG/ML
25 INJECTION, SOLUTION INTRA-ARTERIAL; INTRAMUSCULAR; INTRAVENOUS
Qty: 4 ML | Refills: 11 | Status: SHIPPED | OUTPATIENT
Start: 2023-03-15 | End: 2023-05-09 | Stop reason: SDUPTHER

## 2023-03-15 NOTE — TELEPHONE ENCOUNTER
Language line used to LVM with review of pre-procedure instructions, as well as provided arrival time of 1:30p.  Asked that patient call clinic to confirm- phone number included.

## 2023-03-20 ENCOUNTER — TELEPHONE (OUTPATIENT)
Dept: PAIN MEDICINE | Facility: CLINIC | Age: 73
End: 2023-03-20
Payer: MEDICARE

## 2023-03-20 NOTE — TELEPHONE ENCOUNTER
LVM via language line asking pt to contact clinic to confirm procedure instructions and arrival time for 3/22/23- phone number included.

## 2023-03-21 NOTE — DISCHARGE INSTRUCTIONS

## 2023-03-22 ENCOUNTER — TELEPHONE (OUTPATIENT)
Dept: PAIN MEDICINE | Facility: HOSPITAL | Age: 73
End: 2023-03-22
Payer: MEDICARE

## 2023-03-22 ENCOUNTER — HOSPITAL ENCOUNTER (OUTPATIENT)
Facility: HOSPITAL | Age: 73
Discharge: HOME | End: 2023-03-22
Attending: PAIN MEDICINE | Admitting: PAIN MEDICINE
Payer: MEDICARE

## 2023-03-22 VITALS
HEART RATE: 79 BPM | HEIGHT: 60 IN | WEIGHT: 127 LBS | TEMPERATURE: 98 F | OXYGEN SATURATION: 96 % | RESPIRATION RATE: 16 BRPM | DIASTOLIC BLOOD PRESSURE: 68 MMHG | SYSTOLIC BLOOD PRESSURE: 123 MMHG | BODY MASS INDEX: 24.94 KG/M2

## 2023-03-22 DIAGNOSIS — M54.16 LUMBAR RADICULOPATHY: Primary | ICD-10-CM

## 2023-03-22 PROCEDURE — 25000003 PHARM REV CODE 250: Performed by: PAIN MEDICINE

## 2023-03-22 PROCEDURE — 63600175 PHARM REV CODE 636 W HCPCS: Performed by: PAIN MEDICINE

## 2023-03-22 PROCEDURE — 25500020 PHARM REV CODE 255: Performed by: PAIN MEDICINE

## 2023-03-22 PROCEDURE — 64483 PR EPIDURAL INJ, ANES/STEROID, TRANSFORAMINAL, LUMB/SACR, SNGL LEVL: ICD-10-PCS | Mod: LT,,, | Performed by: PAIN MEDICINE

## 2023-03-22 PROCEDURE — 64483 NJX AA&/STRD TFRM EPI L/S 1: CPT | Mod: LT,,, | Performed by: PAIN MEDICINE

## 2023-03-22 PROCEDURE — 64483 NJX AA&/STRD TFRM EPI L/S 1: CPT | Mod: LT | Performed by: PAIN MEDICINE

## 2023-03-22 RX ORDER — LIDOCAINE HYDROCHLORIDE 10 MG/ML
INJECTION, SOLUTION EPIDURAL; INFILTRATION; INTRACAUDAL; PERINEURAL
Status: DISCONTINUED
Start: 2023-03-22 | End: 2023-03-22 | Stop reason: HOSPADM

## 2023-03-22 RX ORDER — LIDOCAINE HYDROCHLORIDE 10 MG/ML
INJECTION INFILTRATION; PERINEURAL
Status: DISCONTINUED
Start: 2023-03-22 | End: 2023-03-22 | Stop reason: HOSPADM

## 2023-03-22 RX ORDER — DEXAMETHASONE SODIUM PHOSPHATE 10 MG/ML
10 INJECTION INTRAMUSCULAR; INTRAVENOUS ONCE
Status: COMPLETED | OUTPATIENT
Start: 2023-03-22 | End: 2023-03-22

## 2023-03-22 RX ORDER — ALPRAZOLAM 0.5 MG/1
1 TABLET, ORALLY DISINTEGRATING ORAL ONCE AS NEEDED
Status: DISCONTINUED | OUTPATIENT
Start: 2023-03-22 | End: 2023-03-22 | Stop reason: HOSPADM

## 2023-03-22 RX ORDER — LIDOCAINE HYDROCHLORIDE 10 MG/ML
10 INJECTION INFILTRATION; PERINEURAL ONCE
Status: COMPLETED | OUTPATIENT
Start: 2023-03-22 | End: 2023-03-22

## 2023-03-22 RX ORDER — DEXAMETHASONE SODIUM PHOSPHATE 10 MG/ML
INJECTION INTRAMUSCULAR; INTRAVENOUS
Status: DISCONTINUED
Start: 2023-03-22 | End: 2023-03-22 | Stop reason: HOSPADM

## 2023-03-22 RX ORDER — LIDOCAINE HYDROCHLORIDE 10 MG/ML
1 INJECTION, SOLUTION EPIDURAL; INFILTRATION; INTRACAUDAL; PERINEURAL ONCE
Status: COMPLETED | OUTPATIENT
Start: 2023-03-22 | End: 2023-03-22

## 2023-03-22 RX ORDER — LIDOCAINE HYDROCHLORIDE 20 MG/ML
10 INJECTION, SOLUTION INFILTRATION; PERINEURAL ONCE
Status: DISCONTINUED | OUTPATIENT
Start: 2023-03-22 | End: 2023-03-22 | Stop reason: RX

## 2023-03-22 NOTE — TELEPHONE ENCOUNTER
----- Message from Pari Good sent at 3/22/2023  1:13 PM CDT -----  Regarding: self   [phone]  Type: Patient Call Back    Who called:  shante Adkins     What is the request in detail: wants to know if the appt in April is for her injection and if she can know why she has not seen Dr. Hector.     Can the clinic reply by [facility]? no    Would the patient rather a call back or a response via [facility]? Call back     Best call back number: [phone] (needs ) of daughter [phone]

## 2023-03-22 NOTE — OP NOTE
Lumbar transforaminal LYNNETTE    Time-out taken to identify patient and procedure side prior to starting the procedure.   I attest that I have reviewed the patient's home medications prior to the procedure and no contraindication have been identified. I  re-evaluated the patient after the patient was positioned for the procedure in the procedure room immediately before the procedural time-out. The vital signs are current and represent the current state of the patient which has not significantly changed since the preprocedure assessment.                              Date of Service: 03/22/2023    PCP: Primary Doctor No    Referring Physician:                                     PROCEDURE: Left L5 and S1 transforaminal epidural steroid injection under fluoroscopy    REASON FOR PROCEDURE: Left Lumbar radiculopathy [M54.16]  DDD (degenerative disc disease), lumbar [M51.36]    PHYSICIAN: Jim Hector MD  ASSISTANTS:None    MEDICATIONS INJECTED:  Preservative-free dexamethasone 10mg, Xylocaine 1% MPF 3-5ml. 3ml per level. Preservative free, sterile normal saline is used to get larger volume as needed.  LOCAL ANESTHETIC INJECTED:  Xylocaine 1% 3ml per site.    SEDATION MEDICATIONS: None  ESTIMATED BLOOD LOSS:  None.    COMPLICATIONS:  None.    TECHNIQUE:   Laying in a prone position, the patient was prepped and draped in the usual sterile fashion using ChloraPrep and fenestrated drape.  The area to be injected was determined under fluoroscopic guidance.  Local anesthetic was given by raising a wheel and going down to the hub of a 27-gauge 1.25 inch needle.  The 4.75 inch 25-gauge spinal needle was introduced towards the transverse process of each above named nerve root level.  The needle was walked medially then hinged into the neural foramen.  Omnipaque was injected to confirm appropriate placement and that there was no vascular runoff.  The medication was then injected after applying negative pressure. The patient  tolerated the procedure well.    PAIN BEFORE THE PROCEDURE: 6/10.    PAIN AFTER THE PROCEDURE: 0/10.    The patient was monitored after the procedure.  Patient was given post procedure and discharge instructions to follow at home.  We will see the patient back in two weeks or the patient may call to inform of status. The patient was discharged in a stable condition.

## 2023-03-22 NOTE — DISCHARGE SUMMARY
[facility] - Endoscopy  Discharge Note  Short Stay    Procedure(s) (LRB):  Left L5 + S1 Transforaminal Epidural Steroid Injections (Left)      OUTCOME: Patient tolerated treatment/procedure well without complication and is now ready for discharge.    DISPOSITION: Home or Self Care    FINAL DIAGNOSIS:  <principal problem not specified>    FOLLOWUP: In clinic    DISCHARGE INSTRUCTIONS:  No discharge procedures on file.       Discharge Diagnosis:Lumbar radiculopathy [M54.16]  DDD (degenerative disc disease), lumbar [M51.36]  Condition on Discharge: Stable.  Diet on Discharge: Same as before.  Activity: as per instruction sheet.  Discharge to: Home with a responsible adult.  Follow up: as per Discharge instructions

## 2023-03-22 NOTE — PLAN OF CARE
Patient tolerated procedure well. Patient reports 5/10 pain after procedure. Assisted patient up for first time. Gait steady. All discharge instructions given.

## 2023-04-04 ENCOUNTER — OFFICE VISIT (OUTPATIENT)
Dept: PAIN MEDICINE | Facility: CLINIC | Age: 73
End: 2023-04-04
Payer: MEDICARE

## 2023-04-04 VITALS
SYSTOLIC BLOOD PRESSURE: 136 MMHG | DIASTOLIC BLOOD PRESSURE: 63 MMHG | RESPIRATION RATE: 18 BRPM | HEART RATE: 81 BPM | OXYGEN SATURATION: 97 %

## 2023-04-04 DIAGNOSIS — M25.552 CHRONIC LEFT HIP PAIN: ICD-10-CM

## 2023-04-04 DIAGNOSIS — G89.29 CHRONIC LEFT HIP PAIN: ICD-10-CM

## 2023-04-04 DIAGNOSIS — M51.36 DDD (DEGENERATIVE DISC DISEASE), LUMBAR: Primary | ICD-10-CM

## 2023-04-04 DIAGNOSIS — M16.12 OSTEOARTHRITIS OF LEFT HIP, UNSPECIFIED OSTEOARTHRITIS TYPE: ICD-10-CM

## 2023-04-04 DIAGNOSIS — M47.816 LUMBAR SPONDYLOSIS: ICD-10-CM

## 2023-04-04 DIAGNOSIS — M54.16 LUMBAR RADICULOPATHY: ICD-10-CM

## 2023-04-04 PROCEDURE — 1125F PR PAIN SEVERITY QUANTIFIED, PAIN PRESENT: ICD-10-PCS | Mod: CPTII,S$GLB,,

## 2023-04-04 PROCEDURE — 3288F PR FALLS RISK ASSESSMENT DOCUMENTED: ICD-10-PCS | Mod: CPTII,S$GLB,,

## 2023-04-04 PROCEDURE — 4010F PR ACE/ARB THEARPY RXD/TAKEN: ICD-10-PCS | Mod: CPTII,S$GLB,,

## 2023-04-04 PROCEDURE — 3075F SYST BP GE 130 - 139MM HG: CPT | Mod: CPTII,S$GLB,,

## 2023-04-04 PROCEDURE — 3075F PR MOST RECENT SYSTOLIC BLOOD PRESS GE 130-139MM HG: ICD-10-PCS | Mod: CPTII,S$GLB,,

## 2023-04-04 PROCEDURE — 3078F DIAST BP <80 MM HG: CPT | Mod: CPTII,S$GLB,,

## 2023-04-04 PROCEDURE — 4010F ACE/ARB THERAPY RXD/TAKEN: CPT | Mod: CPTII,S$GLB,,

## 2023-04-04 PROCEDURE — 99999 PR PBB SHADOW E&M-EST. PATIENT-LVL IV: CPT | Mod: PBBFAC,,,

## 2023-04-04 PROCEDURE — 1160F RVW MEDS BY RX/DR IN RCRD: CPT | Mod: CPTII,S$GLB,,

## 2023-04-04 PROCEDURE — 1125F AMNT PAIN NOTED PAIN PRSNT: CPT | Mod: CPTII,S$GLB,,

## 2023-04-04 PROCEDURE — 99215 OFFICE O/P EST HI 40 MIN: CPT | Mod: S$GLB,,,

## 2023-04-04 PROCEDURE — 1159F PR MEDICATION LIST DOCUMENTED IN MEDICAL RECORD: ICD-10-PCS | Mod: CPTII,S$GLB,,

## 2023-04-04 PROCEDURE — 1160F PR REVIEW ALL MEDS BY PRESCRIBER/CLIN PHARMACIST DOCUMENTED: ICD-10-PCS | Mod: CPTII,S$GLB,,

## 2023-04-04 PROCEDURE — 3288F FALL RISK ASSESSMENT DOCD: CPT | Mod: CPTII,S$GLB,,

## 2023-04-04 PROCEDURE — 1101F PT FALLS ASSESS-DOCD LE1/YR: CPT | Mod: CPTII,S$GLB,,

## 2023-04-04 PROCEDURE — 3078F PR MOST RECENT DIASTOLIC BLOOD PRESSURE < 80 MM HG: ICD-10-PCS | Mod: CPTII,S$GLB,,

## 2023-04-04 PROCEDURE — 1101F PR PT FALLS ASSESS DOC 0-1 FALLS W/OUT INJ PAST YR: ICD-10-PCS | Mod: CPTII,S$GLB,,

## 2023-04-04 PROCEDURE — 99999 PR PBB SHADOW E&M-EST. PATIENT-LVL IV: ICD-10-PCS | Mod: PBBFAC,,,

## 2023-04-04 PROCEDURE — 1159F MED LIST DOCD IN RCRD: CPT | Mod: CPTII,S$GLB,,

## 2023-04-04 PROCEDURE — 99215 PR OFFICE/OUTPT VISIT, EST, LEVL V, 40-54 MIN: ICD-10-PCS | Mod: S$GLB,,,

## 2023-04-04 RX ORDER — GABAPENTIN 300 MG/1
600 CAPSULE ORAL NIGHTLY
Qty: 60 CAPSULE | Refills: 11 | Status: SHIPPED | OUTPATIENT
Start: 2023-04-04 | End: 2024-04-03

## 2023-04-04 NOTE — PROGRESS NOTES
Subjective:     Patient ID: Alba M Reyes is a 72 y.o. female    Chief Complaint: Post-op Evaluation (3\22 S\P left L5 S1 TF LYNNETTE)        Referred by: No ref. provider found      HPI:    Interval History PA (04/04/2023):  Patient returns to clinic for follow up of of multiple pain complaints.  Patient is s/p left L5, S1 TF LYNNETTE done on 03/22/2023 with 40% relief of pain.  Patient notes back pain has been improved following the procedure, states pain went from 10/10 and is now at a 6/10.  She notes the pain over her lower back is now more intermittent and with decreased severity.  Mainly located over her midline lower lumbar spine.  Patient states the majority of her pain is over her left lateral hip radiating into her groin.  States this pain continues to be severe and without any alleviating factors.  She notes difficulty sleeping due to pain.  States the majority of her continued pain is over her left hip.  She continues to take gabapentin 600 mg q.h.s. with benefit, denies any adverse effects from this medication.  Patient has established care with Dr. Mojica who noted that she is a candidate for a total left hip replacement.  Patient wanted to proceed with more conservative measures including a left intra-articular hip steroid injection.  Following a left hip injection she had good but short term relief.  She is not followed up with Orthopedics since.  Denies any new or worsening symptoms.  Denies any focal weakness, saddle paresthesias or bowel/bladder dysfunction.       Interval History PA (03/02/2023):  Patient returns to clinic for follow up of multiple pain complaints.  Patient reports pain in her bilateral shoulders, bilateral hands, back, and hips.  Patient recently had left intra-articular hip steroid injection with only short-term relief noted.  Patient states pain has been severe and constant since.  Patient has followed up with Orthopedics who notes that she is a candidate for a left total hip  replacement.  Patient states that she is not followed up with ortho.  Main concern is lower back and left hip pain.  States pain in her lower back as mainly left-sided with occasional radiation into her left lower extremity.  Unclear radicular pattern into her left lower extremity.  Patient also has severe left hip pain related to osteoarthritis.  Otherwise denies any changes in the quality or location of her pain.  Denies any new or worsening symptoms.  Denies any focal weakness, saddle paresthesias or bowel/bladder dysfunction.       Interval History PA (12/02/2022):  Patient returns to clinic for follow up of chronic left-sided lower back/lower extremity pain and MRI review.  States her pain has been severe and is having decreased ADLs.  Denies any new or worsening symptoms.  Continues to have majority of pain in her left hip radiating into her left anterior groin, also radiating into her left anterior thigh and to her knee.  Patient also notes having intermittent lower back pain, left worse than right.  She states her left leg feels like it is weak and will give out due to the pain.  Denies any numbness or tingling.  Denies any focal weakness, saddle paresthesias or bowel/bladder dysfunction.  Symptoms are exacerbated by any type of activity.  Difficulty standing up.  Alleviated with rest.  Currently taking gabapentin 300 mg q.h.s. with some relief, denies any adverse effects.  Upon arrival patient is blood pressure was greater than 180/100, denied any headache, nausea, vomiting, blurry vision.  BP retaken at end of visit was down to 143/66.  She reports not taking her BP medication today, nor has she had any food or liquid this morning.    Initial Encounter (8/8/22):  Alba M Reyes is a 72 y.o. female who presents today with chronic left-sided low back and lower extremity this pain has been present for roughly 2 years.  No specific inciting events or injury noted.  Pain is severe and very debilitating.  Patient  localizes pain mainly to the left anterior groin and radiates along the anterior aspect of the left thigh to the knee.  This pain is much worse with standing and walking.  Patient does have some associated left-sided low back pain as well.  She does not endorse paresthesias.  She does feel that her left lower extremity is weak though denies any focal weakness.  She denies any bowel or bladder dysfunction.   This pain is described in detail below.    Physical Therapy:  Yes.    Non-pharmacologic Treatment:  Rest helps         TENS?  No    Pain Medications:         Currently taking:  Gabapentin, Cymbalta    Has tried in the past:  NSAIDs, Tylenol    Has not tried:  Opioids, Muscle relaxants, TCAs, topical creams    Blood thinners:  None    Interventional Therapies:    12/19/2022 - left intra-articular hip steroid injection - minimal/no relief  03/22/2023 - left L5, S1 transforaminal epidural steroid injection - 40% relief    Relevant Surgeries:  None    Affecting sleep?  Yes    Affecting daily activities? yes    Depressive symptoms? yes          SI/HI? No    Work status: Retired    Pain Scores:    Best:       8/10  Worst:    10/10  Usually:   9/10  Today:    8/10    Pain Disability Index  Family/Home Responsibilities:: 8  Recreation:: 0  Social Activity:: 0  Occupation:: 0  Sexual Behavior:: 0  Self Care:: 9  Life-Support Activities:: 9  Pain Disability Index (PDI): 26    Review of Systems   Constitutional:  Negative for activity change, appetite change, chills, fatigue, fever and unexpected weight change.   HENT:  Negative for hearing loss.    Eyes:  Negative for visual disturbance.   Respiratory:  Negative for chest tightness and shortness of breath.    Cardiovascular:  Negative for chest pain.   Gastrointestinal:  Negative for abdominal pain, constipation, diarrhea, nausea and vomiting.   Genitourinary:  Negative for difficulty urinating.   Musculoskeletal:  Positive for arthralgias, back pain, gait problem and  myalgias. Negative for neck pain.   Skin:  Negative for rash.   Neurological:  Positive for weakness. Negative for dizziness, light-headedness, numbness and headaches.   Psychiatric/Behavioral:  Positive for sleep disturbance. Negative for hallucinations and suicidal ideas. The patient is not nervous/anxious.      Past Medical History:   Diagnosis Date    Arthritis     Hypertension        Past Surgical History:   Procedure Laterality Date    EPIDURAL STEROID INJECTION Left 3/22/2023    Procedure: Left L5 + S1 Transforaminal Epidural Steroid Injections;  Surgeon: Jim Hector Jr., MD;  Location: [facility] ENDO;  Service: Pain Management;  Laterality: Left;  @1330  No ATC or DM    INJECTION Left 12/19/2022    Procedure: INJECTION, LEFT HIP INTRA-ARTICULAR STEROID CONTRAST DIRECT REF  confirmed;  Surgeon: Hailey Valladares MD;  Location: [facility] PAIN T;  Service: Pain Management;  Laterality: Left;       Social History     Socioeconomic History    Marital status:    Tobacco Use    Smoking status: Never    Smokeless tobacco: Never   Substance and Sexual Activity    Alcohol use: Not Currently    Drug use: Not Currently    Sexual activity: Not Currently       Review of patient's allergies indicates:  No Known Allergies    Current Outpatient Medications on File Prior to Visit   Medication Sig Dispense Refill    albuterol (VENTOLIN HFA) 90 mcg/actuation inhaler Inhale 2 puffs into the lungs every 6 (six) hours as needed for Wheezing. Rescue 6.7 g 0    cetirizine (ZYRTEC) 10 MG tablet Take 10 mg by mouth.      clindamycin (CLEOCIN) 150 MG capsule       ergocalciferol (ERGOCALCIFEROL) 50,000 unit Cap       esomeprazole (NEXIUM) 20 MG capsule Take 1 capsule by mouth once daily 90 capsule 0    folic acid (FOLVITE) 1 MG tablet Take 1 tablet (1 mg total) by mouth once daily. 30 tablet 4    gabapentin (NEURONTIN) 300 MG capsule Take 2 capsules (600 mg total) by mouth every evening. 60 capsule 11    lisinopriL  (PRINIVIL,ZESTRIL) 20 MG tablet lisinopril Take No date recorded No form recorded No frequency recorded No route recorded No set duration recorded No set duration amount recorded active No dosage strength recorded No dosage strength units of measure recorded      lovastatin (MEVACOR) 10 MG tablet lovastatin Take No date recorded No form recorded No frequency recorded No route recorded No set duration recorded No set duration amount recorded active No dosage strength recorded No dosage strength units of measure recorded      magnesium oxide (MAG-OX) 400 mg (241.3 mg magnesium) tablet Take 1 tablet by mouth once daily. for 90 days      memantine (NAMENDA) 7 mg CSpX Namenda Take No date recorded No form recorded No frequency recorded No route recorded No set duration recorded No set duration amount recorded active No dosage strength recorded No dosage strength units of measure recorded      methotrexate 25 mg/mL injection Inject 1 mL (25 mg total) into the skin every 7 days. 4 mL 11    omega-3 acid ethyl esters (LOVAZA) 1 gram capsule       predniSONE (DELTASONE) 10 mg tablet pack TAKE AS DIRECTED      triamcinolone acetonide 0.1% (KENALOG) 0.1 % cream APPLY CREAM EXTERNALLY TO AFFECTED AREA OF LEGS TWICE DAILY AS NEEDED FOR FLARES      adalimumab (HUMIRA,CF, PEN) 40 mg/0.4 mL PnKt Inject 0.4 mLs (40 mg total) into the skin every 14 (fourteen) days. 2 pen 11    DULoxetine (CYMBALTA) 20 MG capsule Take 1 capsule (20 mg total) by mouth 2 (two) times daily. 60 capsule 0     No current facility-administered medications on file prior to visit.       Objective:      /63 (BP Location: Right arm, Patient Position: Sitting, BP Method: Medium (Automatic))   Pulse 81   Resp 18   SpO2 97%     Exam:  GEN:  Well developed, well nourished.  No acute distress.   HEENT:  No trauma.  Mucous membranes moist.  Nares patent bilaterally.  PSYCH: Normal affect. Thought content appropriate.  CHEST:  Breathing symmetric.  No audible  wheezing.  ABD: Soft, non-distended.  SKIN:  Warm, pink, dry.  No rash on exposed areas.    EXT:  No cyanosis, clubbing, or edema.  No color change or changes in nail or hair growth.  NEURO/MUSCULOSKELETAL:  Fully alert, oriented, and appropriate. Speech normal xiomara. No cranial nerve deficits.   Gait: antalgic, ambulates with cane.  No focal motor deficits.         Imaging:  Narrative & Impression    EXAMINATION:  MRI LUMBAR SPINE WITHOUT CONTRAST     CLINICAL HISTORY:  Low back pain, symptoms persist with > 6wks conservative treatment; Other intervertebral disc degeneration, lumbar region     TECHNIQUE:  Multiplanar, multisequence MR images were acquired from the thoracolumbar junction to the sacrum without the administration of contrast.     COMPARISON:  Radiograph 04/08/2022, CT abdomen pelvis 08/30/2020     FINDINGS:  Transition type lumbosacral segment with lumbarization of the S1 vertebral body and a well-formed but diminutive disc at the S1-2 level.  No evidence of fracture, osseous destructive process or aggressive bone marrow replacement process.     No significant spondylolisthesis.     Degenerative changes individual levels further discussed below:     T12-L1: Minimal disc bulging and facet arthropathy.  No spinal stenosis or neural foraminal narrowing.     L1-2: Mild loss of disc height with degenerative endplate changes and osteophyte formation anteriorly.  Mild circumferential disc bulging and facet arthropathy.  No spinal stenosis or neural foraminal narrowing.     L2-3: Mild disc bulging and endplate marginal osteophyte formation.  Mild facet arthropathy.  No spinal stenosis.  Modest left-sided neural foraminal narrowing.     L3-4: Mild disc bulging endplate marginal osteophyte formation.  Mild facet arthropathy.  No spinal stenosis or significant neural foraminal narrowing.     L4-5: Mild circumferential disc bulging and endplate marginal osteophyte formation.  Mild facet arthropathy and  thickening ligamentum flavum.  No significant spinal canal stenosis or neural foraminal narrowing.     L5-S1: Advanced loss of disc height with Modic type degenerative endplate changes and endplate marginal osteophyte formation.  Mild facet arthropathy.  Bilateral lateral recess stenosis and neural foraminal narrowing (right more than left) without significant narrowing of the overall spinal canal diameter.     S1-2: Diminutive but well-formed disc without degenerative change.     The terminal spinal cord, conus, and cauda equina demonstrate normal caliber, morphology, and signal.     No intraspinal mass or fluid collection.     No acute abnormalities in the visualized paraspinal soft tissue structures.     Several small round T2 hyperintense foci on both kidneys, likely simple cysts but not definitively characterized on a noncontrast exam.  Multiple round T2 hypointense foci in uterus, in keeping with fibroids.     Impression:     Multilevel degenerative change throughout the lumbar spine.  There is scattered lateral recess neural foraminal encroachment without significant spinal canal stenosis.  Individual levels further discussed above.     Please note there is a transition type lumbosacral segment with nomenclature as above.  Correlation with imaging suggested prior to any intervention.     Several small round T2 hyperintense foci on both kidneys, likely simple cysts but not definitively characterized on a noncontrast exam.        Electronically signed by: Chandan To MD  Date:                                            08/10/2022  Time:                                           08:51     Narrative & Impression    EXAMINATION:  XR HIPS BILATERAL 2 VIEW INCL AP PELVIS     CLINICAL HISTORY:  Pain in left hip     TECHNIQUE:  AP view of the pelvis and frogleg lateral views of both hips were performed.     COMPARISON:  Two thousand twenty     FINDINGS:  Some levoscoliosis with elevation right hemipelvis.  Evidence  of facet joint arthropathy degenerative disc disease lumbosacral junction.  Moderate erosive DJD change left hip joint with joint space narrowing and less prominent DJD changes contralateral side.     Impression:     No new fracture dislocation.  Additional findings above.        Electronically signed by: Perry Stauffer MD  Date:                                            08/09/2022  Time:                                           12:59       Narrative & Impression    EXAMINATION:  XR LUMBAR SPINE AP AND LATERAL     CLINICAL HISTORY:  Primary osteoarthritis, unspecified site     TECHNIQUE:  AP, lateral and spot images were performed of the lumbar spine.     COMPARISON:  Lumbar spine radiograph dated 03/26/2019     FINDINGS:  Mild rightward curvature.  Multilevel discogenic change with variable disc space loss, most pronounced and advanced at L5-S1.  Lower facet DJD.  No evidence for lytic or blastic lesion.  Left greater than right hip degenerative change, partially visualized.     Impression:     As above.        Electronically signed by: Ricardo Baker  Date:                                            04/08/2022  Time:                                           11:30         Narrative & Impression    EXAMINATION:  XR HIP 2 VIEW LEFT     CLINICAL HISTORY:  Pain in left hip     FINDINGS:  Two views: There is moderate DJD and impingement change.  No fracture dislocation bone destruction seen.        Electronically signed by: Javier Meléndez MD  Date:                                            02/05/2020  Time:                                           14:01         Assessment:       Encounter Diagnoses   Name Primary?    Lumbar radiculopathy     DDD (degenerative disc disease), lumbar Yes    Chronic left hip pain     Osteoarthritis of left hip, unspecified osteoarthritis type     Lumbar spondylosis                Plan:       Nichole was seen today for post-op evaluation.    Diagnoses and all orders for this  visit:    DDD (degenerative disc disease), lumbar    Lumbar radiculopathy  -     gabapentin (NEURONTIN) 300 MG capsule; Take 2 capsules (600 mg total) by mouth every evening.    Chronic left hip pain    Osteoarthritis of left hip, unspecified osteoarthritis type    Lumbar spondylosis              Alba M Reyes is a 72 y.o. female with chronic left-sided low back and lower extremity pain.  I suspect the pain is multifactorial.  The most overt source of her pain at this time appears to be related to the left hip joint.  Does have some back pain and lumbar radiculopathy cannot be ruled out.  Lumbar MRI showing multilevel degenerative changes throughout her spine, most notable at L5-S1 with advanced disc height loss.  Also of note is transitional anatomy of well-formed disc at S1-S2.  Hip x-ray showing moderate degenerative changes and joint space narrowing of the left hip.  No relief with left intra-articular hip steroid injection.  Mild-to-moderate improvement following left L5, S1 TF LYNNETTE.  Majority of patient's continued pain appears to be related to left hip osteoarthritis.    Prior records review.  Pertinent imaging studies reviewed by me. Imaging results were discussed with patient.  Patient is s/p left L5, S1 TF LYNNETTE with 40% relief of symptoms.  Patient does note pain in lower back now with decreased severity as well as more intermittent.  Majority of patient's continued pain appears to be related to left hip pathology.  Continue to follow up with Orthopedics.  Patient has previously established care with Dr. Mojica who noted the patient was a candidate for a total left hip replacement.  She previously wanted to proceed with more conservative measures including a intra-articular hip steroid injection.  This provided some relief but was non lasting relief.  Patient is now more open to consideration of surgery if needed.  Follow up with Orthopedics scheduled.  Continue gabapentin 600 mg q.h.s..  Patient takes with  benefit, denies any adverse effects.  Advised patient that she can increase gabapentin 900 mg q.h.s. as tolerated.  Stressed the importance of maintaining regular home exercise program and being mindful of how they use their back throughout the day.  Patient expressed understanding and agreement.    Can consider referral to physical therapy once pain/symptoms are at a more manageable level.  Return to clinic in 8 weeks or sooner if needed.  After further evaluation by orthopedics of patient's hip pain we may consider additional lumbar interventional procedures in the future.    Nasir Madsen PA-C  [facility]  Interventional Pain Management   04/04/2023    The total time spent for evaluation and management on 04/04/2023 including reviewing separately obtained history, performing a medically appropriate exam and evaluation, documenting clinical information in the health record, independently interpreting results and communicating them to the patient/family/caregiver, and ordering medications/tests/procedures was between 40-54 minutes.    This note was created by combination of typed  and M-Modal dictation.  Transcription and phonetic errors may be present.  If there are any questions, please contact me.

## 2023-04-06 ENCOUNTER — OFFICE VISIT (OUTPATIENT)
Dept: ORTHOPEDICS | Facility: CLINIC | Age: 73
End: 2023-04-06
Payer: MEDICARE

## 2023-04-06 DIAGNOSIS — M16.12 OSTEOARTHRITIS OF LEFT HIP, UNSPECIFIED OSTEOARTHRITIS TYPE: Primary | ICD-10-CM

## 2023-04-06 PROCEDURE — 99999 PR PBB SHADOW E&M-EST. PATIENT-LVL III: ICD-10-PCS | Mod: PBBFAC,,, | Performed by: ORTHOPAEDIC SURGERY

## 2023-04-06 PROCEDURE — 1159F MED LIST DOCD IN RCRD: CPT | Mod: CPTII,S$GLB,, | Performed by: ORTHOPAEDIC SURGERY

## 2023-04-06 PROCEDURE — 99999 PR PBB SHADOW E&M-EST. PATIENT-LVL III: CPT | Mod: PBBFAC,,, | Performed by: ORTHOPAEDIC SURGERY

## 2023-04-06 PROCEDURE — 1101F PT FALLS ASSESS-DOCD LE1/YR: CPT | Mod: CPTII,S$GLB,, | Performed by: ORTHOPAEDIC SURGERY

## 2023-04-06 PROCEDURE — 1159F PR MEDICATION LIST DOCUMENTED IN MEDICAL RECORD: ICD-10-PCS | Mod: CPTII,S$GLB,, | Performed by: ORTHOPAEDIC SURGERY

## 2023-04-06 PROCEDURE — 1125F PR PAIN SEVERITY QUANTIFIED, PAIN PRESENT: ICD-10-PCS | Mod: CPTII,S$GLB,, | Performed by: ORTHOPAEDIC SURGERY

## 2023-04-06 PROCEDURE — 99213 PR OFFICE/OUTPT VISIT, EST, LEVL III, 20-29 MIN: ICD-10-PCS | Mod: S$GLB,,, | Performed by: ORTHOPAEDIC SURGERY

## 2023-04-06 PROCEDURE — 3288F PR FALLS RISK ASSESSMENT DOCUMENTED: ICD-10-PCS | Mod: CPTII,S$GLB,, | Performed by: ORTHOPAEDIC SURGERY

## 2023-04-06 PROCEDURE — 4010F PR ACE/ARB THEARPY RXD/TAKEN: ICD-10-PCS | Mod: CPTII,S$GLB,, | Performed by: ORTHOPAEDIC SURGERY

## 2023-04-06 PROCEDURE — 1125F AMNT PAIN NOTED PAIN PRSNT: CPT | Mod: CPTII,S$GLB,, | Performed by: ORTHOPAEDIC SURGERY

## 2023-04-06 PROCEDURE — 99213 OFFICE O/P EST LOW 20 MIN: CPT | Mod: S$GLB,,, | Performed by: ORTHOPAEDIC SURGERY

## 2023-04-06 PROCEDURE — 4010F ACE/ARB THERAPY RXD/TAKEN: CPT | Mod: CPTII,S$GLB,, | Performed by: ORTHOPAEDIC SURGERY

## 2023-04-06 PROCEDURE — 3288F FALL RISK ASSESSMENT DOCD: CPT | Mod: CPTII,S$GLB,, | Performed by: ORTHOPAEDIC SURGERY

## 2023-04-06 PROCEDURE — 1101F PR PT FALLS ASSESS DOC 0-1 FALLS W/OUT INJ PAST YR: ICD-10-PCS | Mod: CPTII,S$GLB,, | Performed by: ORTHOPAEDIC SURGERY

## 2023-04-06 NOTE — PROGRESS NOTES
EST PATIENT ORTHOPAEDIC: HIP    PRIMARY CARE PHYSICIAN: Primary Doctor No   REFERRING PROVIDER: No referring provider defined for this encounter.     ASSESSMENT & PLAN:    Impression:  Left Hip Severe Degenerative Osteoarthritis, Primary    Follow Up Plan: PRN     Surgery:     Alba M Reyes has radiograph and physical exam evidence of the aforementioned impression and wishes to pursue surgery. This patient appears to have sufficient symptoms to warrant surgical intervention and is an appropriate candidate for left Primary Total Hip Arthroplasty as evidenced by months of unsuccessful non-operative treatment as outlined in the HPI below and progressive symptoms.    Non operative care:    Alba M Reyes has physical exam evidence of above and wishes to pursue an non-operative care. I am recommending the following: pre-surgical planning.     Patient seen after referral by pain management for chronic left-sided lower back/lower extremity pain.  States her pain has been severe and is having decreased ADLs.  Continues to have majority of pain in her left hip radiating into her left anterior groin, also radiating into her left anterior thigh and to her knee.  Patient also notes having intermittent lower back pain, left worse than right.  Difficulty standing up.  Alleviated with rest. Decision was made to do intraarticular hip injection previously. Patient reports some sustained improvement in her pain after the injection which has now worn off    Seen as the patient has severe underlying arthritis of her hip and she is now proven that there is an intra-articular source as evidenced by relief of her pain with previous injection which was just not sustained for prolonged period of time.  I think she would benefit from consideration of surgical intervention.  We discussed the outcomes and expectations regarding this surgery with the Nia nagel.  I think she understands the surgical concepts in rehabilitation plans  afterwards.  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  When she is able to identify some help and she has a month in which she wants to consider this surgery asked her to follow up 4-6 weeks beforehand so we can do more pre-surgical planning.      The patient has been ordered:  None    CONSULTS:   None    ACTIVE PROBLEM LIST  Patient Active Problem List   Diagnosis    Right upper quadrant pain    Acute cholecystitis due to biliary calculus    Incarcerated umbilical hernia    Lack of coordination    Posture abnormality    Weakness of both hips    GERD (gastroesophageal reflux disease)    Hypovitaminosis D    Mixed incontinence    Osteopenia    Osteoporosis    Rheumatoid arthritis    Varicose veins of legs    Lumbar spondylosis    Lumbar radiculopathy    DDD (degenerative disc disease), lumbar    Osteoarthritis of left hip           SUBJECTIVE    CHIEF COMPLAINT: Hip Pain    HPI:   Alba M Reyes is a 72 y.o. female here for evaluation and management of left hip pain. There is not a specific incident that brought about this pain. she has had progressive problems with the hip(s) starting 3 years ago and is progressing which is now interfering with activities which include: walking, functional household ADL's, rising from a sitting position, standing for prolonged periods of time, and climbing stairs     Currently the pain in the joint is moderate with activity.  The pain is constant and is located in groin and thigh.  The pain is described as aching, severe, and stiffness. Relieving factors include ambulatory device, rest, prescription medication, over the counter medication , and repositioning. no associated Catching, Clicking, and Popping.     They do not report leg length inequality.     Alba M Reyes has no additional complaints.     4/6/23:  Patient returns for follow-up she has been seen by pain management and  underwent intra-articular hip injection which helped improve her pain for a short period of time she is now had increased pain primarily in her groin with weight-bearing activities.  She also was seen pain management for her lumbar spine in his undergone injections into her spine with some improvement of these symptoms.  She continues to have this more isolated groin pain. She is on rollator.     PROGRESSIVE SYMPTOMS:  Pain worsened by weight bearing  Pain effecting living situation    FUNCTIONAL STATUS:   Limited most or all of the time (uses scooter, mobility device)    PREVIOUS TREATMENTS:  Medical: OTC NSAIDS, Steroid Injections, and Tylenol  Physical Therapy: Activities Modified   Previous Orthopaedic Surgery: None    REVIEW OF SYSTEMS:  PAIN ASSESSMENT:  See HPI.  MUSCULOSKELETAL: See HPI.  OTHER 10 point review of systems is negative except as stated in HPI above    PAST MEDICAL HISTORY   has a past medical history of Arthritis and Hypertension.     PAST SURGICAL HISTORY   has a past surgical history that includes injection (Left, 12/19/2022) and Epidural steroid injection (Left, 3/22/2023).     FAMILY HISTORY  family history is not on file.     SOCIAL HISTORY   reports that she has never smoked. She has never used smokeless tobacco. She reports that she does not currently use alcohol. She reports that she does not currently use drugs.     ALLERGIES   Review of patient's allergies indicates:  No Known Allergies     MEDICATIONS   Current Outpatient Medications on File Prior to Visit   Medication Sig Dispense Refill    albuterol (VENTOLIN HFA) 90 mcg/actuation inhaler Inhale 2 puffs into the lungs every 6 (six) hours as needed for Wheezing. Rescue 6.7 g 0    cetirizine (ZYRTEC) 10 MG tablet Take 10 mg by mouth.      clindamycin (CLEOCIN) 150 MG capsule       ergocalciferol (ERGOCALCIFEROL) 50,000 unit Cap       esomeprazole (NEXIUM) 20 MG capsule Take 1 capsule by mouth once daily 90 capsule 0    folic acid  (FOLVITE) 1 MG tablet Take 1 tablet (1 mg total) by mouth once daily. 30 tablet 4    gabapentin (NEURONTIN) 300 MG capsule Take 2 capsules (600 mg total) by mouth every evening. 60 capsule 11    lisinopriL (PRINIVIL,ZESTRIL) 20 MG tablet lisinopril Take No date recorded No form recorded No frequency recorded No route recorded No set duration recorded No set duration amount recorded active No dosage strength recorded No dosage strength units of measure recorded      lovastatin (MEVACOR) 10 MG tablet lovastatin Take No date recorded No form recorded No frequency recorded No route recorded No set duration recorded No set duration amount recorded active No dosage strength recorded No dosage strength units of measure recorded      magnesium oxide (MAG-OX) 400 mg (241.3 mg magnesium) tablet Take 1 tablet by mouth once daily. for 90 days      memantine (NAMENDA) 7 mg CSpX Namenda Take No date recorded No form recorded No frequency recorded No route recorded No set duration recorded No set duration amount recorded active No dosage strength recorded No dosage strength units of measure recorded      methotrexate 25 mg/mL injection Inject 1 mL (25 mg total) into the skin every 7 days. 4 mL 11    omega-3 acid ethyl esters (LOVAZA) 1 gram capsule       predniSONE (DELTASONE) 10 mg tablet pack TAKE AS DIRECTED      triamcinolone acetonide 0.1% (KENALOG) 0.1 % cream APPLY CREAM EXTERNALLY TO AFFECTED AREA OF LEGS TWICE DAILY AS NEEDED FOR FLARES      adalimumab (HUMIRA,CF, PEN) 40 mg/0.4 mL PnKt Inject 0.4 mLs (40 mg total) into the skin every 14 (fourteen) days. 2 pen 11    DULoxetine (CYMBALTA) 20 MG capsule Take 1 capsule (20 mg total) by mouth 2 (two) times daily. 60 capsule 0     No current facility-administered medications on file prior to visit.          PHYSICAL EXAM   vitals were not taken for this visit.   There is no height or weight on file to calculate BMI.      All other systems deferred.  GENERAL:  No acute  distress  HABITUS: Normal  GAIT: Antalgic  SKIN: Normal     HIP EXAM:    left:   ROM:   Flexion: 120 degrees    Internal Rotation: 30 degrees    External Rotation: 30 degrees    Abduction: 45 degrees    Adduction: 20 degrees  No apparent leg length discrepancy    Palpation: no tenderness over greater trochanter, SI joint, ilioposas, IT band, gluteal musculature   Pain is reproduced with IR and ER of the hip  Strength: 5/5 hip flexion, abduction, knee flexion and extension   Straight leg raise: Negative   Neurovascular Status: Sensation intact to light touch in Sural, saphenous, SPN, DPN, Tibial nerve distribution  2+ pulse DP/PT, normal capillary refill, foot has normal warmth    DATA:  Diagnostic tests reviewed for today's visit:     AP pelvis, hip, and lateral radiographs shows narrowing of the superior and medial joint space with sclerosis and osteophyte formation. The femoral neck is in varus position. The femoral head is spherical at superior margin. There is no signficant evidence of acetabular dysplasia and the femoral head remains covered.

## 2023-05-09 DIAGNOSIS — M05.79 RHEUMATOID ARTHRITIS INVOLVING MULTIPLE SITES WITH POSITIVE RHEUMATOID FACTOR: ICD-10-CM

## 2023-05-09 NOTE — TELEPHONE ENCOUNTER
----- Message from Silas Hernandez sent at 5/9/2023  9:31 AM CDT -----  Type: Patient Call Back    Who called:Yanique/ [facility]     What is the request in detail: Asking for a call in regards to // methotrexate 25 mg/mL injection 4 mL     Can the clinic reply by [facility]? NO     Would the patient rather a call back or a response via [facility]? CALL     Best call back number:[phone] (Direct line to dept)

## 2023-05-10 RX ORDER — METHOTREXATE 25 MG/ML
25 INJECTION, SOLUTION INTRA-ARTERIAL; INTRAMUSCULAR; INTRAVENOUS
Qty: 4 ML | Refills: 11 | Status: SHIPPED | OUTPATIENT
Start: 2023-05-10

## 2024-04-16 ENCOUNTER — HOSPITAL ENCOUNTER (EMERGENCY)
Facility: HOSPITAL | Age: 74
Discharge: HOME | End: 2024-04-16
Attending: EMERGENCY MEDICINE
Payer: MEDICARE

## 2024-04-16 VITALS
OXYGEN SATURATION: 97 % | BODY MASS INDEX: 23.92 KG/M2 | HEIGHT: 62 IN | TEMPERATURE: 99 F | WEIGHT: 130 LBS | RESPIRATION RATE: 20 BRPM | DIASTOLIC BLOOD PRESSURE: 59 MMHG | SYSTOLIC BLOOD PRESSURE: 103 MMHG | HEART RATE: 87 BPM

## 2024-04-16 DIAGNOSIS — B34.9 VIRAL SYNDROME: Primary | ICD-10-CM

## 2024-04-16 DIAGNOSIS — Z87.39 HISTORY OF RHEUMATOID ARTHRITIS: ICD-10-CM

## 2024-04-16 LAB
ALBUMIN SERPL-MCNC: 3.9 G/DL (ref 3.3–5.5)
ALP SERPL-CCNC: 86 U/L (ref 42–141)
BILIRUB SERPL-MCNC: 0.8 MG/DL (ref 0.2–1.6)
BILIRUBIN, POC UA: ABNORMAL
BLOOD, POC UA: ABNORMAL
BUN SERPL-MCNC: 17 MG/DL (ref 7–22)
CALCIUM SERPL-MCNC: 9.9 MG/DL (ref 8–10.3)
CHLORIDE SERPL-SCNC: 104 MMOL/L (ref 98–108)
CLARITY, POC UA: CLEAR
COLOR, POC UA: YELLOW
CREAT SERPL-MCNC: 0.8 MG/DL (ref 0.6–1.2)
CTP QC/QA: YES
GLUCOSE SERPL-MCNC: 147 MG/DL (ref 73–118)
GLUCOSE, POC UA: NEGATIVE
HCT, POC: NORMAL
HGB, POC: NORMAL (ref 14–18)
INFLUENZA A ANTIGEN, POC: NEGATIVE
INFLUENZA B ANTIGEN, POC: NEGATIVE
KETONES, POC UA: ABNORMAL
LEUKOCYTE EST, POC UA: NEGATIVE
MCH, POC: NORMAL
MCHC, POC: NORMAL
MCV, POC: NORMAL
MPV, POC: NORMAL
NITRITE, POC UA: NEGATIVE
OHS QRS DURATION: 94 MS
OHS QTC CALCULATION: 482 MS
PH UR STRIP: 5.5 [PH]
POC ALT (SGPT): 19 U/L (ref 10–47)
POC AST (SGOT): 33 U/L (ref 11–38)
POC PLATELET COUNT: NORMAL
POC RAPID STREP A: NEGATIVE
POC TCO2: 28 MMOL/L (ref 18–33)
POTASSIUM BLD-SCNC: 3.4 MMOL/L (ref 3.6–5.1)
PROTEIN, POC UA: ABNORMAL
PROTEIN, POC: 8 G/DL (ref 6.4–8.1)
RBC, POC: NORMAL
RDW, POC: NORMAL
SARS-COV-2 RDRP RESP QL NAA+PROBE: NEGATIVE
SODIUM BLD-SCNC: 141 MMOL/L (ref 128–145)
SPECIFIC GRAVITY, POC UA: >=1.03
UROBILINOGEN, POC UA: 1 E.U./DL
WBC, POC: NORMAL

## 2024-04-16 PROCEDURE — 87804 INFLUENZA ASSAY W/OPTIC: CPT | Mod: 59,ER

## 2024-04-16 PROCEDURE — 93010 ELECTROCARDIOGRAM REPORT: CPT | Mod: ,,, | Performed by: INTERNAL MEDICINE

## 2024-04-16 PROCEDURE — 96361 HYDRATE IV INFUSION ADD-ON: CPT | Mod: ER

## 2024-04-16 PROCEDURE — 96374 THER/PROPH/DIAG INJ IV PUSH: CPT | Mod: ER

## 2024-04-16 PROCEDURE — 80053 COMPREHEN METABOLIC PANEL: CPT | Mod: ER

## 2024-04-16 PROCEDURE — 99284 EMERGENCY DEPT VISIT MOD MDM: CPT | Mod: 25,ER

## 2024-04-16 PROCEDURE — 63600175 PHARM REV CODE 636 W HCPCS: Mod: ER

## 2024-04-16 PROCEDURE — 25000003 PHARM REV CODE 250: Mod: ER

## 2024-04-16 PROCEDURE — 85025 COMPLETE CBC W/AUTO DIFF WBC: CPT | Mod: ER

## 2024-04-16 PROCEDURE — 87635 SARS-COV-2 COVID-19 AMP PRB: CPT | Mod: ER

## 2024-04-16 PROCEDURE — 87880 STREP A ASSAY W/OPTIC: CPT | Mod: ER

## 2024-04-16 PROCEDURE — 93005 ELECTROCARDIOGRAM TRACING: CPT | Mod: ER

## 2024-04-16 RX ORDER — ACETAMINOPHEN 500 MG
1000 TABLET ORAL
Status: COMPLETED | OUTPATIENT
Start: 2024-04-16 | End: 2024-04-16

## 2024-04-16 RX ORDER — METHOCARBAMOL 500 MG/1
500 TABLET, FILM COATED ORAL 2 TIMES DAILY
Qty: 20 TABLET | Refills: 0 | OUTPATIENT
Start: 2024-04-16 | End: 2024-04-20

## 2024-04-16 RX ORDER — DEXAMETHASONE SODIUM PHOSPHATE 4 MG/ML
8 INJECTION, SOLUTION INTRA-ARTICULAR; INTRALESIONAL; INTRAMUSCULAR; INTRAVENOUS; SOFT TISSUE
Status: COMPLETED | OUTPATIENT
Start: 2024-04-16 | End: 2024-04-16

## 2024-04-16 RX ORDER — ACETAMINOPHEN 500 MG
500 TABLET ORAL EVERY 6 HOURS PRN
Qty: 20 TABLET | Refills: 0 | OUTPATIENT
Start: 2024-04-16 | End: 2024-04-20

## 2024-04-16 RX ADMIN — DEXAMETHASONE SODIUM PHOSPHATE 8 MG: 4 INJECTION INTRA-ARTICULAR; INTRALESIONAL; INTRAMUSCULAR; INTRAVENOUS; SOFT TISSUE at 09:04

## 2024-04-16 RX ADMIN — SODIUM CHLORIDE, POTASSIUM CHLORIDE, SODIUM LACTATE AND CALCIUM CHLORIDE 1000 ML: 600; 310; 30; 20 INJECTION, SOLUTION INTRAVENOUS at 09:04

## 2024-04-16 RX ADMIN — ACETAMINOPHEN 1000 MG: 500 TABLET ORAL at 09:04

## 2024-04-16 NOTE — ED PROVIDER NOTES
Encounter Date: 4/16/2024       History     Chief Complaint   Patient presents with    Generalized Body Aches     Complains of headache, generalized body aches and chills x3 days.     Patient is a 73-year-old female with a past medical history of rheumatoid arthritis, osteoarthritis, hypertension who presents to the emergency department for evaluation of worsening body aches, chills x 2 weeks.  She does follow with a rheumatologist.  Reports she has been taking methotrexate and hydrochloroquine but reports changes in her hydrochloroquine due to blood pressure difficulties.  Reports her rheumatoid arthritis has been acting up since change in medicine.  She denies numbness or tingling.  No swelling or redness.  No new trauma or injuries.  Denies specific headache, chest pain, shortness of breath, abdominal pain.    The history is provided by the patient.     Review of patient's allergies indicates:  No Known Allergies  Past Medical History:   Diagnosis Date    Arthritis     Hypertension      Past Surgical History:   Procedure Laterality Date    EPIDURAL STEROID INJECTION Left 3/22/2023    Procedure: Left L5 + S1 Transforaminal Epidural Steroid Injections;  Surgeon: Jim Hector Jr., MD;  Location: [facility];  Service: Pain Management;  Laterality: Left;  @1330  No ATC or DM    INJECTION Left 12/19/2022    Procedure: INJECTION, LEFT HIP INTRA-ARTICULAR STEROID CONTRAST DIRECT REF  confirmed;  Surgeon: Hailey Valladares MD;  Location: [facility];  Service: Pain Management;  Laterality: Left;     No family history on file.  Social History     Tobacco Use    Smoking status: Never    Smokeless tobacco: Never   Substance Use Topics    Alcohol use: Not Currently    Drug use: Not Currently     Review of Systems   Constitutional:  Positive for chills. Negative for fever.   HENT:  Negative for congestion, ear pain, rhinorrhea, sore throat and trouble swallowing.    Respiratory:  Negative for cough and shortness  of breath.    Cardiovascular:  Negative for chest pain.   Gastrointestinal:  Negative for abdominal pain, nausea and vomiting.   Genitourinary:  Negative for decreased urine volume.   Musculoskeletal:  Positive for arthralgias and myalgias. Negative for joint swelling, neck pain and neck stiffness.   Skin:  Negative for color change.   Neurological:  Negative for dizziness, light-headedness, numbness and headaches.       Physical Exam     Initial Vitals   BP Pulse Resp Temp SpO2   04/16/24 0840 04/16/24 0840 04/16/24 0840 04/16/24 0839 04/16/24 0840   (!) 112/56 (!) 120 20 98.2 °F (36.8 °C) 97 %      MAP       --                Physical Exam    Nursing note and vitals reviewed.  Constitutional: She appears well-developed and well-nourished.   HENT:   Head: Normocephalic and atraumatic.   Right Ear: External ear normal.   Left Ear: External ear normal.   Neck: Carotid bruit is not present.   Normal range of motion.  Cardiovascular:  Normal rate, regular rhythm, normal heart sounds and intact distal pulses.     Exam reveals no gallop and no friction rub.       No murmur heard.  Pulmonary/Chest: Breath sounds normal. No respiratory distress. She has no wheezes. She has no rhonchi. She has no rales.   Abdominal: Abdomen is soft. Bowel sounds are normal. She exhibits no distension. There is no abdominal tenderness. There is no rebound and no guarding.   Musculoskeletal:         General: Normal range of motion.      Cervical back: Normal range of motion.     Neurological: She is alert and oriented to person, place, and time. GCS score is 15. GCS eye subscore is 4. GCS verbal subscore is 5. GCS motor subscore is 6.   Psychiatric: She has a normal mood and affect.         ED Course   Procedures  Labs Reviewed   POCT URINALYSIS W/O SCOPE - Abnormal; Notable for the following components:       Result Value    Bilirubin, UA 1+ (*)     Ketones, UA Trace (*)     Spec Grav UA >=1.030 (*)     Blood, UA Trace-intact (*)      Protein, UA 2+ (*)     All other components within normal limits   POCT CMP - Abnormal; Notable for the following components:    POC Glucose 147 (*)     POC Potassium 3.4 (*)     All other components within normal limits   SARS-COV-2 RDRP GENE    Narrative:     This test utilizes isothermal nucleic acid amplification technology to detect the SARS-CoV-2 RdRp nucleic acid segment. The analytical sensitivity (limit of detection) is 500 copies/swab.     A POSITIVE result is indicative of the presence of SARS-CoV-2 RNA; clinical correlation with patient history and other diagnostic information is necessary to determine patient infection status.    A NEGATIVE result means that SARS-CoV-2 nucleic acids are not present above the limit of detection. A NEGATIVE result should be treated as presumptive. It does not rule out the possibility of COVID-19 and should not be the sole basis for treatment decisions. If COVID-19 is strongly suspected based on clinical and exposure history, re-testing using an alternate molecular assay should be considered.     Commercial kits are provided by [employer].   _________________________________________________________________   The authorized Fact Sheet for Healthcare Providers and the authorized Fact Sheet for Patients of the ID NOW COVID-19 are available on the FDA website:    https://www.fda.gov/media/695382/download      https://www.fda.gov/media/782701/download      POCT CBC   POCT INFLUENZA A/B MOLECULAR   POCT URINALYSIS(INSTRUMENT)   POCT STREP A MOLECULAR   POCT CMP   POCT RAPID INFLUENZA A/B   POCT STREP A, RAPID          Imaging Results              X-Ray Chest PA And Lateral (Final result)  Result time 04/16/24 10:47:04      Final result by Cole Fuller MD (04/16/24 10:47:04)                   Impression:      No acute radiographic findings in the chest to account for reported history of fever.      Electronically signed by: Cole Fuller  MD  Date:    04/16/2024  Time:    10:47               Narrative:    EXAMINATION:  XR CHEST PA AND LATERAL    CLINICAL HISTORY:  Fever, unspecified    TECHNIQUE:  PA and lateral views of the chest were performed.    COMPARISON:  Radiographs 01/28/2020.    FINDINGS:  Mediastinal structures are midline.  Hilar contours are unremarkable.  Cardiac silhouette is normal in size.  Lung volumes are normal and symmetric.  No consolidation.  No pneumothorax or pleural effusion.  No free air beneath the diaphragm.  No acute osseous abnormalities.  Visualized soft tissues appear within normal limits.                                       Medications   dexAMETHasone injection 8 mg (8 mg Intravenous Given 4/16/24 0902)   acetaminophen tablet 1,000 mg (1,000 mg Oral Given 4/16/24 0903)   lactated ringers bolus 1,000 mL (0 mLs Intravenous Stopped 4/16/24 1118)     Medical Decision Making  This is an emergent evaluation of a  73-year-old female with a past medical history of rheumatoid arthritis, osteoarthritis, hypertension who presents to the emergency department for evaluation of worsening body aches, chills x 2 weeks.  She does follow with a rheumatologist.  Reports she has been taking methotrexate and hydrochloroquine but reports changes in her hydrochloroquine due to blood pressure difficulties.  Reports her rheumatoid arthritis has been acting up since change in medicine..    Patient looks well clinically. Regular rate rhythm without murmurs.  No carotid bruits appreciated on exam. Lungs are clear to auscultation bilaterally.  Abdomen is soft, nontender, non distended, with normal bowel sounds.     Differential diagnosis includes but is not limited to rheumatoid arthritis flare, COVID, flu, other viral syndrome.    Workup initiated with basic labs, viral swabs.  Ordered Decadron and Toradol.  Patient does have history of baseline pancytopenia, is followed by Hematology/oncology, will ensure no significant decreases.  Vital  signs, chart, labs, and/or imaging were all reviewed.  See ED course below and interpretations above. My overall impression is viral syndrome versus rheumatoid arthritis flare. Will discharge home with Tylenol, Robaxin.  Ambulatory referral placed to Rheumatology. Patient is very well appearing, and in no acute distress. Vital signs are reassuring here in the emergency department, patient is afebrile, breathing comfortable, satting 97 % on room air. Patient/Caregiver is stable for discharge at this time.  Patient/Caregiver was informed of results and plan of care. Patient/Caregiver verbalized understanding of care plan. All questions and concerns were addressed. Discussed strict return precautions with the patient/caregiver. Instructed follow up with primary care provider within 1 week.      Nirmal Zheng PA-C    DISCLAIMER: This note was prepared with [employer] voice recognition transcription software. Garbled syntax, mangled pronouns, and other bizarre constructions may be attributed to that software system.      Amount and/or Complexity of Data Reviewed  Labs: ordered. Decision-making details documented in ED Course.  Radiology: ordered. Decision-making details documented in ED Course.    Risk  OTC drugs.  Prescription drug management.               ED Course as of 04/16/24 1149   Tue Apr 16, 2024   0859 Pulse(!): 120  Patient initially tachycardic at 120 without fever in triage.  Had nurse recheck vital signs.  102.3° F when roomed with HR of 112. Ordered Tylenol. [TM]   0928 POCT CBC  CBC without leukocytosis, H and H of 11.4/33.2, platelets of 77.  Patient has history of pancytopenia.  She has rheumatoid arthritis.  She is followed by Rheumatology as well as Hematology/Oncology for this ongoing issue. [TM]   0929 COVID and flu negative, however patient is febrile today.  Ordered Strep swab. Ordered urinalysis and chest x-ray to look for other infectious causes.  Ordered EKG due to tachycardia to rule out any  acute infarct although suspicion is low. [TM]   0929 POCT CMP(!)  CMP with normal renal function, glucose of 147, potassium of 3.4, no other electrolyte abnormalities.  EKG pending. [TM]   0936 POCT URINALYSIS W/O SCOPE(!)  Urinalysis with 1+ bilirubin, trace ketones, elevated specific gravity, trace intact blood, 2+ protein.  No signs of infection however.  Negative for nitrites or leukocytes. [TM]   1048 POCT Rapid Strep A  Strep negative [TM]   1106 X-Ray Chest PA And Lateral  Mediastinal structures are midline.  Hilar contours are unremarkable.  Cardiac silhouette is normal in size.  Lung volumes are normal and symmetric.  No consolidation.  No pneumothorax or pleural effusion.  No free air beneath the diaphragm.  No acute osseous abnormalities.  Visualized soft tissues appear within normal limits. [TM]   1149 Suspect viral syndrome.  No other source of infection found.  Patient reports feeling improved.  Patient clinically looks extremely well.  We will discharge home with a trial of Tylenol and Robaxin.  Ambulatory referral placed to Rheumatology. [TM]      ED Course User Index  [TM] Nirmal Zheng PA-C                             Clinical Impression:  Final diagnoses:  [B34.9] Viral syndrome (Primary)  [Z87.39] History of rheumatoid arthritis          ED Disposition Condition    Discharge Stable          ED Prescriptions       Medication Sig Dispense Start Date End Date Auth. Provider    acetaminophen (TYLENOL) 500 MG tablet Take 1 tablet (500 mg total) by mouth every 6 (six) hours as needed. 20 tablet 4/16/2024 -- Nirmal Zheng PA-C    methocarbamoL (ROBAXIN) 500 MG Tab Take 1 tablet (500 mg total) by mouth 2 (two) times a day. for 10 days 20 tablet 4/16/2024 4/26/2024 Nirmal Zheng PA-C          Follow-up Information       Follow up With Specialties Details Why Contact Info    [facility] - Emergency Dept Emergency Medicine Go to  Go to [facility] emergency department on [facility]  if symptoms worsen or do not resolve, If symptoms worsen [address]  [facility] [id number] [phone]    Primary care doctor  Schedule an appointment as soon as possible for a visit in 3 days               Nirmal Zheng PA-C  04/16/24 1159

## 2024-04-16 NOTE — DISCHARGE INSTRUCTIONS
You were seen in the emergency department today for body aches and were diagnosed with viral illness. Please take medications as prescribed and follow up with rheumatology, someone should call you for an appointment.  It is important to remember that some problems are difficult to diagnose and may not be found during your Emergency Department visit. Be sure to follow up with your primary care doctor and review all labs/imaging/tests that were performed during this visit with them. Some labs/tests may be outside of the normal range and require non-emergent follow-up and further investigation to help diagnose/exclude/prevent complications or other medical conditions. Return to the emergency department for any new or worsening symptoms. Thank you for allowing me to care for you today, it was my pleasure. I hope you get to feeling better soon!

## 2024-04-20 ENCOUNTER — HOSPITAL ENCOUNTER (EMERGENCY)
Facility: HOSPITAL | Age: 74
Discharge: HOME | End: 2024-04-20
Attending: EMERGENCY MEDICINE
Payer: MEDICARE

## 2024-04-20 VITALS
SYSTOLIC BLOOD PRESSURE: 139 MMHG | BODY MASS INDEX: 23.78 KG/M2 | TEMPERATURE: 98 F | HEART RATE: 74 BPM | WEIGHT: 130 LBS | RESPIRATION RATE: 22 BRPM | DIASTOLIC BLOOD PRESSURE: 63 MMHG | OXYGEN SATURATION: 95 %

## 2024-04-20 DIAGNOSIS — M06.9 RHEUMATOID ARTHRITIS, INVOLVING UNSPECIFIED SITE, UNSPECIFIED WHETHER RHEUMATOID FACTOR PRESENT: ICD-10-CM

## 2024-04-20 DIAGNOSIS — E87.6 HYPOKALEMIA: Primary | ICD-10-CM

## 2024-04-20 DIAGNOSIS — I10 HTN (HYPERTENSION): ICD-10-CM

## 2024-04-20 DIAGNOSIS — R51.9 ACUTE NONINTRACTABLE HEADACHE, UNSPECIFIED HEADACHE TYPE: ICD-10-CM

## 2024-04-20 LAB
ALBUMIN SERPL BCP-MCNC: 2.9 G/DL (ref 3.5–5.2)
ALBUMIN SERPL BCP-MCNC: 3.4 G/DL (ref 3.5–5.2)
ALP SERPL-CCNC: 76 U/L (ref 55–135)
ALP SERPL-CCNC: 85 U/L (ref 55–135)
ALT SERPL W/O P-5'-P-CCNC: 22 U/L (ref 10–44)
ALT SERPL W/O P-5'-P-CCNC: 25 U/L (ref 10–44)
ANION GAP SERPL CALC-SCNC: 7 MMOL/L (ref 8–16)
ANION GAP SERPL CALC-SCNC: 9 MMOL/L (ref 8–16)
AST SERPL-CCNC: 36 U/L (ref 10–40)
AST SERPL-CCNC: 42 U/L (ref 10–40)
BACTERIA #/AREA URNS HPF: ABNORMAL /HPF
BASOPHILS # BLD AUTO: 0.02 K/UL (ref 0–0.2)
BASOPHILS NFR BLD: 0.6 % (ref 0–1.9)
BILIRUB SERPL-MCNC: 0.5 MG/DL (ref 0.1–1)
BILIRUB SERPL-MCNC: 0.8 MG/DL (ref 0.1–1)
BILIRUB UR QL STRIP: ABNORMAL
BUN SERPL-MCNC: 16 MG/DL (ref 8–23)
BUN SERPL-MCNC: 16 MG/DL (ref 8–23)
CALCIUM SERPL-MCNC: 8.6 MG/DL (ref 8.7–10.5)
CALCIUM SERPL-MCNC: 9.5 MG/DL (ref 8.7–10.5)
CHLORIDE SERPL-SCNC: 101 MMOL/L (ref 95–110)
CHLORIDE SERPL-SCNC: 104 MMOL/L (ref 95–110)
CLARITY UR: ABNORMAL
CO2 SERPL-SCNC: 25 MMOL/L (ref 23–29)
CO2 SERPL-SCNC: 26 MMOL/L (ref 23–29)
COLOR UR: YELLOW
CREAT SERPL-MCNC: 0.6 MG/DL (ref 0.5–1.4)
CREAT SERPL-MCNC: 0.7 MG/DL (ref 0.5–1.4)
CTP QC/QA: YES
DIFFERENTIAL METHOD BLD: ABNORMAL
EOSINOPHIL # BLD AUTO: 0 K/UL (ref 0–0.5)
EOSINOPHIL NFR BLD: 0.6 % (ref 0–8)
ERYTHROCYTE [DISTWIDTH] IN BLOOD BY AUTOMATED COUNT: 15 % (ref 11.5–14.5)
EST. GFR  (NO RACE VARIABLE): >60 ML/MIN/1.73 M^2
EST. GFR  (NO RACE VARIABLE): >60 ML/MIN/1.73 M^2
GLUCOSE SERPL-MCNC: 178 MG/DL (ref 70–110)
GLUCOSE SERPL-MCNC: 92 MG/DL (ref 70–110)
GLUCOSE UR QL STRIP: NEGATIVE
GRAN CASTS #/AREA URNS LPF: 6 /LPF
HCT VFR BLD AUTO: 33.1 % (ref 37–48.5)
HGB BLD-MCNC: 10.9 G/DL (ref 12–16)
HGB UR QL STRIP: ABNORMAL
HYALINE CASTS #/AREA URNS LPF: 6 /LPF
IMM GRANULOCYTES # BLD AUTO: 0.14 K/UL (ref 0–0.04)
IMM GRANULOCYTES NFR BLD AUTO: 4.1 % (ref 0–0.5)
KETONES UR QL STRIP: ABNORMAL
LACTATE SERPL-SCNC: 1.2 MMOL/L (ref 0.5–2.2)
LEUKOCYTE ESTERASE UR QL STRIP: NEGATIVE
LIPASE SERPL-CCNC: 17 U/L (ref 4–60)
LYMPHOCYTES # BLD AUTO: 1.1 K/UL (ref 1–4.8)
LYMPHOCYTES NFR BLD: 31.1 % (ref 18–48)
MAGNESIUM SERPL-MCNC: 1.6 MG/DL (ref 1.6–2.6)
MCH RBC QN AUTO: 31.4 PG (ref 27–31)
MCHC RBC AUTO-ENTMCNC: 32.9 G/DL (ref 32–36)
MCV RBC AUTO: 95 FL (ref 82–98)
MICROSCOPIC COMMENT: ABNORMAL
MOLECULAR STREP A: NEGATIVE
MONOCYTES # BLD AUTO: 1 K/UL (ref 0.3–1)
MONOCYTES NFR BLD: 28.2 % (ref 4–15)
NEUTROPHILS # BLD AUTO: 1.2 K/UL (ref 1.8–7.7)
NEUTROPHILS NFR BLD: 35.4 % (ref 38–73)
NITRITE UR QL STRIP: NEGATIVE
NRBC BLD-RTO: 0 /100 WBC
PH UR STRIP: 6 [PH] (ref 5–8)
PLATELET # BLD AUTO: 89 K/UL (ref 150–450)
PMV BLD AUTO: 13 FL (ref 9.2–12.9)
POC MOLECULAR INFLUENZA A AGN: NEGATIVE
POC MOLECULAR INFLUENZA B AGN: NEGATIVE
POTASSIUM SERPL-SCNC: 2.8 MMOL/L (ref 3.5–5.1)
POTASSIUM SERPL-SCNC: 3.4 MMOL/L (ref 3.5–5.1)
PROT SERPL-MCNC: 6.2 G/DL (ref 6–8.4)
PROT SERPL-MCNC: 7.3 G/DL (ref 6–8.4)
PROT UR QL STRIP: ABNORMAL
RBC # BLD AUTO: 3.47 M/UL (ref 4–5.4)
RBC #/AREA URNS HPF: 6 /HPF (ref 0–4)
SARS-COV-2 RDRP RESP QL NAA+PROBE: NEGATIVE
SODIUM SERPL-SCNC: 135 MMOL/L (ref 136–145)
SODIUM SERPL-SCNC: 137 MMOL/L (ref 136–145)
SP GR UR STRIP: 1.02 (ref 1–1.03)
SQUAMOUS #/AREA URNS HPF: 2 /HPF
TROPONIN I SERPL DL<=0.01 NG/ML-MCNC: 0.01 NG/ML (ref 0–0.03)
URN SPEC COLLECT METH UR: ABNORMAL
UROBILINOGEN UR STRIP-ACNC: >=8 EU/DL
WBC # BLD AUTO: 3.44 K/UL (ref 3.9–12.7)
WBC #/AREA URNS HPF: 5 /HPF (ref 0–5)

## 2024-04-20 PROCEDURE — 25000003 PHARM REV CODE 250: Performed by: EMERGENCY MEDICINE

## 2024-04-20 PROCEDURE — 83735 ASSAY OF MAGNESIUM: CPT | Performed by: EMERGENCY MEDICINE

## 2024-04-20 PROCEDURE — 87651 STREP A DNA AMP PROBE: CPT

## 2024-04-20 PROCEDURE — 96375 TX/PRO/DX INJ NEW DRUG ADDON: CPT

## 2024-04-20 PROCEDURE — 83690 ASSAY OF LIPASE: CPT | Performed by: EMERGENCY MEDICINE

## 2024-04-20 PROCEDURE — 83605 ASSAY OF LACTIC ACID: CPT | Performed by: EMERGENCY MEDICINE

## 2024-04-20 PROCEDURE — 96361 HYDRATE IV INFUSION ADD-ON: CPT

## 2024-04-20 PROCEDURE — 93010 ELECTROCARDIOGRAM REPORT: CPT | Mod: ,,, | Performed by: INTERNAL MEDICINE

## 2024-04-20 PROCEDURE — 87502 INFLUENZA DNA AMP PROBE: CPT

## 2024-04-20 PROCEDURE — 96367 TX/PROPH/DG ADDL SEQ IV INF: CPT

## 2024-04-20 PROCEDURE — 84484 ASSAY OF TROPONIN QUANT: CPT | Performed by: EMERGENCY MEDICINE

## 2024-04-20 PROCEDURE — 99285 EMERGENCY DEPT VISIT HI MDM: CPT | Mod: 25

## 2024-04-20 PROCEDURE — 93005 ELECTROCARDIOGRAM TRACING: CPT

## 2024-04-20 PROCEDURE — 80053 COMPREHEN METABOLIC PANEL: CPT | Performed by: EMERGENCY MEDICINE

## 2024-04-20 PROCEDURE — 63600175 PHARM REV CODE 636 W HCPCS: Performed by: EMERGENCY MEDICINE

## 2024-04-20 PROCEDURE — 81000 URINALYSIS NONAUTO W/SCOPE: CPT | Performed by: EMERGENCY MEDICINE

## 2024-04-20 PROCEDURE — 87635 SARS-COV-2 COVID-19 AMP PRB: CPT | Performed by: EMERGENCY MEDICINE

## 2024-04-20 PROCEDURE — 85025 COMPLETE CBC W/AUTO DIFF WBC: CPT | Performed by: EMERGENCY MEDICINE

## 2024-04-20 PROCEDURE — 96365 THER/PROPH/DIAG IV INF INIT: CPT

## 2024-04-20 RX ORDER — IBUPROFEN 600 MG/1
600 TABLET ORAL EVERY 6 HOURS PRN
Qty: 20 TABLET | Refills: 0 | Status: SHIPPED | OUTPATIENT
Start: 2024-04-20

## 2024-04-20 RX ORDER — KETOROLAC TROMETHAMINE 30 MG/ML
15 INJECTION, SOLUTION INTRAMUSCULAR; INTRAVENOUS
Status: COMPLETED | OUTPATIENT
Start: 2024-04-20 | End: 2024-04-20

## 2024-04-20 RX ORDER — PREDNISONE 5 MG/1
5 TABLET ORAL DAILY
Qty: 7 TABLET | Refills: 0 | Status: SHIPPED | OUTPATIENT
Start: 2024-04-20 | End: 2024-04-27

## 2024-04-20 RX ORDER — DICLOFENAC SODIUM 10 MG/G
2 GEL TOPICAL 4 TIMES DAILY PRN
Qty: 200 G | Refills: 0 | Status: SHIPPED | OUTPATIENT
Start: 2024-04-20

## 2024-04-20 RX ORDER — PREDNISONE 5 MG/1
1 TABLET ORAL DAILY
COMMUNITY
Start: 2024-01-29 | End: 2024-04-20

## 2024-04-20 RX ORDER — POTASSIUM CHLORIDE 7.45 MG/ML
10 INJECTION INTRAVENOUS
Status: COMPLETED | OUTPATIENT
Start: 2024-04-20 | End: 2024-04-20

## 2024-04-20 RX ORDER — ACETAMINOPHEN 500 MG
500 TABLET ORAL EVERY 6 HOURS PRN
Qty: 30 TABLET | Refills: 0 | Status: SHIPPED | OUTPATIENT
Start: 2024-04-20

## 2024-04-20 RX ORDER — PHENAZOPYRIDINE HYDROCHLORIDE 100 MG/1
200 TABLET, FILM COATED ORAL
Status: COMPLETED | OUTPATIENT
Start: 2024-04-20 | End: 2024-04-20

## 2024-04-20 RX ORDER — MAGNESIUM SULFATE HEPTAHYDRATE 40 MG/ML
2 INJECTION, SOLUTION INTRAVENOUS ONCE
Status: COMPLETED | OUTPATIENT
Start: 2024-04-20 | End: 2024-04-20

## 2024-04-20 RX ORDER — METHOCARBAMOL 500 MG/1
1000 TABLET, FILM COATED ORAL 3 TIMES DAILY
Qty: 30 TABLET | Refills: 0 | Status: SHIPPED | OUTPATIENT
Start: 2024-04-20 | End: 2024-04-25

## 2024-04-20 RX ADMIN — PHENAZOPYRIDINE HYDROCHLORIDE 200 MG: 100 TABLET ORAL at 01:04

## 2024-04-20 RX ADMIN — POTASSIUM BICARBONATE 25 MEQ: 977.5 TABLET, EFFERVESCENT ORAL at 02:04

## 2024-04-20 RX ADMIN — KETOROLAC TROMETHAMINE 15 MG: 30 INJECTION, SOLUTION INTRAMUSCULAR at 01:04

## 2024-04-20 RX ADMIN — POTASSIUM CHLORIDE 10 MEQ: 7.46 INJECTION, SOLUTION INTRAVENOUS at 02:04

## 2024-04-20 RX ADMIN — SODIUM CHLORIDE 1000 ML: 9 INJECTION, SOLUTION INTRAVENOUS at 02:04

## 2024-04-20 RX ADMIN — MAGNESIUM SULFATE HEPTAHYDRATE 2 G: 40 INJECTION, SOLUTION INTRAVENOUS at 04:04

## 2024-04-20 RX ADMIN — POTASSIUM BICARBONATE 25 MEQ: 977.5 TABLET, EFFERVESCENT ORAL at 06:04

## 2024-04-20 NOTE — ED NOTES
Two patient identifiers have been checked and are correct.      Appearance: Pt awake, alert & oriented to person, place & time. Pt in no acute distress at present time. Pt appears to be in mild pain. Pt is clean and well groomed with clothes appropriately fastened.   Skin: Skin warm, dry & intact. Color consistent with ethnicity. Mucous membranes moist. No breakdown or brusing noted.   Musculoskeletal: Patient moving all extremities well, no obvious swelling or deformities noted. Reports R hip pain at baseline. Family states she needs a hip replacement. Pt ambulates with walker   Respiratory: Respirations spontaneous, even, and non-labored. Visible chest rise noted. Airway is open and patent. No accessory muscle use noted.   Neurologic: Sensation is intact. Speech is clear and appropriate. Eyes open spontaneously, behavior appropriate to situation, follows commands, facial expression symmetrical, bilateral hand grasp equal and even, purposeful motor response noted.  Cardiac: All peripheral pulses present. No Bilateral lower extremity edema. Cap refill is <3 seconds.  Abdomen: Abdomen soft, non-tender to palpation. Reporting generalized abd pain.  : Pt reports urinary frequency and burning.

## 2024-04-20 NOTE — Clinical Note
"Nichole"Alba" Reyes was seen and treated in our emergency department on 4/20/2024.  She may return to work on 04/21/2024.       If you have any questions or concerns, please don't hesitate to call.      Norma Mendez, DO"

## 2024-04-20 NOTE — ED PROVIDER NOTES
"Encounter Date: 4/20/2024    SCRIBE #1 NOTE: I, Saumya Kent, am scribing for, and in the presence of,  Norma Mendez DO. I have scribed the following portions of the note - the EKG reading. Other sections scribed: HPI, ROS, PE, MDM.       History     Chief Complaint   Patient presents with    Abdominal Pain     Pt to ED reporting abdominal pain, back pain, frequent urination, and headache. Pt reporting she was unable to sleep last night. Pt reporting she has incontinence as well. Pt also reporting hip pain, according to daughter pt needs a hip replacement.      Alba M Reyes is a 73 y.o. female with Hx of arthritis and hypertension who presents to the ED for chief complaint of severe, non-radiating headache x 1 week. Patient also reports associated body aches, sciatica pain, fatigue, reduced appetite, fever, and sore throat. Patient notes that when she eats she "does not feel well" and notes bilateral hands and shoulders heaviness/stiffness. Patient has been taking 800 mg Ibuprofen with no significant relief. Per independent historian, patient's daughter, the patient also has increased urinary incontinence that "occurs every few minutes". Denies any chills, rhinorrhea, cough, nasal congestion, chest pain, SOB, or N/V/D.     Patient has hx of arthritis and notes pain to left leg at baseline. Patient does not currently have a rheumatologist. Daughter reports patient needs hip replacement but was not a candidate. Patient reports her PCP took her off hydrocodone and she ran out of her Gabapentin Rx. Patient does not take any vitamins or supplements. Reports PSHx of cholecystectomy.       The history is provided by the patient and a relative. No  was used (Patient offered Monegasque  but declined. Patient's daughter served as .).     Review of patient's allergies indicates:  No Known Allergies  Past Medical History:   Diagnosis Date    Arthritis     Hypertension      Past " Surgical History:   Procedure Laterality Date    EPIDURAL STEROID INJECTION Left 3/22/2023    Procedure: Left L5 + S1 Transforaminal Epidural Steroid Injections;  Surgeon: Jim Hector Jr., MD;  Location: [facility];  Service: Pain Management;  Laterality: Left;  @1330  No ATC or DM    INJECTION Left 12/19/2022    Procedure: INJECTION, LEFT HIP INTRA-ARTICULAR STEROID CONTRAST DIRECT REF  confirmed;  Surgeon: Hailey Valladares MD;  [facility];  Service: Pain Management;  Laterality: Left;     No family history on file.  Social History     Tobacco Use    Smoking status: Never    Smokeless tobacco: Never   Substance Use Topics    Alcohol use: Not Currently    Drug use: Not Currently     Review of Systems   Constitutional:  Positive for appetite change (reduced), fatigue and fever.   HENT:  Positive for sore throat. Negative for congestion and rhinorrhea.    Eyes:  Negative for redness.   Respiratory:  Negative for cough and shortness of breath.    Cardiovascular:  Negative for chest pain and leg swelling.   Gastrointestinal:  Negative for abdominal pain, diarrhea, nausea and vomiting.   Genitourinary:         (+) increased urinary incontinence    Musculoskeletal:  Positive for myalgias (generalized). Negative for back pain.        (+) bilateral hands and shoulder stiffness  (+) sciatica pain    Skin:  Negative for rash.   Neurological:  Positive for headaches. Negative for syncope.   Hematological:  Does not bruise/bleed easily.   Psychiatric/Behavioral:  Negative for agitation.    All other systems reviewed and are negative.      Physical Exam     Initial Vitals [04/20/24 1206]   BP Pulse Resp Temp SpO2   (!) 142/63 109 (!) 22 99.7 °F (37.6 °C) (!) 94 %      MAP       --         Physical Exam    Nursing note and vitals reviewed.  Constitutional: She appears well-developed and well-nourished.   Patient gave consent to have physical exam performed.    HENT:   Head: Normocephalic and atraumatic.    Right Ear: External ear normal.   Left Ear: External ear normal.   Nose: Nose normal.   Mouth/Throat: Oropharynx is clear and moist.   Eyes: Conjunctivae and EOM are normal. Pupils are equal, round, and reactive to light.   Neck: Phonation normal. Neck supple.   Normal range of motion.  Cardiovascular:  Normal rate, regular rhythm, normal heart sounds and intact distal pulses.     Exam reveals no gallop and no friction rub.       No murmur heard.  Pulmonary/Chest: Effort normal and breath sounds normal. No stridor. No respiratory distress. She has no wheezes. She has no rhonchi. She has no rales. She exhibits no tenderness.   Abdominal: Abdomen is soft. Bowel sounds are normal. She exhibits no distension. There is no abdominal tenderness. There is no rigidity, no rebound and no guarding.   Musculoskeletal:         General: No tenderness or edema. Normal range of motion.      Cervical back: Normal range of motion and neck supple.      Comments: On exam, no point tenderness present to spine.      Neurological: She is alert and oriented to person, place, and time. She has normal strength. No cranial nerve deficit or sensory deficit. GCS score is 15. GCS eye subscore is 4. GCS verbal subscore is 5. GCS motor subscore is 6.   Skin: Skin is warm and dry. Capillary refill takes less than 2 seconds. No rash noted.   Psychiatric: She has a normal mood and affect. Her behavior is normal.         ED Course   Procedures  Labs Reviewed   CBC W/ AUTO DIFFERENTIAL - Abnormal; Notable for the following components:       Result Value    WBC 3.44 (*)     RBC 3.47 (*)     Hemoglobin 10.9 (*)     Hematocrit 33.1 (*)     MCH 31.4 (*)     RDW 15.0 (*)     Platelets 89 (*)     MPV 13.0 (*)     Immature Granulocytes 4.1 (*)     Gran # (ANC) 1.2 (*)     Immature Grans (Abs) 0.14 (*)     Gran % 35.4 (*)     Mono % 28.2 (*)     All other components within normal limits   COMPREHENSIVE METABOLIC PANEL - Abnormal; Notable for the following  components:    Sodium 135 (*)     Potassium 2.8 (*)     Glucose 178 (*)     Albumin 3.4 (*)     AST 42 (*)     All other components within normal limits   URINALYSIS, REFLEX TO URINE CULTURE - Abnormal; Notable for the following components:    Appearance, UA Hazy (*)     Protein, UA 1+ (*)     Ketones, UA 1+ (*)     Bilirubin (UA) 1+ (*)     Occult Blood UA 1+ (*)     Urobilinogen, UA >=8.0 (*)     All other components within normal limits    Narrative:     Specimen Source->Urine   URINALYSIS MICROSCOPIC - Abnormal; Notable for the following components:    RBC, UA 6 (*)     Hyaline Casts, UA 6 (*)     Granular Casts, UA 6 (*)     All other components within normal limits    Narrative:     Specimen Source->Urine   COMPREHENSIVE METABOLIC PANEL - Abnormal; Notable for the following components:    Potassium 3.4 (*)     Calcium 8.6 (*)     Albumin 2.9 (*)     Anion Gap 7 (*)     All other components within normal limits   LIPASE   LACTIC ACID, PLASMA   TROPONIN I   TROPONIN I   MAGNESIUM   MAGNESIUM   POCT INFLUENZA A/B MOLECULAR   SARS-COV-2 RDRP GENE   POCT STREP A MOLECULAR   ISTAT CHEM8          Imaging Results              X-Ray Chest PA And Lateral (Final result)  Result time 04/20/24 15:16:21      Final result by Kem Pillai MD (04/20/24 15:16:21)                   Impression:      No acute cardiopulmonary process.      Electronically signed by: Kem Pillai MD  Date:    04/20/2024  Time:    15:16               Narrative:    EXAMINATION:  XR CHEST PA AND LATERAL    CLINICAL HISTORY:  htn;    TECHNIQUE:  PA and lateral views of the chest were performed.    COMPARISON:  04/16/2024    FINDINGS:  There is no consolidation, effusion, or pneumothorax.  Cardiomediastinal silhouette is unremarkable.  Regional osseous structures are unremarkable.                                       CT Head Without Contrast (Final result)  Result time 04/20/24 14:44:16      Final result by Dayton Priest MD (04/20/24 14:44:16)                    Impression:      No acute intracranial pathology.      Electronically signed by: Dayton Priest  Date:    04/20/2024  Time:    14:44               Narrative:    EXAMINATION:  CT HEAD WITHOUT CONTRAST    CLINICAL HISTORY:  Headache, new or worsening (Age >= 50y);    TECHNIQUE:  Low dose axial CT images obtained throughout the head without intravenous contrast. Sagittal and coronal reconstructions were performed.    COMPARISON:  None.    FINDINGS:  Intracranial compartment:    Ventricles and sulci are normal in size for age without evidence of hydrocephalus. No extra-axial blood or fluid collections.    The brain parenchyma appears normal. No parenchymal mass, hemorrhage, edema or major vascular distribution infarct.    Skull/extracranial contents (limited evaluation): No fracture. Mastoid air cells and paranasal sinuses are essentially clear.                                       Medications   potassium bicarbonate disintegrating tablet 25 mEq (has no administration in time range)   ketorolac injection 15 mg (15 mg Intravenous Given 4/20/24 1304)   phenazopyridine tablet 200 mg (200 mg Oral Given 4/20/24 1304)   potassium bicarbonate disintegrating tablet 25 mEq (25 mEq Oral Given 4/20/24 1444)   magnesium sulfate 2g in water 50mL IVPB (premix) (0 g Intravenous Stopped 4/20/24 1720)   potassium chloride 10 mEq in 100 mL IVPB (0 mEq Intravenous Stopped 4/20/24 1607)   sodium chloride 0.9% bolus 1,000 mL 1,000 mL (0 mLs Intravenous Stopped 4/20/24 1802)     Medical Decision Making  Amount and/or Complexity of Data Reviewed  Independent Historian:      Details: Relative: Daughter. See HPI.   Labs: ordered.  Radiology: ordered.    Risk  Prescription drug management.    Medical Decision Making:    This is an evaluation of a 73 y.o. female that presents to the Emergency Department for   Chief Complaint   Patient presents with    Abdominal Pain     Pt to ED reporting abdominal pain, back pain, frequent urination,  and headache. Pt reporting she was unable to sleep last night. Pt reporting she has incontinence as well. Pt also reporting hip pain, according to daughter pt needs a hip replacement.        Independent historian: Relative: Daughter.    The patient is a non-toxic and well appearing patient. On physical exam, patient appears well hydrated with moist mucus membranes. Breath sounds are clear and equal bilaterally with no adventitious breath sounds, tachypnea or respiratory distress. Regular rate and rhythm. No murmurs. Abdomen soft and non tender. Patient is tolerating PO without difficulty. Physical exam otherwise as above.     I have reviewed vital signs and nursing notes.   Vital Signs Are Reassuring.     Based on the patient's symptoms, I am considering and evaluating for the following differential diagnoses: Viral Illness, Influenza A/B, COVID-19, Headache, Intractable Headache, UTI, Electrolyte Imbalance, Dehydration, Anemia, Thrombocytopenia, Chronic Pain.     Consider hospitalization for:  Intractable headache    Patient is agreeable to transfer and admission to [facility]    ED Course:Treatment in the ED included Physical Exam and medications given in ED  Medications   potassium bicarbonate disintegrating tablet 25 mEq (has no administration in time range)   ketorolac injection 15 mg (15 mg Intravenous Given 4/20/24 1304)   phenazopyridine tablet 200 mg (200 mg Oral Given 4/20/24 1304)   potassium bicarbonate disintegrating tablet 25 mEq (25 mEq Oral Given 4/20/24 1444)   magnesium sulfate 2g in water 50mL IVPB (premix) (0 g Intravenous Stopped 4/20/24 1720)   potassium chloride 10 mEq in 100 mL IVPB (0 mEq Intravenous Stopped 4/20/24 1607)   sodium chloride 0.9% bolus 1,000 mL 1,000 mL (0 mLs Intravenous Stopped 4/20/24 1802)   .   Patient reports feeling better after treatment in the ER.       External Data/Documents Reviewed: Previous medical records and vital signs reviewed, see HPI and Physical  exam.   Labs: ordered and reviewed.  Potassium is very low today at 2.8..  Radiology: ordered as indicated and reviewed.  No pneumothorax  ECG/medicine tests: ordered and independent interpretation performed by Dr. Norma Mendez DO. Decision-making details documented in ED Course.   Cardiac monitor placed for headache. Monitor shows Normal Sinus Rhythm with  rate of 76. Interpreted by Dr. Norma Mendez DO.    Risk  Diagnosis or treatment significantly limited by the following social determinants of health: Body mass index is 23.78 kg/m².     In shared decision making with the patient and family, we discussed treatment, prescriptions, labs, and imaging results.  Referral placed for Internal Medicine/primary care physician and rheumatology.  Discharge home with   ED Prescriptions       Medication Sig Dispense Start Date End Date Auth. Provider    methocarbamoL (ROBAXIN) 500 MG Tab Take 2 tablets (1,000 mg total) by mouth 3 (three) times daily. for 5 days 30 tablet 4/20/2024 4/25/2024 Norma Mendez DO    acetaminophen (TYLENOL) 500 MG tablet Take 1 tablet (500 mg total) by mouth every 6 (six) hours as needed for Pain (As needed for mild-to-moderate pain). 30 tablet 4/20/2024 -- Norma Mendez DO    diclofenac sodium (VOLTAREN) 1 % Gel Apply 2 g topically 4 (four) times daily as needed (Apply to painful area 4 times a day as needed for pain). 200 g 4/20/2024 -- Norma Mendez DO    ibuprofen (ADVIL,MOTRIN) 600 MG tablet Take 1 tablet (600 mg total) by mouth every 6 (six) hours as needed for Pain (Take with food as needed for mild-to-moderate pain). 20 tablet 4/20/2024 -- Norma Mendez DO    predniSONE (DELTASONE) 5 MG tablet Take 1 tablet (5 mg total) by mouth once daily. for 7 days 7 tablet 4/20/2024 4/27/2024 Norma Mendez DO          Fill and take prescriptions as directed.  Return to the ED if symptoms worsen or do not resolve.   Answered questions and discussed discharge plan.    Patient feels better and is ready for  discharge.  Follow up with PCP/specialist in 1 day      At time of discharge patient is awake alert oriented x4 speaking clearly in full sentences and moving all 4 extremities.     The following labs and imaging were reviewed:      Admission on 04/20/2024   Component Date Value Ref Range Status    WBC 04/20/2024 3.44 (L)  3.90 - 12.70 K/uL Final    RBC 04/20/2024 3.47 (L)  4.00 - 5.40 M/uL Final    Hemoglobin 04/20/2024 10.9 (L)  12.0 - 16.0 g/dL Final    Hematocrit 04/20/2024 33.1 (L)  37.0 - 48.5 % Final    MCV 04/20/2024 95  82 - 98 fL Final    MCH 04/20/2024 31.4 (H)  27.0 - 31.0 pg Final    MCHC 04/20/2024 32.9  32.0 - 36.0 g/dL Final    RDW 04/20/2024 15.0 (H)  11.5 - 14.5 % Final    Platelets 04/20/2024 89 (L)  150 - 450 K/uL Final    MPV 04/20/2024 13.0 (H)  9.2 - 12.9 fL Final    Immature Granulocytes 04/20/2024 4.1 (H)  0.0 - 0.5 % Final    Gran # (ANC) 04/20/2024 1.2 (L)  1.8 - 7.7 K/uL Final    Immature Grans (Abs) 04/20/2024 0.14 (H)  0.00 - 0.04 K/uL Final    Comment: Mild elevation in immature granulocytes is non specific and   can be seen in a variety of conditions including stress response,   acute inflammation, trauma and pregnancy. Correlation with other   laboratory and clinical findings is essential.      Lymph # 04/20/2024 1.1  1.0 - 4.8 K/uL Final    Mono # 04/20/2024 1.0  0.3 - 1.0 K/uL Final    Eos # 04/20/2024 0.0  0.0 - 0.5 K/uL Final    Baso # 04/20/2024 0.02  0.00 - 0.20 K/uL Final    nRBC 04/20/2024 0  0 /100 WBC Final    Gran % 04/20/2024 35.4 (L)  38.0 - 73.0 % Final    Lymph % 04/20/2024 31.1  18.0 - 48.0 % Final    Mono % 04/20/2024 28.2 (H)  4.0 - 15.0 % Final    Eosinophil % 04/20/2024 0.6  0.0 - 8.0 % Final    Basophil % 04/20/2024 0.6  0.0 - 1.9 % Final    Differential Method 04/20/2024 Automated   Final    Sodium 04/20/2024 135 (L)  136 - 145 mmol/L Final    Potassium 04/20/2024 2.8 (L)  3.5 - 5.1 mmol/L Final    Chloride 04/20/2024 101  95 - 110 mmol/L Final    CO2 04/20/2024  25  23 - 29 mmol/L Final    Glucose 04/20/2024 178 (H)  70 - 110 mg/dL Final    BUN 04/20/2024 16  8 - 23 mg/dL Final    Creatinine 04/20/2024 0.7  0.5 - 1.4 mg/dL Final    Calcium 04/20/2024 9.5  8.7 - 10.5 mg/dL Final    Total Protein 04/20/2024 7.3  6.0 - 8.4 g/dL Final    Albumin 04/20/2024 3.4 (L)  3.5 - 5.2 g/dL Final    Total Bilirubin 04/20/2024 0.8  0.1 - 1.0 mg/dL Final    Comment: For infants and newborns, interpretation of results should be based  on gestational age, weight and in agreement with clinical  observations.    Premature Infant recommended reference ranges:  Up to 24 hours.............<8.0 mg/dL  Up to 48 hours............<12.0 mg/dL  3-5 days..................<15.0 mg/dL  6-29 days.................<15.0 mg/dL      Alkaline Phosphatase 04/20/2024 85  55 - 135 U/L Final    AST 04/20/2024 42 (H)  10 - 40 U/L Final    ALT 04/20/2024 25  10 - 44 U/L Final    eGFR 04/20/2024 >60  >60 mL/min/1.73 m^2 Final    Anion Gap 04/20/2024 9  8 - 16 mmol/L Final    Lipase 04/20/2024 17  4 - 60 U/L Final    Specimen UA 04/20/2024 Urine, Clean Catch   Final    Color, UA 04/20/2024 Yellow  Yellow, Straw, Teena Final    Appearance, UA 04/20/2024 Hazy (A)  Clear Final    pH, UA 04/20/2024 6.0  5.0 - 8.0 Final    Specific Gravity, UA 04/20/2024 1.025  1.005 - 1.030 Final    Protein, UA 04/20/2024 1+ (A)  Negative Final    Comment: Recommend a 24 hour urine protein or a urine   protein/creatinine ratio if globulin induced proteinuria is  clinically suspected.      Glucose, UA 04/20/2024 Negative  Negative Final    Ketones, UA 04/20/2024 1+ (A)  Negative Final    Bilirubin (UA) 04/20/2024 1+ (A)  Negative Final    Comment: Positive urine bilirubin is not confirmed. Correlate with   serum bilirubin and clinical presentation.      Occult Blood UA 04/20/2024 1+ (A)  Negative Final    Nitrite, UA 04/20/2024 Negative  Negative Final    Urobilinogen, UA 04/20/2024 >=8.0 (A)  <2.0 EU/dL Final    Leukocytes, UA 04/20/2024  Negative  Negative Final    Lactate (Lactic Acid) 04/20/2024 1.2  0.5 - 2.2 mmol/L Final    Comment: Falsely low lactic acid results can be found in samples   containing >=13.0 mg/dL total bilirubin and/or >=3.5 mg/dL   direct bilirubin.      RBC, UA 04/20/2024 6 (H)  0 - 4 /hpf Final    WBC, UA 04/20/2024 5  0 - 5 /hpf Final    Bacteria 04/20/2024 None  None-Occ /hpf Final    Squam Epithel, UA 04/20/2024 2  /hpf Final    Hyaline Casts, UA 04/20/2024 6 (A)  0-1/lpf /lpf Final    Granular Casts, UA 04/20/2024 6 (A)  None /lpf Final    Microscopic Comment 04/20/2024 SEE COMMENT   Final    Comment: Other formed elements not mentioned in the report are not   present in the microscopic examination.       Troponin I 04/20/2024 0.008  0.000 - 0.026 ng/mL Final    Comment: The reference interval for Troponin I represents the 99th percentile   cutoff   for our facility and is consistent with 3rd generation assay   performance.      POC Molecular Influenza A Ag 04/20/2024 Negative  Negative Final    POC Molecular Influenza B Ag 04/20/2024 Negative  Negative Final     Acceptable 04/20/2024 Yes   Final    POC Rapid COVID 04/20/2024 Negative  Negative Final     Acceptable 04/20/2024 Yes   Final    Molecular Strep A, POC 04/20/2024 Negative  Negative Final     Acceptable 04/20/2024 Yes   Final    Magnesium 04/20/2024 1.6  1.6 - 2.6 mg/dL Final    Sodium 04/20/2024 137  136 - 145 mmol/L Final    Potassium 04/20/2024 3.4 (L)  3.5 - 5.1 mmol/L Final    Chloride 04/20/2024 104  95 - 110 mmol/L Final    CO2 04/20/2024 26  23 - 29 mmol/L Final    Glucose 04/20/2024 92  70 - 110 mg/dL Final    BUN 04/20/2024 16  8 - 23 mg/dL Final    Creatinine 04/20/2024 0.6  0.5 - 1.4 mg/dL Final    Calcium 04/20/2024 8.6 (L)  8.7 - 10.5 mg/dL Final    Total Protein 04/20/2024 6.2  6.0 - 8.4 g/dL Final    Albumin 04/20/2024 2.9 (L)  3.5 - 5.2 g/dL Final    Total Bilirubin 04/20/2024 0.5  0.1 - 1.0 mg/dL  Final    Comment: For infants and newborns, interpretation of results should be based  on gestational age, weight and in agreement with clinical  observations.    Premature Infant recommended reference ranges:  Up to 24 hours.............<8.0 mg/dL  Up to 48 hours............<12.0 mg/dL  3-5 days..................<15.0 mg/dL  6-29 days.................<15.0 mg/dL      Alkaline Phosphatase 04/20/2024 76  55 - 135 U/L Final    AST 04/20/2024 36  10 - 40 U/L Final    ALT 04/20/2024 22  10 - 44 U/L Final    eGFR 04/20/2024 >60  >60 mL/min/1.73 m^2 Final    Anion Gap 04/20/2024 7 (L)  8 - 16 mmol/L Final        Imaging Results              X-Ray Chest PA And Lateral (Final result)  Result time 04/20/24 15:16:21      Final result by Kem Pillai MD (04/20/24 15:16:21)                   Impression:      No acute cardiopulmonary process.      Electronically signed by: Kem Pillai MD  Date:    04/20/2024  Time:    15:16               Narrative:    EXAMINATION:  XR CHEST PA AND LATERAL    CLINICAL HISTORY:  htn;    TECHNIQUE:  PA and lateral views of the chest were performed.    COMPARISON:  04/16/2024    FINDINGS:  There is no consolidation, effusion, or pneumothorax.  Cardiomediastinal silhouette is unremarkable.  Regional osseous structures are unremarkable.                                       CT Head Without Contrast (Final result)  Result time 04/20/24 14:44:16      Final result by Dayton Priest MD (04/20/24 14:44:16)                   Impression:      No acute intracranial pathology.      Electronically signed by: Dayton Priest  Date:    04/20/2024  Time:    14:44               Narrative:    EXAMINATION:  CT HEAD WITHOUT CONTRAST    CLINICAL HISTORY:  Headache, new or worsening (Age >= 50y);    TECHNIQUE:  Low dose axial CT images obtained throughout the head without intravenous contrast. Sagittal and coronal reconstructions were performed.    COMPARISON:  None.    FINDINGS:  Intracranial  compartment:    Ventricles and sulci are normal in size for age without evidence of hydrocephalus. No extra-axial blood or fluid collections.    The brain parenchyma appears normal. No parenchymal mass, hemorrhage, edema or major vascular distribution infarct.    Skull/extracranial contents (limited evaluation): No fracture. Mastoid air cells and paranasal sinuses are essentially clear.                                           Scribe Attestation:   Scribe #1: I performed the above scribed service and the documentation accurately describes the services I performed. I attest to the accuracy of the note.        ED Course as of 04/20/24 1841   Sat Apr 20, 2024   1549 EKG independently interpreted by Dr. Mendez reads: No STEMI. Rate of 102. Sinus Tachycardia. Leftward Axis. Abnormal EKG. QTc normal at 456. When compared to prior EKG dated 12/28/16 rate increased by 40 bpm.   .    [CF]      ED Course User Index  [CF] Saumya Kent, Dr. Norma Mendez, personally performed the services described in this documentation. This document was produced by a scribe under my direction and in my presence. All medical record entries made by the scribe were at my direction and in my presence.  I have reviewed the chart and agree that the record reflects my personal performance and is accurate and complete. Norma Mendez, DO.     04/20/2024 6:37 PM    Clinical Impression:  Final diagnoses:  [I10] HTN (hypertension)  [E87.6] Hypokalemia (Primary)  [R51.9] Acute nonintractable headache, unspecified headache type  [M06.9] Rheumatoid arthritis, involving unspecified site, unspecified whether rheumatoid factor present          ED Disposition Condition    Discharge Stable          ED Prescriptions       Medication Sig Dispense Start Date End Date Auth. Provider    methocarbamoL (ROBAXIN) 500 MG Tab Take 2 tablets (1,000 mg total) by mouth 3 (three) times daily. for 5 days 30 tablet 4/20/2024 4/25/2024  Norma Mendez DO    acetaminophen (TYLENOL) 500 MG tablet Take 1 tablet (500 mg total) by mouth every 6 (six) hours as needed for Pain (As needed for mild-to-moderate pain). 30 tablet 4/20/2024 -- Norma Mendez DO    diclofenac sodium (VOLTAREN) 1 % Gel Apply 2 g topically 4 (four) times daily as needed (Apply to painful area 4 times a day as needed for pain). 200 g 4/20/2024 -- Norma Mendez DO    ibuprofen (ADVIL,MOTRIN) 600 MG tablet Take 1 tablet (600 mg total) by mouth every 6 (six) hours as needed for Pain (Take with food as needed for mild-to-moderate pain). 20 tablet 4/20/2024 -- Norma Mendez DO    predniSONE (DELTASONE) 5 MG tablet Take 1 tablet (5 mg total) by mouth once daily. for 7 days 7 tablet 4/20/2024 4/27/2024 Norma Mendez DO          Follow-up Information    None          Norma eMndez DO  04/20/24 0078

## 2024-04-22 LAB
OHS QRS DURATION: 94 MS
OHS QTC CALCULATION: 456 MS

## 2024-04-23 ENCOUNTER — OFFICE VISIT (OUTPATIENT)
Dept: FAMILY MEDICINE | Facility: CLINIC | Age: 74
End: 2024-04-23
Payer: MEDICARE

## 2024-04-23 VITALS
BODY MASS INDEX: 25.59 KG/M2 | SYSTOLIC BLOOD PRESSURE: 122 MMHG | HEART RATE: 86 BPM | WEIGHT: 135.56 LBS | HEIGHT: 61 IN | DIASTOLIC BLOOD PRESSURE: 70 MMHG | TEMPERATURE: 99 F | OXYGEN SATURATION: 96 %

## 2024-04-23 DIAGNOSIS — I10 ESSENTIAL HYPERTENSION: ICD-10-CM

## 2024-04-23 DIAGNOSIS — R51.9 NONINTRACTABLE HEADACHE, UNSPECIFIED CHRONICITY PATTERN, UNSPECIFIED HEADACHE TYPE: Primary | ICD-10-CM

## 2024-04-23 DIAGNOSIS — D69.6 THROMBOCYTOPENIA, UNSPECIFIED: ICD-10-CM

## 2024-04-23 DIAGNOSIS — D75.9 CLONAL CYTOPENIA OF UNDETERMINED SIGNIFICANCE (CCUS): ICD-10-CM

## 2024-04-23 DIAGNOSIS — M06.9 RHEUMATOID ARTHRITIS, INVOLVING UNSPECIFIED SITE, UNSPECIFIED WHETHER RHEUMATOID FACTOR PRESENT: ICD-10-CM

## 2024-04-23 DIAGNOSIS — M54.16 LUMBAR RADICULOPATHY: ICD-10-CM

## 2024-04-23 DIAGNOSIS — E87.6 HYPOKALEMIA: ICD-10-CM

## 2024-04-23 DIAGNOSIS — E83.51 HYPOCALCEMIA: ICD-10-CM

## 2024-04-23 DIAGNOSIS — D61.818 PANCYTOPENIA: ICD-10-CM

## 2024-04-23 PROCEDURE — 3288F FALL RISK ASSESSMENT DOCD: CPT | Mod: CPTII,S$GLB,, | Performed by: INTERNAL MEDICINE

## 2024-04-23 PROCEDURE — 99204 OFFICE O/P NEW MOD 45 MIN: CPT | Mod: S$GLB,,, | Performed by: INTERNAL MEDICINE

## 2024-04-23 PROCEDURE — 3078F DIAST BP <80 MM HG: CPT | Mod: CPTII,S$GLB,, | Performed by: INTERNAL MEDICINE

## 2024-04-23 PROCEDURE — 99999 PR PBB SHADOW E&M-EST. PATIENT-LVL IV: CPT | Mod: PBBFAC,,, | Performed by: INTERNAL MEDICINE

## 2024-04-23 PROCEDURE — 1125F AMNT PAIN NOTED PAIN PRSNT: CPT | Mod: CPTII,S$GLB,, | Performed by: INTERNAL MEDICINE

## 2024-04-23 PROCEDURE — 1101F PT FALLS ASSESS-DOCD LE1/YR: CPT | Mod: CPTII,S$GLB,, | Performed by: INTERNAL MEDICINE

## 2024-04-23 PROCEDURE — 3074F SYST BP LT 130 MM HG: CPT | Mod: CPTII,S$GLB,, | Performed by: INTERNAL MEDICINE

## 2024-04-23 PROCEDURE — 1159F MED LIST DOCD IN RCRD: CPT | Mod: CPTII,S$GLB,, | Performed by: INTERNAL MEDICINE

## 2024-04-23 PROCEDURE — 3008F BODY MASS INDEX DOCD: CPT | Mod: CPTII,S$GLB,, | Performed by: INTERNAL MEDICINE

## 2024-04-23 RX ORDER — BACLOFEN 5 MG/1
TABLET ORAL
COMMUNITY

## 2024-04-23 RX ORDER — DULOXETIN HYDROCHLORIDE 20 MG/1
40 CAPSULE, DELAYED RELEASE ORAL DAILY
Qty: 60 CAPSULE | Refills: 0 | Status: SHIPPED | OUTPATIENT
Start: 2024-04-23 | End: 2025-04-23

## 2024-04-23 RX ORDER — GABAPENTIN 300 MG/1
600 CAPSULE ORAL NIGHTLY
Qty: 60 CAPSULE | Refills: 11 | Status: SHIPPED | OUTPATIENT
Start: 2024-04-23 | End: 2025-04-23

## 2024-04-23 RX ORDER — PENICILLIN V POTASSIUM 500 MG/1
500 TABLET, FILM COATED ORAL EVERY 6 HOURS
COMMUNITY
Start: 2024-01-14

## 2024-04-23 NOTE — PROGRESS NOTES
Chief Complaint  Headache    Patient was examined with assistance of a     HPI  Alba M Reyes is a new patient to me today who is a 73 y.o. female w/ RA, OA, sciatica, & HTN who presents for f/u of recent ED visit. Pt initially went to the ED on 4/16 and 4/20 d/t HA and pain all over her body d/t arthritis. Flu/COVID swaps were negative, but was presumed to have a viral syndrome d/t having fever, cough, sore throat, myalgia, malaise, chills, reduced appetite. Labs drawn during ED visit revealed hypokalemia (drop from 3.4 to 2.8 within 4 days since last ED visit).  CC: headache  Onset: 2 weeks ago; gradual   Pt thinks HA could be d/t not taking gabapentin, which has been prescribed for the past 3 years for her arthritis pain. However, she recently ran out of her medication.   Gabapentin - takes 2 at night to help her sleep; otherwise she can't sleep.  Pt is coughing occasionally; still recovering from viral syndrome.   Pt reports chills   Location: B/L; forehead, temples, front and back of head  Duration of HA episode: 8am to 10pm  Alleviated by: heat pack, 1 tylenol & 1 ibuprofen @ 8AM; prednisone 5mg daily @1pm all 3 of which were started after pt's ED visit on Saturday  Aggravated by:   Radiates: no; no pain in neck, sinus, ears, eyes  Timing: constant  Severity: Pain 8-10/10  Any other assoc. S/sx (N/V, photophobia, preceding aura, prodrome): N  Tight posterior neck muscles? N   Pericranial tenderness?: Y      Red flags:   pattern change: N  progressively worse: N  Papilledema: --  Juice syndrome: N  Hx of cancer: N -- but pt is not UTD on cancer screening  HIV or lyme: N  AMS: N  Analgesics refractory: N  Neurological deficits: N  Temporal artery tenderness: N  Triggered by cough, exertion, or sexual intercourse: N  Onset > 51 y/o: N; pt has a hx of Has and used to take Motrin (2 in the AM 2 in afternoon 2 in PM) to relieve the pain   Onset of worst HA (thunderclap) N  Meningeal signs (fever,  "AMS, meningismus: neck stiffness/rigidity, photophobia, HA): N  Mid-dilated pupil (AACG): N  Systemic S/sx (fever, myalgia, malaise): N  Seizures: no     Other complaints: hands feel heavy, cold, frozen   - rheumatologist started pt on methotrexate & plaquinol; however, pt reports she has stopped taking it d/t those medications causing her blood to be "bad"  - Pt presents w/ walker.       PAST MEDICAL HISTORY:  Past Medical History:   Diagnosis Date    Arthritis     Hypertension        PAST SURGICAL HISTORY:  Past Surgical History:   Procedure Laterality Date    EPIDURAL STEROID INJECTION Left 3/22/2023    Procedure: Left L5 + S1 Transforaminal Epidural Steroid Injections;  Surgeon: Jim Hector Jr., MD;  Location: [facility];  Service: Pain Management;  Laterality: Left;  @1330  No ATC or DM    INJECTION Left 12/19/2022    Procedure: INJECTION, LEFT HIP INTRA-ARTICULAR STEROID CONTRAST DIRECT REF  confirmed;  Surgeon: Hailey Valladares MD;  [facility];  Service: Pain Management;  Laterality: Left;       SOCIAL HISTORY:  Social History     Socioeconomic History    Marital status:    Tobacco Use    Smoking status: Never    Smokeless tobacco: Never   Substance and Sexual Activity    Alcohol use: Not Currently    Drug use: Not Currently    Sexual activity: Not Currently       FAMILY HISTORY:  No family history on file.    ALLERGIES AND MEDICATIONS: updated and reviewed.    Pt had not been taking the duloxetine on her medication list    Review of patient's allergies indicates:  No Known Allergies  Current Outpatient Medications   Medication Sig Dispense Refill    acetaminophen (TYLENOL) 500 MG tablet Take 1 tablet (500 mg total) by mouth every 6 (six) hours as needed for Pain (As needed for mild-to-moderate pain). 30 tablet 0    baclofen (LIORESAL) 5 mg Tab tablet Take 1 tablet twice a day by oral route as needed for 20 days.      cetirizine (ZYRTEC) 10 MG tablet Take 10 mg by mouth.      " diclofenac sodium (VOLTAREN) 1 % Gel Apply 2 g topically 4 (four) times daily as needed (Apply to painful area 4 times a day as needed for pain). 200 g 0    ibuprofen (ADVIL,MOTRIN) 600 MG tablet Take 1 tablet (600 mg total) by mouth every 6 (six) hours as needed for Pain (Take with food as needed for mild-to-moderate pain). 20 tablet 0    lisinopriL (PRINIVIL,ZESTRIL) 20 MG tablet lisinopril Take No date recorded No form recorded No frequency recorded No route recorded No set duration recorded No set duration amount recorded active No dosage strength recorded No dosage strength units of measure recorded      lovastatin (MEVACOR) 10 MG tablet lovastatin Take No date recorded No form recorded No frequency recorded No route recorded No set duration recorded No set duration amount recorded active No dosage strength recorded No dosage strength units of measure recorded      memantine (NAMENDA) 7 mg CSpX Namenda Take No date recorded No form recorded No frequency recorded No route recorded No set duration recorded No set duration amount recorded active No dosage strength recorded No dosage strength units of measure recorded      methocarbamoL (ROBAXIN) 500 MG Tab Take 2 tablets (1,000 mg total) by mouth 3 (three) times daily. for 5 days 30 tablet 0    penicillin v potassium (VEETID) 500 MG tablet Take 500 mg by mouth every 6 (six) hours.      predniSONE (DELTASONE) 5 MG tablet Take 1 tablet (5 mg total) by mouth once daily. for 7 days 7 tablet 0    triamcinolone acetonide 0.1% (KENALOG) 0.1 % cream APPLY CREAM EXTERNALLY TO AFFECTED AREA OF LEGS TWICE DAILY AS NEEDED FOR FLARES      DULoxetine (CYMBALTA) 20 MG capsule Take 2 capsules (40 mg total) by mouth once daily. 60 capsule 0    folic acid (FOLVITE) 1 MG tablet Take 1 tablet (1 mg total) by mouth once daily. 30 tablet 4    gabapentin (NEURONTIN) 300 MG capsule Take 2 capsules (600 mg total) by mouth every evening. 60 capsule 11    methotrexate 25 mg/mL injection  "Inject 1 mL (25 mg total) into the skin every 7 days. (Patient not taking: Reported on 4/23/2024) 4 mL 11     No current facility-administered medications for this visit.         ROS  Review of Systems   Constitutional:  Positive for chills. Negative for fatigue and fever.   Respiratory:  Positive for cough. Negative for apnea, choking, chest tightness, shortness of breath, wheezing and stridor.      Physical Exam  Vitals:    04/23/24 0936   BP: 122/70   Pulse: 86   Temp: 99.3 °F (37.4 °C)   TempSrc: Oral   SpO2: 96%   Weight: 61.5 kg (135 lb 9.3 oz)   Height: 5' 1" (1.549 m)    There is no height or weight on file to calculate BMI.          Physical Exam  HENT:      Right Ear: Hearing, tympanic membrane, ear canal and external ear normal.      Left Ear: Hearing, tympanic membrane, ear canal and external ear normal.      Ears:      Comments: Some fluid in L ear     Mouth/Throat:      Lips: No lesions.      Mouth: Mucous membranes are moist.      Tongue: No lesions. Tongue does not deviate from midline.      Palate: No mass and lesions.      Pharynx: Oropharynx is clear. Uvula midline. Posterior oropharyngeal erythema present. No oropharyngeal exudate or uvula swelling.      Tonsils: No tonsillar exudate or tonsillar abscesses.   Neck:      Comments: R anterior cervical region tenderness  No lymphadenopathy nor swollen glands    Health Maintenance         Date Due Completion Date    Shingles Vaccine (1 of 2) Never done ---    Colorectal Cancer Screening Never done ---    RSV Vaccine (Age 60+ and Pregnant patients) (1 - 1-dose 60+ series) Never done ---    COVID-19 Vaccine (4 - 2023-24 season) 09/01/2023 1/7/2022    DEXA Scan 03/14/2024 3/14/2022    Mammogram 10/12/2024 10/12/2023    Lipid Panel 04/08/2026 4/8/2021    TETANUS VACCINE 04/11/2033 4/11/2023              Assessment and Plan: Mrs. Alba Reyes is a 72 y/o F w/ OA, RA, HTN, sciatica who presents d/t HA and f/u for recent ED visit.  From my assessment, pt " "appears stable and is still recovering from a viral illness, which may be exacerbating her HA. HA is also likely exacerbated by her RA-- she has not been taking her Cymbalta/Duloxetine. Labs to r/o other causes for the HA have been ordered. Pt will be restarting duloxetine and continue taking gabapentin. Referrals were also made to Pain Management and Hematology for f/u on her other existing conditions. Pt is scheduled for f/u in 3 mo.     Headache  - Red flags are not present  - r/o life/sight-threatening conditions: stroke, carotid dissection, cerebral venous thrombosis, hemorrhage, HTNive encephalopathy, space-occupying brain lesion, CNS infxn (e.g. meningitis), temporal arteritis, AACG, benign intracranial HTN  - noncon head CT done in ED last week showed no hemorrhage nor lesions.   - order CBC, CMP, ESR/CRP, vitamin D level, PTH  - f/u w/ Dr. France in 3 months  - schedule eye exam   - advised pt to limit ibuprofen dose to 800 mg a day to prevent rebound HA    RA  - restart and increase frequency of duloxetine 20mg from once a day to BID.  - referral to pain management  - pt will continue seeing Dr. Moreno for rheumatology-- next appointment scheduled for September of this year.  - pt had a telephone encounter w/ Dr. Moreno on 4/9/24 who noted: "gabapentin filled. We can also put her on list to schedule if any cancellation."   - otherwise, pt has an appointment w/ Dr. Gan next month at the [facility] clinic.  - some confusion regarding gabapentin prescription/refill-- notes showing that Dr. Moreno sent refills after telephone encounter w/ pt 2 wks ago; however, pt requested refill for gabapentin during today's visit.    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    I spent 45 minutes reviewing the patient's chart, talking to family/caregiver(s), coordinating care with other providers/specialists and time spent on documentation     Gideon France MD

## 2024-04-24 DIAGNOSIS — Z78.0 MENOPAUSE: ICD-10-CM

## 2024-04-25 PROBLEM — D69.6 THROMBOCYTOPENIA, UNSPECIFIED: Status: ACTIVE | Noted: 2024-04-25

## 2024-04-25 PROBLEM — D61.818 PANCYTOPENIA: Status: ACTIVE | Noted: 2024-04-25

## 2024-04-29 ENCOUNTER — PATIENT MESSAGE (OUTPATIENT)
Dept: ADMINISTRATIVE | Facility: HOSPITAL | Age: 74
End: 2024-04-29
Payer: MEDICARE

## 2024-05-07 ENCOUNTER — OFFICE VISIT (OUTPATIENT)
Dept: HEMATOLOGY/ONCOLOGY | Facility: CLINIC | Age: 74
End: 2024-05-07
Payer: MEDICARE

## 2024-05-07 VITALS
HEART RATE: 80 BPM | SYSTOLIC BLOOD PRESSURE: 151 MMHG | WEIGHT: 136.69 LBS | HEIGHT: 62 IN | OXYGEN SATURATION: 98 % | DIASTOLIC BLOOD PRESSURE: 73 MMHG | BODY MASS INDEX: 25.15 KG/M2

## 2024-05-07 DIAGNOSIS — Z76.89 ENCOUNTER TO ESTABLISH CARE: ICD-10-CM

## 2024-05-07 DIAGNOSIS — M06.9 RHEUMATOID ARTHRITIS, INVOLVING UNSPECIFIED SITE, UNSPECIFIED WHETHER RHEUMATOID FACTOR PRESENT: Primary | ICD-10-CM

## 2024-05-07 DIAGNOSIS — D75.9 CLONAL CYTOPENIA OF UNDETERMINED SIGNIFICANCE (CCUS): ICD-10-CM

## 2024-05-07 DIAGNOSIS — D61.818 PANCYTOPENIA: ICD-10-CM

## 2024-05-07 PROCEDURE — 3078F DIAST BP <80 MM HG: CPT | Mod: CPTII,S$GLB,, | Performed by: STUDENT IN AN ORGANIZED HEALTH CARE EDUCATION/TRAINING PROGRAM

## 2024-05-07 PROCEDURE — 99999 PR PBB SHADOW E&M-EST. PATIENT-LVL IV: CPT | Mod: PBBFAC,,, | Performed by: STUDENT IN AN ORGANIZED HEALTH CARE EDUCATION/TRAINING PROGRAM

## 2024-05-07 PROCEDURE — 3044F HG A1C LEVEL LT 7.0%: CPT | Mod: CPTII,S$GLB,, | Performed by: STUDENT IN AN ORGANIZED HEALTH CARE EDUCATION/TRAINING PROGRAM

## 2024-05-07 PROCEDURE — 3077F SYST BP >= 140 MM HG: CPT | Mod: CPTII,S$GLB,, | Performed by: STUDENT IN AN ORGANIZED HEALTH CARE EDUCATION/TRAINING PROGRAM

## 2024-05-07 PROCEDURE — 3008F BODY MASS INDEX DOCD: CPT | Mod: CPTII,S$GLB,, | Performed by: STUDENT IN AN ORGANIZED HEALTH CARE EDUCATION/TRAINING PROGRAM

## 2024-05-07 PROCEDURE — 3288F FALL RISK ASSESSMENT DOCD: CPT | Mod: CPTII,S$GLB,, | Performed by: STUDENT IN AN ORGANIZED HEALTH CARE EDUCATION/TRAINING PROGRAM

## 2024-05-07 PROCEDURE — 99204 OFFICE O/P NEW MOD 45 MIN: CPT | Mod: S$GLB,,, | Performed by: STUDENT IN AN ORGANIZED HEALTH CARE EDUCATION/TRAINING PROGRAM

## 2024-05-07 PROCEDURE — 1101F PT FALLS ASSESS-DOCD LE1/YR: CPT | Mod: CPTII,S$GLB,, | Performed by: STUDENT IN AN ORGANIZED HEALTH CARE EDUCATION/TRAINING PROGRAM

## 2024-05-07 PROCEDURE — 1126F AMNT PAIN NOTED NONE PRSNT: CPT | Mod: CPTII,S$GLB,, | Performed by: STUDENT IN AN ORGANIZED HEALTH CARE EDUCATION/TRAINING PROGRAM

## 2024-05-07 PROCEDURE — 1159F MED LIST DOCD IN RCRD: CPT | Mod: CPTII,S$GLB,, | Performed by: STUDENT IN AN ORGANIZED HEALTH CARE EDUCATION/TRAINING PROGRAM

## 2024-05-07 NOTE — PROGRESS NOTES
Hematology- Oncology Clinic Note :     5/7/2024    Chief Complaint   Patient presents with    Establish Care    Pancytopenia     Swazi trnaslator used for this encoutner    HPI  Pt is a 73 y.o. female who  has a past medical history of Arthritis and Hypertension. Who presents to establish care for pancytopenia/CCUS noted on bmbx last year.  Pt has no new issues and only complaint art time of exam is chronic joint pain.  Pt has been off RA tx/MTX since last Nov due to concerns for pancytopenia but takes ibuprofen regularly.  Labs reviewed with pt and will cut back on ibuprofen.  BMBX results reviewed and mildly hypercellular for age and NGS demonstrated CCUS but no other abnormalities seen.  Pt informed it is most likely caused by a combination of age, co morbidities and medications.  Will monitor incase CBC worsens and re marrow if concerns for MDS developing.  Pt agreeable to plan and all questions answered to her satisfaction.        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    Osteoarthritis of left hip 12/02/2022    Lumbar spondylosis 08/08/2022    Lumbar radiculopathy 08/08/2022    DDD (degenerative disc disease), lumbar 08/08/2022    Lack of coordination 03/02/2020    Posture abnormality 03/02/2020    Weakness of both hips 03/02/2020    GERD (gastroesophageal reflux disease) 04/15/2019    Osteoporosis 04/15/2019    Mixed incontinence 03/20/2019    Varicose veins of legs 11/20/2018    Incarcerated umbilical hernia 01/03/2017    Right upper quadrant pain 12/29/2016    Acute cholecystitis due to biliary calculus 12/29/2016    Hypovitaminosis D 03/27/2014    Osteopenia 03/27/2014    Rheumatoid arthritis 03/27/2014     Overview:   Removed deleted/inactive dx from new diagnosis load  Overview:   ICD10 update         Patient Active Problem List    Diagnosis  Date Noted    Pancytopenia 04/25/2024    Thrombocytopenia, unspecified 04/25/2024    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    Osteoarthritis of left hip 12/02/2022    Lumbar spondylosis 08/08/2022    Lumbar radiculopathy 08/08/2022    DDD (degenerative disc disease), lumbar 08/08/2022    Lack of coordination 03/02/2020    Posture abnormality 03/02/2020    Weakness of both hips 03/02/2020    GERD (gastroesophageal reflux disease) 04/15/2019    Osteoporosis 04/15/2019    Mixed incontinence 03/20/2019    Varicose veins of legs 11/20/2018    Incarcerated umbilical hernia 01/03/2017    Right upper quadrant pain 12/29/2016    Acute cholecystitis due to biliary calculus 12/29/2016    Hypovitaminosis D 03/27/2014    Osteopenia 03/27/2014    Rheumatoid arthritis 03/27/2014     Past Medical History:   Diagnosis Date    Arthritis     Hypertension       Past Surgical History:   Procedure Laterality Date    EPIDURAL STEROID INJECTION Left 3/22/2023    Procedure: Left L5 + S1 Transforaminal Epidural Steroid Injections;  Surgeon: Jim Hector Jr., MD;  Location: [facility];  Service: Pain Management;  Laterality: Left;  @1330  No ATC or DM    INJECTION Left 12/19/2022    Procedure: INJECTION, LEFT HIP INTRA-ARTICULAR STEROID CONTRAST DIRECT REF  confirmed;  Surgeon: Hailey Valladares MD;  [facility];  Service: Pain Management;  Laterality: Left;      (Not in a hospital admission)    Review of patient's allergies indicates:  No Known Allergies   Social History     Tobacco Use    Smoking status: Never    Smokeless tobacco: Never   Substance Use Topics    Alcohol use: Not Currently      No family history on file.     Review of Systems :  Review of Systems   Constitutional:  Negative for fever, malaise/fatigue and weight loss.   HENT:  Negative for hearing loss.    Eyes:  Negative for blurred vision and discharge.   Respiratory:  Negative for cough and shortness of breath.     Cardiovascular:  Negative for chest pain and leg swelling.   Gastrointestinal:  Negative for abdominal pain, blood in stool, constipation, diarrhea, heartburn, melena, nausea and vomiting.   Genitourinary:  Negative for dysuria.   Musculoskeletal:  Positive for joint pain and myalgias.   Skin:  Negative for itching and rash.   Neurological:  Negative for dizziness and focal weakness.   Endo/Heme/Allergies:  Does not bruise/bleed easily.   Psychiatric/Behavioral:  Negative for depression. The patient is nervous/anxious.        Physical Exam :  Wt Readings from Last 3 Encounters:   05/07/24 62 kg (136 lb 11 oz)   04/23/24 61.5 kg (135 lb 9.3 oz)   04/20/24 59 kg (130 lb)     Temp Readings from Last 3 Encounters:   04/23/24 99.3 °F (37.4 °C) (Oral)   04/20/24 98.4 °F (36.9 °C) (Oral)   04/16/24 99.1 °F (37.3 °C) (Oral)     BP Readings from Last 3 Encounters:   05/07/24 (!) 151/73   04/23/24 122/70   04/20/24 139/63     Pulse Readings from Last 3 Encounters:   05/07/24 80   04/23/24 86   04/20/24 74     Body mass index is 25 kg/m².    Physical Exam  Vitals reviewed.   Constitutional:       Comments: Uses walker   HENT:      Head: Normocephalic and atraumatic.      Nose: Nose normal.      Mouth/Throat:      Mouth: Mucous membranes are moist.   Eyes:      Pupils: Pupils are equal, round, and reactive to light.   Cardiovascular:      Rate and Rhythm: Normal rate and regular rhythm.   Pulmonary:      Effort: Pulmonary effort is normal.      Breath sounds: Normal breath sounds.   Abdominal:      General: Abdomen is flat.   Musculoskeletal:         General: Normal range of motion.      Cervical back: Normal range of motion and neck supple.   Skin:     General: Skin is warm and dry.   Neurological:      Mental Status: She is alert.   Psychiatric:         Mood and Affect: Mood normal.         Behavior: Behavior normal.           Pertinent Diagnostic studies:    BMBX 08/02/23:    Final Diagnosis    Bone marrow, right iliac  crest, aspirate smears and core biopsy, and peripheral blood smear:  - Hypercellular marrow for age (cellularity 50%) with trilineage hematopoiesis, M:E ratio 2.4:1, adequate megakaryocytes with occasional atypical forms and adequate iron stores.     Lymphocytes are a mix of T, B and NK cells with T cells predominating. T cells do not show aberrant loss of any antigens. B cells do not show evidence of monoclonality based on surface light chain expression.     CD34+ blasts are 1.0%.      Molecular Studies ([email or web address] Myeloid NGS) Report:   SUMMARY  At least one variant of strong clinical significance (Tier I) and one variant of potential clinical significance (Tier II) were detected.   Gene Variant Frequency (%) Clinical Impact   SRSF2 39 Tier I   TET2 47 Tier I   CBL 7 Tier II         See complete scanned linked reports.     ADDENDUM NOTE:  A clonal molecular abnormality is detected. 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 Clinical correlation and follow-up is recommended.        No results found for this or any previous visit (from the past 24 hour(s)).    Assessment/Plan :     Pancytopenia/CCUS  -chronic  -Prior work-up: B12, folate and iron all WNL; Path smear review with mature WBC/no concern for malignancy   -Patient has had anemia since 2014 but pancytopenia appears new since 2021 per record review  -Pt off methotrexate sicne Nov 2023 with no improvement in pancytopenia  -BM biopsy performed 8/10. Flow cytometry showing no clonal population (see above)  -Will cut back on ibuprofen use  -Pt agreeable to monitoring  -Will check labs in 3 months and RTC in 6 months for CBC and MD visit        RA  -RA followed by outside rheumatology service  -Reports she has not  had toleration issues with HCQ or methotrexate but has remained off tx since November 2023 due to concerns for cytopenias  -Pt takes ibuprofen regularly so will cut back on usage to see if improvement        Time spent on case: 45 minutes    Summary of orders placed this encounter:  Orders Placed This Encounter   Procedures    CBC W/ AUTO DIFFERENTIAL    COPPER, SERUM    ZINC    Methylmalonic Acid, Serum       Future Appointments   Date Time Provider Department Center   5/16/2024 11:00 AM Makenna Bryson MD [facility]   6/14/2024 10:30 AM Jim Hector Jr., MD [facility] -    7/24/2024  9:20 AM Gideon France MD [facility] Sveta Arshad MD   Hematology/oncology, [facility] [place]

## 2024-05-16 ENCOUNTER — OFFICE VISIT (OUTPATIENT)
Dept: RHEUMATOLOGY | Facility: CLINIC | Age: 74
End: 2024-05-16
Payer: MEDICARE

## 2024-05-16 ENCOUNTER — HOSPITAL ENCOUNTER (OUTPATIENT)
Dept: RADIOLOGY | Facility: HOSPITAL | Age: 74
Discharge: HOME | End: 2024-05-16
Attending: STUDENT IN AN ORGANIZED HEALTH CARE EDUCATION/TRAINING PROGRAM
Payer: MEDICARE

## 2024-05-16 VITALS
BODY MASS INDEX: 24.87 KG/M2 | HEART RATE: 74 BPM | DIASTOLIC BLOOD PRESSURE: 74 MMHG | WEIGHT: 135.13 LBS | HEIGHT: 62 IN | SYSTOLIC BLOOD PRESSURE: 132 MMHG

## 2024-05-16 DIAGNOSIS — D61.818 PANCYTOPENIA: ICD-10-CM

## 2024-05-16 DIAGNOSIS — M05.79 RHEUMATOID ARTHRITIS INVOLVING MULTIPLE SITES WITH POSITIVE RHEUMATOID FACTOR: ICD-10-CM

## 2024-05-16 DIAGNOSIS — M85.80 OSTEOPENIA, UNSPECIFIED LOCATION: ICD-10-CM

## 2024-05-16 DIAGNOSIS — M16.12 OSTEOARTHRITIS OF LEFT HIP, UNSPECIFIED OSTEOARTHRITIS TYPE: ICD-10-CM

## 2024-05-16 DIAGNOSIS — M54.16 LUMBAR RADICULOPATHY: ICD-10-CM

## 2024-05-16 DIAGNOSIS — M05.79 RHEUMATOID ARTHRITIS INVOLVING MULTIPLE SITES WITH POSITIVE RHEUMATOID FACTOR: Primary | ICD-10-CM

## 2024-05-16 DIAGNOSIS — M47.816 LUMBAR SPONDYLOSIS: ICD-10-CM

## 2024-05-16 PROCEDURE — 77077 JOINT SURVEY SINGLE VIEW: CPT | Mod: 26,,, | Performed by: INTERNAL MEDICINE

## 2024-05-16 PROCEDURE — 1159F MED LIST DOCD IN RCRD: CPT | Mod: CPTII,GC,S$GLB, | Performed by: STUDENT IN AN ORGANIZED HEALTH CARE EDUCATION/TRAINING PROGRAM

## 2024-05-16 PROCEDURE — 1101F PT FALLS ASSESS-DOCD LE1/YR: CPT | Mod: CPTII,GC,S$GLB, | Performed by: STUDENT IN AN ORGANIZED HEALTH CARE EDUCATION/TRAINING PROGRAM

## 2024-05-16 PROCEDURE — 3008F BODY MASS INDEX DOCD: CPT | Mod: CPTII,GC,S$GLB, | Performed by: STUDENT IN AN ORGANIZED HEALTH CARE EDUCATION/TRAINING PROGRAM

## 2024-05-16 PROCEDURE — 99215 OFFICE O/P EST HI 40 MIN: CPT | Mod: GC,S$GLB,, | Performed by: STUDENT IN AN ORGANIZED HEALTH CARE EDUCATION/TRAINING PROGRAM

## 2024-05-16 PROCEDURE — 3044F HG A1C LEVEL LT 7.0%: CPT | Mod: CPTII,GC,S$GLB, | Performed by: STUDENT IN AN ORGANIZED HEALTH CARE EDUCATION/TRAINING PROGRAM

## 2024-05-16 PROCEDURE — 3288F FALL RISK ASSESSMENT DOCD: CPT | Mod: CPTII,GC,S$GLB, | Performed by: STUDENT IN AN ORGANIZED HEALTH CARE EDUCATION/TRAINING PROGRAM

## 2024-05-16 PROCEDURE — 99999 PR PBB SHADOW E&M-EST. PATIENT-LVL V: CPT | Mod: PBBFAC,GC,, | Performed by: STUDENT IN AN ORGANIZED HEALTH CARE EDUCATION/TRAINING PROGRAM

## 2024-05-16 PROCEDURE — 3075F SYST BP GE 130 - 139MM HG: CPT | Mod: CPTII,GC,S$GLB, | Performed by: STUDENT IN AN ORGANIZED HEALTH CARE EDUCATION/TRAINING PROGRAM

## 2024-05-16 PROCEDURE — 1125F AMNT PAIN NOTED PAIN PRSNT: CPT | Mod: CPTII,GC,S$GLB, | Performed by: STUDENT IN AN ORGANIZED HEALTH CARE EDUCATION/TRAINING PROGRAM

## 2024-05-16 PROCEDURE — 77077 JOINT SURVEY SINGLE VIEW: CPT | Mod: TC

## 2024-05-16 PROCEDURE — 3078F DIAST BP <80 MM HG: CPT | Mod: CPTII,GC,S$GLB, | Performed by: STUDENT IN AN ORGANIZED HEALTH CARE EDUCATION/TRAINING PROGRAM

## 2024-05-16 PROCEDURE — 1160F RVW MEDS BY RX/DR IN RCRD: CPT | Mod: CPTII,GC,S$GLB, | Performed by: STUDENT IN AN ORGANIZED HEALTH CARE EDUCATION/TRAINING PROGRAM

## 2024-05-16 NOTE — PATIENT INSTRUCTIONS
Por favor, hablar con el doctor orthopedico sobre renteria dolor y sciatica:    [facility]   [address]   (Enter at [address])   [place] [address] [phone]

## 2024-05-16 NOTE — Clinical Note
Dr. Arshad,  I am the rheumatology fellow who saw this pt today in our clinic, with my attending, Dr. Mckee.   We were trying to figure out the best treatment options for this pt's RA. Pt was very nervous about any meds at all with side effects. I just wanted to clarify with you about if you felt that her pancytopenia may be attributable to methotrexate? And if you would recommend avoiding mtx use or not in this patient?  We were talking about possibly abatacept instead of using mtx or leflunomide to minimize the potential cytopenia effects.  We appreciate your input and guidance!  Regards, Makenna Bryson MD Rheumatology

## 2024-05-16 NOTE — PROGRESS NOTES
Subjective:      Patient ID: Alba M Reyes is a 73 y.o. female.    Chief Complaint: rheumatoid arthritis follow up    Alba Reyes is a 74yo F with PMH of seropositive RA, L hip OA, L sciatica, osteopenia, HTN.    Rheum History:  - Seropositive RA dx around 2013  - Serologies: +RF, negative CCP, +CRYSTAL (1:320), +SSA, +SSB  - Previously was following with [facility] rheumatology ([id number][phone] then [facility] again (5-8/2023)    Prior Treatments:  - Methotrexate (most recently d/c'd in 11/2023 due to pancytopenia)  - Humira (self d/c'd in early 2023 with pancytopenia)  - Rinvoq (d/c'd due to GI intolerance)  - Hydroxychloroquine (self d/c'd due to pancytopenia, did not start taking again due to pt concern related to pancytopenia)    Current Treatments:  - Ibuprofen & acetaminophen daily    Pt is here to re-establish care at [facility] today. Was previously following with [facility], then Dr. Youssef here, then [facility] again during last year. She has been dealing with a lot of pain, especially in her hands and knees. Endorses notable AM stiffness for a couple hours. She has been having some pain around the MCPs and also around the knees. Pt does notice some intermittent swelling. She also has been noticing a lot of LLE neuropathic pain related to sciatica as well as severe L hip OA. She had previously seen orthopedic surgery about this a couple months ago. Pt states that she has been extremely scared about re-starting any RA meds due to her pancytopenia. She is very worried about the blood counts and does not want to take any meds with any side effects.     Review of Systems   Constitutional:  Negative for activity change, appetite change, chills, fatigue, fever and unexpected weight change.   HENT:  Negative for congestion, mouth sores, sinus pain and sore throat.    Eyes:  Negative for photophobia, redness and visual disturbance.   Respiratory:  Negative for cough, chest tightness and shortness of breath.   "  Cardiovascular:  Negative for chest pain and leg swelling.   Gastrointestinal:  Negative for abdominal pain, constipation, diarrhea, nausea and vomiting.   Genitourinary:  Negative for dysuria, frequency and urgency.   Musculoskeletal:  Positive for arthralgias, back pain, joint swelling and myalgias.   Skin:  Negative for color change, rash and wound.   Neurological:  Negative for dizziness, tremors, syncope, weakness, light-headedness and headaches.   Psychiatric/Behavioral:  Negative for agitation, confusion and sleep disturbance. The patient is not nervous/anxious.       Objective:   /74 (BP Location: Right arm)   Pulse 74   Ht 5' 2" (1.575 m)   Wt 61.3 kg (135 lb 2.3 oz)   BMI 24.72 kg/m²   Physical Exam   Constitutional: She is oriented to person, place, and time. She appears well-developed and well-nourished. No distress.   HENT:   Head: Normocephalic and atraumatic.   Right Ear: External ear normal.   Left Ear: External ear normal.   Nose: Nose normal. No nasal congestion.   Mouth/Throat: Mucous membranes are moist. Oropharynx is clear.   Eyes: Conjunctivae are normal.   Cardiovascular: Normal rate, regular rhythm and normal heart sounds.   Pulmonary/Chest: Effort normal and breath sounds normal. No respiratory distress. She has no wheezes.   Abdominal: Soft. She exhibits no distension. There is no abdominal tenderness.   Musculoskeletal:         General: Normal range of motion.      Cervical back: Normal range of motion and neck supple.      Comments: There is some TTP of some of the joints as detailed in the homunculus.    Neurological: She is alert and oriented to person, place, and time.   Skin: Skin is warm and dry. No lesion and no rash noted.   Psychiatric: Her behavior is normal. Mood normal.   Vitals reviewed.         5/16/2024   Tender (SANTAMARIA-28) 6 / 28    Swollen (SANTAMARIA-28) 0 / 28    Provider Global --   Patient Global --   ESR 16 mm/hr   CRP 2.7 mg/L   SANTAMARIA-28 (ESR) --   SANTAMARIA-28 (CRP) -- "   CDAI Score --     Assessment:     1. Rheumatoid arthritis involving multiple sites with positive rheumatoid factor    2. Lumbar spondylosis    3. Lumbar radiculopathy    4. Pancytopenia    5. Osteopenia, unspecified location    6. Osteoarthritis of left hip, unspecified osteoarthritis type      - Pt with known seropositive RA, previously was well controlled on mtx  - However, she developed pancytopenia of unclear etiology  - Heme workup has not shown any sign of malignancy, MDS, etc. And they had previously advised that pt can likely resume her meds  - However, pt is very concerned about the blood counts and side effects related to any meds    Plan:     Problem List Items Addressed This Visit          Neuro    Lumbar spondylosis    Lumbar radiculopathy       Immunology/Multi System    Rheumatoid arthritis - Primary    Relevant Orders    C-Reactive Protein    Sedimentation rate    Comprehensive Metabolic Panel    CBC Auto Differential    XR Arthritis Survey (Completed)       Oncology    Pancytopenia       Orthopedic    Osteopenia    Osteoarthritis of left hip     - Will message heme/onc who saw pt last, to clarify if/which meds can be safely used to manage pt  - We discussed DMARD / treatment options with pt at length and explained that all meds do have some side effects but the benefits of achieving good disease / symptom control outweighs some of the side effects  - Provided reading material about otezla / abatacept for pt to consider as a treatment option  - Check labs today: CBC, CMP, ESR/CRP  - Obtain arthritis survey today  - Advised pt to f/u with ortho again regarding the severe L hip pain and sciatica she has been dealing with    Follow up here in clinic in about 2-3 months or earlier if needed.    Assessment and plan discussed with supervising attending, Dr. Mckee.       Makenna Bryson MD  PGY-4, Rheumatology

## 2024-05-17 NOTE — PROGRESS NOTES
Patient seen and examined with fellow.  All elements of history, physical exam and medical decision making independently confirmed by me.  Patient with RA off medications due to concern about risk of return of pancytopenia.  Will check labs and x-rays.  Patient will consider use of Orencia.  Risks discussed. See note for details.

## 2024-05-17 NOTE — PROGRESS NOTES
X-ray findings consistent with multiple areas of mild to moderate degenerative and inflammatory arthritis changes.

## 2024-05-23 ENCOUNTER — TELEPHONE (OUTPATIENT)
Dept: FAMILY MEDICINE | Facility: CLINIC | Age: 74
End: 2024-05-23
Payer: MEDICARE

## 2024-05-23 DIAGNOSIS — E21.1 SECONDARY HYPERPARATHYROIDISM, NOT ELSEWHERE CLASSIFIED: ICD-10-CM

## 2024-05-23 DIAGNOSIS — I10 ESSENTIAL HYPERTENSION: Primary | ICD-10-CM

## 2024-05-23 DIAGNOSIS — M06.9 RHEUMATOID ARTHRITIS, INVOLVING UNSPECIFIED SITE, UNSPECIFIED WHETHER RHEUMATOID FACTOR PRESENT: ICD-10-CM

## 2024-05-23 NOTE — TELEPHONE ENCOUNTER
----- Message from Gideon France MD sent at 5/23/2024  2:45 PM CDT -----      ----- Message -----  From: SYSTEM  Sent: 5/23/2024   1:34 AM CDT  To: Gideon France MD

## 2024-07-08 ENCOUNTER — PATIENT OUTREACH (OUTPATIENT)
Dept: ADMINISTRATIVE | Facility: HOSPITAL | Age: 74
End: 2024-07-08
Payer: MEDICARE

## 2024-07-12 ENCOUNTER — TELEPHONE (OUTPATIENT)
Dept: PAIN MEDICINE | Facility: CLINIC | Age: 74
End: 2024-07-12
Payer: MEDICARE

## 2024-07-12 ENCOUNTER — HOSPITAL ENCOUNTER (EMERGENCY)
Facility: HOSPITAL | Age: 74
Discharge: HOME | End: 2024-07-12
Attending: EMERGENCY MEDICINE
Payer: MEDICARE

## 2024-07-12 VITALS
HEART RATE: 67 BPM | TEMPERATURE: 98 F | RESPIRATION RATE: 20 BRPM | SYSTOLIC BLOOD PRESSURE: 165 MMHG | OXYGEN SATURATION: 100 % | BODY MASS INDEX: 23.92 KG/M2 | DIASTOLIC BLOOD PRESSURE: 88 MMHG | WEIGHT: 130 LBS | HEIGHT: 62 IN

## 2024-07-12 DIAGNOSIS — M79.605 BILATERAL LEG PAIN: ICD-10-CM

## 2024-07-12 DIAGNOSIS — L03.90 CELLULITIS, UNSPECIFIED CELLULITIS SITE: Primary | ICD-10-CM

## 2024-07-12 DIAGNOSIS — I83.893 VARICOSE VEINS OF BOTH LEGS WITH EDEMA: ICD-10-CM

## 2024-07-12 DIAGNOSIS — M79.604 BILATERAL LEG PAIN: ICD-10-CM

## 2024-07-12 LAB
ALBUMIN SERPL-MCNC: 3.8 G/DL (ref 3.3–5.5)
ALP SERPL-CCNC: 87 U/L (ref 42–141)
BILIRUB SERPL-MCNC: 0.6 MG/DL (ref 0.2–1.6)
BUN SERPL-MCNC: 15 MG/DL (ref 7–22)
CALCIUM SERPL-MCNC: 9.7 MG/DL (ref 8–10.3)
CHLORIDE SERPL-SCNC: 106 MMOL/L (ref 98–108)
CREAT SERPL-MCNC: 0.3 MG/DL (ref 0.6–1.2)
GLUCOSE SERPL-MCNC: 118 MG/DL (ref 73–118)
HCT, POC: NORMAL
HGB, POC: NORMAL (ref 14–18)
MCH, POC: NORMAL
MCHC, POC: NORMAL
MCV, POC: NORMAL
MPV, POC: NORMAL
POC ALT (SGPT): 19 U/L (ref 10–47)
POC AST (SGOT): 33 U/L (ref 11–38)
POC PLATELET COUNT: NORMAL
POC TCO2: 31 MMOL/L (ref 18–33)
POTASSIUM BLD-SCNC: 4 MMOL/L (ref 3.6–5.1)
PROTEIN, POC: 7.2 G/DL (ref 6.4–8.1)
RBC, POC: NORMAL
RDW, POC: NORMAL
SODIUM BLD-SCNC: 144 MMOL/L (ref 128–145)
WBC, POC: NORMAL

## 2024-07-12 PROCEDURE — 85025 COMPLETE CBC W/AUTO DIFF WBC: CPT | Mod: ER

## 2024-07-12 PROCEDURE — 80053 COMPREHEN METABOLIC PANEL: CPT | Mod: ER

## 2024-07-12 PROCEDURE — 99284 EMERGENCY DEPT VISIT MOD MDM: CPT | Mod: 25,ER

## 2024-07-12 RX ORDER — CYCLOBENZAPRINE HCL 5 MG
5 TABLET ORAL NIGHTLY
Qty: 14 TABLET | Refills: 0 | Status: SHIPPED | OUTPATIENT
Start: 2024-07-12 | End: 2024-07-26

## 2024-07-12 RX ORDER — CEPHALEXIN 500 MG/1
500 CAPSULE ORAL 4 TIMES DAILY
Qty: 28 CAPSULE | Refills: 0 | Status: SHIPPED | OUTPATIENT
Start: 2024-07-12 | End: 2024-07-19

## 2024-07-12 NOTE — ED PROVIDER NOTES
Encounter Date: 7/12/2024    SCRIBE #1 NOTE: I, Maryamnoemí Holguin, am scribing for, and in the presence of,  Jasmin Calderon MD.       History     Chief Complaint   Patient presents with    Leg Pain     Bilateral lower leg pain and swelling. Noted multiple varicose veins      72 yo F w/ PMHx of HTN, arthritis, presents to the ED w/ left lower leg swelling and pain x5 days. Reports redness to posterior left calf. Reports swelling has gone down some but pain has persisted. Reports trouble sleeping 2/2 pain. Reports she has been wearing compression stockings. Denies prior evaluation or referral by vascular surgery. No other exacerbating or alleviating factors. Denies CP, SOB, fever, numbness, or other associated symptoms.     Per chart review, recent US 6/17/2024 findings:   1.   No left lower extremity deep venous thrombosis is identified.  2.   There are multiple superficial varicose veins, some of which are partially thrombosed.       The history is provided by the patient and medical records. No  was used.     Review of patient's allergies indicates:  No Known Allergies  Past Medical History:   Diagnosis Date    Arthritis     Hypertension      Past Surgical History:   Procedure Laterality Date    EPIDURAL STEROID INJECTION Left 3/22/2023    Procedure: Left L5 + S1 Transforaminal Epidural Steroid Injections;  Surgeon: Jim Hector Jr., MD;  Location: [facility] ENDO;  Service: Pain Management;  Laterality: Left;  @1330  No ATC or DM    INJECTION Left 12/19/2022    Procedure: INJECTION, LEFT HIP INTRA-ARTICULAR STEROID CONTRAST DIRECT REF  confirmed;  Surgeon: Hailey Valladares MD;  Location: [facility] PAIN T;  Service: Pain Management;  Laterality: Left;     No family history on file.  Social History     Tobacco Use    Smoking status: Never    Smokeless tobacco: Never   Substance Use Topics    Alcohol use: Not Currently    Drug use: Not Currently     Review of Systems   Constitutional:  Negative for  activity change, appetite change, chills and fever.   HENT:  Negative for congestion, rhinorrhea, sneezing and sore throat.    Respiratory:  Negative for cough, choking, shortness of breath and wheezing.    Cardiovascular:  Positive for leg swelling. Negative for chest pain and palpitations.   Gastrointestinal:  Negative for abdominal pain, diarrhea, nausea and vomiting.   Musculoskeletal:  Positive for myalgias.   Skin:  Positive for color change.   Neurological:  Negative for dizziness, syncope, light-headedness, numbness and headaches.   All other systems reviewed and are negative.      Physical Exam     Initial Vitals [07/12/24 1211]   BP Pulse Resp Temp SpO2   118/76 85 19 98.3 °F (36.8 °C) 96 %      MAP       --         Physical Exam    Nursing note and vitals reviewed.  Constitutional: She appears well-developed and well-nourished. No distress.   HENT:   Head: Normocephalic and atraumatic.   Eyes: Conjunctivae are normal.   Neck:   Normal range of motion.  Cardiovascular:  Normal rate, regular rhythm and normal heart sounds.           No murmur heard.  Pulmonary/Chest: Breath sounds normal. No respiratory distress.   Musculoskeletal:         General: Normal range of motion.      Cervical back: Normal range of motion.      Comments: Redness to posterior left calf. Tense edema to left lateral calf. Multiple varicose veins to BLE.      Neurological: She is alert and oriented to person, place, and time. GCS score is 15. GCS eye subscore is 4. GCS verbal subscore is 5. GCS motor subscore is 6.   Skin: Skin is warm and dry.   Psychiatric: She has a normal mood and affect. Her behavior is normal.         ED Course   Procedures  Labs Reviewed   POCT CMP - Abnormal; Notable for the following components:       Result Value    POC Creatinine 0.3 (*)     All other components within normal limits   POCT CBC   POCT CMP          Imaging Results              US Lower Extremity Veins Bilateral (Final result)  Result time  07/12/24 14:42:45      Final result by Singh Lala MD (07/12/24 14:42:45)                   Impression:      No evidence of deep venous thrombosis in the right lower extremity or left lower extremity above the knee.  Only a single patent anterior tibial vein identified on the left, which may be a normal variant; however, potential thrombosis of a nonvisualized paired vein not excluded on the basis of this examination without priors for comparison.  Clinical correlation advised.  Further evaluation/follow-up as warranted.      Electronically signed by: Singh Lala MD  Date:    07/12/2024  Time:    14:42               Narrative:    EXAMINATION:  US LOWER EXTREMITY VEINS BILATERAL    CLINICAL HISTORY:  Pain in right leg    TECHNIQUE:  Duplex and color flow Doppler and dynamic compression was performed of the bilateral lower extremity veins was performed.    COMPARISON:  None    FINDINGS:  Right thigh veins: The common femoral, femoral, popliteal, upper greater saphenous, and deep femoral veins are patent and free of thrombus. The veins are normally compressible and have normal phasic flow and augmentation response.    Right calf veins: Paired anterior and posterior tibial and peroneal veins are patent.    Left thigh veins: The common femoral, femoral, popliteal, upper greater saphenous, and deep femoral veins are patent and free of thrombus. The veins are normally compressible and have normal phasic flow and augmentation response.    Left calf veins: Paired peroneal and posterior tibial veins are patent.  There is a single patent anterior tibial vein.    Miscellaneous: None                                       Medications - No data to display  Medical Decision Making  74 yo F w/ PMHx of HTN, arthritis, presents to the ED w/ left lower leg swelling and pain x5 days. Reports redness to posterior left calf. Reports swelling has gone down some but pain has persisted. Reports trouble sleeping 2/2 pain. Reports she  has been wearing compression stockings. Denies prior evaluation or referral by vascular surgery. No other exacerbating or alleviating factors. Denies CP, SOB, fever, numbness, or other associated symptoms. On exam, heart and lung sounds normal. Redness to posterior left calf. Tense edema to left lateral calf. Multiple varicose veins to BLE. In shared decision making with patient, will order labs and imaging. Work up no signs of infection or DVT. I considered but excluded fever, sepsis, DVT in my differential diagnosis. Pt does have edema (not new) and cellulitis. Will treat with keflex and compression socks. Recommend follow up with vascular.          Amount and/or Complexity of Data Reviewed  External Data Reviewed: radiology and notes.     Details: See HPI  Labs: ordered. Decision-making details documented in ED Course.  Radiology: ordered. Decision-making details documented in ED Course.    Risk  Prescription drug management.            Scribe Attestation:   Scribe #1: I performed the above scribed service and the documentation accurately describes the services I performed. I attest to the accuracy of the note.                             I, Dr. Jasmin Calderon, personally performed the services described in this documentation.   All medical record entries made by the scribe were at my direction and in my presence.   I have reviewed the chart and agree that the record is accurate and complete.   Jasmin Calderon MD.  1:00 PM 07/12/2024     Clinical Impression:  Final diagnoses:  [M79.604, M79.605] Bilateral leg pain  [I83.893] Varicose veins of both legs with edema  [L03.90] Cellulitis, unspecified cellulitis site (Primary)          ED Disposition Condition    Discharge Stable          ED Prescriptions       Medication Sig Dispense Start Date End Date Auth. Provider    cyclobenzaprine (FLEXERIL) 5 MG tablet Take 1 tablet (5 mg total) by mouth nightly. for 14 days 14 tablet 7/12/2024 7/26/2024 Jasmin Calderon MD    cephALEXin  (KEFLEX) 500 MG capsule Take 1 capsule (500 mg total) by mouth 4 (four) times daily. for 7 days 28 capsule 7/12/2024 7/19/2024 Jasmin Calderon MD          Follow-up Information       Follow up With Specialties Details Why Contact Info    [facility] VASCULAR SURGERY Vascular Surgery Schedule an appointment as soon as possible for a visit   [address] [facility] [place] [id number] [phone]             Jasmin Calderon MD  07/12/24 2942

## 2024-07-12 NOTE — DISCHARGE INSTRUCTIONS
Wear compression socks. Elevate legs at the end  of the day, or if swelling starts earlier tin the day. Take all of the prescribed antibiotics. Take flexeril at bedtime to help you sleep. Make an appointment with vascular surgery for evaluation of varicose veins.

## 2024-07-12 NOTE — TELEPHONE ENCOUNTER
LVM via language line stating I was returning the pt's call in regards to leg pain and to give our clinic a call back if the pt would like to schedule an appointment. (Number given)      ----- Message from Julienne Esposito sent at 7/11/2024  6:42 PM CDT -----  Type:  Patient Returning Call    Who Called: Pt  Who Left Message for Patient:  Does the patient know what this is regarding?: pain in legs  Would the patient rather a call back or a response via [facility]? Call  Best Call Back Number:  [phone]  Additional Information:

## 2024-07-15 ENCOUNTER — INITIAL CONSULT (OUTPATIENT)
Dept: VASCULAR SURGERY | Facility: CLINIC | Age: 74
End: 2024-07-15
Payer: MEDICARE

## 2024-07-15 VITALS
BODY MASS INDEX: 24.75 KG/M2 | HEIGHT: 62 IN | HEART RATE: 83 BPM | DIASTOLIC BLOOD PRESSURE: 72 MMHG | SYSTOLIC BLOOD PRESSURE: 122 MMHG | WEIGHT: 134.5 LBS

## 2024-07-15 DIAGNOSIS — I83.899 SYMPTOMATIC SPIDER VARICOSE VEIN, UNSPECIFIED LATERALITY: ICD-10-CM

## 2024-07-15 DIAGNOSIS — I87.2 VENOUS (PERIPHERAL) INSUFFICIENCY: Primary | ICD-10-CM

## 2024-07-15 DIAGNOSIS — L03.116 CELLULITIS OF LEFT LOWER LEG: ICD-10-CM

## 2024-07-15 PROCEDURE — 99205 OFFICE O/P NEW HI 60 MIN: CPT | Mod: S$GLB,,, | Performed by: NURSE PRACTITIONER

## 2024-07-15 PROCEDURE — 99999 PR PBB SHADOW E&M-EST. PATIENT-LVL III: CPT | Mod: PBBFAC,,, | Performed by: NURSE PRACTITIONER

## 2024-07-15 NOTE — PROGRESS NOTES
[facility]                                                        07/15/2024    HPI:  Alba M Reyes is a 73 y.o. female with   Patient Active Problem List   Diagnosis    Right upper quadrant pain    Acute cholecystitis due to biliary calculus    Incarcerated umbilical hernia    Lack of coordination    Posture abnormality    Weakness of both hips    GERD (gastroesophageal reflux disease)    Hypovitaminosis D    Mixed incontinence    Osteopenia    Osteoporosis    Rheumatoid arthritis    Varicose veins of legs    Lumbar spondylosis    Lumbar radiculopathy    DDD (degenerative disc disease), lumbar    Osteoarthritis of left hip    Clonal cytopenia of undetermined significance (CCUS)    Pancytopenia    Thrombocytopenia, unspecified    being managed by PCP and specialists who is here today for evaluation of BLE pain and edema.  Patient states location is BLE from knee distally occurring for 5 yrs.  Associated signs and symptoms include discoloration, varicose veins and spider veins.  Quality is throbbing and severity is 9/10.  Symptoms began 2015.  Alleviating factors include antibiotics that she was started on last week.  Worsening factors include high Na diet.  Patient is not wearing compression stockings daily.  No FH of venous disease.  Symptoms do not limit patient's functional status and daily activities.  No DVT history.  No venous interventions.  No low sodium diet.  No excessive water intake.    Migraine with aura: no  PFO/ASD/right to left shunt: no  Pregnant: No  Breastfeeding: No    no MI  no Stroke  Tobacco use: denies    H&P UPDATES:  07/15/24: Pt presents for hospital follow up for LLE cellulitis and BLE venous insufficiency. Patient states that she has not started taking discharge medications because her  has been in the hospital. Pt with complaints of LLE redness and BLE edema/varicose veins. Patient states a few weeks ago she started bleeding from  her RLE varicose vein that now has a scab.    Past Medical History:   Diagnosis Date    Arthritis     Hypertension      Past Surgical History:   Procedure Laterality Date    EPIDURAL STEROID INJECTION Left 3/22/2023    Procedure: Left L5 + S1 Transforaminal Epidural Steroid Injections;  Surgeon: Jim Hector Jr., MD;  Location: [facility] END[facility];  Service: Pain Management;  Laterality: Left;  @1330  No ATC or DM    INJECTION Left 12/19/2022    Procedure: INJECTION, LEFT HIP INTRA-ARTICULAR STEROID CONTRAST DIRECT REF  confirmed;  Surgeon: Hailey Valladares MD;  Location: [facility]GT;  Service: Pain Management;  Laterality: Left;     No family history on file.  Social History     Socioeconomic History    Marital status:    Tobacco Use    Smoking status: Never    Smokeless tobacco: Never   Substance and Sexual Activity    Alcohol use: Not Currently    Drug use: Not Currently    Sexual activity: Not Currently       Current Outpatient Medications:     acetaminophen (TYLENOL) 500 MG tablet, Take 1 tablet (500 mg total) by mouth every 6 (six) hours as needed for Pain (As needed for mild-to-moderate pain)., Disp: 30 tablet, Rfl: 0    baclofen (LIORESAL) 5 mg Tab tablet, Take 1 tablet twice a day by oral route as needed for 20 days., Disp: , Rfl:     cephALEXin (KEFLEX) 500 MG capsule, Take 1 capsule (500 mg total) by mouth 4 (four) times daily. for 7 days, Disp: 28 capsule, Rfl: 0    cetirizine (ZYRTEC) 10 MG tablet, Take 10 mg by mouth., Disp: , Rfl:     cyclobenzaprine (FLEXERIL) 5 MG tablet, Take 1 tablet (5 mg total) by mouth nightly. for 14 days, Disp: 14 tablet, Rfl: 0    diclofenac sodium (VOLTAREN) 1 % Gel, Apply 2 g topically 4 (four) times daily as needed (Apply to painful area 4 times a day as needed for pain)., Disp: 200 g, Rfl: 0    DULoxetine (CYMBALTA) 20 MG capsule, Take 2 capsules (40 mg total) by mouth once daily., Disp: 60 capsule, Rfl: 0    gabapentin (NEURONTIN) 300 MG capsule, Take 2  capsules (600 mg total) by mouth every evening., Disp: 60 capsule, Rfl: 11    ibuprofen (ADVIL,MOTRIN) 600 MG tablet, Take 1 tablet (600 mg total) by mouth every 6 (six) hours as needed for Pain (Take with food as needed for mild-to-moderate pain)., Disp: 20 tablet, Rfl: 0    lisinopriL (PRINIVIL,ZESTRIL) 20 MG tablet, lisinopril Take No date recorded No form recorded No frequency recorded No route recorded No set duration recorded No set duration amount recorded active No dosage strength recorded No dosage strength units of measure recorded, Disp: , Rfl:     lovastatin (MEVACOR) 10 MG tablet, lovastatin Take No date recorded No form recorded No frequency recorded No route recorded No set duration recorded No set duration amount recorded active No dosage strength recorded No dosage strength units of measure recorded, Disp: , Rfl:     memantine (NAMENDA) 7 mg CSpX, Namenda Take No date recorded No form recorded No frequency recorded No route recorded No set duration recorded No set duration amount recorded active No dosage strength recorded No dosage strength units of measure recorded, Disp: , Rfl:     methotrexate 25 mg/mL injection, Inject 1 mL (25 mg total) into the skin every 7 days., Disp: 4 mL, Rfl: 11    penicillin v potassium (VEETID) 500 MG tablet, Take 500 mg by mouth every 6 (six) hours., Disp: , Rfl:     triamcinolone acetonide 0.1% (KENALOG) 0.1 % cream, APPLY CREAM EXTERNALLY TO AFFECTED AREA OF LEGS TWICE DAILY AS NEEDED FOR FLARES, Disp: , Rfl:     folic acid (FOLVITE) 1 MG tablet, Take 1 tablet (1 mg total) by mouth once daily., Disp: 30 tablet, Rfl: 4    REVIEW OF SYSTEMS:  General: No fevers or chills; ENT: No sore throat; Allergy and Immunology: no persistent infections; Hematological and Lymphatic: No history of bleeding or easy bruising; Endocrine: negative; Respiratory: no cough, shortness of breath, or wheezing; Cardiovascular: no chest pain or dyspnea on exertion; Gastrointestinal: no  "abdominal pain/back, change in bowel habits, or bloody stools; Genito-Urinary: no dysuria, trouble voiding, or hematuria; Musculoskeletal: pain and edema; Neurological: no TIA or stroke symptoms; Psychiatric: no nervousness, anxiety or depression.    PHYSICAL EXAM:      Pulse: 83         General appearance:  Alert, well-appearing, and in no distress.  Oriented to person, place, and time                    Neurological: Normal speech, no focal findings noted; CN II - XII grossly intact. RLE with sensation to light touch, LLE with sensation to light touch.            Musculoskeletal: Digits/nail without cyanosis/clubbing.  Strength 5/5 BLE.                    Neck: Supple, no significant adenopathy                  Chest:  No wheezes, symmetric air entry. No use of accessory muscles               Cardiac: Normal rate and regular rhythm            Abdomen: Soft, nontender, nondistended      Extremities:   2+ R DP pulse, 2+ L DP pulse      2+ RLE edema, 2+ LLE edema    Skin:  RLE no ulcer; LLE no ulcer, erythema present      RLE + spider veins, LLE + spider veins      RLE + varicose veins, LLE + varicose veins    CEAP 3/3    LAB RESULTS:  No results found for: "CBC"  Lab Results   Component Value Date    LABPROT 11.8 08/10/2023    INR 1.0 08/10/2023     Lab Results   Component Value Date     05/16/2024    K 4.2 05/16/2024     05/16/2024    CO2 27 05/16/2024    GLU 88 05/16/2024    BUN 20 05/16/2024    CREATININE 0.7 05/16/2024    CALCIUM 9.5 05/16/2024    ANIONGAP 5 (L) 05/16/2024    EGFRNONAA >60 07/08/2022     Lab Results   Component Value Date    WBC 4.34 05/16/2024    RBC 3.01 (L) 05/16/2024    HGB 10.0 (L) 05/16/2024    HCT 30.4 (L) 05/16/2024     (H) 05/16/2024    MCH 33.2 (H) 05/16/2024    MCHC 32.9 05/16/2024    RDW 15.9 (H) 05/16/2024    PLT 94 (L) 05/16/2024    MPV 11.6 05/16/2024    GRAN 1.0 (L) 05/16/2024    GRAN 24.4 (L) 05/16/2024    LYMPH 2.5 05/16/2024    LYMPH 58.4 (H) 05/16/2024    " MONO 0.6 05/16/2024    MONO 14.1 05/16/2024    EOS 0.1 05/16/2024    BASO 0.02 05/16/2024    EOSINOPHIL 2.4 05/16/2024    BASOPHIL 0.5 05/16/2024    DIFFMETHOD Automated 05/16/2024     .  Lab Results   Component Value Date    HGBA1C 5.0 05/02/2024       IMAGING:  All pertinent imaging has been reviewed and interpreted independently.    Venous US 1/3/20 Impression:  Right lower extremity venous ultrasound shows greater saphenous vein reflux from SFJ to knee.  There is no lesser saphenous vein reflux.  There is no anterior accessory saphenous venous reflux.  There is no DVT.  Left lower extremity venous ultrasound shows greater saphenous vein reflux from SFJ to knee.  There is no lesser saphenous vein reflux.  There is no anterior accessory saphenous venous reflux.  There is no DVT.      IMP/PLAN:  73 y.o. female with   Patient Active Problem List   Diagnosis    Right upper quadrant pain    Acute cholecystitis due to biliary calculus    Incarcerated umbilical hernia    Lack of coordination    Posture abnormality    Weakness of both hips    GERD (gastroesophageal reflux disease)    Hypovitaminosis D    Mixed incontinence    Osteopenia    Osteoporosis    Rheumatoid arthritis    Varicose veins of legs    Lumbar spondylosis    Lumbar radiculopathy    DDD (degenerative disc disease), lumbar    Osteoarthritis of left hip    Clonal cytopenia of undetermined significance (CCUS)    Pancytopenia    Thrombocytopenia, unspecified    being managed by PCP and specialists who is here today for evaluation of BLE edema and pain, s/p hospital admission for LLE cellulitis patient non compliant with Antibiotics.    - Start taking antibiotics and pain medication prescribed on recent admission, patient acknowledged that she will  medications from pharmacy today.  -Prev hx Bilateral GSV insufficiency-recommend compression with Rx stockings, elevation, dietary changes associated with water and sodium intake discussed at length with  patient  - Warm compress for thrombophlebitis.  - Venous insuff US  -Exercise   -RTC 3-4 weeks for further evaluation    I spent 30 minutes evaluating this patient and greater than 50% of the time was spent counseling, coordinator care and discussing the plan of care.  All questions were answered and patient stated understanding with agreement with the above treatment plan.    Haydee Dutta NP  Vascular and Endovascular Surgery

## 2024-08-06 ENCOUNTER — HOSPITAL ENCOUNTER (OUTPATIENT)
Dept: RADIOLOGY | Facility: HOSPITAL | Age: 74
Discharge: HOME | End: 2024-08-06
Attending: NURSE PRACTITIONER
Payer: MEDICARE

## 2024-08-06 ENCOUNTER — OFFICE VISIT (OUTPATIENT)
Dept: VASCULAR SURGERY | Facility: CLINIC | Age: 74
End: 2024-08-06
Payer: MEDICARE

## 2024-08-06 ENCOUNTER — HOSPITAL ENCOUNTER (EMERGENCY)
Facility: HOSPITAL | Age: 74
Discharge: LEFT BEFORE DISCHARGE | End: 2024-08-06
Payer: MEDICARE

## 2024-08-06 VITALS
HEART RATE: 77 BPM | WEIGHT: 132.94 LBS | HEIGHT: 62 IN | SYSTOLIC BLOOD PRESSURE: 162 MMHG | DIASTOLIC BLOOD PRESSURE: 83 MMHG | BODY MASS INDEX: 24.46 KG/M2

## 2024-08-06 VITALS
OXYGEN SATURATION: 97 % | TEMPERATURE: 98 F | WEIGHT: 132 LBS | SYSTOLIC BLOOD PRESSURE: 148 MMHG | DIASTOLIC BLOOD PRESSURE: 73 MMHG | HEIGHT: 62 IN | BODY MASS INDEX: 24.29 KG/M2 | RESPIRATION RATE: 20 BRPM | HEART RATE: 70 BPM

## 2024-08-06 DIAGNOSIS — R60.9 SWELLING: ICD-10-CM

## 2024-08-06 DIAGNOSIS — I83.899 SYMPTOMATIC SPIDER VARICOSE VEIN, UNSPECIFIED LATERALITY: ICD-10-CM

## 2024-08-06 DIAGNOSIS — I87.2 VENOUS (PERIPHERAL) INSUFFICIENCY: ICD-10-CM

## 2024-08-06 DIAGNOSIS — I87.2 VENOUS (PERIPHERAL) INSUFFICIENCY: Primary | ICD-10-CM

## 2024-08-06 DIAGNOSIS — L03.116 CELLULITIS OF LEFT LOWER LEG: ICD-10-CM

## 2024-08-06 PROCEDURE — 93970 EXTREMITY STUDY: CPT | Mod: 26,,, | Performed by: RADIOLOGY

## 2024-08-06 PROCEDURE — 99999 HC NO LEVEL OF SERVICE - ED ONLY: CPT

## 2024-08-06 PROCEDURE — 93970 EXTREMITY STUDY: CPT | Mod: TC

## 2024-08-06 PROCEDURE — 99999 PR PBB SHADOW E&M-EST. PATIENT-LVL III: CPT | Mod: PBBFAC,,, | Performed by: NURSE PRACTITIONER

## 2024-08-06 NOTE — FIRST PROVIDER EVALUATION
"Medical screening examination initiated.  I have conducted a focused provider triage encounter, findings are as follows:    Brief history of present illness:  73-year-old female with past medical history of hypertension arthritis presenting to the emergency department today for evaluation of left lower leg cellulitis and swelling.  Patient was seen at vascular surgery today informed to go to the emergency department for further evaluation.  Patient was not been taking her prescribed antibiotics.  Patient was endorsing left lower leg pain swelling worse with movement and palpation.  Denies fever, chest pain, shortness for breath.    Vitals:    08/06/24 1440   BP: (!) 148/73   BP Location: Left arm   Patient Position: Sitting   Pulse: 70   Resp: 20   Temp: 97.9 °F (36.6 °C)   TempSrc: Oral   SpO2: 97%   Weight: 59.9 kg (132 lb)   Height: 5' 2" (1.575 m)       Pertinent physical exam:  Swelling, erythema, induration to the left lower extremity.  2+ dorsal pedal pulses.  Neurovascularly intact.    Brief workup plan:  Basic blood labs, ESR, up to next provider    Preliminary workup initiated; this workup will be continued and followed by the physician or advanced practice provider that is assigned to the patient when roomed.  "

## 2024-08-06 NOTE — ED NOTES
Pt states she is going to leave because she needs to get home to her . Provider and myself tried to encourage and educate pt as to why she should stay. Pt said she will return on tomorrow.

## 2024-08-07 ENCOUNTER — LAB VISIT (OUTPATIENT)
Dept: LAB | Facility: HOSPITAL | Age: 74
End: 2024-08-07
Attending: STUDENT IN AN ORGANIZED HEALTH CARE EDUCATION/TRAINING PROGRAM
Payer: MEDICARE

## 2024-08-07 DIAGNOSIS — M06.9 RHEUMATOID ARTHRITIS, INVOLVING UNSPECIFIED SITE, UNSPECIFIED WHETHER RHEUMATOID FACTOR PRESENT: ICD-10-CM

## 2024-08-07 DIAGNOSIS — D61.818 PANCYTOPENIA: ICD-10-CM

## 2024-08-07 LAB
BASOPHILS # BLD AUTO: 0.05 K/UL (ref 0–0.2)
BASOPHILS NFR BLD: 1.6 % (ref 0–1.9)
DIFFERENTIAL METHOD BLD: ABNORMAL
EOSINOPHIL # BLD AUTO: 0 K/UL (ref 0–0.5)
EOSINOPHIL NFR BLD: 1 % (ref 0–8)
ERYTHROCYTE [DISTWIDTH] IN BLOOD BY AUTOMATED COUNT: 15.2 % (ref 11.5–14.5)
HCT VFR BLD AUTO: 31.2 % (ref 37–48.5)
HGB BLD-MCNC: 10.5 G/DL (ref 12–16)
IMM GRANULOCYTES # BLD AUTO: 0.01 K/UL (ref 0–0.04)
IMM GRANULOCYTES NFR BLD AUTO: 0.3 % (ref 0–0.5)
LYMPHOCYTES # BLD AUTO: 1.6 K/UL (ref 1–4.8)
LYMPHOCYTES NFR BLD: 50.2 % (ref 18–48)
MCH RBC QN AUTO: 34.7 PG (ref 27–31)
MCHC RBC AUTO-ENTMCNC: 33.7 G/DL (ref 32–36)
MCV RBC AUTO: 103 FL (ref 82–98)
MONOCYTES # BLD AUTO: 0.5 K/UL (ref 0.3–1)
MONOCYTES NFR BLD: 17 % (ref 4–15)
NEUTROPHILS # BLD AUTO: 0.9 K/UL (ref 1.8–7.7)
NEUTROPHILS NFR BLD: 29.9 % (ref 38–73)
NRBC BLD-RTO: 0 /100 WBC
PLATELET # BLD AUTO: ABNORMAL K/UL (ref 150–450)
PLATELET BLD QL SMEAR: ABNORMAL
PMV BLD AUTO: ABNORMAL FL (ref 9.2–12.9)
RBC # BLD AUTO: 3.03 M/UL (ref 4–5.4)
WBC # BLD AUTO: 3.11 K/UL (ref 3.9–12.7)

## 2024-08-07 PROCEDURE — 84630 ASSAY OF ZINC: CPT | Performed by: STUDENT IN AN ORGANIZED HEALTH CARE EDUCATION/TRAINING PROGRAM

## 2024-08-07 PROCEDURE — 82525 ASSAY OF COPPER: CPT | Performed by: STUDENT IN AN ORGANIZED HEALTH CARE EDUCATION/TRAINING PROGRAM

## 2024-08-07 PROCEDURE — 83921 ORGANIC ACID SINGLE QUANT: CPT | Performed by: STUDENT IN AN ORGANIZED HEALTH CARE EDUCATION/TRAINING PROGRAM

## 2024-08-07 PROCEDURE — 85025 COMPLETE CBC W/AUTO DIFF WBC: CPT | Performed by: STUDENT IN AN ORGANIZED HEALTH CARE EDUCATION/TRAINING PROGRAM

## 2024-08-07 PROCEDURE — 36415 COLL VENOUS BLD VENIPUNCTURE: CPT | Mod: PO | Performed by: STUDENT IN AN ORGANIZED HEALTH CARE EDUCATION/TRAINING PROGRAM

## 2024-08-08 ENCOUNTER — LAB VISIT (OUTPATIENT)
Dept: LAB | Facility: HOSPITAL | Age: 74
End: 2024-08-08
Attending: FAMILY MEDICINE
Payer: MEDICARE

## 2024-08-08 ENCOUNTER — OFFICE VISIT (OUTPATIENT)
Dept: FAMILY MEDICINE | Facility: CLINIC | Age: 74
End: 2024-08-08
Payer: MEDICARE

## 2024-08-08 VITALS
OXYGEN SATURATION: 96 % | SYSTOLIC BLOOD PRESSURE: 108 MMHG | HEIGHT: 62 IN | TEMPERATURE: 99 F | WEIGHT: 132.69 LBS | HEART RATE: 83 BPM | DIASTOLIC BLOOD PRESSURE: 66 MMHG | BODY MASS INDEX: 24.42 KG/M2

## 2024-08-08 DIAGNOSIS — M79.89 SWELLING OF LOWER LEG: ICD-10-CM

## 2024-08-08 DIAGNOSIS — M62.830 MUSCLE SPASM OF BACK: ICD-10-CM

## 2024-08-08 DIAGNOSIS — D50.9 IRON DEFICIENCY ANEMIA, UNSPECIFIED IRON DEFICIENCY ANEMIA TYPE: ICD-10-CM

## 2024-08-08 DIAGNOSIS — M06.9 RHEUMATOID ARTHRITIS, INVOLVING UNSPECIFIED SITE, UNSPECIFIED WHETHER RHEUMATOID FACTOR PRESENT: Primary | ICD-10-CM

## 2024-08-08 DIAGNOSIS — M06.9 RHEUMATOID ARTHRITIS, INVOLVING UNSPECIFIED SITE, UNSPECIFIED WHETHER RHEUMATOID FACTOR PRESENT: ICD-10-CM

## 2024-08-08 PROCEDURE — 85025 COMPLETE CBC W/AUTO DIFF WBC: CPT | Performed by: FAMILY MEDICINE

## 2024-08-08 PROCEDURE — 99999 PR PBB SHADOW E&M-EST. PATIENT-LVL IV: CPT | Mod: PBBFAC,,, | Performed by: FAMILY MEDICINE

## 2024-08-08 PROCEDURE — 80053 COMPREHEN METABOLIC PANEL: CPT | Performed by: FAMILY MEDICINE

## 2024-08-08 PROCEDURE — 3078F DIAST BP <80 MM HG: CPT | Mod: CPTII,S$GLB,, | Performed by: FAMILY MEDICINE

## 2024-08-08 PROCEDURE — 3288F FALL RISK ASSESSMENT DOCD: CPT | Mod: CPTII,S$GLB,, | Performed by: FAMILY MEDICINE

## 2024-08-08 PROCEDURE — 99214 OFFICE O/P EST MOD 30 MIN: CPT | Mod: S$GLB,,, | Performed by: FAMILY MEDICINE

## 2024-08-08 PROCEDURE — 1101F PT FALLS ASSESS-DOCD LE1/YR: CPT | Mod: CPTII,S$GLB,, | Performed by: FAMILY MEDICINE

## 2024-08-08 PROCEDURE — 36415 COLL VENOUS BLD VENIPUNCTURE: CPT | Mod: PO | Performed by: FAMILY MEDICINE

## 2024-08-08 PROCEDURE — 82728 ASSAY OF FERRITIN: CPT | Performed by: FAMILY MEDICINE

## 2024-08-08 PROCEDURE — 3074F SYST BP LT 130 MM HG: CPT | Mod: CPTII,S$GLB,, | Performed by: FAMILY MEDICINE

## 2024-08-08 PROCEDURE — 83735 ASSAY OF MAGNESIUM: CPT | Performed by: FAMILY MEDICINE

## 2024-08-08 PROCEDURE — 83880 ASSAY OF NATRIURETIC PEPTIDE: CPT | Performed by: FAMILY MEDICINE

## 2024-08-08 PROCEDURE — 84466 ASSAY OF TRANSFERRIN: CPT | Performed by: FAMILY MEDICINE

## 2024-08-08 PROCEDURE — 3008F BODY MASS INDEX DOCD: CPT | Mod: CPTII,S$GLB,, | Performed by: FAMILY MEDICINE

## 2024-08-08 PROCEDURE — 3044F HG A1C LEVEL LT 7.0%: CPT | Mod: CPTII,S$GLB,, | Performed by: FAMILY MEDICINE

## 2024-08-08 PROCEDURE — 1126F AMNT PAIN NOTED NONE PRSNT: CPT | Mod: CPTII,S$GLB,, | Performed by: FAMILY MEDICINE

## 2024-08-08 PROCEDURE — 1159F MED LIST DOCD IN RCRD: CPT | Mod: CPTII,S$GLB,, | Performed by: FAMILY MEDICINE

## 2024-08-08 RX ORDER — IBUPROFEN 600 MG/1
600 TABLET ORAL EVERY 8 HOURS PRN
Qty: 60 TABLET | Refills: 0 | Status: SHIPPED | OUTPATIENT
Start: 2024-08-08

## 2024-08-08 RX ORDER — HYDROXYCHLOROQUINE SULFATE 200 MG/1
TABLET, FILM COATED ORAL
Qty: 45 TABLET | Refills: 1 | Status: SHIPPED | OUTPATIENT
Start: 2024-08-08

## 2024-08-08 RX ORDER — FERROUS SULFATE 325(65) MG
1 TABLET ORAL DAILY
COMMUNITY
End: 2024-08-08 | Stop reason: SDUPTHER

## 2024-08-08 RX ORDER — BACLOFEN 5 MG/1
5 TABLET ORAL 2 TIMES DAILY PRN
Qty: 60 TABLET | Refills: 1 | Status: SHIPPED | OUTPATIENT
Start: 2024-08-08

## 2024-08-08 RX ORDER — IBUPROFEN 800 MG/1
800 TABLET ORAL 2 TIMES DAILY PRN
COMMUNITY

## 2024-08-08 RX ORDER — MEMANTINE HYDROCHLORIDE 5 MG/1
TABLET ORAL
COMMUNITY

## 2024-08-08 RX ORDER — KETOCONAZOLE 20 MG/G
CREAM TOPICAL
COMMUNITY

## 2024-08-08 RX ORDER — METHOCARBAMOL 500 MG/1
1000 TABLET, FILM COATED ORAL
COMMUNITY
Start: 2024-04-21

## 2024-08-08 RX ORDER — PREDNISONE 5 MG/1
5 TABLET ORAL
COMMUNITY

## 2024-08-08 RX ORDER — HYDROXYCHLOROQUINE SULFATE 200 MG/1
TABLET, FILM COATED ORAL
COMMUNITY
End: 2024-08-08 | Stop reason: SDUPTHER

## 2024-08-08 RX ORDER — OMEGA-3-ACID ETHYL ESTERS 1 G/1
1 CAPSULE, LIQUID FILLED ORAL DAILY
COMMUNITY

## 2024-08-08 RX ORDER — FERROUS SULFATE 325(65) MG
325 TABLET ORAL DAILY
Qty: 90 TABLET | Refills: 1 | Status: SHIPPED | OUTPATIENT
Start: 2024-08-08

## 2024-08-08 RX ORDER — CEPHALEXIN 500 MG/1
500 CAPSULE ORAL 4 TIMES DAILY
Qty: 28 CAPSULE | Refills: 0 | Status: CANCELLED | OUTPATIENT
Start: 2024-08-08 | End: 2024-08-15

## 2024-08-09 ENCOUNTER — TELEPHONE (OUTPATIENT)
Dept: FAMILY MEDICINE | Facility: CLINIC | Age: 74
End: 2024-08-09
Payer: MEDICARE

## 2024-08-09 ENCOUNTER — LAB VISIT (OUTPATIENT)
Dept: LAB | Facility: HOSPITAL | Age: 74
End: 2024-08-09
Attending: INTERNAL MEDICINE
Payer: MEDICARE

## 2024-08-09 DIAGNOSIS — M06.9 RHEUMATOID ARTHRITIS, INVOLVING UNSPECIFIED SITE, UNSPECIFIED WHETHER RHEUMATOID FACTOR PRESENT: ICD-10-CM

## 2024-08-09 LAB
ALBUMIN SERPL BCP-MCNC: 4 G/DL (ref 3.5–5.2)
ALP SERPL-CCNC: 80 U/L (ref 55–135)
ALT SERPL W/O P-5'-P-CCNC: 10 U/L (ref 10–44)
ANION GAP SERPL CALC-SCNC: 12 MMOL/L (ref 8–16)
ANISOCYTOSIS BLD QL SMEAR: SLIGHT
ANISOCYTOSIS BLD QL SMEAR: SLIGHT
AST SERPL-CCNC: 21 U/L (ref 10–40)
BASOPHILS # BLD AUTO: 0.03 K/UL (ref 0–0.2)
BASOPHILS # BLD AUTO: 0.05 K/UL (ref 0–0.2)
BASOPHILS NFR BLD: 0.8 % (ref 0–1.9)
BASOPHILS NFR BLD: 1.4 % (ref 0–1.9)
BILIRUB SERPL-MCNC: 0.4 MG/DL (ref 0.1–1)
BNP SERPL-MCNC: 47 PG/ML (ref 0–99)
BUN SERPL-MCNC: 16 MG/DL (ref 8–23)
BURR CELLS BLD QL SMEAR: ABNORMAL
CALCIUM SERPL-MCNC: 9.4 MG/DL (ref 8.7–10.5)
CHLORIDE SERPL-SCNC: 107 MMOL/L (ref 95–110)
CO2 SERPL-SCNC: 22 MMOL/L (ref 23–29)
CREAT SERPL-MCNC: 0.7 MG/DL (ref 0.5–1.4)
DACRYOCYTES BLD QL SMEAR: ABNORMAL
DIFFERENTIAL METHOD BLD: ABNORMAL
DIFFERENTIAL METHOD BLD: ABNORMAL
EOSINOPHIL # BLD AUTO: 0 K/UL (ref 0–0.5)
EOSINOPHIL # BLD AUTO: 0.1 K/UL (ref 0–0.5)
EOSINOPHIL NFR BLD: 1 % (ref 0–8)
EOSINOPHIL NFR BLD: 1.4 % (ref 0–8)
ERYTHROCYTE [DISTWIDTH] IN BLOOD BY AUTOMATED COUNT: 15.4 % (ref 11.5–14.5)
ERYTHROCYTE [DISTWIDTH] IN BLOOD BY AUTOMATED COUNT: 16.1 % (ref 11.5–14.5)
EST. GFR  (NO RACE VARIABLE): >60 ML/MIN/1.73 M^2
FERRITIN SERPL-MCNC: 186 NG/ML (ref 20–300)
GIANT PLATELETS BLD QL SMEAR: PRESENT
GLUCOSE SERPL-MCNC: 89 MG/DL (ref 70–110)
HCT VFR BLD AUTO: 30.8 % (ref 37–48.5)
HCT VFR BLD AUTO: 32.7 % (ref 37–48.5)
HGB BLD-MCNC: 10.1 G/DL (ref 12–16)
HGB BLD-MCNC: 9.7 G/DL (ref 12–16)
IMM GRANULOCYTES # BLD AUTO: 0.01 K/UL (ref 0–0.04)
IMM GRANULOCYTES # BLD AUTO: 0.02 K/UL (ref 0–0.04)
IMM GRANULOCYTES NFR BLD AUTO: 0.3 % (ref 0–0.5)
IMM GRANULOCYTES NFR BLD AUTO: 0.5 % (ref 0–0.5)
IRON SERPL-MCNC: 56 UG/DL (ref 30–160)
LYMPHOCYTES # BLD AUTO: 1.6 K/UL (ref 1–4.8)
LYMPHOCYTES # BLD AUTO: 1.9 K/UL (ref 1–4.8)
LYMPHOCYTES NFR BLD: 46.5 % (ref 18–48)
LYMPHOCYTES NFR BLD: 48.2 % (ref 18–48)
MAGNESIUM SERPL-MCNC: 1.9 MG/DL (ref 1.6–2.6)
MCH RBC QN AUTO: 32.4 PG (ref 27–31)
MCH RBC QN AUTO: 32.4 PG (ref 27–31)
MCHC RBC AUTO-ENTMCNC: 30.9 G/DL (ref 32–36)
MCHC RBC AUTO-ENTMCNC: 31.5 G/DL (ref 32–36)
MCV RBC AUTO: 103 FL (ref 82–98)
MCV RBC AUTO: 105 FL (ref 82–98)
MONOCYTES # BLD AUTO: 0.7 K/UL (ref 0.3–1)
MONOCYTES # BLD AUTO: 0.8 K/UL (ref 0.3–1)
MONOCYTES NFR BLD: 21.1 % (ref 4–15)
MONOCYTES NFR BLD: 21.8 % (ref 4–15)
NEUTROPHILS # BLD AUTO: 1 K/UL (ref 1.8–7.7)
NEUTROPHILS # BLD AUTO: 1.1 K/UL (ref 1.8–7.7)
NEUTROPHILS NFR BLD: 27.7 % (ref 38–73)
NEUTROPHILS NFR BLD: 29.3 % (ref 38–73)
NRBC BLD-RTO: 0 /100 WBC
NRBC BLD-RTO: 0 /100 WBC
OVALOCYTES BLD QL SMEAR: ABNORMAL
OVALOCYTES BLD QL SMEAR: ABNORMAL
PLATELET # BLD AUTO: 104 K/UL (ref 150–450)
PLATELET # BLD AUTO: ABNORMAL K/UL (ref 150–450)
PLATELET BLD QL SMEAR: ABNORMAL
PLATELET BLD QL SMEAR: ABNORMAL
PMV BLD AUTO: ABNORMAL FL (ref 9.2–12.9)
PMV BLD AUTO: ABNORMAL FL (ref 9.2–12.9)
POIKILOCYTOSIS BLD QL SMEAR: SLIGHT
POIKILOCYTOSIS BLD QL SMEAR: SLIGHT
POLYCHROMASIA BLD QL SMEAR: ABNORMAL
POTASSIUM SERPL-SCNC: 3.5 MMOL/L (ref 3.5–5.1)
PROT SERPL-MCNC: 7.4 G/DL (ref 6–8.4)
RBC # BLD AUTO: 2.99 M/UL (ref 4–5.4)
RBC # BLD AUTO: 3.12 M/UL (ref 4–5.4)
SATURATED IRON: 17 % (ref 20–50)
SODIUM SERPL-SCNC: 141 MMOL/L (ref 136–145)
TOTAL IRON BINDING CAPACITY: 327 UG/DL (ref 250–450)
TRANSFERRIN SERPL-MCNC: 221 MG/DL (ref 200–375)
WBC # BLD AUTO: 3.46 K/UL (ref 3.9–12.7)
WBC # BLD AUTO: 3.86 K/UL (ref 3.9–12.7)

## 2024-08-09 PROCEDURE — 85025 COMPLETE CBC W/AUTO DIFF WBC: CPT | Performed by: INTERNAL MEDICINE

## 2024-08-09 PROCEDURE — 36415 COLL VENOUS BLD VENIPUNCTURE: CPT | Mod: PO | Performed by: INTERNAL MEDICINE

## 2024-08-12 LAB
METHYLMALONATE SERPL-SCNC: 0.17 UMOL/L
ZINC SERPL-MCNC: 95 UG/DL (ref 60–130)

## 2024-08-14 DIAGNOSIS — I87.2 VENOUS (PERIPHERAL) INSUFFICIENCY: ICD-10-CM

## 2024-08-14 DIAGNOSIS — I83.891 BLEEDING FROM VARICOSE VEINS OF LOWER EXTREMITY, RIGHT: Primary | ICD-10-CM

## 2024-08-14 LAB — COPPER SERPL-MCNC: 1264 UG/L (ref 810–1990)

## 2024-08-16 ENCOUNTER — TELEPHONE (OUTPATIENT)
Dept: HEMATOLOGY/ONCOLOGY | Facility: CLINIC | Age: 74
End: 2024-08-16
Payer: MEDICARE

## 2024-08-16 NOTE — TELEPHONE ENCOUNTER
----- Message from Vega Arshad MD sent at 8/14/2024  2:32 PM CDT -----  Please let her know labs are stable and we will follow up next visit  ----- Message -----  From: [facility] [employer] [facility]  Sent: 8/7/2024   3:26 PM CDT  To: Vega Arshad MD

## 2024-08-23 ENCOUNTER — TELEPHONE (OUTPATIENT)
Dept: FAMILY MEDICINE | Facility: CLINIC | Age: 74
End: 2024-08-23
Payer: MEDICARE

## 2024-08-23 NOTE — TELEPHONE ENCOUNTER
----- Message from Gideon France MD sent at 8/21/2024 10:00 PM CDT -----  Please call the patient regarding the following results:    Patient has stable anemia. We will recheck labs in a few months.    Let me know if you have any questions or concerns.

## 2024-08-28 ENCOUNTER — TELEPHONE (OUTPATIENT)
Dept: VASCULAR SURGERY | Facility: CLINIC | Age: 74
End: 2024-08-28

## 2024-08-28 ENCOUNTER — OFFICE VISIT (OUTPATIENT)
Dept: VASCULAR SURGERY | Facility: CLINIC | Age: 74
End: 2024-08-28
Payer: MEDICARE

## 2024-08-28 VITALS
BODY MASS INDEX: 24.38 KG/M2 | DIASTOLIC BLOOD PRESSURE: 85 MMHG | HEART RATE: 79 BPM | HEIGHT: 62 IN | SYSTOLIC BLOOD PRESSURE: 138 MMHG | WEIGHT: 132.5 LBS

## 2024-08-28 DIAGNOSIS — I87.2 VENOUS (PERIPHERAL) INSUFFICIENCY: Primary | ICD-10-CM

## 2024-08-28 PROCEDURE — 99999 PR PBB SHADOW E&M-EST. PATIENT-LVL III: CPT | Mod: PBBFAC,,, | Performed by: SURGERY

## 2024-08-28 NOTE — LETTER
August 28, 2024        [facility]             [facility]  [address]   [place] [address]  Phone: [phone]  Fax: [phone]   Patient: Alba M Reyes   MR Number: [id number]   YOB: 1950   Date of Visit: 8/28/2024       Dear  Staff:    Thank you for referring Alba Reyes to me for evaluation. Below are the relevant portions of my assessment and plan of care.            If you have questions, please do not hesitate to call me. I look forward to following Nichole along with you.    Sincerely,      Yunior Cantor MD           CC    No Recipients

## 2024-08-28 NOTE — PROGRESS NOTES
Yunior Cantor MD, RPVI                                 [facility]                                                        08/28/2024    HPI:  Alba M Reyes is a 73 y.o. female with   Patient Active Problem List   Diagnosis    Right upper quadrant pain    Acute cholecystitis due to biliary calculus    Incarcerated umbilical hernia    Lack of coordination    Posture abnormality    Weakness of both hips    GERD (gastroesophageal reflux disease)    Hypovitaminosis D    Mixed incontinence    Osteopenia    Osteoporosis    Rheumatoid arthritis    Varicose veins of legs    Lumbar spondylosis    Lumbar radiculopathy    DDD (degenerative disc disease), lumbar    Osteoarthritis of left hip    Clonal cytopenia of undetermined significance (CCUS)    Pancytopenia    Thrombocytopenia, unspecified    being managed by PCP and specialists who is here today for evaluation of BLE pain and edema.  Patient states location is BLE from knee distally occurring for 5 yrs.  Associated signs and symptoms include discoloration, varicose veins and spider veins.  Quality is throbbing and severity is 9/10.  Symptoms began 2015.  Alleviating factors include antibiotics that she was started on last week.  Worsening factors include high Na diet.  Patient is not wearing compression stockings daily.  No FH of venous disease.  Symptoms do not limit patient's functional status and daily activities.  No DVT history.  No venous interventions.  No low sodium diet.  No excessive water intake.    Migraine with aura: no  PFO/ASD/right to left shunt: no  Pregnant: No  Breastfeeding: No    no MI  no Stroke  Tobacco use: denies    8/2024: c/o BLE edema and pain despite compression and elevation > 3 mo.  Severe pain LLE and bleeding varicose vein.    Past Medical History:   Diagnosis Date    Arthritis     Hypertension      Past Surgical History:   Procedure Laterality Date    EPIDURAL STEROID INJECTION Left 3/22/2023    Procedure: Left  L5 + S1 Transforaminal Epidural Steroid Injections;  Surgeon: Jim Hector Jr., MD;  Location: [facility] ENDO;  Service: Pain Management;  Laterality: Left;  @1330  No ATC or DM    INJECTION Left 12/19/2022    Procedure: INJECTION, LEFT HIP INTRA-ARTICULAR STEROID CONTRAST DIRECT REF  confirmed;  Surgeon: Hailey Valladares MD;  Location: [facility] PAIN MGT;  Service: Pain Management;  Laterality: Left;     No family history on file.  Social History     Socioeconomic History    Marital status:    Tobacco Use    Smoking status: Never    Smokeless tobacco: Never   Substance and Sexual Activity    Alcohol use: Not Currently    Drug use: Not Currently    Sexual activity: Not Currently       Current Outpatient Medications:     acetaminophen (TYLENOL) 500 MG tablet, Take 1 tablet (500 mg total) by mouth every 6 (six) hours as needed for Pain (As needed for mild-to-moderate pain)., Disp: 30 tablet, Rfl: 0    baclofen (LIORESAL) 5 mg Tab tablet, Take 1 tablet (5 mg total) by mouth 2 (two) times daily as needed., Disp: 60 tablet, Rfl: 1    cetirizine (ZYRTEC) 10 MG tablet, Take 10 mg by mouth., Disp: , Rfl:     diclofenac sodium (VOLTAREN) 1 % Gel, Apply 2 g topically 4 (four) times daily as needed (Apply to painful area 4 times a day as needed for pain)., Disp: 200 g, Rfl: 0    DULoxetine (CYMBALTA) 20 MG capsule, Take 2 capsules (40 mg total) by mouth once daily., Disp: 60 capsule, Rfl: 0    ferrous sulfate (FEOSOL) 325 mg (65 mg iron) Tab tablet, Take 1 tablet (325 mg total) by mouth once daily., Disp: 90 tablet, Rfl: 1    gabapentin (NEURONTIN) 300 MG capsule, Take 2 capsules (600 mg total) by mouth every evening., Disp: 60 capsule, Rfl: 11    hydroxychloroquine (PLAQUENIL) 200 mg tablet, TAKE 1 & 1/2 (ONE & ONE-HALF) TABLETS BY MOUTH NIGHTLY, Disp: 45 tablet, Rfl: 1    ibuprofen (ADVIL,MOTRIN) 600 MG tablet, Take 1 tablet (600 mg total) by mouth every 8 (eight) hours as needed for Pain (Take with food as needed  for mild-to-moderate pain)., Disp: 60 tablet, Rfl: 0    ibuprofen (ADVIL,MOTRIN) 800 MG tablet, Take 800 mg by mouth 2 (two) times daily as needed., Disp: , Rfl:     ketoconazole (NIZORAL) 2 % cream, APPLY TO AFFECTED AREA ON FEET TWICE DAILY UNTIL CLEAR, Disp: , Rfl:     lisinopriL (PRINIVIL,ZESTRIL) 20 MG tablet, lisinopril Take No date recorded No form recorded No frequency recorded No route recorded No set duration recorded No set duration amount recorded active No dosage strength recorded No dosage strength units of measure recorded, Disp: , Rfl:     lovastatin (MEVACOR) 10 MG tablet, lovastatin Take No date recorded No form recorded No frequency recorded No route recorded No set duration recorded No set duration amount recorded active No dosage strength recorded No dosage strength units of measure recorded, Disp: , Rfl:     memantine (NAMENDA) 5 MG Tab, Take 1 tablet twice a day by oral route for 30 days., Disp: , Rfl:     memantine (NAMENDA) 7 mg CSpX, Namenda Take No date recorded No form recorded No frequency recorded No route recorded No set duration recorded No set duration amount recorded active No dosage strength recorded No dosage strength units of measure recorded, Disp: , Rfl:     methocarbamoL (ROBAXIN) 500 MG Tab, Take 1,000 mg by mouth., Disp: , Rfl:     methotrexate 25 mg/mL injection, Inject 1 mL (25 mg total) into the skin every 7 days., Disp: 4 mL, Rfl: 11    omega-3 acid ethyl esters (LOVAZA) 1 gram capsule, Take 1 capsule by mouth once daily., Disp: , Rfl:     penicillin v potassium (VEETID) 500 MG tablet, Take 500 mg by mouth every 6 (six) hours., Disp: , Rfl:     predniSONE (DELTASONE) 5 MG tablet, Take 5 mg by mouth., Disp: , Rfl:     triamcinolone acetonide 0.1% (KENALOG) 0.1 % cream, APPLY CREAM EXTERNALLY TO AFFECTED AREA OF LEGS TWICE DAILY AS NEEDED FOR FLARES, Disp: , Rfl:     folic acid (FOLVITE) 1 MG tablet, Take 1 tablet (1 mg total) by mouth once daily., Disp: 30 tablet, Rfl:  "4    REVIEW OF SYSTEMS:  General: No fevers or chills; ENT: No sore throat; Allergy and Immunology: no persistent infections; Hematological and Lymphatic: No history of bleeding or easy bruising; Endocrine: negative; Respiratory: no cough, shortness of breath, or wheezing; Cardiovascular: no chest pain or dyspnea on exertion; Gastrointestinal: no abdominal pain/back, change in bowel habits, or bloody stools; Genito-Urinary: no dysuria, trouble voiding, or hematuria; Musculoskeletal: pain and edema; Neurological: no TIA or stroke symptoms; Psychiatric: no nervousness, anxiety or depression.    PHYSICAL EXAM:      Pulse: 79         General appearance:  Alert, well-appearing, and in no distress.  Oriented to person, place, and time                    Neurological: Normal speech, no focal findings noted; CN II - XII grossly intact. RLE with sensation to light touch, LLE with sensation to light touch.            Musculoskeletal: Digits/nail without cyanosis/clubbing.  Strength 5/5 BLE.                    Neck: Supple, no significant adenopathy                  Chest:  No wheezes, symmetric air entry. No use of accessory muscles               Cardiac: Normal rate and regular rhythm            Abdomen: Soft, nontender, nondistended      Extremities:   2+ R DP pulse, 2+ L DP pulse      2+ RLE edema, 2+ LLE edema    Skin:  RLE no ulcer; LLE no ulcer      RLE + spider veins, LLE + spider veins      RLE + varicose veins, LLE + varicose veins    CEAP 3/3    VCSS 5    LAB RESULTS:  No results found for: "CBC"  Lab Results   Component Value Date    LABPROT 11.8 08/10/2023    INR 1.0 08/10/2023     Lab Results   Component Value Date     08/08/2024    K 3.5 08/08/2024     08/08/2024    CO2 22 (L) 08/08/2024    GLU 89 08/08/2024    BUN 16 08/08/2024    CREATININE 0.7 08/08/2024    CALCIUM 9.4 08/08/2024    ANIONGAP 12 08/08/2024    EGFRNONAA >60 07/08/2022     Lab Results   Component Value Date    WBC 3.86 (L) 08/09/2024 "    RBC 2.99 (L) 08/09/2024    HGB 9.7 (L) 08/09/2024    HCT 30.8 (L) 08/09/2024     (H) 08/09/2024    MCH 32.4 (H) 08/09/2024    MCHC 31.5 (L) 08/09/2024    RDW 15.4 (H) 08/09/2024     (L) 08/09/2024    MPV SEE COMMENT 08/09/2024    GRAN 1.1 (L) 08/09/2024    GRAN 27.7 (L) 08/09/2024    LYMPH 1.9 08/09/2024    LYMPH 48.2 (H) 08/09/2024    MONO 0.8 08/09/2024    MONO 21.8 (H) 08/09/2024    EOS 0.0 08/09/2024    BASO 0.03 08/09/2024    EOSINOPHIL 1.0 08/09/2024    BASOPHIL 0.8 08/09/2024    DIFFMETHOD Automated 08/09/2024     .  Lab Results   Component Value Date    HGBA1C 5.0 05/02/2024       IMAGING:  All pertinent imaging has been reviewed and interpreted independently.    Venous US 1/3/20 Impression:  Right lower extremity venous ultrasound shows greater saphenous vein reflux from SFJ to knee.  There is no lesser saphenous vein reflux.  There is no anterior accessory saphenous venous reflux.  There is no DVT.  Left lower extremity venous ultrasound shows greater saphenous vein reflux from SFJ to knee.  There is no lesser saphenous vein reflux.  There is no anterior accessory saphenous venous reflux.  There is no DVT.    8/2024     FINDINGS:  Note that bilateral lower extremity DVT studies were performed on 07/12/2024.     The greater saphenous and lesser saphenous veins were evaluated bilaterally for reflux using both Valsalva and standing maneuvers.     The right greater saphenous vein measures up to 9.1 mm in diameter at the saphenofemoral junction.  There is significant venous reflux identified within the right greater saphenous vein lasting 2135 milliseconds at the saphenofemoral junction, 1931 milliseconds within the mid thigh, 1945 milliseconds within the distal thigh, 3469 milliseconds at the level of the knee.  There are multiple superficial varices originating from the greater saphenous vein within the right calf with persistent venous reflux lasting 1705 milliseconds within 1 of the varices  at the right calf.  The right lesser saphenous vein measures 2.8 mm in diameter at the knee.  No significant reflux is identified within the right lesser saphenous vein.     The left greater saphenous vein measures up to 9.4 mm in diameter at the saphenofemoral junction.  There is evidence for significant reflux within the left greater saphenous vein lasting 1805 milliseconds at the level of the saphenofemoral junction, 2531 milliseconds within the mid thigh, 2104 milliseconds within the distal thigh, 1711 milliseconds at the level of the knee and 1398 milliseconds within the calf.  Multiple varices are identified within the left calf.  The left lesser saphenous vein measures 2.0 mm in diameter at the knee.  No evidence for significant reflux within the left lesser saphenous vein.     Impression:     Dilated greater saphenous veins bilaterally.  There is venous reflux identified throughout the visualized greater saphenous veins bilaterally as detailed above.     Lesser saphenous veins appear normal in caliber with no significant venous reflux identified.     Multiple superficial varices identified within both calves.         IMP/PLAN:  73 y.o. female with   Patient Active Problem List   Diagnosis    Right upper quadrant pain    Acute cholecystitis due to biliary calculus    Incarcerated umbilical hernia    Lack of coordination    Posture abnormality    Weakness of both hips    GERD (gastroesophageal reflux disease)    Hypovitaminosis D    Mixed incontinence    Osteopenia    Osteoporosis    Rheumatoid arthritis    Varicose veins of legs    Lumbar spondylosis    Lumbar radiculopathy    DDD (degenerative disc disease), lumbar    Osteoarthritis of left hip    Clonal cytopenia of undetermined significance (CCUS)    Pancytopenia    Thrombocytopenia, unspecified    being managed by PCP and specialists who is here today for evaluation of BLE edema and pain.    -rec L GSV EVLT - urgent due to pain and bleeding varicose  vein  -Future R GSV EVLT  -cont compression with Rx stockings, elevation, dietary changes associated with water and sodium intake discussed at length with patient  -Exercise     I spent 15 minutes evaluating this patient and greater than 50% of the time was spent counseling, coordinator care and discussing the plan of care.  All questions were answered and patient stated understanding with agreement with the above treatment plan.    Yunior Cantor MD [facility]  Vascular and Endovascular Surgery

## 2024-08-30 DIAGNOSIS — I87.2 VENOUS (PERIPHERAL) INSUFFICIENCY: Primary | ICD-10-CM

## 2024-08-30 DIAGNOSIS — I83.891 BLEEDING FROM VARICOSE VEINS OF LOWER EXTREMITY, RIGHT: ICD-10-CM

## 2024-09-05 ENCOUNTER — TELEPHONE (OUTPATIENT)
Dept: VASCULAR SURGERY | Facility: CLINIC | Age: 74
End: 2024-09-05
Payer: MEDICARE

## 2024-09-13 DIAGNOSIS — I80.02 PHLEBITIS AND THROMBOPHLEBITIS OF SUPERFICIAL VESSELS OF LEFT LOWER EXTREMITY: ICD-10-CM

## 2024-09-13 DIAGNOSIS — F41.9 ANXIETY DUE TO INVASIVE PROCEDURE: ICD-10-CM

## 2024-09-13 DIAGNOSIS — G89.18 PAIN ASSOCIATED WITH SURGICAL PROCEDURE: Primary | ICD-10-CM

## 2024-09-13 DIAGNOSIS — Z98.890 STATUS POST LASER ABLATION OF INCOMPETENT VEIN: Primary | ICD-10-CM

## 2024-09-13 RX ORDER — MELOXICAM 7.5 MG/1
7.5 TABLET ORAL 2 TIMES DAILY
Qty: 14 TABLET | Refills: 0 | Status: SHIPPED | OUTPATIENT
Start: 2024-09-13 | End: 2024-09-20

## 2024-09-13 RX ORDER — ALPRAZOLAM 0.5 MG/1
TABLET ORAL
Qty: 2 TABLET | Refills: 0 | Status: SHIPPED | OUTPATIENT
Start: 2024-09-13

## 2024-09-20 ENCOUNTER — PROCEDURE VISIT (OUTPATIENT)
Dept: VASCULAR SURGERY | Facility: CLINIC | Age: 74
End: 2024-09-20
Payer: MEDICARE

## 2024-09-20 VITALS
DIASTOLIC BLOOD PRESSURE: 54 MMHG | RESPIRATION RATE: 16 BRPM | OXYGEN SATURATION: 96 % | HEART RATE: 71 BPM | SYSTOLIC BLOOD PRESSURE: 115 MMHG

## 2024-09-20 DIAGNOSIS — I87.2 VENOUS (PERIPHERAL) INSUFFICIENCY: ICD-10-CM

## 2024-09-20 DIAGNOSIS — Z91.89 AT HIGH RISK FOR PAIN FROM PROCEDURE: Primary | ICD-10-CM

## 2024-09-20 DIAGNOSIS — I83.891 BLEEDING FROM VARICOSE VEINS OF LOWER EXTREMITY, RIGHT: ICD-10-CM

## 2024-09-20 RX ORDER — LIDOCAINE HYDROCHLORIDE 10 MG/ML
5 INJECTION, SOLUTION INFILTRATION; PERINEURAL
Status: SHIPPED | OUTPATIENT
Start: 2024-09-20

## 2024-09-20 NOTE — PROCEDURES
Nichole MANZO Reyes  09/20/2024     Pre-Procedure Diagnosis: Left greater saphenous vein reflux; significant superficial venous insufficiency    Post-Procedure Diagnsosis: Same    Procedure: Laser endovenous ablation of the left greater saphenous vein    Surgeon: Yunior Cantor MD    Anesthesia: Local    The skin of the leg was prepped and draped in sterile fashion.  Ultrasound-guidance was used throughout the procedure with a portable duplex ultrasound machine.  The GSV was cannulated using a micro-puncture system.  An 0.035 wire J-tip followed by a 4-Fr Angiodynamics sheath was placed throughout the GSV.   Using tumescent anesthesia, the entire sheathed portion of the GSV was anesthesized keeping the vein at least one cm from the skin; Klein pump was used.  Position of the tip of the laser was then reconfirmed to be approximately 2 cm from the saphenofemoral junction.  The 1470 nm laser was then activated and the entire length of the vein was treated at ~ 49 J/cm.  The fiber and sheath were then removed intact.  Duplex confirmed occlusion of the GSV with continued patency of the common femoral vein.   The incision was closed with Steri-strips.   Sterile compression dressings and a compression stocking were applied and the patient was discharged to home in a satisfactory condition.    Cannulation site: calf    Sheath length: 42 cm    Perez of power: 7 W    Laser time: 293 sec    Joules: 2055.9 J             Yunior Cantor MD   Vascular & Endovascular Surgery

## 2024-09-23 ENCOUNTER — HOSPITAL ENCOUNTER (OUTPATIENT)
Dept: RADIOLOGY | Facility: HOSPITAL | Age: 74
Discharge: HOME | End: 2024-09-23
Attending: SURGERY
Payer: MEDICARE

## 2024-09-23 DIAGNOSIS — I80.02 PHLEBITIS AND THROMBOPHLEBITIS OF SUPERFICIAL VESSELS OF LEFT LOWER EXTREMITY: ICD-10-CM

## 2024-09-23 DIAGNOSIS — Z98.890 STATUS POST LASER ABLATION OF INCOMPETENT VEIN: ICD-10-CM

## 2024-09-23 PROCEDURE — 93971 EXTREMITY STUDY: CPT | Mod: 26,LT,, | Performed by: RADIOLOGY

## 2024-09-23 PROCEDURE — 93971 EXTREMITY STUDY: CPT | Mod: TC,LT

## 2024-10-02 DIAGNOSIS — M16.12 OSTEOARTHRITIS OF LEFT HIP, UNSPECIFIED OSTEOARTHRITIS TYPE: Primary | ICD-10-CM

## 2024-10-31 ENCOUNTER — OFFICE VISIT (OUTPATIENT)
Dept: ORTHOPEDICS | Facility: CLINIC | Age: 74
End: 2024-10-31
Attending: ORTHOPAEDIC SURGERY
Payer: MEDICARE

## 2024-10-31 VITALS — HEIGHT: 62 IN | WEIGHT: 132.5 LBS | BODY MASS INDEX: 24.38 KG/M2

## 2024-10-31 DIAGNOSIS — M16.12 OSTEOARTHRITIS OF LEFT HIP, UNSPECIFIED OSTEOARTHRITIS TYPE: Primary | ICD-10-CM

## 2024-10-31 PROCEDURE — 3008F BODY MASS INDEX DOCD: CPT | Mod: CPTII,S$GLB,, | Performed by: ORTHOPAEDIC SURGERY

## 2024-10-31 PROCEDURE — 99999 PR PBB SHADOW E&M-EST. PATIENT-LVL III: CPT | Mod: PBBFAC,,, | Performed by: ORTHOPAEDIC SURGERY

## 2024-10-31 PROCEDURE — 1101F PT FALLS ASSESS-DOCD LE1/YR: CPT | Mod: CPTII,S$GLB,, | Performed by: ORTHOPAEDIC SURGERY

## 2024-10-31 PROCEDURE — 1159F MED LIST DOCD IN RCRD: CPT | Mod: CPTII,S$GLB,, | Performed by: ORTHOPAEDIC SURGERY

## 2024-10-31 PROCEDURE — 99213 OFFICE O/P EST LOW 20 MIN: CPT | Mod: S$GLB,,, | Performed by: ORTHOPAEDIC SURGERY

## 2024-10-31 PROCEDURE — 3044F HG A1C LEVEL LT 7.0%: CPT | Mod: CPTII,S$GLB,, | Performed by: ORTHOPAEDIC SURGERY

## 2024-10-31 PROCEDURE — 3288F FALL RISK ASSESSMENT DOCD: CPT | Mod: CPTII,S$GLB,, | Performed by: ORTHOPAEDIC SURGERY

## 2024-10-31 PROCEDURE — 1125F AMNT PAIN NOTED PAIN PRSNT: CPT | Mod: CPTII,S$GLB,, | Performed by: ORTHOPAEDIC SURGERY

## 2024-11-07 ENCOUNTER — LAB VISIT (OUTPATIENT)
Dept: LAB | Facility: HOSPITAL | Age: 74
End: 2024-11-07
Attending: NURSE PRACTITIONER
Payer: MEDICARE

## 2024-11-07 DIAGNOSIS — D61.818 PANCYTOPENIA: ICD-10-CM

## 2024-11-07 DIAGNOSIS — M06.9 RHEUMATOID ARTHRITIS, INVOLVING UNSPECIFIED SITE, UNSPECIFIED WHETHER RHEUMATOID FACTOR PRESENT: ICD-10-CM

## 2024-11-07 LAB
BASOPHILS # BLD AUTO: 0.04 K/UL (ref 0–0.2)
BASOPHILS NFR BLD: 1.2 % (ref 0–1.9)
DIFFERENTIAL METHOD BLD: ABNORMAL
EOSINOPHIL # BLD AUTO: 0.1 K/UL (ref 0–0.5)
EOSINOPHIL NFR BLD: 2.7 % (ref 0–8)
ERYTHROCYTE [DISTWIDTH] IN BLOOD BY AUTOMATED COUNT: 15.1 % (ref 11.5–14.5)
HCT VFR BLD AUTO: 31.7 % (ref 37–48.5)
HGB BLD-MCNC: 10.4 G/DL (ref 12–16)
IMM GRANULOCYTES # BLD AUTO: 0.03 K/UL (ref 0–0.04)
IMM GRANULOCYTES NFR BLD AUTO: 0.9 % (ref 0–0.5)
LYMPHOCYTES # BLD AUTO: 1.4 K/UL (ref 1–4.8)
LYMPHOCYTES NFR BLD: 41.7 % (ref 18–48)
MCH RBC QN AUTO: 33.3 PG (ref 27–31)
MCHC RBC AUTO-ENTMCNC: 32.8 G/DL (ref 32–36)
MCV RBC AUTO: 102 FL (ref 82–98)
MONOCYTES # BLD AUTO: 0.8 K/UL (ref 0.3–1)
MONOCYTES NFR BLD: 23.6 % (ref 4–15)
NEUTROPHILS # BLD AUTO: 1 K/UL (ref 1.8–7.7)
NEUTROPHILS NFR BLD: 29.9 % (ref 38–73)
NRBC BLD-RTO: 0 /100 WBC
PLATELET # BLD AUTO: 66 K/UL (ref 150–450)
PMV BLD AUTO: 12.7 FL (ref 9.2–12.9)
RBC # BLD AUTO: 3.12 M/UL (ref 4–5.4)
WBC # BLD AUTO: 3.31 K/UL (ref 3.9–12.7)

## 2024-11-07 PROCEDURE — 85025 COMPLETE CBC W/AUTO DIFF WBC: CPT | Performed by: STUDENT IN AN ORGANIZED HEALTH CARE EDUCATION/TRAINING PROGRAM

## 2024-11-07 PROCEDURE — 36415 COLL VENOUS BLD VENIPUNCTURE: CPT | Mod: PO | Performed by: STUDENT IN AN ORGANIZED HEALTH CARE EDUCATION/TRAINING PROGRAM

## 2024-11-08 ENCOUNTER — OFFICE VISIT (OUTPATIENT)
Dept: HEMATOLOGY/ONCOLOGY | Facility: CLINIC | Age: 74
End: 2024-11-08
Payer: MEDICARE

## 2024-11-08 ENCOUNTER — OFFICE VISIT (OUTPATIENT)
Dept: FAMILY MEDICINE | Facility: CLINIC | Age: 74
End: 2024-11-08
Payer: MEDICARE

## 2024-11-08 VITALS
HEART RATE: 71 BPM | WEIGHT: 131.75 LBS | SYSTOLIC BLOOD PRESSURE: 154 MMHG | DIASTOLIC BLOOD PRESSURE: 82 MMHG | OXYGEN SATURATION: 97 % | TEMPERATURE: 98 F | BODY MASS INDEX: 24.24 KG/M2 | HEIGHT: 62 IN

## 2024-11-08 VITALS
HEIGHT: 62 IN | DIASTOLIC BLOOD PRESSURE: 74 MMHG | BODY MASS INDEX: 24.14 KG/M2 | OXYGEN SATURATION: 97 % | SYSTOLIC BLOOD PRESSURE: 144 MMHG | WEIGHT: 131.19 LBS | HEART RATE: 71 BPM

## 2024-11-08 DIAGNOSIS — D61.818 PANCYTOPENIA: Primary | ICD-10-CM

## 2024-11-08 DIAGNOSIS — I10 ESSENTIAL HYPERTENSION: ICD-10-CM

## 2024-11-08 DIAGNOSIS — D69.6 THROMBOCYTOPENIA, UNSPECIFIED: ICD-10-CM

## 2024-11-08 DIAGNOSIS — Z12.31 ENCOUNTER FOR SCREENING MAMMOGRAM FOR MALIGNANT NEOPLASM OF BREAST: ICD-10-CM

## 2024-11-08 DIAGNOSIS — R35.0 URINARY FREQUENCY: Primary | ICD-10-CM

## 2024-11-08 DIAGNOSIS — M06.9 RHEUMATOID ARTHRITIS, INVOLVING UNSPECIFIED SITE, UNSPECIFIED WHETHER RHEUMATOID FACTOR PRESENT: ICD-10-CM

## 2024-11-08 DIAGNOSIS — N39.46 MIXED INCONTINENCE: ICD-10-CM

## 2024-11-08 DIAGNOSIS — Z71.2 ENCOUNTER TO DISCUSS TEST RESULTS: ICD-10-CM

## 2024-11-08 DIAGNOSIS — D75.9 CLONAL CYTOPENIA OF UNDETERMINED SIGNIFICANCE (CCUS): ICD-10-CM

## 2024-11-08 LAB
BILIRUB UR QL STRIP: NEGATIVE
CLARITY UR REFRACT.AUTO: CLEAR
COLOR UR AUTO: YELLOW
GLUCOSE UR QL STRIP: NEGATIVE
HGB UR QL STRIP: NEGATIVE
KETONES UR QL STRIP: NEGATIVE
LEUKOCYTE ESTERASE UR QL STRIP: NEGATIVE
NITRITE UR QL STRIP: NEGATIVE
PH UR STRIP: 6 [PH] (ref 5–8)
PROT UR QL STRIP: ABNORMAL
SP GR UR STRIP: 1.03 (ref 1–1.03)
URN SPEC COLLECT METH UR: ABNORMAL

## 2024-11-08 PROCEDURE — 1101F PT FALLS ASSESS-DOCD LE1/YR: CPT | Mod: CPTII,S$GLB,,

## 2024-11-08 PROCEDURE — 1126F AMNT PAIN NOTED NONE PRSNT: CPT | Mod: CPTII,S$GLB,,

## 2024-11-08 PROCEDURE — 3044F HG A1C LEVEL LT 7.0%: CPT | Mod: CPTII,S$GLB,,

## 2024-11-08 PROCEDURE — 3008F BODY MASS INDEX DOCD: CPT | Mod: CPTII,S$GLB,,

## 2024-11-08 PROCEDURE — 3288F FALL RISK ASSESSMENT DOCD: CPT | Mod: CPTII,S$GLB,,

## 2024-11-08 PROCEDURE — 99999 PR PBB SHADOW E&M-EST. PATIENT-LVL IV: CPT | Mod: PBBFAC,,, | Performed by: STUDENT IN AN ORGANIZED HEALTH CARE EDUCATION/TRAINING PROGRAM

## 2024-11-08 PROCEDURE — 99213 OFFICE O/P EST LOW 20 MIN: CPT | Mod: S$GLB,,,

## 2024-11-08 PROCEDURE — 3077F SYST BP >= 140 MM HG: CPT | Mod: CPTII,S$GLB,,

## 2024-11-08 PROCEDURE — 3079F DIAST BP 80-89 MM HG: CPT | Mod: CPTII,S$GLB,,

## 2024-11-08 PROCEDURE — 81003 URINALYSIS AUTO W/O SCOPE: CPT

## 2024-11-08 PROCEDURE — 99999 PR PBB SHADOW E&M-EST. PATIENT-LVL V: CPT | Mod: PBBFAC,,,

## 2024-11-08 NOTE — PROGRESS NOTES
Hematology- Oncology Clinic Note :     11/8/2024    Chief Complaint   Patient presents with    Follow-up     pancytopenia     Kosovan trnaslator used for this encoutner    HPI  Pt is a 74 y.o. female who  has a past medical history of Arthritis and Hypertension. Who presents to establish care for pancytopenia/CCUS noted on bmbx last year.  Pt has no new issues and only complaint art time of exam is chronic joint pain.  Pt has been off RA tx/MTX since last Nov due to concerns for pancytopenia but takes ibuprofen regularly.  BMBX results reviewed and mildly hypercellular for age and NGS demonstrated CCUS but no other abnormalities seen.  Pt informed it is most likely caused by a combination of age, co morbidities and medications.        Interval hx:      No new issues at time of exam.  Pt off mtx but on/off plaquenil.  Will monitor in case CBC worsens and re marrow if concerns for MDS developing.  Pt agreeable to plan and all questions answered to her satisfaction.        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    Osteoarthritis of left hip 12/02/2022    Lumbar spondylosis 08/08/2022    Lumbar radiculopathy 08/08/2022    DDD (degenerative disc disease), lumbar 08/08/2022    Lack of coordination 03/02/2020    Posture abnormality 03/02/2020    Weakness of both hips 03/02/2020    GERD (gastroesophageal reflux disease) 04/15/2019    Osteoporosis 04/15/2019    Mixed incontinence 03/20/2019    Varicose veins of legs 11/20/2018    Incarcerated umbilical hernia 01/03/2017    Right upper quadrant pain 12/29/2016    Acute cholecystitis due to biliary calculus 12/29/2016    Hypovitaminosis D 03/27/2014    Osteopenia 03/27/2014    Rheumatoid arthritis 03/27/2014     Overview:   Removed deleted/inactive dx from new diagnosis load  Overview:   ICD10 update         Patient  Active Problem List    Diagnosis Date Noted    Pancytopenia 04/25/2024    Thrombocytopenia, unspecified 04/25/2024    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    Osteoarthritis of left hip 12/02/2022    Lumbar spondylosis 08/08/2022    Lumbar radiculopathy 08/08/2022    DDD (degenerative disc disease), lumbar 08/08/2022    Lack of coordination 03/02/2020    Posture abnormality 03/02/2020    Weakness of both hips 03/02/2020    GERD (gastroesophageal reflux disease) 04/15/2019    Osteoporosis 04/15/2019    Mixed incontinence 03/20/2019    Varicose veins of legs 11/20/2018    Incarcerated umbilical hernia 01/03/2017    Right upper quadrant pain 12/29/2016    Acute cholecystitis due to biliary calculus 12/29/2016    Hypovitaminosis D 03/27/2014    Osteopenia 03/27/2014    Rheumatoid arthritis 03/27/2014     Past Medical History:   Diagnosis Date    Arthritis     Hypertension       Past Surgical History:   Procedure Laterality Date    EPIDURAL STEROID INJECTION Left 3/22/2023    Procedure: Left L5 + S1 Transforaminal Epidural Steroid Injections;  Surgeon: Jim Hector Jr., MD;  Location: [facility] ENDO;  Service: Pain Management;  Laterality: Left;  @1330  No ATC or DM    INJECTION Left 12/19/2022    Procedure: INJECTION, LEFT HIP INTRA-ARTICULAR STEROID CONTRAST DIRECT REF  confirmed;  Surgeon: Hailey Valladares MD;  [facility];  Service: Pain Management;  Laterality: Left;      (Not in a hospital admission)    Review of patient's allergies indicates:  No Known Allergies   Social History     Tobacco Use    Smoking status: Never    Smokeless tobacco: Never   Substance Use Topics    Alcohol use: Not Currently      No family history on file.     Review of Systems :  Review of Systems   Constitutional:  Negative for fever, malaise/fatigue and weight loss.   HENT:  Negative for hearing loss.    Eyes:  Negative for blurred vision and discharge.   Respiratory:  Negative for cough and  shortness of breath.    Cardiovascular:  Negative for chest pain and leg swelling.   Gastrointestinal:  Negative for abdominal pain, blood in stool, constipation, diarrhea, heartburn, melena, nausea and vomiting.   Genitourinary:  Negative for dysuria.   Musculoskeletal:  Positive for joint pain and myalgias.   Skin:  Negative for itching and rash.   Neurological:  Negative for dizziness and focal weakness.   Endo/Heme/Allergies:  Does not bruise/bleed easily.   Psychiatric/Behavioral:  Negative for depression. The patient is nervous/anxious.        Physical Exam :  Wt Readings from Last 3 Encounters:   11/08/24 59.5 kg (131 lb 2.8 oz)   11/08/24 59.8 kg (131 lb 11.6 oz)   10/31/24 60.1 kg (132 lb 7.9 oz)     Temp Readings from Last 3 Encounters:   11/08/24 97.8 °F (36.6 °C) (Other (see comments))   08/08/24 98.9 °F (37.2 °C) (Oral)   08/06/24 97.9 °F (36.6 °C) (Oral)     BP Readings from Last 3 Encounters:   11/08/24 (!) 144/74   11/08/24 (!) 154/82   09/20/24 (!) 115/54     Pulse Readings from Last 3 Encounters:   11/08/24 71   11/08/24 71   09/20/24 71     Body mass index is 23.99 kg/m².    Physical Exam  Vitals reviewed.   Constitutional:       Comments: Uses walker   HENT:      Head: Normocephalic and atraumatic.      Nose: Nose normal.      Mouth/Throat:      Mouth: Mucous membranes are moist.   Eyes:      Pupils: Pupils are equal, round, and reactive to light.   Cardiovascular:      Rate and Rhythm: Normal rate and regular rhythm.   Pulmonary:      Effort: Pulmonary effort is normal.      Breath sounds: Normal breath sounds.   Abdominal:      General: Abdomen is flat.   Musculoskeletal:         General: Normal range of motion.      Cervical back: Normal range of motion and neck supple.   Skin:     General: Skin is warm and dry.   Neurological:      Mental Status: She is alert.   Psychiatric:         Mood and Affect: Mood normal.         Behavior: Behavior normal.           Pertinent Diagnostic  studies:    BMBX 08/02/23:    Final Diagnosis    Bone marrow, right iliac crest, aspirate smears and core biopsy, and peripheral blood smear:  - Hypercellular marrow for age (cellularity 50%) with trilineage hematopoiesis, M:E ratio 2.4:1, adequate megakaryocytes with occasional atypical forms and adequate iron stores.     Lymphocytes are a mix of T, B and NK cells with T cells predominating. T cells do not show aberrant loss of any antigens. B cells do not show evidence of monoclonality based on surface light chain expression.     CD34+ blasts are 1.0%.      Molecular Studies ([employer] Myeloid NGS) Report:   SUMMARY  At least one variant of strong clinical significance (Tier I) and one variant of potential clinical significance (Tier II) were detected.   Gene Variant Frequency (%) Clinical Impact   SRSF2 39 Tier I   TET2 47 Tier I   CBL 7 Tier II         See complete scanned linked reports.     ADDENDUM NOTE:  A clonal molecular abnormality is detected. 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 Clinical correlation and follow-up is recommended.        No results found for this or any previous visit (from the past 24 hours).    Assessment/Plan :     Pancytopenia/CCUS  -chronic  -Prior work-up: B12, folate and iron all WNL; Path smear review with mature WBC/no concern for malignancy   -Patient has had anemia since 2014 but pancytopenia appears new since 2021 per record review  -Pt off methotrexate sicne Nov 2023 with mild improvement in pancytopenia, now on plaquenil on/off most likely RA and meds contributing to pancytopenia   -BM biopsy performed 8/10. Flow cytometry showing no clonal population (see above)  -Will cut back on ibuprofen use  -Pt agreeable to  monitoring  Plan:  -Labs only in 2 months CBC, iron, ferritin,Misc immature plt fraction  -CBC in 4 months  -RTC in 6 months for MD visit and CBC day prior      RA  -RA followed by outside rheumatology service  -On plaquenil but infrequently   -Pt takes ibuprofen regularly so will cut back on usage to see if improvement        Time spent on case: 45 minutes    Summary of orders placed this encounter:  Orders Placed This Encounter   Procedures    CBC W/ AUTO DIFFERENTIAL    IRON AND TIBC    Ferritin    RETICULOCYTES    Misc Sendout Test, Blood immature plt fraction       Future Appointments   Date Time Provider Department Center   11/21/2024 11:20 AM NURSE, [facility] MED [place]   11/21/2024 11:45 AM Moises Mckenzie MD [facility]   12/18/2024  2:45 PM Yunior Cantor MD [facility]           Vega Arshad MD   Hematology/oncology, [facility]

## 2024-11-08 NOTE — PROGRESS NOTES
HPI     Alba M Reyes is a 74 y.o. female with multiple medical diagnoses as listed in the medical history and problem list that presents for urinary frequency. PCP is Dr. France with last visit on 8/8/24.   Chief Complaint   Patient presents with    Urinary Frequency       Urinary Frequency   This is a recurrent problem. The current episode started more than 1 month ago. The problem occurs intermittently. The pain is mild. There has been no fever. Associated symptoms include frequency and urgency. Pertinent negatives include no chills, flank pain, hematuria, vomiting or rash. She has tried home medications for the symptoms. The treatment provided mild relief.     Patient reports 6 months of intermittent urinary frequency, urgency. Incontinence of urine at times due to urgency. Notices the urinary urgency more when she's worried about something, feels like she has to run to the bathroom. Loves to drink coffee, usually drinks several cups each day. Endorses drinking lots of water as well. Reports mild suprapubic pain intermittently, associated with frequency.     Assessment & Plan     1. Urinary frequency    Afebrile, VSS in clinic today. Abdomen soft, non-tender to palpation. Denies flank pain or CVA tenderness. Endorses mild suprapubic pain with urgency. Urine dipstick showed trace blood, trace bilirubin in urine. UA results were negative for UTI. Given duration of symptoms, will refer to urology for further evaluation. Encouraged continued hydration with water and avoidance of bladder irritants. Given bladder training program handout and discussed timed voiding. Patient agreeable to plan. Follow up with clinic for any worsening symptoms, or if symptoms fail to improve.       - POCT URINE DIPSTICK WITHOUT MICROSCOPE  - Urinalysis, Reflex to Urine Culture Urine, Clean Catch  - Ambulatory referral/consult to Urology; Future    2. Mixed incontinence    Noted with mainly urge incontinence symptoms; feeling like she  has to suddenly and often leaks urine on her way to the bathroom. Wears pads/depends. UA negative for infection. Discussed trying a bladder training schedule, voiding every two hours even if she doesn't feel like she needs to go. Encouraged hydration with water throughout the day, and stopping an hour or two before bed. Encouraged to avoid bladder irritants. Will refer to urology for further evaluation. Also discussed stress relief measures; Encouraged the patient to perform self-calming techniques, such as deep breathing/relaxation techniques and exercise.    - Ambulatory referral/consult to Urology; Future    3. Essential hypertension    Elevated in clinic today, with recheck of 144/74. Takes lisinopril daily as ordered. Will have patient return in two weeks for nurse BP check. Encouraged to monitor at home in the meantime and keep log of readings.     BP Readings from Last 3 Encounters:   11/08/24 (!) 144/74   11/08/24 (!) 154/82   09/20/24 (!) 115/54     -continue current medication regimen  -DASH diet, regular cardiovascular exercises, portion control  -weight loss  -f/u with BP logs in 2 weeks     4. Rheumatoid arthritis, involving unspecified site, unspecified whether rheumatoid factor present    Stable, follow by rheumatologist. Mainly has pain in her left hip. Takes prednisone daily. Methotrexate once weekly. Takes OTC Artribion for pain PRN.     5. Encounter for screening mammogram for malignant neoplasm of breast    - Mammo Digital Screening Bilat w/ Jordin; Future    Discussed DDx, condition, and treatment.   Education sent to patient portal/included in after visit summary.  ED precautions given.   Notify provider if symptoms do not resolve or increase in severity.   Patient verbalizes understanding and agrees with plan of care.  --------------------------------------------      Health Maintenance:  Health Maintenance         Date Due Completion Date    Shingles Vaccine (1 of 2) Never done ---     Colorectal Cancer Screening Never done ---    RSV Vaccine (Age 60+ and Pregnant patients) (1 - Risk 60-74 years 1-dose series) Never done ---    DEXA Scan 03/14/2024 3/14/2022    Influenza Vaccine (1) 09/01/2024 12/6/2023    COVID-19 Vaccine (4 - 2024-25 season) 09/01/2024 1/7/2022    Mammogram 10/12/2024 10/12/2023    Lipid Panel 05/02/2029 5/2/2024    TETANUS VACCINE 04/11/2033 4/11/2023            Health maintenance reviewed and Mammogram ordered    Follow Up:  No follow-ups on file.    Exam     Review of Systems:  (as noted above)  Review of Systems   Constitutional:  Negative for chills and fever.   HENT:  Negative for trouble swallowing.    Respiratory:  Negative for shortness of breath.    Cardiovascular:  Negative for chest pain.   Gastrointestinal:  Negative for blood in stool and vomiting.   Genitourinary:  Positive for frequency and urgency. Negative for flank pain and hematuria.   Musculoskeletal:  Positive for arthralgias.   Skin:  Negative for rash.       Physical Exam:   Physical Exam  Constitutional:       General: She is not in acute distress.     Appearance: Normal appearance.   HENT:      Head: Normocephalic and atraumatic.   Cardiovascular:      Rate and Rhythm: Normal rate and regular rhythm.      Pulses: Normal pulses.      Heart sounds: Normal heart sounds. No murmur heard.  Pulmonary:      Effort: Pulmonary effort is normal.      Breath sounds: Normal breath sounds.   Abdominal:      General: Abdomen is flat. Bowel sounds are normal. There is no distension.      Palpations: There is no mass.      Tenderness: There is no abdominal tenderness. There is no right CVA tenderness, left CVA tenderness or guarding.   Skin:     General: Skin is warm and dry.      Capillary Refill: Capillary refill takes less than 2 seconds.   Neurological:      General: No focal deficit present.      Mental Status: She is alert and oriented to person, place, and time.   Psychiatric:         Mood and Affect: Mood  "normal.         Behavior: Behavior normal.       Vitals:    11/08/24 1321 11/08/24 1422   BP: (!) 140/76 (!) 154/82   BP Location:  Left arm   Patient Position:  Sitting   Pulse: 71    Temp: 97.8 °F (36.6 °C)    TempSrc: Other (see comments)    SpO2: 97%    Weight: 59.8 kg (131 lb 11.6 oz)    Height: 5' 2" (1.575 m)       Body mass index is 24.09 kg/m².        History     Past Medical History:  Past Medical History:   Diagnosis Date    Arthritis     Hypertension        Past Surgical History:  Past Surgical History:   Procedure Laterality Date    EPIDURAL STEROID INJECTION Left 3/22/2023    Procedure: Left L5 + S1 Transforaminal Epidural Steroid Injections;  Surgeon: Jim Hector Jr., MD;  Location: [facility] ENDO;  Service: Pain Management;  Laterality: Left;  @1330  No ATC or DM    INJECTION Left 12/19/2022    Procedure: INJECTION, LEFT HIP INTRA-ARTICULAR STEROID CONTRAST DIRECT REF  confirmed;  Surgeon: Hailey Valladares MD;  Location: [facility] PAIN MGT;  Service: Pain Management;  Laterality: Left;       Social History:  Social History     Socioeconomic History    Marital status:    Tobacco Use    Smoking status: Never    Smokeless tobacco: Never   Substance and Sexual Activity    Alcohol use: Not Currently    Drug use: Not Currently    Sexual activity: Not Currently       Family History:  No family history on file.    Allergies and Medications: (updated and reviewed)  Review of patient's allergies indicates:  No Known Allergies  Current Outpatient Medications   Medication Sig Dispense Refill    ALPRAZolam (XANAX) 0.5 MG tablet Take one tablet by mouth 1 hour before procedure and bring other tablet with you to the procedure. 2 tablet 0    baclofen (LIORESAL) 5 mg Tab tablet Take 1 tablet (5 mg total) by mouth 2 (two) times daily as needed. 60 tablet 1    DULoxetine (CYMBALTA) 20 MG capsule Take 2 capsules (40 mg total) by mouth once daily. 60 capsule 0    ferrous sulfate (FEOSOL) 325 mg (65 mg iron) " Tab tablet Take 1 tablet (325 mg total) by mouth once daily. 90 tablet 1    gabapentin (NEURONTIN) 300 MG capsule Take 2 capsules (600 mg total) by mouth every evening. 60 capsule 11    hydroxychloroquine (PLAQUENIL) 200 mg tablet TAKE 1 & 1/2 (ONE & ONE-HALF) TABLETS BY MOUTH NIGHTLY 45 tablet 1    ibuprofen (ADVIL,MOTRIN) 600 MG tablet Take 1 tablet (600 mg total) by mouth every 8 (eight) hours as needed for Pain (Take with food as needed for mild-to-moderate pain). 60 tablet 0    lisinopriL (PRINIVIL,ZESTRIL) 20 MG tablet lisinopril Take No date recorded No form recorded No frequency recorded No route recorded No set duration recorded No set duration amount recorded active No dosage strength recorded No dosage strength units of measure recorded      lovastatin (MEVACOR) 10 MG tablet lovastatin Take No date recorded No form recorded No frequency recorded No route recorded No set duration recorded No set duration amount recorded active No dosage strength recorded No dosage strength units of measure recorded      memantine (NAMENDA) 7 mg CSpX Namenda Take No date recorded No form recorded No frequency recorded No route recorded No set duration recorded No set duration amount recorded active No dosage strength recorded No dosage strength units of measure recorded      methocarbamoL (ROBAXIN) 500 MG Tab Take 1,000 mg by mouth.      methotrexate 25 mg/mL injection Inject 1 mL (25 mg total) into the skin every 7 days. 4 mL 11    omega-3 acid ethyl esters (LOVAZA) 1 gram capsule Take 1 capsule by mouth once daily.      predniSONE (DELTASONE) 5 MG tablet Take 5 mg by mouth.      acetaminophen (TYLENOL) 500 MG tablet Take 1 tablet (500 mg total) by mouth every 6 (six) hours as needed for Pain (As needed for mild-to-moderate pain). 30 tablet 0    cetirizine (ZYRTEC) 10 MG tablet Take 10 mg by mouth.      diclofenac sodium (VOLTAREN) 1 % Gel Apply 2 g topically 4 (four) times daily as needed (Apply to painful area 4 times a  day as needed for pain). 200 g 0    folic acid (FOLVITE) 1 MG tablet Take 1 tablet (1 mg total) by mouth once daily. 30 tablet 4    ibuprofen (ADVIL,MOTRIN) 800 MG tablet Take 800 mg by mouth 2 (two) times daily as needed.      ketoconazole (NIZORAL) 2 % cream APPLY TO AFFECTED AREA ON FEET TWICE DAILY UNTIL CLEAR      memantine (NAMENDA) 5 MG Tab       penicillin v potassium (VEETID) 500 MG tablet Take 500 mg by mouth every 6 (six) hours.      triamcinolone acetonide 0.1% (KENALOG) 0.1 % cream APPLY CREAM EXTERNALLY TO AFFECTED AREA OF LEGS TWICE DAILY AS NEEDED FOR FLARES       Current Facility-Administered Medications   Medication Dose Route Frequency Provider Last Rate Last Admin    LIDOcaine HCL 10 mg/ml (1%) injection 5 mL  5 mL Other 1 time in Clinic/HOD         LIDOcaine-EPINEPHrine 1%-1:100,000 30 mL, LIDOcaine HCL 10 mg/ml (1%) 20 mL, sodium bicarbonate 10 mL in 0.9% NaCl 500 mL solution   MISCELLANEOUS 1 time in Clinic/HOD            Patient Care Team:  Gideon France MD as PCP - General (Internal Medicine)         - The patient is given an After Visit Summary that lists all medications with directions, allergies, education, orders placed during this encounter and follow-up instructions.      - I have reviewed the patient's medical information including past medical, family, and social history sections including the medications and allergies.      - We discussed the patient's current medications.     This note was created by combination of typed  and MModal dictation.  Transcription errors may be present.  If there are any questions, please contact me.                 CHRISTINE Escalante

## 2024-11-08 NOTE — Clinical Note
-Labs only in 2 months CBC, iron, ferritin,Misc immature plt fraction -CBC in 4 months -RTC in 6 months for MD visit and CBC day prior

## 2024-11-09 ENCOUNTER — PATIENT MESSAGE (OUTPATIENT)
Dept: FAMILY MEDICINE | Facility: CLINIC | Age: 74
End: 2024-11-09
Payer: MEDICARE

## 2024-11-18 ENCOUNTER — CLINICAL SUPPORT (OUTPATIENT)
Dept: FAMILY MEDICINE | Facility: CLINIC | Age: 74
End: 2024-11-18
Payer: MEDICARE

## 2024-11-18 VITALS — SYSTOLIC BLOOD PRESSURE: 126 MMHG | DIASTOLIC BLOOD PRESSURE: 68 MMHG | HEART RATE: 80 BPM

## 2024-11-18 DIAGNOSIS — I10 ESSENTIAL HYPERTENSION: Primary | ICD-10-CM

## 2024-11-18 NOTE — PROGRESS NOTES
Alba M Reyes 74 y.o. female is here today for Blood Pressure check.   History of HTN yes.    Review of patient's allergies indicates:  No Known Allergies  Creatinine   Date Value Ref Range Status   08/08/2024 0.7 0.5 - 1.4 mg/dL Final     Sodium   Date Value Ref Range Status   08/08/2024 141 136 - 145 mmol/L Final     Potassium   Date Value Ref Range Status   08/08/2024 3.5 3.5 - 5.1 mmol/L Final   ]  Patient verifies taking blood pressure medications on a regular basis at the same time of the day.     Current Outpatient Medications:     acetaminophen (TYLENOL) 500 MG tablet, Take 1 tablet (500 mg total) by mouth every 6 (six) hours as needed for Pain (As needed for mild-to-moderate pain)., Disp: 30 tablet, Rfl: 0    ALPRAZolam (XANAX) 0.5 MG tablet, Take one tablet by mouth 1 hour before procedure and bring other tablet with you to the procedure., Disp: 2 tablet, Rfl: 0    baclofen (LIORESAL) 5 mg Tab tablet, Take 1 tablet (5 mg total) by mouth 2 (two) times daily as needed., Disp: 60 tablet, Rfl: 1    cetirizine (ZYRTEC) 10 MG tablet, Take 10 mg by mouth., Disp: , Rfl:     diclofenac sodium (VOLTAREN) 1 % Gel, Apply 2 g topically 4 (four) times daily as needed (Apply to painful area 4 times a day as needed for pain)., Disp: 200 g, Rfl: 0    DULoxetine (CYMBALTA) 20 MG capsule, Take 2 capsules (40 mg total) by mouth once daily., Disp: 60 capsule, Rfl: 0    ferrous sulfate (FEOSOL) 325 mg (65 mg iron) Tab tablet, Take 1 tablet (325 mg total) by mouth once daily., Disp: 90 tablet, Rfl: 1    folic acid (FOLVITE) 1 MG tablet, Take 1 tablet (1 mg total) by mouth once daily., Disp: 30 tablet, Rfl: 4    gabapentin (NEURONTIN) 300 MG capsule, Take 2 capsules (600 mg total) by mouth every evening., Disp: 60 capsule, Rfl: 11    hydroxychloroquine (PLAQUENIL) 200 mg tablet, TAKE 1 & 1/2 (ONE & ONE-HALF) TABLETS BY MOUTH NIGHTLY, Disp: 45 tablet, Rfl: 1    ibuprofen (ADVIL,MOTRIN) 600 MG tablet, Take 1 tablet (600 mg total) by  mouth every 8 (eight) hours as needed for Pain (Take with food as needed for mild-to-moderate pain)., Disp: 60 tablet, Rfl: 0    ibuprofen (ADVIL,MOTRIN) 800 MG tablet, Take 800 mg by mouth 2 (two) times daily as needed., Disp: , Rfl:     ketoconazole (NIZORAL) 2 % cream, APPLY TO AFFECTED AREA ON FEET TWICE DAILY UNTIL CLEAR, Disp: , Rfl:     lisinopriL (PRINIVIL,ZESTRIL) 20 MG tablet, lisinopril Take No date recorded No form recorded No frequency recorded No route recorded No set duration recorded No set duration amount recorded active No dosage strength recorded No dosage strength units of measure recorded, Disp: , Rfl:     lovastatin (MEVACOR) 10 MG tablet, lovastatin Take No date recorded No form recorded No frequency recorded No route recorded No set duration recorded No set duration amount recorded active No dosage strength recorded No dosage strength units of measure recorded, Disp: , Rfl:     memantine (NAMENDA) 5 MG Tab, , Disp: , Rfl:     memantine (NAMENDA) 7 mg CSpX, Namenda Take No date recorded No form recorded No frequency recorded No route recorded No set duration recorded No set duration amount recorded active No dosage strength recorded No dosage strength units of measure recorded, Disp: , Rfl:     methocarbamoL (ROBAXIN) 500 MG Tab, Take 1,000 mg by mouth., Disp: , Rfl:     methotrexate 25 mg/mL injection, Inject 1 mL (25 mg total) into the skin every 7 days., Disp: 4 mL, Rfl: 11    omega-3 acid ethyl esters (LOVAZA) 1 gram capsule, Take 1 capsule by mouth once daily., Disp: , Rfl:     penicillin v potassium (VEETID) 500 MG tablet, Take 500 mg by mouth every 6 (six) hours., Disp: , Rfl:     predniSONE (DELTASONE) 5 MG tablet, Take 5 mg by mouth., Disp: , Rfl:     triamcinolone acetonide 0.1% (KENALOG) 0.1 % cream, APPLY CREAM EXTERNALLY TO AFFECTED AREA OF LEGS TWICE DAILY AS NEEDED FOR FLARES, Disp: , Rfl:     Current Facility-Administered Medications:     LIDOcaine HCL 10 mg/ml (1%) injection 5  mL, 5 mL, Other, 1 time in Clinic/HOD,     LIDOcaine-EPINEPHrine 1%-1:100,000 30 mL, LIDOcaine HCL 10 mg/ml (1%) 20 mL, sodium bicarbonate 10 mL in 0.9% NaCl 500 mL solution, , MISCELLANEOUS, 1 time in Clinic/HOD,   Does patient have record of home blood pressure readings no.    Last dose of blood pressure medication was taken at 11 am.  Patient is asymptomatic.     BP: 126/68 , Pulse: 80 .    Dr. France notified.

## 2024-11-21 ENCOUNTER — OFFICE VISIT (OUTPATIENT)
Dept: UROLOGY | Facility: CLINIC | Age: 74
End: 2024-11-21
Payer: MEDICARE

## 2024-11-21 VITALS — WEIGHT: 135.38 LBS | BODY MASS INDEX: 24.76 KG/M2

## 2024-11-21 DIAGNOSIS — R35.0 URINARY FREQUENCY: ICD-10-CM

## 2024-11-21 DIAGNOSIS — N39.3 STRESS INCONTINENCE: ICD-10-CM

## 2024-11-21 DIAGNOSIS — N39.8 VOIDING DYSFUNCTION: Primary | ICD-10-CM

## 2024-11-21 LAB
MICROSCOPIC COMMENT: ABNORMAL
RBC #/AREA URNS HPF: 6 /HPF (ref 0–4)
SQUAMOUS #/AREA URNS HPF: 0 /HPF

## 2024-11-21 PROCEDURE — 3008F BODY MASS INDEX DOCD: CPT | Mod: CPTII,S$GLB,, | Performed by: STUDENT IN AN ORGANIZED HEALTH CARE EDUCATION/TRAINING PROGRAM

## 2024-11-21 PROCEDURE — 3044F HG A1C LEVEL LT 7.0%: CPT | Mod: CPTII,S$GLB,, | Performed by: STUDENT IN AN ORGANIZED HEALTH CARE EDUCATION/TRAINING PROGRAM

## 2024-11-21 PROCEDURE — G2211 COMPLEX E/M VISIT ADD ON: HCPCS | Mod: S$GLB,,, | Performed by: STUDENT IN AN ORGANIZED HEALTH CARE EDUCATION/TRAINING PROGRAM

## 2024-11-21 PROCEDURE — 99999 PR PBB SHADOW E&M-EST. PATIENT-LVL IV: CPT | Mod: PBBFAC,,, | Performed by: STUDENT IN AN ORGANIZED HEALTH CARE EDUCATION/TRAINING PROGRAM

## 2024-11-21 PROCEDURE — 1160F RVW MEDS BY RX/DR IN RCRD: CPT | Mod: CPTII,S$GLB,, | Performed by: STUDENT IN AN ORGANIZED HEALTH CARE EDUCATION/TRAINING PROGRAM

## 2024-11-21 PROCEDURE — 99204 OFFICE O/P NEW MOD 45 MIN: CPT | Mod: S$GLB,,, | Performed by: STUDENT IN AN ORGANIZED HEALTH CARE EDUCATION/TRAINING PROGRAM

## 2024-11-21 PROCEDURE — 87086 URINE CULTURE/COLONY COUNT: CPT | Performed by: STUDENT IN AN ORGANIZED HEALTH CARE EDUCATION/TRAINING PROGRAM

## 2024-11-21 PROCEDURE — 81000 URINALYSIS NONAUTO W/SCOPE: CPT | Performed by: STUDENT IN AN ORGANIZED HEALTH CARE EDUCATION/TRAINING PROGRAM

## 2024-11-21 PROCEDURE — 1125F AMNT PAIN NOTED PAIN PRSNT: CPT | Mod: CPTII,S$GLB,, | Performed by: STUDENT IN AN ORGANIZED HEALTH CARE EDUCATION/TRAINING PROGRAM

## 2024-11-21 PROCEDURE — 1159F MED LIST DOCD IN RCRD: CPT | Mod: CPTII,S$GLB,, | Performed by: STUDENT IN AN ORGANIZED HEALTH CARE EDUCATION/TRAINING PROGRAM

## 2024-11-21 PROCEDURE — 87088 URINE BACTERIA CULTURE: CPT | Performed by: STUDENT IN AN ORGANIZED HEALTH CARE EDUCATION/TRAINING PROGRAM

## 2024-11-21 PROCEDURE — 1101F PT FALLS ASSESS-DOCD LE1/YR: CPT | Mod: CPTII,S$GLB,, | Performed by: STUDENT IN AN ORGANIZED HEALTH CARE EDUCATION/TRAINING PROGRAM

## 2024-11-21 PROCEDURE — 3288F FALL RISK ASSESSMENT DOCD: CPT | Mod: CPTII,S$GLB,, | Performed by: STUDENT IN AN ORGANIZED HEALTH CARE EDUCATION/TRAINING PROGRAM

## 2024-11-21 PROCEDURE — 87186 SC STD MICRODIL/AGAR DIL: CPT | Performed by: STUDENT IN AN ORGANIZED HEALTH CARE EDUCATION/TRAINING PROGRAM

## 2024-11-21 NOTE — PROGRESS NOTES
Patient ID: Alba M Reyes is a 74 y.o. female.    Chief Complaint: Incontinence  Referral: Kristy Flannery, FNP  [id number] [facility]  [place] [address]     [facility]  74 y.o. who presents to the Urology clinic for evaluation of incontinence which started off very little in 2014 and progressed to needing about 5 pads per day. She notes she leaks with coughing, sneezing, laughing. She notes incontinence overnight. . Patient notes symptoms of *** started ***. Patient has *** received prior treatment of this condition which has *** improved their condition.*** history of gross hematuria, UTIs, kidney stones, constipation.  *** drinks *** cups/bottles of water daily, including *** coffee, tea, juice, carbonated beverages. *** pain or discomfort associated with intercourse. *** pelvic procedure in the past.  *** incontinence of urine or feces.     Prior /abdominal surgeries include: ***  Possible medications effecting patient's condition include ***  Anticoagulant usage? Under the care of a cardiologist?  History  DVT, MI, CVA.     Medically Necessary ROS documented in HPI    Past Medical History  Active Ambulatory Problems     Diagnosis Date Noted    Right upper quadrant pain 12/29/2016    Acute cholecystitis due to biliary calculus 12/29/2016    Incarcerated umbilical hernia 01/03/2017    Lack of coordination 03/02/2020    Posture abnormality 03/02/2020    Weakness of both hips 03/02/2020    GERD (gastroesophageal reflux disease) 04/15/2019    Hypovitaminosis D 03/27/2014    Mixed incontinence 03/20/2019    Osteopenia 03/27/2014    Osteoporosis 04/15/2019    Rheumatoid arthritis 03/27/2014    Varicose veins of legs 11/20/2018    Lumbar spondylosis 08/08/2022    Lumbar radiculopathy 08/08/2022    DDD (degenerative disc disease), lumbar 08/08/2022    Osteoarthritis of left hip 12/02/2022    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    Pancytopenia 04/25/2024    Thrombocytopenia,  unspecified 04/25/2024     Resolved Ambulatory Problems     Diagnosis Date Noted    Decreased functional activity tolerance 04/03/2019    Weakness 04/03/2019    Abnormality of gait and mobility 04/03/2019     Past Medical History:   Diagnosis Date    Arthritis     Hypertension          Past Surgical History  Past Surgical History:   Procedure Laterality Date    EPIDURAL STEROID INJECTION Left 3/22/2023    Procedure: Left L5 + S1 Transforaminal Epidural Steroid Injections;  Surgeon: Jim Hector Jr., MD;  Location: [facility] ENDO;  Service: Pain Management;  Laterality: Left;  @1330  No ATC or DM    INJECTION Left 12/19/2022    Procedure: INJECTION, LEFT HIP INTRA-ARTICULAR STEROID CONTRAST DIRECT REF  confirmed;  Surgeon: Hailey Valladares MD;  Location: [facility] [place] [facility]IN [facility]GT;  Service: Pain Management;  Laterality: Left;       Social History       Medications    Current Outpatient Medications:     acetaminophen (TYLENOL) 500 MG tablet, Take 1 tablet (500 mg total) by mouth every 6 (six) hours as needed for Pain (As needed for mild-to-moderate pain)., Disp: 30 tablet, Rfl: 0    ALPRAZolam (XANAX) 0.5 MG tablet, Take one tablet by mouth 1 hour before procedure and bring other tablet with you to the procedure., Disp: 2 tablet, Rfl: 0    baclofen (LIORESAL) 5 mg Tab tablet, Take 1 tablet (5 mg total) by mouth 2 (two) times daily as needed., Disp: 60 tablet, Rfl: 1    cetirizine (ZYRTEC) 10 MG tablet, Take 10 mg by mouth., Disp: , Rfl:     diclofenac sodium (VOLTAREN) 1 % Gel, Apply 2 g topically 4 (four) times daily as needed (Apply to painful area 4 times a day as needed for pain)., Disp: 200 g, Rfl: 0    DULoxetine (CYMBALTA) 20 MG capsule, Take 2 capsules (40 mg total) by mouth once daily., Disp: 60 capsule, Rfl: 0    ferrous sulfate (FEOSOL) 325 mg (65 mg iron) Tab tablet, Take 1 tablet (325 mg total) by mouth once daily., Disp: 90 tablet, Rfl: 1    folic acid (FOLVITE) 1 MG tablet, Take 1 tablet (1  mg total) by mouth once daily., Disp: 30 tablet, Rfl: 4    gabapentin (NEURONTIN) 300 MG capsule, Take 2 capsules (600 mg total) by mouth every evening., Disp: 60 capsule, Rfl: 11    hydroxychloroquine (PLAQUENIL) 200 mg tablet, TAKE 1 & 1/2 (ONE & ONE-HALF) TABLETS BY MOUTH NIGHTLY, Disp: 45 tablet, Rfl: 1    ibuprofen (ADVIL,MOTRIN) 600 MG tablet, Take 1 tablet (600 mg total) by mouth every 8 (eight) hours as needed for Pain (Take with food as needed for mild-to-moderate pain)., Disp: 60 tablet, Rfl: 0    ibuprofen (ADVIL,MOTRIN) 800 MG tablet, Take 800 mg by mouth 2 (two) times daily as needed., Disp: , Rfl:     ketoconazole (NIZORAL) 2 % cream, APPLY TO AFFECTED AREA ON FEET TWICE DAILY UNTIL CLEAR, Disp: , Rfl:     lisinopriL (PRINIVIL,ZESTRIL) 20 MG tablet, lisinopril Take No date recorded No form recorded No frequency recorded No route recorded No set duration recorded No set duration amount recorded active No dosage strength recorded No dosage strength units of measure recorded, Disp: , Rfl:     lovastatin (MEVACOR) 10 MG tablet, lovastatin Take No date recorded No form recorded No frequency recorded No route recorded No set duration recorded No set duration amount recorded active No dosage strength recorded No dosage strength units of measure recorded, Disp: , Rfl:     memantine (NAMENDA) 5 MG Tab, , Disp: , Rfl:     memantine (NAMENDA) 7 mg CSpX, Namenda Take No date recorded No form recorded No frequency recorded No route recorded No set duration recorded No set duration amount recorded active No dosage strength recorded No dosage strength units of measure recorded, Disp: , Rfl:     methocarbamoL (ROBAXIN) 500 MG Tab, Take 1,000 mg by mouth., Disp: , Rfl:     methotrexate 25 mg/mL injection, Inject 1 mL (25 mg total) into the skin every 7 days., Disp: 4 mL, Rfl: 11    omega-3 acid ethyl esters (LOVAZA) 1 gram capsule, Take 1 capsule by mouth once daily., Disp: , Rfl:     penicillin v  potassium (VEETID) 500 MG tablet, Take 500 mg by mouth every 6 (six) hours., Disp: , Rfl:     predniSONE (DELTASONE) 5 MG tablet, Take 5 mg by mouth., Disp: , Rfl:     triamcinolone acetonide 0.1% (KENALOG) 0.1 % cream, APPLY CREAM EXTERNALLY TO AFFECTED AREA OF LEGS TWICE DAILY AS NEEDED FOR FLARES, Disp: , Rfl:     Current Facility-Administered Medications:     LIDOcaine HCL 10 mg/ml (1%) injection 5 mL, 5 mL, Other, 1 time in Clinic/HOD,     LIDOcaine-EPINEPHrine 1%-1:100,000 30 mL, LIDOcaine HCL 10 mg/ml (1%) 20 mL, sodium bicarbonate 10 mL in 0.9% NaCl 500 mL solution, , MISCELLANEOUS, 1 time in Clinic/HOD,     Allergies  Review of patient's allergies indicates:  No Known Allergies    Patient's PMH, FH, Social hx, Medications, allergies reviewed and updated as pertinent to today's visit    Objective:      Physical Exam  Constitutional:       Appearance: She is well-developed.   HENT:      Head: Normocephalic and atraumatic.   Eyes:      Conjunctiva/sclera: Conjunctivae normal.   Pulmonary:      Effort: Pulmonary effort is normal. No respiratory distress.   Abdominal:      General: Abdomen is flat. There is no distension.      Palpations: Abdomen is soft.   Skin:     General: Skin is warm.      Findings: No rash.   Neurological:      Mental Status: She is alert and oriented to person, place, and time.   Psychiatric:         Behavior: Behavior normal.           Assessment:       1. Urinary frequency    2. Mixed incontinence        Plan:           Discussed *** with patient  Discussed pathophysiology and risk factors  Plan of care to include    Can consider ***    RTC ***

## 2024-11-21 NOTE — PROGRESS NOTES
Patient ID: Alba M Reyes is a 74 y.o. female.    Chief Complaint: urinary frequency, incontinence  Referral: Kristy Flannery, FNP  [address] [facility]  [place] [address]     [facility] [place]  74 y.o. who presents to the Urology clinic for evaluation of incontinence. Last seen by Dr. Jolly 3 years ago. Also seen by Dr. Labadie in the past. Patient declined UDS, PFPT, procedure in the past. She notes remote UTI. Patient notes leakage of urine occurs the most during coughing. Symptoms were minimal 2 years ago, patient now notices she is wearing several # 6 incontinence pads per night. Notes her symptoms are worse at night. No hx of urinary retention.  3 vaginal deliveries in the past.   She notes holding her urine for long periods of time per day.   Followed by oncology for cytopenia/anemia    Medically Necessary ROS documented in HPI    Past Medical History  Active Ambulatory Problems     Diagnosis Date Noted    Right upper quadrant pain 12/29/2016    Acute cholecystitis due to biliary calculus 12/29/2016    Incarcerated umbilical hernia 01/03/2017    Lack of coordination 03/02/2020    Posture abnormality 03/02/2020    Weakness of both hips 03/02/2020    GERD (gastroesophageal reflux disease) 04/15/2019    Hypovitaminosis D 03/27/2014    Mixed incontinence 03/20/2019    Osteopenia 03/27/2014    Osteoporosis 04/15/2019    Rheumatoid arthritis 03/27/2014    Varicose veins of legs 11/20/2018    Lumbar spondylosis 08/08/2022    Lumbar radiculopathy 08/08/2022    DDD (degenerative disc disease), lumbar 08/08/2022    Osteoarthritis of left hip 12/02/2022    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    Pancytopenia 04/25/2024    Thrombocytopenia, unspecified 04/25/2024     Resolved Ambulatory Problems     Diagnosis Date Noted    Decreased functional activity tolerance 04/03/2019    Weakness 04/03/2019    Abnormality of gait and mobility 04/03/2019     Past Medical History:   Diagnosis Date    Arthritis      Hypertension          Past Surgical History  Past Surgical History:   Procedure Laterality Date    EPIDURAL STEROID INJECTION Left 3/22/2023    Procedure: Left L5 + S1 Transforaminal Epidural Steroid Injections;  Surgeon: Jim Hector Jr., MD;  Location: [facility] ENDO;  Service: Pain Management;  Laterality: Left;  @1330  No ATC or DM    INJECTION Left 12/19/2022    Procedure: INJECTION, LEFT HIP INTRA-ARTICULAR STEROID CONTRAST DIRECT REF  confirmed;  Surgeon: Hailey Valladares MD;  Location: [facility] PAIN MGT;  Service: Pain Management;  Laterality: Left;       Social History       Medications    Current Outpatient Medications:     acetaminophen (TYLENOL) 500 MG tablet, Take 1 tablet (500 mg total) by mouth every 6 (six) hours as needed for Pain (As needed for mild-to-moderate pain)., Disp: 30 tablet, Rfl: 0    ALPRAZolam (XANAX) 0.5 MG tablet, Take one tablet by mouth 1 hour before procedure and bring other tablet with you to the procedure., Disp: 2 tablet, Rfl: 0    baclofen (LIORESAL) 5 mg Tab tablet, Take 1 tablet (5 mg total) by mouth 2 (two) times daily as needed., Disp: 60 tablet, Rfl: 1    cetirizine (ZYRTEC) 10 MG tablet, Take 10 mg by mouth., Disp: , Rfl:     diclofenac sodium (VOLTAREN) 1 % Gel, Apply 2 g topically 4 (four) times daily as needed (Apply to painful area 4 times a day as needed for pain)., Disp: 200 g, Rfl: 0    DULoxetine (CYMBALTA) 20 MG capsule, Take 2 capsules (40 mg total) by mouth once daily., Disp: 60 capsule, Rfl: 0    ferrous sulfate (FEOSOL) 325 mg (65 mg iron) Tab tablet, Take 1 tablet (325 mg total) by mouth once daily., Disp: 90 tablet, Rfl: 1    folic acid (FOLVITE) 1 MG tablet, Take 1 tablet (1 mg total) by mouth once daily., Disp: 30 tablet, Rfl: 4    gabapentin (NEURONTIN) 300 MG capsule, Take 2 capsules (600 mg total) by mouth every evening., Disp: 60 capsule, Rfl: 11    hydroxychloroquine (PLAQUENIL) 200 mg tablet, TAKE 1 & 1/2 (ONE & ONE-HALF) TABLETS BY MOUTH  NIGHTLY, Disp: 45 tablet, Rfl: 1    ibuprofen (ADVIL,MOTRIN) 600 MG tablet, Take 1 tablet (600 mg total) by mouth every 8 (eight) hours as needed for Pain (Take with food as needed for mild-to-moderate pain)., Disp: 60 tablet, Rfl: 0    ibuprofen (ADVIL,MOTRIN) 800 MG tablet, Take 800 mg by mouth 2 (two) times daily as needed., Disp: , Rfl:     ketoconazole (NIZORAL) 2 % cream, APPLY TO AFFECTED AREA ON FEET TWICE DAILY UNTIL CLEAR, Disp: , Rfl:     lisinopriL (PRINIVIL,ZESTRIL) 20 MG tablet, lisinopril Take No date recorded No form recorded No frequency recorded No route recorded No set duration recorded No set duration amount recorded active No dosage strength recorded No dosage strength units of measure recorded, Disp: , Rfl:     lovastatin (MEVACOR) 10 MG tablet, lovastatin Take No date recorded No form recorded No frequency recorded No route recorded No set duration recorded No set duration amount recorded active No dosage strength recorded No dosage strength units of measure recorded, Disp: , Rfl:     memantine (NAMENDA) 5 MG Tab, , Disp: , Rfl:     memantine (NAMENDA) 7 mg CSpX, Namenda Take No date recorded No form recorded No frequency recorded No route recorded No set duration recorded No set duration amount recorded active No dosage strength recorded No dosage strength units of measure recorded, Disp: , Rfl:     methocarbamoL (ROBAXIN) 500 MG Tab, Take 1,000 mg by mouth., Disp: , Rfl:     methotrexate 25 mg/mL injection, Inject 1 mL (25 mg total) into the skin every 7 days., Disp: 4 mL, Rfl: 11    omega-3 acid ethyl esters (LOVAZA) 1 gram capsule, Take 1 capsule by mouth once daily., Disp: , Rfl:     penicillin v potassium (VEETID) 500 MG tablet, Take 500 mg by mouth every 6 (six) hours., Disp: , Rfl:     predniSONE (DELTASONE) 5 MG tablet, Take 5 mg by mouth., Disp: , Rfl:     triamcinolone acetonide 0.1% (KENALOG) 0.1 % cream, APPLY CREAM EXTERNALLY TO AFFECTED AREA OF LEGS TWICE DAILY AS NEEDED FOR  FLARES, Disp: , Rfl:     Current Facility-Administered Medications:     LIDOcaine HCL 10 mg/ml (1%) injection 5 mL, 5 mL, Other, 1 time in Clinic/HOD,     LIDOcaine-EPINEPHrine 1%-1:100,000 30 mL, LIDOcaine HCL 10 mg/ml (1%) 20 mL, sodium bicarbonate 10 mL in 0.9% NaCl 500 mL solution, , MISCELLANEOUS, 1 time in Clinic/HOD,     Allergies  Review of patient's allergies indicates:  No Known Allergies    Patient's PMH, FH, Social hx, Medications, allergies reviewed and updated as pertinent to today's visit    Objective:      Physical Exam  Constitutional:       Appearance: She is well-developed.   HENT:      Head: Normocephalic and atraumatic.   Eyes:      Conjunctiva/sclera: Conjunctivae normal.   Pulmonary:      Effort: Pulmonary effort is normal. No respiratory distress.   Abdominal:      General: Abdomen is flat. There is no distension.      Palpations: Abdomen is soft.      Tenderness: There is no right CVA tenderness or left CVA tenderness.   Musculoskeletal:      Comments: Uses walker   Skin:     General: Skin is warm.      Findings: No rash.   Neurological:      Mental Status: She is alert and oriented to person, place, and time.      Gait: Gait abnormal.   Psychiatric:         Behavior: Behavior normal.               Assessment:       1. Voiding dysfunction    2. Urinary frequency    3. Stress incontinence        Plan:       Voiding dysfunction  Chronic problem problem with exacerbation  Recommend cystoscopy to eval degree of RITA, will assess capacity at that time as patient has declined UDS in the past  Suspect OAB contributing to night time incontinence events  Discussed avoiding holding urine for long periods of time.   Gemtesa rx provided, risks of medication discussed        Visit today included increased complexity associated with the care of the episodic problem voiding dysfunction noted above addressed and managing the longitudinal care of the patient due to the  complex managed problem(s) .    [place]   used

## 2024-11-23 LAB — BACTERIA UR CULT: ABNORMAL

## 2024-11-23 RX ORDER — CEPHALEXIN 500 MG/1
500 CAPSULE ORAL EVERY 12 HOURS
Qty: 10 CAPSULE | Refills: 0 | Status: SHIPPED | OUTPATIENT
Start: 2024-11-23 | End: 2024-11-28

## 2024-11-25 ENCOUNTER — TELEPHONE (OUTPATIENT)
Dept: UROLOGY | Facility: CLINIC | Age: 74
End: 2024-11-25
Payer: MEDICARE

## 2024-12-03 ENCOUNTER — HOSPITAL ENCOUNTER (OUTPATIENT)
Dept: RADIOLOGY | Facility: HOSPITAL | Age: 74
Discharge: HOME | End: 2024-12-03
Payer: MEDICARE

## 2024-12-03 DIAGNOSIS — Z12.31 ENCOUNTER FOR SCREENING MAMMOGRAM FOR MALIGNANT NEOPLASM OF BREAST: ICD-10-CM

## 2024-12-03 PROCEDURE — 77067 SCR MAMMO BI INCL CAD: CPT | Mod: TC,PO

## 2024-12-09 NOTE — LETTER
December 9, 2024    Alba M Reyes  [address]  [place] [address]             [facility]  [address]  [place] [address]  Phone: [phone]  Fax: [phone] Dear Mrs. Alba Reyes:    Below are the results from your recent visit:    Resulted Orders   Mammo Digital Screening Bilat w/ Jordin    Narrative    Facility:  [facility]  [address] [place] [address] [phone]    Name: Alba M Reyes    MRN: [id number]    Result:  Mammo Digital Screening Bilat w/ Jordin    History:  Patient is 74 y.o. and is seen for a screening mammogram.      Films Compared:  Prior images (if available) were compared.     Findings:  This procedure was performed using tomosynthesis.   Computer-aided detection was utilized in the interpretation of this   examination.    There are scattered areas of fibroglandular density. There is no evidence   of suspicious masses, microcalcifications or architectural distortion.      Impression       No mammographic evidence of malignancy.    BI-RADS Category 1: Negative    Recommendation:  Routine screening mammogram in 1 year is recommended.    Your estimated lifetime risk of breast cancer (to age 85) based on   Tyrer-Cuzick risk assessment model is 1.98%.  According to the American   Cancer Society, patients with a lifetime breast cancer risk of 20% or   higher might benefit from supplemental screening tests, such as screening   breast MRI.    Chandan Frost MD         Your results are normal.  Please don't hesitate to call our office if you have any questions or concerns.      Sincerely,        Val Vogt NP

## 2024-12-12 ENCOUNTER — TELEPHONE (OUTPATIENT)
Dept: UROLOGY | Facility: CLINIC | Age: 74
End: 2024-12-12
Payer: MEDICARE

## 2024-12-12 NOTE — TELEPHONE ENCOUNTER
Pt was contacted pt informed me she cannot do morning appts. Appt r/s  ----- Message from Keysha sent at 12/12/2024 10:38 AM CST -----  Regarding: patient call back  Type: Patient Call Back    Who called: Self     What is the request in detail: asked for a call back to get her procedure r/s to a Thursday or Tuesday because for her husbands apts     Can the clinic reply by [facility]? No     Would the patient rather a call back or a response via [facility]? Call     Best call back number: .[phone]

## 2024-12-17 ENCOUNTER — TELEPHONE (OUTPATIENT)
Dept: VASCULAR SURGERY | Facility: CLINIC | Age: 74
End: 2024-12-17
Payer: MEDICARE

## 2024-12-17 NOTE — TELEPHONE ENCOUNTER
----- Message from Med Assistant Matias sent at 12/17/2024 12:46 PM CST -----  Type:  Patient Returning Call    Who Called:  Pt   Who Left Message for Patient:  Staff   Does the patient know what this is regarding?:    Would the patient rather a call back or a response via [facility]?  Call back  Callback   Best Call Back Number:   [phone]     Additional Information:    Thank you.

## 2024-12-26 ENCOUNTER — OFFICE VISIT (OUTPATIENT)
Dept: URGENT CARE | Facility: CLINIC | Age: 74
End: 2024-12-26
Payer: MEDICARE

## 2024-12-26 VITALS
HEART RATE: 91 BPM | TEMPERATURE: 99 F | RESPIRATION RATE: 16 BRPM | HEIGHT: 62 IN | SYSTOLIC BLOOD PRESSURE: 142 MMHG | WEIGHT: 128.31 LBS | BODY MASS INDEX: 23.61 KG/M2 | OXYGEN SATURATION: 99 % | DIASTOLIC BLOOD PRESSURE: 81 MMHG

## 2024-12-26 DIAGNOSIS — R10.84 GENERALIZED ABDOMINAL PAIN: Primary | ICD-10-CM

## 2024-12-26 DIAGNOSIS — R19.7 DIARRHEA, UNSPECIFIED TYPE: ICD-10-CM

## 2024-12-26 PROCEDURE — 99214 OFFICE O/P EST MOD 30 MIN: CPT | Mod: S$GLB,,, | Performed by: FAMILY MEDICINE

## 2024-12-26 RX ORDER — HYOSCYAMINE SULFATE 0.12 MG/1
0.12 TABLET SUBLINGUAL 3 TIMES DAILY PRN
Qty: 30 TABLET | Refills: 0 | Status: SHIPPED | OUTPATIENT
Start: 2024-12-26 | End: 2025-01-05

## 2024-12-26 NOTE — PROGRESS NOTES
"Subjective:      Patient ID: Alba M Reyes is a 74 y.o. female.    Vitals:  height is 5' 2" (1.575 m) and weight is 58.2 kg (128 lb 4.9 oz). Her oral temperature is 98.5 °F (36.9 °C). Her blood pressure is 142/81 (abnormal) and her pulse is 91. Her respiration is 16 and oxygen saturation is 99%.     Chief Complaint: Abdominal Pain     used ID [id number]  Pt reports she is having abdominal pain and diarrhea x 8 days,  several times a day, she has tried imodium, pepto bismol, esomeprazole. She says she was 131 pounds, now 128 pounds. Denies any recent abx or international travel. She says she had similar problems around this time last year. She thinks she drank old milk last week which caused her symptoms. She says she has good uop, yellow urine. Denies any fever or chills. Hx of cholecystectomy. She says she had diarrhea x 1 today, 2 yesterday.     Abdominal Pain  This is a new problem. The current episode started in the past 7 days. The onset quality is undetermined. The problem has been unchanged. The pain is located in the generalized abdominal region. The pain is at a severity of 5/10. The pain is moderate. The quality of the pain is aching. The abdominal pain does not radiate. Associated symptoms include anorexia and diarrhea. Pertinent negatives include no arthralgias, belching, constipation, dysuria, fever, flatus, frequency, headaches, hematochezia, hematuria, melena, myalgias, nausea, vomiting or weight loss. Nothing aggravates the pain. The pain is relieved by Nothing. The treatment provided no relief. There is no history of abdominal surgery. Patient's medical history does not include UTI.       Constitution: Positive for activity change and appetite change. Negative for chills, sweating, fatigue and fever.   Gastrointestinal:  Positive for abdominal pain and diarrhea. Negative for abdominal bloating, history of abdominal surgery, nausea, vomiting, constipation, bright red blood in stool, dark " colored stools, rectal bleeding, rectal pain, hemorrhoids, heartburn and bowel incontinence.   Genitourinary:  Negative for dysuria, frequency, urgency, urine decreased and hematuria.   Musculoskeletal:  Negative for joint pain and muscle ache.   Neurological:  Negative for headaches.      Objective:     Physical Exam   Constitutional: She is oriented to person, place, and time. She appears well-developed.  Non-toxic appearance. She does not appear ill. No distress.   HENT:   Head: Normocephalic and atraumatic.   Ears:   Right Ear: External ear normal.   Left Ear: External ear normal.   Nose: Nose normal.   Mouth/Throat: Oropharynx is clear and moist.   Eyes: Conjunctivae, EOM and lids are normal. Pupils are equal, round, and reactive to light.   Neck: Trachea normal and phonation normal. Neck supple.   Abdominal: There is abdominal tenderness in the right upper quadrant, right lower quadrant and left upper quadrant. There is no rebound, no guarding, no tenderness at McBurney's point, negative Richardson's sign, no left CVA tenderness, negative Rovsing's sign, negative psoas sign, no right CVA tenderness and negative obturator sign.      Comments: Patient able to transition from heel to toe without pain or grimacing. No sign of acute abdomen at this time, ambulatory without grimacing, Abdomen is soft there is generalized tenderness present.    Musculoskeletal: Normal range of motion.         General: Normal range of motion.   Neurological: She is alert and oriented to person, place, and time.   Skin: Skin is warm, dry, intact and not diaphoretic.   Psychiatric: Her speech is normal and behavior is normal. Judgment and thought content normal.   Nursing note and vitals reviewed.      Assessment:     1. Generalized abdominal pain    2. Diarrhea, unspecified type        Plan:     Abdominal exam without focal tenderness, stressed close f/u in 2-3 days, strict ED precautions, no guarding on exam, we can try levsin, stool  studies ordered, case d/w Dr. Ness, MMM, vss, supplies provided.     used for entire encounter, ID [id number]    Discussed results/diagnosis/plan with patient in clinic. Strict precautions given to patient to monitor for worsening signs and symptoms. Advised to follow up with PCP or specialist.    Explained side effects of medications prescribed with patient and informed him/her to discontinue use if he/she has any side effects and to inform UC or PCP if this occurs. All questions answered. Strict ED verses clinic return precautions stressed and given in depth. Advised if symptoms worsens of fail to improve he/she should go to the Emergency Room. Discharge and follow-up instructions given verbally/printed with the patient who expressed understanding and willingness to comply with my recommendations. Patient voiced understanding and in agreement with current treatment plan. Patient exits the exam room in no acute distress. Conversant and engaged during discharge discussion, verbalized understanding.      Generalized abdominal pain  -     hyoscyamine 0.125 mg Subl; Place 1 tablet (0.125 mg total) under the tongue 3 (three) times daily as needed (abdominal cramping).  Dispense: 30 tablet; Refill: 0    Diarrhea, unspecified type  -     WBC, Stool; Future; Expected date: 12/26/2024  -     Clostridium difficile EIA; Future; Expected date: 12/26/2024  -     Stool culture; Future; Expected date: 12/26/2024  -     Stool Exam-Ova,Cysts,Parasites; Future; Expected date: 12/26/2024          Medical Decision Making:   Clinical Tests:   Lab Tests: Ordered  Other:   I have discussed this case with another health care provider.

## 2024-12-27 ENCOUNTER — TELEPHONE (OUTPATIENT)
Dept: URGENT CARE | Facility: CLINIC | Age: 74
End: 2024-12-27
Payer: MEDICARE

## 2024-12-27 NOTE — PATIENT INSTRUCTIONS

## 2024-12-28 NOTE — TELEPHONE ENCOUNTER
Courtesy call to check on patient, she verified full name and date of birth.  She reports she is feeling much better, denies any diarrhea.  She thinks it was related to the milk she was drinking.  She was able to eat and drink today and symptoms have improved, advised to follow up with any questions or concerns she agreed with plan.

## 2025-01-02 ENCOUNTER — PATIENT OUTREACH (OUTPATIENT)
Dept: ADMINISTRATIVE | Facility: HOSPITAL | Age: 75
End: 2025-01-02
Payer: MEDICARE

## 2025-01-03 ENCOUNTER — PROCEDURE VISIT (OUTPATIENT)
Dept: UROLOGY | Facility: CLINIC | Age: 75
End: 2025-01-03
Payer: MEDICARE

## 2025-01-03 VITALS — WEIGHT: 128 LBS | BODY MASS INDEX: 23.41 KG/M2

## 2025-01-03 DIAGNOSIS — N39.3 STRESS INCONTINENCE: ICD-10-CM

## 2025-01-03 DIAGNOSIS — R35.0 URINARY FREQUENCY: ICD-10-CM

## 2025-01-03 PROCEDURE — 87086 URINE CULTURE/COLONY COUNT: CPT | Performed by: STUDENT IN AN ORGANIZED HEALTH CARE EDUCATION/TRAINING PROGRAM

## 2025-01-03 RX ORDER — CEFDINIR 300 MG/1
300 CAPSULE ORAL EVERY 12 HOURS
Qty: 10 CAPSULE | Refills: 0 | Status: SHIPPED | OUTPATIENT
Start: 2025-01-03 | End: 2025-01-08

## 2025-01-03 RX ORDER — ESTRADIOL 0.1 MG/G
1 CREAM VAGINAL
Qty: 42.5 G | Refills: 3 | Status: SHIPPED | OUTPATIENT
Start: 2025-01-06 | End: 2026-01-06

## 2025-01-03 NOTE — PROCEDURES
Cystoscopy    Date/Time: 1/3/2025 2:30 PM    Performed by: Moises Mckenzie MD  Authorized by: Moises Mckenzie MD    Consent Done?:  Yes (Written)  Timeout: prior to procedure the correct patient, procedure, and site was verified    Prep: patient was prepped and draped in usual sterile fashion    Local anesthesia used?: Yes    Anesthesia:  Intraurethral instillation  Local anesthetic:  Topical anesthetic  Indications: recurrent UTIs and overactive bladder    Position:  Dorsal lithotomy  Anesthesia:  Intraurethral instillation  Preparation: Patient was prepped and draped in usual sterile fashion    Scope type:  Flexible cystoscope  External exam normal: Yes    Digital exam performed: Yes    Urethra normal: Mild distal urehtral stricture, accomodates the cystourethroscope without trauma to discomfort to patient.    Bladder neck normal: Yes    Bladder normal: Yes     patient tolerated the procedure well with no immediate complications  Comments:      Systematic evaluation of bladder unrevealing for masses, erythematous patches  Clear efflux of urine  Distal urethra edematous, with very short stricture mentioned above. Recommend topical estrogen cream, twice weekly, RTC 3 months for cystoscopy or sooner if needed    Poct ua prior to procedure not concerning for UTI/infection  Patient requesting antibiotics  Culture obtained  No leakage of urine with valsalva or cough

## 2025-01-04 LAB — BACTERIA UR CULT: NORMAL

## 2025-01-06 ENCOUNTER — TELEPHONE (OUTPATIENT)
Dept: UROLOGY | Facility: CLINIC | Age: 75
End: 2025-01-06
Payer: MEDICARE

## 2025-01-06 NOTE — TELEPHONE ENCOUNTER
----- Message from Moises Mckenzie MD sent at 1/5/2025  7:05 PM CST -----  No UTI, please alert pt to stop antibiotics

## 2025-01-13 ENCOUNTER — LAB VISIT (OUTPATIENT)
Dept: LAB | Facility: HOSPITAL | Age: 75
End: 2025-01-13
Payer: MEDICARE

## 2025-01-13 DIAGNOSIS — M06.9 RHEUMATOID ARTHRITIS, INVOLVING UNSPECIFIED SITE, UNSPECIFIED WHETHER RHEUMATOID FACTOR PRESENT: ICD-10-CM

## 2025-01-13 DIAGNOSIS — D61.818 PANCYTOPENIA: ICD-10-CM

## 2025-01-13 DIAGNOSIS — D69.6 THROMBOCYTOPENIA, UNSPECIFIED: ICD-10-CM

## 2025-01-13 LAB
BASOPHILS # BLD AUTO: 0.04 K/UL (ref 0–0.2)
BASOPHILS NFR BLD: 1.3 % (ref 0–1.9)
DIFFERENTIAL METHOD BLD: ABNORMAL
EOSINOPHIL # BLD AUTO: 0 K/UL (ref 0–0.5)
EOSINOPHIL NFR BLD: 1.3 % (ref 0–8)
ERYTHROCYTE [DISTWIDTH] IN BLOOD BY AUTOMATED COUNT: 14.9 % (ref 11.5–14.5)
FERRITIN SERPL-MCNC: 258 NG/ML (ref 20–300)
HCT VFR BLD AUTO: 30.8 % (ref 37–48.5)
HGB BLD-MCNC: 10.2 G/DL (ref 12–16)
IMM GRANULOCYTES # BLD AUTO: 0.01 K/UL (ref 0–0.04)
IMM GRANULOCYTES NFR BLD AUTO: 0.3 % (ref 0–0.5)
IRON SERPL-MCNC: 66 UG/DL (ref 30–160)
LYMPHOCYTES # BLD AUTO: 1.4 K/UL (ref 1–4.8)
LYMPHOCYTES NFR BLD: 46.3 % (ref 18–48)
MCH RBC QN AUTO: 33.4 PG (ref 27–31)
MCHC RBC AUTO-ENTMCNC: 33.1 G/DL (ref 32–36)
MCV RBC AUTO: 101 FL (ref 82–98)
MONOCYTES # BLD AUTO: 0.9 K/UL (ref 0.3–1)
MONOCYTES NFR BLD: 29.1 % (ref 4–15)
NEUTROPHILS # BLD AUTO: 0.7 K/UL (ref 1.8–7.7)
NEUTROPHILS NFR BLD: 21.7 % (ref 38–73)
NRBC BLD-RTO: 0 /100 WBC
PLATELET # BLD AUTO: ABNORMAL K/UL (ref 150–450)
PLATELET BLD QL SMEAR: ABNORMAL
PMV BLD AUTO: ABNORMAL FL (ref 9.2–12.9)
RBC # BLD AUTO: 3.05 M/UL (ref 4–5.4)
RETICS/RBC NFR AUTO: 1.9 % (ref 0.5–2.5)
SATURATED IRON: 22 % (ref 20–50)
TOTAL IRON BINDING CAPACITY: 297 UG/DL (ref 250–450)
TRANSFERRIN SERPL-MCNC: 201 MG/DL (ref 200–375)
WBC # BLD AUTO: 3.09 K/UL (ref 3.9–12.7)

## 2025-01-13 PROCEDURE — 85045 AUTOMATED RETICULOCYTE COUNT: CPT | Performed by: STUDENT IN AN ORGANIZED HEALTH CARE EDUCATION/TRAINING PROGRAM

## 2025-01-13 PROCEDURE — 85025 COMPLETE CBC W/AUTO DIFF WBC: CPT | Performed by: STUDENT IN AN ORGANIZED HEALTH CARE EDUCATION/TRAINING PROGRAM

## 2025-01-13 PROCEDURE — 82728 ASSAY OF FERRITIN: CPT | Performed by: STUDENT IN AN ORGANIZED HEALTH CARE EDUCATION/TRAINING PROGRAM

## 2025-01-13 PROCEDURE — 83540 ASSAY OF IRON: CPT | Performed by: STUDENT IN AN ORGANIZED HEALTH CARE EDUCATION/TRAINING PROGRAM

## 2025-01-14 ENCOUNTER — TELEPHONE (OUTPATIENT)
Dept: HEMATOLOGY/ONCOLOGY | Facility: CLINIC | Age: 75
End: 2025-01-14
Payer: MEDICARE

## 2025-01-14 NOTE — TELEPHONE ENCOUNTER
----- Message from Vega Arshad MD sent at 1/13/2025  5:24 PM CST -----  Please let her know labs are stable and we will follow up on next visit with surveillance labs  ----- Message -----  From: Mason, Soft Lab Interface  Sent: 1/13/2025   4:38 PM CST  To: Vega Arshad MD

## 2025-02-14 ENCOUNTER — TELEPHONE (OUTPATIENT)
Dept: VASCULAR SURGERY | Facility: CLINIC | Age: 75
End: 2025-02-14
Payer: MEDICARE

## 2025-03-18 ENCOUNTER — TELEPHONE (OUTPATIENT)
Dept: UROLOGY | Facility: CLINIC | Age: 75
End: 2025-03-18
Payer: MEDICARE

## 2025-03-18 ENCOUNTER — RESULTS FOLLOW-UP (OUTPATIENT)
Dept: HEMATOLOGY/ONCOLOGY | Facility: HOSPITAL | Age: 75
End: 2025-03-18

## 2025-03-18 ENCOUNTER — LAB VISIT (OUTPATIENT)
Dept: LAB | Facility: HOSPITAL | Age: 75
End: 2025-03-18
Attending: STUDENT IN AN ORGANIZED HEALTH CARE EDUCATION/TRAINING PROGRAM
Payer: MEDICARE

## 2025-03-18 ENCOUNTER — PATIENT MESSAGE (OUTPATIENT)
Dept: UROLOGY | Facility: CLINIC | Age: 75
End: 2025-03-18
Payer: MEDICARE

## 2025-03-18 DIAGNOSIS — D61.818 PANCYTOPENIA: ICD-10-CM

## 2025-03-18 DIAGNOSIS — M06.9 RHEUMATOID ARTHRITIS, INVOLVING UNSPECIFIED SITE, UNSPECIFIED WHETHER RHEUMATOID FACTOR PRESENT: ICD-10-CM

## 2025-03-18 DIAGNOSIS — D69.6 THROMBOCYTOPENIA, UNSPECIFIED: ICD-10-CM

## 2025-03-18 LAB
BASOPHILS # BLD AUTO: 0.02 K/UL (ref 0–0.2)
BASOPHILS NFR BLD: 0.9 % (ref 0–1.9)
DIFFERENTIAL METHOD BLD: ABNORMAL
EOSINOPHIL # BLD AUTO: 0 K/UL (ref 0–0.5)
EOSINOPHIL NFR BLD: 0.9 % (ref 0–8)
ERYTHROCYTE [DISTWIDTH] IN BLOOD BY AUTOMATED COUNT: 15.9 % (ref 11.5–14.5)
FERRITIN SERPL-MCNC: 216 NG/ML (ref 20–300)
GIANT PLATELETS BLD QL SMEAR: PRESENT
HCT VFR BLD AUTO: 29.2 % (ref 37–48.5)
HGB BLD-MCNC: 9.7 G/DL (ref 12–16)
IMM GRANULOCYTES # BLD AUTO: 0.01 K/UL (ref 0–0.04)
IMM GRANULOCYTES NFR BLD AUTO: 0.5 % (ref 0–0.5)
IRON SERPL-MCNC: 102 UG/DL (ref 30–160)
LYMPHOCYTES # BLD AUTO: 1.2 K/UL (ref 1–4.8)
LYMPHOCYTES NFR BLD: 55.3 % (ref 18–48)
MCH RBC QN AUTO: 33.7 PG (ref 27–31)
MCHC RBC AUTO-ENTMCNC: 33.2 G/DL (ref 32–36)
MCV RBC AUTO: 101 FL (ref 82–98)
MONOCYTES # BLD AUTO: 0.5 K/UL (ref 0.3–1)
MONOCYTES NFR BLD: 22.8 % (ref 4–15)
NEUTROPHILS # BLD AUTO: 0.4 K/UL (ref 1.8–7.7)
NEUTROPHILS NFR BLD: 19.6 % (ref 38–73)
NRBC BLD-RTO: 0 /100 WBC
PLATELET # BLD AUTO: 51 K/UL (ref 150–450)
PLATELET BLD QL SMEAR: ABNORMAL
PMV BLD AUTO: ABNORMAL FL (ref 9.2–12.9)
RBC # BLD AUTO: 2.88 M/UL (ref 4–5.4)
SATURATED IRON: 32 % (ref 20–50)
TOTAL IRON BINDING CAPACITY: 315 UG/DL (ref 250–450)
TRANSFERRIN SERPL-MCNC: 213 MG/DL (ref 200–375)
WBC # BLD AUTO: 2.19 K/UL (ref 3.9–12.7)

## 2025-03-18 PROCEDURE — 82728 ASSAY OF FERRITIN: CPT | Performed by: STUDENT IN AN ORGANIZED HEALTH CARE EDUCATION/TRAINING PROGRAM

## 2025-03-18 PROCEDURE — 36415 COLL VENOUS BLD VENIPUNCTURE: CPT | Mod: PO | Performed by: STUDENT IN AN ORGANIZED HEALTH CARE EDUCATION/TRAINING PROGRAM

## 2025-03-18 PROCEDURE — 85025 COMPLETE CBC W/AUTO DIFF WBC: CPT | Performed by: STUDENT IN AN ORGANIZED HEALTH CARE EDUCATION/TRAINING PROGRAM

## 2025-03-18 PROCEDURE — 83540 ASSAY OF IRON: CPT | Performed by: STUDENT IN AN ORGANIZED HEALTH CARE EDUCATION/TRAINING PROGRAM

## 2025-03-18 NOTE — TELEPHONE ENCOUNTER
Message sent via [employer] explaining appt rescheduled to 4/4/2025 at 11:30AM.  To contact our office if unable to keep appt date/time.   DINORAH Howe    ----- Message from Moises Mckenzie MD sent at 3/18/2025 10:45 AM CDT -----  Regarding: reschedule  Please reschedule patient I am not in clinic this day, in the OR

## 2025-03-26 ENCOUNTER — TELEPHONE (OUTPATIENT)
Dept: HEMATOLOGY/ONCOLOGY | Facility: CLINIC | Age: 75
End: 2025-03-26
Payer: MEDICARE

## 2025-03-26 NOTE — TELEPHONE ENCOUNTER
----- Message from Vega Arshad MD sent at 3/22/2025  2:50 PM CDT -----  Please check with pt to see if she is taking any of her rheumatoid arthritis meds such as plaquenil as she is having worsening cytopenias, if she still is of them then we need to get bmbx arranged   with IR  ----- Message -----  From: Mason, Soft Lab Interface  Sent: 3/18/2025   5:47 PM CDT  To: Vega Arshad MD

## 2025-03-28 ENCOUNTER — TELEPHONE (OUTPATIENT)
Dept: HEMATOLOGY/ONCOLOGY | Facility: CLINIC | Age: 75
End: 2025-03-28
Payer: MEDICARE

## 2025-03-28 NOTE — TELEPHONE ENCOUNTER
----- Message from Vega Arshad MD sent at 3/27/2025  6:36 PM CDT -----  Regarding: RE: Plaquenil  Please see if she is ok seeing me in 2 weeks to discuss bmbx  ----- Message -----  From: Peggy Watts  Sent: 3/27/2025   4:01 PM CDT  To: Vega Arshad MD  Subject: Plaquenil                                        Patient takes medication as needed.  Asked her on average a week, she said maybe 3 times.  Only when pain is excruciating.  ----- Message -----  From: Beena Hyde MA  Sent: 3/27/2025   1:34 PM CDT  To: Peggy Watts    Advise   Thank You,   Beena:MA  ----- Message -----  From: Vega Arshad MD  Sent: 3/22/2025   2:51 PM CDT  To: Beena Hyde MA    Please check with pt to see if she is taking any of her rheumatoid arthritis meds such as plaquenil as she is having worsening cytopenias, if she still is of them then we need to get bmbx arranged   with IR  ----- Message -----  From: Mason, [employer] Lab Interface  Sent: 3/18/2025   5:47 PM CDT  To: Vega Arshad MD

## 2025-03-28 NOTE — TELEPHONE ENCOUNTER
[facility]  [address]  [place] [address]  Phone: [phone]    May 29, 2018        Malaika Hsieh  [address]  [facility] [address]          To whom it may concern:    RE: Malaika Hsieh    Patient was seen and treated today at our clinic. Please allow her to rest from physical activity for the next 4 days. She can attend the field trip on Thursday 5/31/2018.    Please contact me for questions or concerns.      Sincerely,        Faith Mcgill NP   Left PT VM asking if appt for 04/11 @ 11am works for her.

## 2025-04-02 DIAGNOSIS — M54.16 LUMBAR RADICULOPATHY: ICD-10-CM

## 2025-04-02 DIAGNOSIS — I10 ESSENTIAL HYPERTENSION: Primary | ICD-10-CM

## 2025-04-02 DIAGNOSIS — D50.9 IRON DEFICIENCY ANEMIA, UNSPECIFIED IRON DEFICIENCY ANEMIA TYPE: ICD-10-CM

## 2025-04-02 RX ORDER — LISINOPRIL 20 MG/1
20 TABLET ORAL DAILY
Qty: 90 TABLET | Refills: 0 | Status: SHIPPED | OUTPATIENT
Start: 2025-04-02

## 2025-04-02 RX ORDER — GABAPENTIN 300 MG/1
600 CAPSULE ORAL NIGHTLY
Qty: 60 CAPSULE | Refills: 11 | Status: SHIPPED | OUTPATIENT
Start: 2025-04-02 | End: 2026-04-02

## 2025-04-02 RX ORDER — LOVASTATIN 10 MG/1
10 TABLET ORAL NIGHTLY
Qty: 90 TABLET | Refills: 0 | Status: SHIPPED | OUTPATIENT
Start: 2025-04-02

## 2025-04-02 RX ORDER — FERROUS SULFATE 325(65) MG
325 TABLET ORAL DAILY
Qty: 90 TABLET | Refills: 1 | Status: SHIPPED | OUTPATIENT
Start: 2025-04-02

## 2025-04-02 NOTE — TELEPHONE ENCOUNTER
Care Due:                  Date            Visit Type   Department     Provider  --------------------------------------------------------------------------------                                EP -         [facility] FAMILY MED                              PRIMARY      / INTERNAL MED  Last Visit: 08-      CARE ([facility])   / PEDS         Diaz Merrill                               -         [facility] FAMILY MED                              PRIMARY      / INTERNAL MED  Next Visit: 05-      CARE ([facility])   / PEDS         Gideon France                                                            Last  Test          Frequency    Reason                     Performed    Due Date  --------------------------------------------------------------------------------    CMP.........  6 months...  hydroxychloroquine.......  08- 02-    [facility] Embedded Care Due Messages. Reference number: [id number].   4/02/2025 11:03:01 AM CDT

## 2025-04-02 NOTE — TELEPHONE ENCOUNTER
----- Message from Chester sent at 4/1/2025 12:42 PM CDT -----  Regarding: self  Type: RX Refill RequestWho called:selfHave you contacted your pharmacy:noRefill or New Rx:refillRX name and strength: PATIENT STATES SHE WANTS EVERY MEDICATION FILLED ON CHART. Preferred pharmacy and phone number:[facility] [address] [place], [place] [address] [facility] [address] [place] [address]Psevc: [phone] Fax: [phone] provider:Hnerietta Allen the patient rather a call back or a response via [facility]:call [facility] call back number:[phone] information:

## 2025-04-02 NOTE — TELEPHONE ENCOUNTER
Call placed to patient and medication refills were verified. Noted patient due for annual visit and scheduled for 5-27-25. Confirmation and thank you verbalized.

## 2025-04-15 ENCOUNTER — TELEPHONE (OUTPATIENT)
Dept: UROLOGY | Facility: CLINIC | Age: 75
End: 2025-04-15
Payer: MEDICARE

## 2025-04-15 ENCOUNTER — OFFICE VISIT (OUTPATIENT)
Dept: HEMATOLOGY/ONCOLOGY | Facility: CLINIC | Age: 75
End: 2025-04-15
Payer: MEDICARE

## 2025-04-15 VITALS
HEART RATE: 74 BPM | BODY MASS INDEX: 22.92 KG/M2 | DIASTOLIC BLOOD PRESSURE: 79 MMHG | SYSTOLIC BLOOD PRESSURE: 153 MMHG | HEIGHT: 62 IN | WEIGHT: 124.56 LBS

## 2025-04-15 DIAGNOSIS — Z71.2 ENCOUNTER TO DISCUSS TEST RESULTS: ICD-10-CM

## 2025-04-15 DIAGNOSIS — D69.6 THROMBOCYTOPENIA, UNSPECIFIED: Primary | ICD-10-CM

## 2025-04-15 DIAGNOSIS — D61.818 PANCYTOPENIA: Primary | ICD-10-CM

## 2025-04-15 DIAGNOSIS — D75.9 CLONAL CYTOPENIA OF UNDETERMINED SIGNIFICANCE (CCUS): ICD-10-CM

## 2025-04-15 DIAGNOSIS — R35.0 URINARY FREQUENCY: Primary | ICD-10-CM

## 2025-04-15 DIAGNOSIS — M06.9 RHEUMATOID ARTHRITIS, INVOLVING UNSPECIFIED SITE, UNSPECIFIED WHETHER RHEUMATOID FACTOR PRESENT: ICD-10-CM

## 2025-04-15 DIAGNOSIS — D69.6 THROMBOCYTOPENIA, UNSPECIFIED: ICD-10-CM

## 2025-04-15 PROCEDURE — 3077F SYST BP >= 140 MM HG: CPT | Mod: CPTII,S$GLB,, | Performed by: STUDENT IN AN ORGANIZED HEALTH CARE EDUCATION/TRAINING PROGRAM

## 2025-04-15 PROCEDURE — 3008F BODY MASS INDEX DOCD: CPT | Mod: CPTII,S$GLB,, | Performed by: STUDENT IN AN ORGANIZED HEALTH CARE EDUCATION/TRAINING PROGRAM

## 2025-04-15 PROCEDURE — 1159F MED LIST DOCD IN RCRD: CPT | Mod: CPTII,S$GLB,, | Performed by: STUDENT IN AN ORGANIZED HEALTH CARE EDUCATION/TRAINING PROGRAM

## 2025-04-15 PROCEDURE — 3288F FALL RISK ASSESSMENT DOCD: CPT | Mod: CPTII,S$GLB,, | Performed by: STUDENT IN AN ORGANIZED HEALTH CARE EDUCATION/TRAINING PROGRAM

## 2025-04-15 PROCEDURE — 4010F ACE/ARB THERAPY RXD/TAKEN: CPT | Mod: CPTII,S$GLB,, | Performed by: STUDENT IN AN ORGANIZED HEALTH CARE EDUCATION/TRAINING PROGRAM

## 2025-04-15 PROCEDURE — 1125F AMNT PAIN NOTED PAIN PRSNT: CPT | Mod: CPTII,S$GLB,, | Performed by: STUDENT IN AN ORGANIZED HEALTH CARE EDUCATION/TRAINING PROGRAM

## 2025-04-15 PROCEDURE — 3078F DIAST BP <80 MM HG: CPT | Mod: CPTII,S$GLB,, | Performed by: STUDENT IN AN ORGANIZED HEALTH CARE EDUCATION/TRAINING PROGRAM

## 2025-04-15 PROCEDURE — 99215 OFFICE O/P EST HI 40 MIN: CPT | Mod: S$GLB,,, | Performed by: STUDENT IN AN ORGANIZED HEALTH CARE EDUCATION/TRAINING PROGRAM

## 2025-04-15 PROCEDURE — 99999 PR PBB SHADOW E&M-EST. PATIENT-LVL V: CPT | Mod: PBBFAC,,, | Performed by: STUDENT IN AN ORGANIZED HEALTH CARE EDUCATION/TRAINING PROGRAM

## 2025-04-15 PROCEDURE — G2211 COMPLEX E/M VISIT ADD ON: HCPCS | Mod: S$GLB,,, | Performed by: STUDENT IN AN ORGANIZED HEALTH CARE EDUCATION/TRAINING PROGRAM

## 2025-04-15 PROCEDURE — 1101F PT FALLS ASSESS-DOCD LE1/YR: CPT | Mod: CPTII,S$GLB,, | Performed by: STUDENT IN AN ORGANIZED HEALTH CARE EDUCATION/TRAINING PROGRAM

## 2025-04-15 NOTE — PROGRESS NOTES
Hematology- Oncology Clinic Note :     4/15/2025    Chief Complaint   Patient presents with    Follow-up    Results    Pancytopenia     Polish trnaslator used for this encoutner    HPI  Pt is a 74 y.o. female who  has a past medical history of Arthritis and Hypertension. Who presents to establish care for pancytopenia/CCUS noted on bmbx last year.  Pt has no new issues and only complaint art time of exam is chronic joint pain.  Pt has been off RA tx/MTX since last Nov due to concerns for pancytopenia but takes ibuprofen regularly.  BMBX results reviewed and mildly hypercellular for age and NGS demonstrated CCUS but no other abnormalities seen.  Pt informed it is most likely caused by a combination of age, co morbidities and medications.        Interval hx:      No new issues at time of exam.  Pt off mtx but on/off plaquenil.  CBC is worsenign and pt agreeable to new bmbx for further investigation.  Pt agreeable to plan and all questions answered to her satisfaction.        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    Osteoarthritis of left hip 12/02/2022    Lumbar spondylosis 08/08/2022    Lumbar radiculopathy 08/08/2022    DDD (degenerative disc disease), lumbar 08/08/2022    Lack of coordination 03/02/2020    Posture abnormality 03/02/2020    Weakness of both hips 03/02/2020    GERD (gastroesophageal reflux disease) 04/15/2019    Osteoporosis 04/15/2019    Mixed incontinence 03/20/2019    Varicose veins of legs 11/20/2018    Incarcerated umbilical hernia 01/03/2017    Right upper quadrant pain 12/29/2016    Acute cholecystitis due to biliary calculus 12/29/2016    Hypovitaminosis D 03/27/2014    Osteopenia 03/27/2014    Rheumatoid arthritis 03/27/2014     Overview:   Removed deleted/inactive dx from new diagnosis load  Overview:   ICD10 update          Patient Active Problem List    Diagnosis Date Noted    Pancytopenia 04/25/2024    Thrombocytopenia, unspecified 04/25/2024    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    Osteoarthritis of left hip 12/02/2022    Lumbar spondylosis 08/08/2022    Lumbar radiculopathy 08/08/2022    DDD (degenerative disc disease), lumbar 08/08/2022    Lack of coordination 03/02/2020    Posture abnormality 03/02/2020    Weakness of both hips 03/02/2020    GERD (gastroesophageal reflux disease) 04/15/2019    Osteoporosis 04/15/2019    Mixed incontinence 03/20/2019    Varicose veins of legs 11/20/2018    Incarcerated umbilical hernia 01/03/2017    Right upper quadrant pain 12/29/2016    Acute cholecystitis due to biliary calculus 12/29/2016    Hypovitaminosis D 03/27/2014    Osteopenia 03/27/2014    Rheumatoid arthritis 03/27/2014     Past Medical History:   Diagnosis Date    Arthritis     Hypertension       Past Surgical History:   Procedure Laterality Date    EPIDURAL STEROID INJECTION Left 03/22/2023    Procedure: Left L5 + S1 Transforaminal Epidural Steroid Injections;  Surgeon: Jim Hector Jr., MD;  Location: [facility] ENDO;  Service: Pain Management;  Laterality: Left;  @1330  No ATC or DM    INJECTION Left 12/19/2022    Procedure: INJECTION, LEFT HIP INTRA-ARTICULAR STEROID CONTRAST DIRECT REF  confirmed;  Surgeon: Hailey Valladares MD;  Location: [facility] MGT;  Service: Pain Management;  Laterality: Left;      (Not in a hospital admission)    Review of patient's allergies indicates:  No Known Allergies   Social History     Tobacco Use    Smoking status: Never    Smokeless tobacco: Never   Substance Use Topics    Alcohol use: Not Currently      No family history on file.     Review of Systems :  Review of Systems   Constitutional:  Negative for fever, malaise/fatigue and weight loss.   HENT:  Negative for hearing loss.    Eyes:  Negative for blurred vision and discharge.   Respiratory:   Negative for cough and shortness of breath.    Cardiovascular:  Negative for chest pain and leg swelling.   Gastrointestinal:  Negative for abdominal pain, blood in stool, constipation, diarrhea, heartburn, melena, nausea and vomiting.   Genitourinary:  Negative for dysuria.   Musculoskeletal:  Positive for joint pain and myalgias.   Skin:  Negative for itching and rash.   Neurological:  Negative for dizziness and focal weakness.   Endo/Heme/Allergies:  Does not bruise/bleed easily.   Psychiatric/Behavioral:  Negative for depression. The patient is nervous/anxious.        Physical Exam :  Wt Readings from Last 3 Encounters:   04/15/25 56.5 kg (124 lb 9 oz)   01/03/25 58 kg (127 lb 15.6 oz)   12/26/24 58.2 kg (128 lb 4.9 oz)     Temp Readings from Last 3 Encounters:   12/26/24 98.5 °F (36.9 °C) (Oral)   11/08/24 97.8 °F (36.6 °C) (Other (see comments))   08/08/24 98.9 °F (37.2 °C) (Oral)     BP Readings from Last 3 Encounters:   04/15/25 (!) 153/79   12/26/24 (!) 142/81   11/18/24 126/68     Pulse Readings from Last 3 Encounters:   04/15/25 74   12/26/24 91   11/18/24 80     Body mass index is 22.78 kg/m².    Physical Exam  Vitals reviewed.   Constitutional:       Comments: Uses walker   HENT:      Head: Normocephalic and atraumatic.      Nose: Nose normal.      Mouth/Throat:      Mouth: Mucous membranes are moist.   Eyes:      Pupils: Pupils are equal, round, and reactive to light.   Cardiovascular:      Rate and Rhythm: Normal rate and regular rhythm.   Pulmonary:      Effort: Pulmonary effort is normal.      Breath sounds: Normal breath sounds.   Abdominal:      General: Abdomen is flat.   Musculoskeletal:         General: Normal range of motion.      Cervical back: Normal range of motion and neck supple.   Skin:     General: Skin is warm and dry.   Neurological:      Mental Status: She is alert.   Psychiatric:         Mood and Affect: Mood normal.         Behavior: Behavior normal.           Pertinent Diagnostic  studies:    BMBX 08/02/23:    Final Diagnosis    Bone marrow, right iliac crest, aspirate smears and core biopsy, and peripheral blood smear:  - Hypercellular marrow for age (cellularity 50%) with trilineage hematopoiesis, M:E ratio 2.4:1, adequate megakaryocytes with occasional atypical forms and adequate iron stores.     Lymphocytes are a mix of T, B and NK cells with T cells predominating. T cells do not show aberrant loss of any antigens. B cells do not show evidence of monoclonality based on surface light chain expression.     CD34+ blasts are 1.0%.      Molecular Studies (Expert Medical Navigation Myeloid NGS) Report:   SUMMARY  At least one variant of strong clinical significance (Tier I) and one variant of potential clinical significance (Tier II) were detected.   Gene Variant Frequency (%) Clinical Impact   SRSF2 39 Tier I   TET2 47 Tier I   CBL 7 Tier II         See complete scanned linked reports.     ADDENDUM NOTE:  A clonal molecular abnormality is detected. 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 Clinical correlation and follow-up is recommended.        No results found for this or any previous visit (from the past 24 hours).    Assessment/Plan :     Pancytopenia/CCUS  -chronic  -Prior work-up: B12, folate and iron all WNL; Path smear review with mature WBC/no concern for malignancy   -Patient has had anemia since 2014 but pancytopenia appears new since 2021 per record review  -Pt off methotrexate sicne Nov 2023 with mild improvement in pancytopenia, now on plaquenil on/off most likely RA and meds contributing to pancytopenia   -BM biopsy performed 8/10. Flow cytometry showing no clonal population (see above)  -Will cut back on ibuprofen use  -Pt agreeable to repeat  bmbx  Plan:  -bmbx with IR in 2-3 weeks  -RTC in 5 weeks for MD visit and cbc      RA  -RA followed by outside rheumatology service  -On plaquenil but infrequently   -Pt takes ibuprofen regularly so will cut back on usage to see if improvement        Time spent on case: 45 minutes    Summary of orders placed this encounter:  Orders Placed This Encounter   Procedures    Bone marrow    IR Biopsy Bone Deep (XPD)    CBC Auto Differential    Leukemia/Lymphoma Screen - Bone Marrow Right Posterior Iliac Crest    Heme Disorders DNA/RNA Hold -Bone Marrow       Future Appointments   Date Time Provider Department Center   4/15/2025  1:40 PM Vega Arshad MD [facility] HEM ONC [facility]   5/23/2025  8:00 AM LAB, [facility] [place]   5/26/2025  2:00 PM Vega Arshad MD [facility] HEM ONC [facility]   5/27/2025  1:00 PM Gideon France MD [facility]           Vega Arshad MD   Hematology/oncology, [facility]

## 2025-04-30 ENCOUNTER — PROCEDURE VISIT (OUTPATIENT)
Dept: UROLOGY | Facility: CLINIC | Age: 75
End: 2025-04-30
Payer: MEDICARE

## 2025-04-30 ENCOUNTER — HOSPITAL ENCOUNTER (OUTPATIENT)
Dept: PREADMISSION TESTING | Facility: HOSPITAL | Age: 75
Discharge: HOME | End: 2025-04-30
Attending: STUDENT IN AN ORGANIZED HEALTH CARE EDUCATION/TRAINING PROGRAM
Payer: MEDICARE

## 2025-04-30 VITALS
HEIGHT: 62 IN | TEMPERATURE: 97 F | HEART RATE: 75 BPM | DIASTOLIC BLOOD PRESSURE: 64 MMHG | BODY MASS INDEX: 22.5 KG/M2 | RESPIRATION RATE: 16 BRPM | OXYGEN SATURATION: 98 % | SYSTOLIC BLOOD PRESSURE: 139 MMHG | WEIGHT: 122.25 LBS

## 2025-04-30 DIAGNOSIS — R35.0 URINARY FREQUENCY: Primary | ICD-10-CM

## 2025-04-30 DIAGNOSIS — D69.6 THROMBOCYTOPENIA, UNSPECIFIED: ICD-10-CM

## 2025-04-30 DIAGNOSIS — Z78.0 MENOPAUSE: ICD-10-CM

## 2025-04-30 LAB
ABSOLUTE EOSINOPHIL (OHS): 0.09 K/UL
ABSOLUTE MONOCYTE (OHS): 0.6 K/UL (ref 0.3–1)
ABSOLUTE NEUTROPHIL COUNT (OHS): 0.63 K/UL (ref 1.8–7.7)
ANION GAP (OHS): 7 MMOL/L (ref 8–16)
BASOPHILS # BLD AUTO: 0.03 K/UL
BASOPHILS NFR BLD AUTO: 1.1 %
BUN SERPL-MCNC: 17 MG/DL (ref 8–23)
CALCIUM SERPL-MCNC: 8.8 MG/DL (ref 8.7–10.5)
CHLORIDE SERPL-SCNC: 108 MMOL/L (ref 95–110)
CO2 SERPL-SCNC: 25 MMOL/L (ref 23–29)
CREAT SERPL-MCNC: 0.6 MG/DL (ref 0.5–1.4)
ERYTHROCYTE [DISTWIDTH] IN BLOOD BY AUTOMATED COUNT: 16.6 % (ref 11.5–14.5)
GFR SERPLBLD CREATININE-BSD FMLA CKD-EPI: >60 ML/MIN/1.73/M2
GLUCOSE SERPL-MCNC: 95 MG/DL (ref 70–110)
HCT VFR BLD AUTO: 30.1 % (ref 37–48.5)
HGB BLD-MCNC: 9.6 GM/DL (ref 12–16)
IMM GRANULOCYTES # BLD AUTO: 0.03 K/UL (ref 0–0.04)
IMM GRANULOCYTES NFR BLD AUTO: 1.1 % (ref 0–0.5)
LYMPHOCYTES # BLD AUTO: 1.28 K/UL (ref 1–4.8)
MCH RBC QN AUTO: 32.2 PG (ref 27–31)
MCHC RBC AUTO-ENTMCNC: 31.9 G/DL (ref 32–36)
MCV RBC AUTO: 101 FL (ref 82–98)
NUCLEATED RBC (/100WBC) (OHS): 0 /100 WBC
PLATELET # BLD AUTO: 69 K/UL (ref 150–450)
PLATELET BLD QL SMEAR: ABNORMAL
PMV BLD AUTO: ABNORMAL FL
POTASSIUM SERPL-SCNC: 3.8 MMOL/L (ref 3.5–5.1)
RBC # BLD AUTO: 2.98 M/UL (ref 4–5.4)
RELATIVE EOSINOPHIL (OHS): 3.4 %
RELATIVE LYMPHOCYTE (OHS): 48.1 % (ref 18–48)
RELATIVE MONOCYTE (OHS): 22.6 % (ref 4–15)
RELATIVE NEUTROPHIL (OHS): 23.7 % (ref 38–73)
SODIUM SERPL-SCNC: 140 MMOL/L (ref 136–145)
WBC # BLD AUTO: 2.66 K/UL (ref 3.9–12.7)

## 2025-04-30 PROCEDURE — 52000 CYSTOURETHROSCOPY: CPT | Mod: S$GLB,,, | Performed by: STUDENT IN AN ORGANIZED HEALTH CARE EDUCATION/TRAINING PROGRAM

## 2025-04-30 PROCEDURE — 80048 BASIC METABOLIC PNL TOTAL CA: CPT

## 2025-04-30 PROCEDURE — 85025 COMPLETE CBC W/AUTO DIFF WBC: CPT

## 2025-04-30 RX ORDER — VIBEGRON 75 MG/1
75 TABLET, FILM COATED ORAL DAILY
Qty: 30 TABLET | Refills: 11 | Status: SHIPPED | OUTPATIENT
Start: 2025-04-30 | End: 2026-04-25

## 2025-04-30 NOTE — DISCHARGE INSTRUCTIONS
YOUR PROCEDURE WILL BE AT [facility] at [address] [address]           Enter through the Main Entrance facing [address].      Your procedure  is scheduled for __5/8/2025________.   Arrive in Same Day Surgery at _11:30___________    You may have up to three visitors.  No children under 18 years old.     You will be going to the Same Day Surgery Unit on the 2nd floor of the hospital.    Report to the Same Day Surgery Registration Desk in the hallway.(Just beside the Same Day Surgery Unit)                    DO NOT PARK IN THE GARAGE.  THERE IS NO OPEN ENTRANCE TO THE HOSPITAL BUILDING AND NO ELEVATOR.      Important instructions:  Do not eat or drink anything after midnight.         SEE MEDICATION SHEET.   TAKE MEDICATIONS AS DIRECTED WITH SIPS OF WATER.    Do not take any diabetic medication on the morning of surgery unless instructed to do so by your doctor or pre op nurse.  Be sure to take all blood pressure medications.      STOP taking for 7 days before surgery:    Aspirin    Ibuprofen  (Advil, Motrin)  Mobic (meloxicam, celebrex)  Aleve (naproxen),   Fish oil, Krill oil and Vitamin E  Headache Powders (BC Powder, Goody's Powder)             You may take Tylenol if needed which is not a blood thinner.    Please shower the night before and the morning of your surgery.                   Use Chlorhexidine soap as instructed by your pre op nurse.   Please place clean linens on your bed the night before surgery. Please wear fresh clean clothing after each shower.    No shaving of procedural area at least 4-5 days before surgery due to increased risk of skin irritation and/or possible infection.    Female patients may be asked for a urine specimen on the morning of the surgery.  Please check with your nurse before using the restroom.  Contact lenses and removable denture work may not be worn during your procedure.    You may wear deodorant only. If you are having  breast surgery, do not wear deodorant on the operative side.    Do not wear powder, body lotion, perfume/cologne or make-up.    Do not wear any jewelry or have any metal on your body.    You will be asked to remove any dentures or partials for the procedure.    If you are going home on the same day of surgery, you must arrange for a family member or a friend to drive you home.  Public transportation is prohibited.  You will not be able to drive home if you were given anesthesia or sedation.    Patients who want to have their Post-op prescriptions filled from our in-house [facility], bring a Credit/Debit Card  or cash with you. A co-pay may be required.  The pharmacy closes at 5:30 pm.    Children under 18 years of age require a parent/guardian present the entire time that they are here.    Wear loose fitting clothes allowing for bandages.    Please leave money and valuables home.      You may bring your cell phone.    Call the doctor if fever or illness should occur before your surgery.    Call [phone] to contact us here if needed.

## 2025-04-30 NOTE — PROCEDURES
Cystoscopy    Date/Time: 4/30/2025 2:15 PM    Performed by: Moises Mckenzie MD  Authorized by: Moises Mckenzie MD    Consent Done?:  Yes (Written)  Timeout: prior to procedure the correct patient, procedure, and site was verified    Prep: patient was prepped and draped in usual sterile fashion    Local anesthesia used?: Yes    Anesthesia:  Intraurethral instillation  Indications comment:  History of urethral stricture, hx of UU  Position:  Dorsal lithotomy  Anesthesia:  Intraurethral instillation  Preparation: Patient was prepped and draped in usual sterile fashion    Scope type:  Flexible cystoscope  Urethra normal: Yes (no recurrence of stricture)    Bladder neck normal: Yes    Bladder normal: Yes     patient tolerated the procedure well with no immediate complications  Comments:      Bladder systematically evaluated without erythematous patches, papillary lesions    Renewed rx for gemtesa, helped resolve her symptoms, she is sleeping through the night and is very pleased  Notes strong stream  Discussed she has refills for topical E2 at the pharmacy    RTC 1 year for follow up appt

## 2025-05-15 NOTE — PROGRESS NOTES
Per  services (Anglican [id number]) pt declined to schedule an OV.Immunization's updated.   FAMILY HISTORY:  No pertinent family history in first degree relatives

## 2025-05-19 ENCOUNTER — PATIENT OUTREACH (OUTPATIENT)
Dept: ADMINISTRATIVE | Facility: HOSPITAL | Age: 75
End: 2025-05-19
Payer: MEDICARE

## 2025-05-19 ENCOUNTER — LAB VISIT (OUTPATIENT)
Dept: LAB | Facility: HOSPITAL | Age: 75
End: 2025-05-19
Attending: STUDENT IN AN ORGANIZED HEALTH CARE EDUCATION/TRAINING PROGRAM
Payer: MEDICARE

## 2025-05-19 ENCOUNTER — TELEPHONE (OUTPATIENT)
Dept: FAMILY MEDICINE | Facility: CLINIC | Age: 75
End: 2025-05-19
Payer: MEDICARE

## 2025-05-19 DIAGNOSIS — D61.818 PANCYTOPENIA: ICD-10-CM

## 2025-05-19 LAB
ABSOLUTE EOSINOPHIL (OHS): 0.1 K/UL
ABSOLUTE MONOCYTE (OHS): 1.12 K/UL (ref 0.3–1)
ABSOLUTE NEUTROPHIL COUNT (OHS): 1.94 K/UL (ref 1.8–7.7)
ANISOCYTOSIS BLD QL SMEAR: SLIGHT
BASOPHILS # BLD AUTO: 0.02 K/UL
BASOPHILS NFR BLD AUTO: 0.4 %
ERYTHROCYTE [DISTWIDTH] IN BLOOD BY AUTOMATED COUNT: 17.6 % (ref 11.5–14.5)
HCT VFR BLD AUTO: 29.5 % (ref 37–48.5)
HGB BLD-MCNC: 9.4 GM/DL (ref 12–16)
IMM GRANULOCYTES # BLD AUTO: 0.12 K/UL (ref 0–0.04)
IMM GRANULOCYTES NFR BLD AUTO: 2.7 % (ref 0–0.5)
LYMPHOCYTES # BLD AUTO: 1.22 K/UL (ref 1–4.8)
MCH RBC QN AUTO: 33 PG (ref 27–31)
MCHC RBC AUTO-ENTMCNC: 31.9 G/DL (ref 32–36)
MCV RBC AUTO: 104 FL (ref 82–98)
NUCLEATED RBC (/100WBC) (OHS): 0 /100 WBC
PLATELET # BLD AUTO: 63 K/UL (ref 150–450)
PLATELET BLD QL SMEAR: ABNORMAL
PMV BLD AUTO: ABNORMAL FL
POIKILOCYTOSIS BLD QL SMEAR: SLIGHT
RBC # BLD AUTO: 2.85 M/UL (ref 4–5.4)
RELATIVE EOSINOPHIL (OHS): 2.2 %
RELATIVE LYMPHOCYTE (OHS): 27 % (ref 18–48)
RELATIVE MONOCYTE (OHS): 24.8 % (ref 4–15)
RELATIVE NEUTROPHIL (OHS): 42.9 % (ref 38–73)
WBC # BLD AUTO: 4.52 K/UL (ref 3.9–12.7)

## 2025-05-19 PROCEDURE — 36415 COLL VENOUS BLD VENIPUNCTURE: CPT | Mod: PO

## 2025-05-19 PROCEDURE — 85025 COMPLETE CBC W/AUTO DIFF WBC: CPT

## 2025-05-19 NOTE — TELEPHONE ENCOUNTER
Call placed to patient to notify of 5-27-25 appointment requiring rescheduling due to provider not in clinic/available on that date. Voicemail was left and a letter sent/mailed to patient. Patient noted with lab appointment at this time and in lobby. Addressed and rescheduled for 7-3-25. Confirmation and thank you verbalized.

## 2025-05-19 NOTE — LETTER
May 19, 2025    Alba M Reyes  [address]  [place] [address]             [facility]  [address] [facility]  [place] [address]  Phone: [phone]  Fax: [phone] Dear Nichole:     You are receiving this letter in regard to an appointment you have scheduled with Gideon France MD on May 27, 2025 . Unfortunately, the provider will not be in clinic/available on that date. The appointment will need to be rescheduled. Please contact us at [phone] to reschedule this appointment.    We apologize for any inconvenience this may have caused you and appreciate your continued support of [facility].    Sincerely,  Rock Castillo  [facility]

## 2025-05-19 NOTE — LETTER
May 19, 2025    Alba M Reyes  [address]  [place] [address]             [facility]  [address] [facility]  [place] [address]  Phone: [phone]  Fax: [phone] Dear Nichole:     You are receiving this letter in regard to an appointment you have scheduled with Gideon France MD on May 27, 2025. Unfortunately, the provider will not be in clinic/available on that date. The appointment will need to be rescheduled. Please contact us at [phone] to reschedule this appointment.    We apologize for any inconvenience this may have caused you and appreciate your continued support of [facility].    Sincerely,  Rock Castillo  [facility]

## 2025-05-27 ENCOUNTER — TELEPHONE (OUTPATIENT)
Dept: VASCULAR SURGERY | Facility: CLINIC | Age: 75
End: 2025-05-27
Payer: MEDICARE

## 2025-05-27 NOTE — TELEPHONE ENCOUNTER
----- Message from Nurse Fofana sent at 5/27/2025  3:13 PM CDT -----    ----- Message -----  From: Debora Dickerson  Sent: 5/27/2025   2:19 PM CDT  To: Sakshi Grimaldo    ..Type:  Sooner Appointment RequestPatient is requesting a sooner appointment.  Patient declined first available appointment listed as well as another facility and provider .  Patient will not accept being placed on the waitlist and is requesting a message be sent to doctor.Name of Caller: SelfWhen is the first available appointment? 8/15/2025Symptoms: R leg pain, swellingWould the patient rather a call back or a response via [facility]? Call Back Best Call Back Number:  [phone] ([phone] (work)Additional Information:

## 2025-06-06 ENCOUNTER — TELEPHONE (OUTPATIENT)
Dept: FAMILY MEDICINE | Facility: CLINIC | Age: 75
End: 2025-06-06
Payer: MEDICARE

## 2025-06-10 ENCOUNTER — LAB VISIT (OUTPATIENT)
Dept: LAB | Facility: HOSPITAL | Age: 75
End: 2025-06-10
Attending: INTERNAL MEDICINE
Payer: MEDICARE

## 2025-06-10 ENCOUNTER — OFFICE VISIT (OUTPATIENT)
Dept: FAMILY MEDICINE | Facility: CLINIC | Age: 75
End: 2025-06-10
Payer: MEDICARE

## 2025-06-10 VITALS
WEIGHT: 121.94 LBS | SYSTOLIC BLOOD PRESSURE: 130 MMHG | BODY MASS INDEX: 22.44 KG/M2 | OXYGEN SATURATION: 97 % | HEIGHT: 62 IN | TEMPERATURE: 98 F | HEART RATE: 87 BPM | DIASTOLIC BLOOD PRESSURE: 80 MMHG

## 2025-06-10 DIAGNOSIS — M06.9 RHEUMATOID ARTHRITIS, INVOLVING UNSPECIFIED SITE, UNSPECIFIED WHETHER RHEUMATOID FACTOR PRESENT: ICD-10-CM

## 2025-06-10 DIAGNOSIS — L03.115 CELLULITIS OF RIGHT LOWER EXTREMITY: ICD-10-CM

## 2025-06-10 DIAGNOSIS — M16.12 ARTHRITIS OF LEFT HIP: Primary | ICD-10-CM

## 2025-06-10 DIAGNOSIS — M54.30 SCIATICA, UNSPECIFIED LATERALITY: ICD-10-CM

## 2025-06-10 LAB
ABSOLUTE EOSINOPHIL (OHS): 0.13 K/UL
ABSOLUTE MONOCYTE (OHS): 2.23 K/UL (ref 0.3–1)
ABSOLUTE NEUTROPHIL COUNT (OHS): 5.33 K/UL (ref 1.8–7.7)
ALBUMIN SERPL BCP-MCNC: 3.7 G/DL (ref 3.5–5.2)
ALP SERPL-CCNC: 96 UNIT/L (ref 40–150)
ALT SERPL W/O P-5'-P-CCNC: 16 UNIT/L (ref 10–44)
ANION GAP (OHS): 9 MMOL/L (ref 8–16)
AST SERPL-CCNC: 26 UNIT/L (ref 11–45)
BASOPHILS # BLD AUTO: 0.08 K/UL
BASOPHILS NFR BLD AUTO: 0.8 %
BILIRUB SERPL-MCNC: 0.5 MG/DL (ref 0.1–1)
BUN SERPL-MCNC: 23 MG/DL (ref 8–23)
CALCIUM SERPL-MCNC: 8.9 MG/DL (ref 8.7–10.5)
CHLORIDE SERPL-SCNC: 105 MMOL/L (ref 95–110)
CO2 SERPL-SCNC: 25 MMOL/L (ref 23–29)
CREAT SERPL-MCNC: 0.8 MG/DL (ref 0.5–1.4)
CRP SERPL-MCNC: 82.5 MG/L
ERYTHROCYTE [DISTWIDTH] IN BLOOD BY AUTOMATED COUNT: 16.6 % (ref 11.5–14.5)
ERYTHROCYTE [SEDIMENTATION RATE] IN BLOOD BY PHOTOMETRIC METHOD: 43 MM/HR
GFR SERPLBLD CREATININE-BSD FMLA CKD-EPI: >60 ML/MIN/1.73/M2
GLUCOSE SERPL-MCNC: 95 MG/DL (ref 70–110)
HCT VFR BLD AUTO: 30.8 % (ref 37–48.5)
HGB BLD-MCNC: 9.6 GM/DL (ref 12–16)
IMM GRANULOCYTES # BLD AUTO: 0.29 K/UL (ref 0–0.04)
IMM GRANULOCYTES NFR BLD AUTO: 3 % (ref 0–0.5)
LYMPHOCYTES # BLD AUTO: 1.47 K/UL (ref 1–4.8)
MCH RBC QN AUTO: 31.7 PG (ref 27–31)
MCHC RBC AUTO-ENTMCNC: 31.2 G/DL (ref 32–36)
MCV RBC AUTO: 102 FL (ref 82–98)
NUCLEATED RBC (/100WBC) (OHS): 0 /100 WBC
PLATELET # BLD AUTO: 110 K/UL (ref 150–450)
PLATELET BLD QL SMEAR: ABNORMAL
PMV BLD AUTO: ABNORMAL FL
POTASSIUM SERPL-SCNC: 3.5 MMOL/L (ref 3.5–5.1)
PROT SERPL-MCNC: 7.4 GM/DL (ref 6–8.4)
RBC # BLD AUTO: 3.03 M/UL (ref 4–5.4)
RELATIVE EOSINOPHIL (OHS): 1.4 %
RELATIVE LYMPHOCYTE (OHS): 15.4 % (ref 18–48)
RELATIVE MONOCYTE (OHS): 23.4 % (ref 4–15)
RELATIVE NEUTROPHIL (OHS): 56 % (ref 38–73)
SODIUM SERPL-SCNC: 139 MMOL/L (ref 136–145)
WBC # BLD AUTO: 9.53 K/UL (ref 3.9–12.7)

## 2025-06-10 PROCEDURE — 85652 RBC SED RATE AUTOMATED: CPT

## 2025-06-10 PROCEDURE — 99215 OFFICE O/P EST HI 40 MIN: CPT | Mod: S$GLB,,, | Performed by: INTERNAL MEDICINE

## 2025-06-10 PROCEDURE — 4010F ACE/ARB THERAPY RXD/TAKEN: CPT | Mod: CPTII,S$GLB,, | Performed by: INTERNAL MEDICINE

## 2025-06-10 PROCEDURE — 1159F MED LIST DOCD IN RCRD: CPT | Mod: CPTII,S$GLB,, | Performed by: INTERNAL MEDICINE

## 2025-06-10 PROCEDURE — 3079F DIAST BP 80-89 MM HG: CPT | Mod: CPTII,S$GLB,, | Performed by: INTERNAL MEDICINE

## 2025-06-10 PROCEDURE — 86140 C-REACTIVE PROTEIN: CPT

## 2025-06-10 PROCEDURE — 1101F PT FALLS ASSESS-DOCD LE1/YR: CPT | Mod: CPTII,S$GLB,, | Performed by: INTERNAL MEDICINE

## 2025-06-10 PROCEDURE — 85025 COMPLETE CBC W/AUTO DIFF WBC: CPT

## 2025-06-10 PROCEDURE — 3075F SYST BP GE 130 - 139MM HG: CPT | Mod: CPTII,S$GLB,, | Performed by: INTERNAL MEDICINE

## 2025-06-10 PROCEDURE — G2211 COMPLEX E/M VISIT ADD ON: HCPCS | Mod: S$GLB,,, | Performed by: INTERNAL MEDICINE

## 2025-06-10 PROCEDURE — 36415 COLL VENOUS BLD VENIPUNCTURE: CPT | Mod: PO

## 2025-06-10 PROCEDURE — 99999 PR PBB SHADOW E&M-EST. PATIENT-LVL V: CPT | Mod: PBBFAC,,, | Performed by: INTERNAL MEDICINE

## 2025-06-10 PROCEDURE — 3288F FALL RISK ASSESSMENT DOCD: CPT | Mod: CPTII,S$GLB,, | Performed by: INTERNAL MEDICINE

## 2025-06-10 PROCEDURE — 3008F BODY MASS INDEX DOCD: CPT | Mod: CPTII,S$GLB,, | Performed by: INTERNAL MEDICINE

## 2025-06-10 PROCEDURE — 80053 COMPREHEN METABOLIC PANEL: CPT

## 2025-06-10 RX ORDER — DOXYCYCLINE HYCLATE 100 MG
100 TABLET ORAL 2 TIMES DAILY
Qty: 14 TABLET | Refills: 0 | Status: SHIPPED | OUTPATIENT
Start: 2025-06-10 | End: 2025-06-17

## 2025-06-11 NOTE — PROGRESS NOTES
Subjective:     History of Present Illness    CHIEF COMPLAINT:  - Nichole presents with multiple concerns including weight loss, feeling cold, hip pain, and a foot infection.    HPI:  Nichole reports significant weight loss, dropping from 130 lbs to 118-120 lbs. She feels excessively cold, requiring multiple blankets and layers of clothing, which she compares to feeling anemic. She complains of severe left hip pain since 2018, extending from her hip down her leg, which significantly impacts her ability to walk and sleep. She takes Gabapentin at midnight for the pain but reports it causes drowsiness and lethargy. She presents with a skin infection, describing it as painful and burning, and mentions having a similar infection on her other leg previously, which required incision and drainage. She also notes occasional swelling in her feet and ankles. She has a history of rheumatoid arthritis, for which she previously took methotrexate, Humira, and hydroxychloroquine, but is currently not on any medication for this condition due to concerns about blood-related side effects. For pain management, she takes Tylenol, Artivion (a OTC medicine similar to ibuprofen), and occasionally ibuprofen 600mg for sciatica pain. She is scheduled for a bone marrow biopsy on Thursday to investigate her anemia, which may be related to her rheumatoid arthritis or potentially something more serious.    She denies chest pain or breathing problems.    MEDICATIONS:  - Tylenol, 2 tablets as needed for pain  - Artivion (Latin medicine), as needed for pain  - Ibuprofen 600 mg, as needed for severe sciatic pain  - Gabapentin, taken at midnight, causes drowsiness    MEDICAL HISTORY:  - Rheumatoid arthritis: Since 2014  - Sciatica  - Anemia  - Cellulitis    FAMILY HISTORY:  - : Kidney disease requiring dialysis, Parkinson's disease    LABS:  - Anemia labs: Previously completed  - Inflammation labs for rheumatoid arthritis: Previously  completed    TESTS:  - Bone marrow biopsy: Scheduled for Thursday    Marital status:       ROS:  General: +chills, +weight loss, +cold intolerance, +difficulty staying asleep, +sleep disturbances, +difficulty falling asleep  Cardiovascular: -chest pain, +lower extremity edema  Musculoskeletal: +joint pain, +bone pain, +pain with movement  Psychiatric: +sleep difficulty         Objective:     Vitals:    06/10/25 1312   BP: 130/80   Pulse: 87   Temp: 98.3 °F (36.8 °C)       Physical Exam    Cardiovascular: Regular rate. Regular rhythm. No murmurs. No rubs. No gallops. Normal S1, S2.         Assessment/Plan     1. Arthritis of left hip  Ambulatory referral/consult to Orthopedics      2. Cellulitis of right lower extremity  doxycycline (VIBRA-TABS) 100 MG tablet      3. Rheumatoid arthritis, involving unspecified site, unspecified whether rheumatoid factor present  CBC Auto Differential    Comprehensive Metabolic Panel    Sedimentation rate    C-Reactive Protein          Assessment & Plan     Patient presents with anemia, rheumatoid arthritis, hip pain, and cellulitis.   Anemia workup ongoing with bone marrow biopsy scheduled.   Rheumatoid arthritis treatment on hold; labs ordered to reassess inflammation.   Left hip pain severe; considering orthopedic referral for possible injection.   Cellulitis of foot noted; initiated oral antibiotic therapy.   Ankle swelling likely related to decreased activity and overall inflammation.    RHEUMATOID ARTHRITIS:  Discussed relationship between inflammation and various symptoms experienced.  Ordered labs for inflammation markers with plan to recheck.  Nichole is taking Tylenol and Ibuprofen for pain management.  Previously took Methotrexate and Humira but discontinued due to adverse effects .  Recommend rheumatology follow-up in the near future given need for DMARD therapy    ANEMIA:  Explained potential causes of anemia, including rheumatoid arthritis or blood  disorders.  Ordered labs to assess current status.  Nichole reports feeling cold and experiencing weight loss, possibly related to anemia.  Scheduled for bone marrow biopsy to investigate underlying cause per Hematolgy    OSTEOARTHRITIS OF LEFT HIP:  Referred patient to orthopedic surgeon for evaluation and potential injection.    SCIATICA:  Nichole is taking Ibuprofen 600 mg for pain management.    CELLULITIS OF LEFT TOE:  Prescribed doxycycline 100 mg twice daily for 1 week, to be taken with food to avoid nausea.  Nichole has history of infection in left toe previously treated with incision and drainage.  Advised to contact office if condition does not improve with antibiotic treatment.    WEIGHT LOSS:  Documented weight loss from 130 to 118 lbs, with current weight at 120 lbs.    NSAID USE:  Nichole is taking Ibuprofen 600 mg for pain management related to rheumatoid arthritis and sciatica.    FOLLOW-UP:  Follow up in 3 months.         This note was generated with the assistance of ambient listening technology. Verbal consent was obtained by the patient and accompanying visitor(s) for the recording of patient appointment to facilitate this note. I attest to having reviewed and edited the generated note for accuracy, though some syntax or spelling errors may persist. Please contact the author of this note for any clarification.    I spent a total of 40 minutes on the day of the visit.This includes face to face time and non-face to face time preparing to see the patient (eg, review of tests), obtaining and/or reviewing separately obtained history, documenting clinical information in the electronic or other health record, independently interpreting results and communicating results to the patient/family/caregiver, or care coordinator.    Visit today included increased complexity associated with the care of the episodic problems addressed and managing the longitudinal care of the patient due to the serious and/or complex managed  problem(s) as per assessment/plan.       Gideon France MD  Internal Medicine-Pediatrics

## 2025-06-12 ENCOUNTER — TELEPHONE (OUTPATIENT)
Dept: OBSTETRICS AND GYNECOLOGY | Facility: CLINIC | Age: 75
End: 2025-06-12
Payer: MEDICARE

## 2025-06-12 ENCOUNTER — RESULTS FOLLOW-UP (OUTPATIENT)
Dept: FAMILY MEDICINE | Facility: CLINIC | Age: 75
End: 2025-06-12
Payer: MEDICARE

## 2025-06-12 NOTE — TELEPHONE ENCOUNTER
LVM for pt to call the office in return.     ----- Message from Gideon France MD sent at 6/12/2025 10:13 AM CDT -----  Please call the patient regarding the following results:    Patient is inflammation labs are elevated which shows that her rheumatoid arthritis is uncontrolled.  Please have patient schedule an appointment with her rheumatologist to discuss best medication   treatment options at this time    Patient continues to have mild anemia for which she is undergoing evaluation by hematologist we discussed  ----- Message -----  From: Lab, Background User  Sent: 6/10/2025   8:23 PM CDT  To: Gideon France MD

## 2025-06-17 ENCOUNTER — TELEPHONE (OUTPATIENT)
Dept: FAMILY MEDICINE | Facility: CLINIC | Age: 75
End: 2025-06-17
Payer: MEDICARE

## 2025-06-17 NOTE — TELEPHONE ENCOUNTER
Copied from CRM #[id number]. Topic: General Inquiry - Return Call  >> Jun 12, 2025 11:41 AM Clementina Chanel wrote:  Type:  Patient Returning Call    Who Called:self   Who Left Message for Patient:Belia Perez MA  Does the patient know what this is regarding?:no  Would the patient rather a call back or a response via [employer] [facility]? Call   Best Call Back Number:.[phone]    Additional Information:

## 2025-06-17 NOTE — TELEPHONE ENCOUNTER
Copied from CRM #[id number]. Topic: General Inquiry - Patient Advice  >> Jun 17, 2025  9:51 AM Med Assistant Tina wrote:  Type:  Patient Returning Call    Who Called: PATIENT    Who Left Message for Patient: Arnoldo Merrill MA      Does the patient know what this is regarding?:NO    Would the patient rather a call back or a response via [facility]? CALL    Best Call Back Number:[phone] (W)

## 2025-06-18 NOTE — TELEPHONE ENCOUNTER
Patient contacted and notified of rheumatology referral placed per MD in regard to lab results. Thank you verbalized.

## 2025-06-19 ENCOUNTER — OFFICE VISIT (OUTPATIENT)
Dept: ORTHOPEDICS | Facility: CLINIC | Age: 75
End: 2025-06-19
Payer: MEDICARE

## 2025-06-19 VITALS — WEIGHT: 121.94 LBS | HEIGHT: 62 IN | BODY MASS INDEX: 22.44 KG/M2

## 2025-06-19 DIAGNOSIS — M16.12 ARTHRITIS OF LEFT HIP: ICD-10-CM

## 2025-06-19 PROCEDURE — 4010F ACE/ARB THERAPY RXD/TAKEN: CPT | Mod: CPTII,S$GLB,,

## 2025-06-19 PROCEDURE — 99214 OFFICE O/P EST MOD 30 MIN: CPT | Mod: S$GLB,,,

## 2025-06-19 PROCEDURE — 3288F FALL RISK ASSESSMENT DOCD: CPT | Mod: CPTII,S$GLB,,

## 2025-06-19 PROCEDURE — 1159F MED LIST DOCD IN RCRD: CPT | Mod: CPTII,S$GLB,,

## 2025-06-19 PROCEDURE — 99999 PR PBB SHADOW E&M-EST. PATIENT-LVL IV: CPT | Mod: PBBFAC,,,

## 2025-06-19 PROCEDURE — 1125F AMNT PAIN NOTED PAIN PRSNT: CPT | Mod: CPTII,S$GLB,,

## 2025-06-19 PROCEDURE — 1101F PT FALLS ASSESS-DOCD LE1/YR: CPT | Mod: CPTII,S$GLB,,

## 2025-06-19 PROCEDURE — 3008F BODY MASS INDEX DOCD: CPT | Mod: CPTII,S$GLB,,

## 2025-06-19 NOTE — PROGRESS NOTES
EST PATIENT ORTHOPAEDIC: HIP    PRIMARY CARE PHYSICIAN: Gideon France MD   REFERRING PROVIDER: Gideon France MD  [address] [facility] [address]  [place] [address]     ASSESSMENT & PLAN:    Impression:  Left Hip Severe Degenerative Osteoarthritis, Primary    Follow Up Plan: TBD    Surgery:     Alba M Reyes has radiograph and physical exam evidence of the aforementioned impression and wishes to pursue surgery. This patient appears to have sufficient symptoms to warrant surgical intervention and is an appropriate candidate for left Primary Total Hip Arthroplasty as evidenced by months of unsuccessful non-operative treatment as outlined in the HPI below and progressive symptoms.    Non operative care:    Alba M Reyes has physical exam evidence of above and wishes to pursue an non-operative care. I am recommending the following:     To review:  Patient seen after referral by pain management for chronic left-sided lower back/lower extremity pain.  States her pain has been severe and is having decreased ADLs.  Continues to have majority of pain in her left hip radiating into her left anterior groin, also radiating into her left anterior thigh and to her knee.  Patient also notes having intermittent lower back pain, left worse than right.  Difficulty standing up.  Alleviated with rest. Decision was made to do intraarticular hip injection previously. Patient reports some sustained improvement in her pain after the injection which has now worn off. Seen as the patient has severe underlying arthritis of her hip and she is now proven that there is an intra-articular source as evidenced by relief of her pain with previous injection which was just not sustained for prolonged period of time.  I think she would benefit from consideration of surgical intervention.  She has previously discussed the outcomes and expectations regarding this surgery. I think she understands the surgical concepts in rehabilitation plans afterwards.   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 She returns to clinic today in regards to ongoing left-sided lower back/lower extremity pain. She still endorses some of her pain isolated to the groin with hip ROM, however, she is still dealing with confounding lower back pain and radicular symptoms. Previously she was considering surgical intervention with Dr. Mojica, but she is not in a place to undergo surgery at this time secondary to limited social support. She is requesting re-evaluation with pain management for potential procedural interventions. She is already established with the pain clinic. Happy to see her back in regards to her hip if she is ready to consider surgical intervention in the future. In the interim, okay to continue over-the-counter NSAIDs/tylenol as needed for pain control.     Varsha RIVERS343 utilized via [employer] system     The patient has been ordered:  None    CONSULTS:   None    ACTIVE PROBLEM LIST  Patient Active Problem List   Diagnosis    Right upper quadrant pain    Acute cholecystitis due to biliary calculus    Incarcerated umbilical hernia    Lack of coordination    Posture abnormality    Weakness of both hips    GERD (gastroesophageal reflux disease)    Hypovitaminosis D    Mixed incontinence    Osteopenia    Osteoporosis    Rheumatoid arthritis    Varicose veins of legs    Lumbar spondylosis    Lumbar radiculopathy    DDD (degenerative disc disease), lumbar    Osteoarthritis of left hip    Clonal cytopenia of undetermined significance (CCUS)    Pancytopenia    Thrombocytopenia, unspecified           SUBJECTIVE    CHIEF COMPLAINT: Hip Pain    HPI:   Alba M Reyes is a 74 y.o. female here for evaluation and management of left hip pain. There is not a specific incident that brought about this pain. she has had progressive problems with the hip(s) starting 3 years ago and  is progressing which is now interfering with activities which include: walking, functional household ADL's, rising from a sitting position, standing for prolonged periods of time, and climbing stairs     Currently the pain in the joint is moderate with activity.  The pain is constant and is located in groin and thigh.  The pain is described as aching, severe, and stiffness. Relieving factors include ambulatory device, rest, prescription medication, over the counter medication , and repositioning. no associated Catching, Clicking, and Popping.     They do not report leg length inequality.     Alba M Reyes has no additional complaints.     4/6/23:  Patient returns for follow-up she has been seen by pain management and underwent intra-articular hip injection which helped improve her pain for a short period of time she is now had increased pain primarily in her groin with weight-bearing activities.  She also was seen pain management for her lumbar spine in his undergone injections into her spine with some improvement of these symptoms.  She continues to have this more isolated groin pain. She is on rollator.     10/31/24: Here for pre surgical planning.     6/19/25 PA: Patient returns to clinic for follow up of left hip pain. Reports that her pain is located in her groin but she also endorses lower back pain with radiation to left lower leg. She is not interested in hip replacement surgery at this time because she does not have social support. She is utilizing rollator but has difficulty ambulating. She has previously been evaluated by pain management and underwent intra-articular hip injection as well as injections for her lumbar spine. She is interested in further intervention injection treatments.    PROGRESSIVE SYMPTOMS:  Pain worsened by weight bearing  Pain effecting living situation    FUNCTIONAL STATUS:   Limited most or all of the time (uses scooter, mobility device)    PREVIOUS TREATMENTS:  Medical: OTC  NSAIDS, Steroid Injections, and Tylenol  Physical Therapy: Activities Modified   Previous Orthopaedic Surgery: None    REVIEW OF SYSTEMS:  PAIN ASSESSMENT:  See HPI.  MUSCULOSKELETAL: See HPI.  OTHER 10 point review of systems is negative except as stated in HPI above    PAST MEDICAL HISTORY   has a past medical history of Arthritis and Hypertension.     PAST SURGICAL HISTORY   has a past surgical history that includes injection (Left, 12/19/2022); Epidural steroid injection (Left, 03/22/2023); and Cholecystectomy.     FAMILY HISTORY  family history is not on file.     SOCIAL HISTORY   reports that she has never smoked. She has never used smokeless tobacco. She reports that she does not currently use alcohol. She reports that she does not currently use drugs.     ALLERGIES   Review of patient's allergies indicates:  No Known Allergies     MEDICATIONS   Current Outpatient Medications on File Prior to Visit   Medication Sig Dispense Refill    acetaminophen (TYLENOL) 500 MG tablet Take 1 tablet (500 mg total) by mouth every 6 (six) hours as needed for Pain (As needed for mild-to-moderate pain). 30 tablet 0    baclofen (LIORESAL) 5 mg Tab tablet Take 1 tablet (5 mg total) by mouth 2 (two) times daily as needed. 60 tablet 1    diclofenac sodium (VOLTAREN) 1 % Gel Apply 2 g topically 4 (four) times daily as needed (Apply to painful area 4 times a day as needed for pain). 200 g 0    estradioL (ESTRACE) 0.01 % (0.1 mg/gram) vaginal cream Place 1 g vaginally twice a week. 42.5 g 3    ferrous sulfate (FEOSOL) 325 mg (65 mg iron) Tab tablet Take 1 tablet (325 mg total) by mouth once daily. 90 tablet 1    folic acid (FOLVITE) 1 MG tablet Take 1 tablet (1 mg total) by mouth once daily. 30 tablet 4    gabapentin (NEURONTIN) 300 MG capsule Take 2 capsules (600 mg total) by mouth every evening. 60 capsule 11    hydroxychloroquine (PLAQUENIL) 200 mg tablet TAKE 1 & 1/2 (ONE & ONE-HALF) TABLETS BY MOUTH NIGHTLY 45 tablet 1     "ibuprofen (ADVIL,MOTRIN) 600 MG tablet Take 1 tablet (600 mg total) by mouth every 8 (eight) hours as needed for Pain (Take with food as needed for mild-to-moderate pain). 60 tablet 0    ibuprofen (ADVIL,MOTRIN) 800 MG tablet Take 800 mg by mouth 2 (two) times daily as needed.      ketoconazole (NIZORAL) 2 % cream       lisinopriL (PRINIVIL,ZESTRIL) 20 MG tablet Take 1 tablet (20 mg total) by mouth once daily. 90 tablet 0    lovastatin (MEVACOR) 10 MG tablet Take 1 tablet (10 mg total) by mouth every evening. 90 tablet 0    memantine (NAMENDA) 5 MG Tab       memantine (NAMENDA) 7 mg CSpX       methocarbamoL (ROBAXIN) 500 MG Tab Take 1,000 mg by mouth.      methotrexate 25 mg/mL injection Inject 1 mL (25 mg total) into the skin every 7 days. 4 mL 11    omega-3 acid ethyl esters (LOVAZA) 1 gram capsule Take 1 capsule by mouth once daily.      predniSONE (DELTASONE) 5 MG tablet Take 5 mg by mouth.      triamcinolone acetonide 0.1% (KENALOG) 0.1 % cream       vibegron (GEMTESA) 75 mg Tab Take 1 tablet (75 mg total) by mouth once daily. 30 tablet 11     Current Facility-Administered Medications on File Prior to Visit   Medication Dose Route Frequency Provider Last Rate Last Admin    LIDOcaine HCL 10 mg/ml (1%) injection 5 mL  5 mL Other 1 time in Clinic/HOD               PHYSICAL EXAM   height is 5' 2" (1.575 m) and weight is 55.3 kg (121 lb 14.6 oz).   Body mass index is 22.3 kg/m².      All other systems deferred.  GENERAL:  No acute distress  HABITUS: Normal  GAIT: Antalgic  SKIN: Normal     HIP EXAM:    left:   ROM:   Flexion: 120 degrees    Internal Rotation: 0 degrees    External Rotation: 30 degrees    Abduction: 45 degrees    Adduction: 20 degrees  No apparent leg length discrepancy    Palpation: no tenderness over greater trochanter, SI joint, ilioposas, IT band, gluteal musculature   Pain is reproduced with IR and ER of the hip  Strength: 5/5 hip flexion, abduction, knee flexion and extension   Straight leg " raise: Negative   Neurovascular Status: Sensation intact to light touch in Sural, saphenous, SPN, DPN, Tibial nerve distribution  2+ pulse DP/PT, normal capillary refill, foot has normal warmth    DATA:  Diagnostic tests reviewed for today's visit:     AP pelvis, hip, and lateral radiographs shows progressive severe narrowing of the superior space with sclerosis and osteophyte formation. The femoral neck is in varus position. The femoral head is spherical at superior margin. There is no signficant evidence of acetabular dysplasia and the femoral head remains covered.

## 2025-06-20 ENCOUNTER — TELEPHONE (OUTPATIENT)
Dept: FAMILY MEDICINE | Facility: CLINIC | Age: 75
End: 2025-06-20
Payer: MEDICARE

## 2025-06-20 NOTE — TELEPHONE ENCOUNTER
Spoke with patient. Patient is rescheduled to [facility] ON 7/30/25. Patient verbalized understanding.              Copied from CRM #[id number]. Topic: General Inquiry - Return Call  >> Jun 20, 2025 11:15 AM Bee wrote:  Type: Patient call    Who called: Patient     Does the patient know what this is regarding? Requesting a call back in regards to needing to reschedule appt on 7/30 to a later time ; please advise     Would the patient rather a call back or response via [facility]? Call    Best call back number: [phone]    Additional information:

## 2025-07-05 ENCOUNTER — HOSPITAL ENCOUNTER (OUTPATIENT)
Facility: HOSPITAL | Age: 75
Discharge: HOME | End: 2025-07-06
Attending: EMERGENCY MEDICINE | Admitting: STUDENT IN AN ORGANIZED HEALTH CARE EDUCATION/TRAINING PROGRAM
Payer: MEDICARE

## 2025-07-05 DIAGNOSIS — Z13.6 SCREENING FOR CARDIOVASCULAR CONDITION: ICD-10-CM

## 2025-07-05 DIAGNOSIS — R50.9 FEVER, UNSPECIFIED FEVER CAUSE: ICD-10-CM

## 2025-07-05 DIAGNOSIS — R19.7 DIARRHEA, UNSPECIFIED TYPE: ICD-10-CM

## 2025-07-05 DIAGNOSIS — K52.9 ENTERITIS: Primary | ICD-10-CM

## 2025-07-05 DIAGNOSIS — R07.9 CHEST PAIN: ICD-10-CM

## 2025-07-05 DIAGNOSIS — R65.10 SIRS (SYSTEMIC INFLAMMATORY RESPONSE SYNDROME): ICD-10-CM

## 2025-07-05 PROBLEM — I10 HYPERTENSION: Status: ACTIVE | Noted: 2025-07-05

## 2025-07-05 PROBLEM — F03.90 DEMENTIA: Status: ACTIVE | Noted: 2025-07-05

## 2025-07-05 PROBLEM — A41.9 SEPSIS: Status: ACTIVE | Noted: 2025-07-05

## 2025-07-05 LAB
ABSOLUTE EOSINOPHIL (OHS): 0.15 K/UL
ABSOLUTE MONOCYTE (OHS): 7.13 K/UL (ref 0.3–1)
ABSOLUTE NEUTROPHIL COUNT (OHS): 14.18 K/UL (ref 1.8–7.7)
ALBUMIN SERPL BCP-MCNC: 3.3 G/DL (ref 3.5–5.2)
ALBUMIN SERPL-MCNC: 3.7 G/DL (ref 3.3–5.5)
ALLENS TEST: ABNORMAL
ALP SERPL-CCNC: 75 UNIT/L (ref 40–150)
ALP SERPL-CCNC: 76 U/L (ref 42–141)
ALT SERPL W/O P-5'-P-CCNC: 14 UNIT/L (ref 10–44)
ANION GAP (OHS): 10 MMOL/L (ref 8–16)
AST SERPL-CCNC: 22 UNIT/L (ref 11–45)
BASOPHILS # BLD AUTO: 0.09 K/UL
BASOPHILS NFR BLD AUTO: 0.4 %
BILIRUB SERPL-MCNC: 0.4 MG/DL (ref 0.1–1)
BILIRUB SERPL-MCNC: 0.6 MG/DL (ref 0.2–1.6)
BILIRUB UR QL STRIP.AUTO: NEGATIVE
BILIRUBIN, POC UA: NEGATIVE
BLOOD, POC UA: ABNORMAL
BUN SERPL-MCNC: 21 MG/DL (ref 7–22)
BUN SERPL-MCNC: 22 MG/DL (ref 8–23)
CALCIUM SERPL-MCNC: 8.8 MG/DL (ref 8.7–10.5)
CALCIUM SERPL-MCNC: 9.7 MG/DL (ref 8–10.3)
CHLORIDE SERPL-SCNC: 105 MMOL/L (ref 95–110)
CHLORIDE SERPL-SCNC: 105 MMOL/L (ref 98–108)
CLARITY UR: CLEAR
CLARITY, UA: CLEAR
CO2 SERPL-SCNC: 23 MMOL/L (ref 23–29)
COLOR UR AUTO: YELLOW
COLOR, UA: YELLOW
CREAT SERPL-MCNC: 0.7 MG/DL (ref 0.5–1.4)
CREAT SERPL-MCNC: 0.8 MG/DL (ref 0.6–1.2)
CTP QC/QA: YES
ERYTHROCYTE [DISTWIDTH] IN BLOOD BY AUTOMATED COUNT: 18.2 % (ref 11.5–14.5)
GFR SERPLBLD CREATININE-BSD FMLA CKD-EPI: >60 ML/MIN/1.73/M2
GLUCOSE SERPL-MCNC: 100 MG/DL (ref 70–110)
GLUCOSE SERPL-MCNC: 129 MG/DL (ref 73–118)
GLUCOSE UR QL STRIP: NEGATIVE
GLUCOSE, POC UA: NEGATIVE
HCO3 UR-SCNC: 25.8 MMOL/L (ref 24–28)
HCT VFR BLD AUTO: 27.2 % (ref 37–48.5)
HCT, POC: NORMAL
HGB BLD-MCNC: 8.6 GM/DL (ref 12–16)
HGB UR QL STRIP: ABNORMAL
HGB, POC: NORMAL (ref 14–18)
IMM GRANULOCYTES # BLD AUTO: 0.81 K/UL (ref 0–0.04)
IMM GRANULOCYTES NFR BLD AUTO: 3.4 % (ref 0–0.5)
INFLUENZA A ANTIGEN, POC: NEGATIVE
INFLUENZA B ANTIGEN, POC: NEGATIVE
KETONES UR QL STRIP: ABNORMAL
KETONES, POC UA: NEGATIVE
LACTATE SERPL-SCNC: 0.8 MMOL/L (ref 0.5–2.2)
LDH SERPL L TO P-CCNC: 0.93 MMOL/L (ref 0.5–2.2)
LEUKOCYTE EST, POC UA: NEGATIVE
LEUKOCYTE ESTERASE UR QL STRIP: NEGATIVE
LYMPHOCYTES # BLD AUTO: 1.57 K/UL (ref 1–4.8)
MCH RBC QN AUTO: 32.5 PG (ref 27–31)
MCH, POC: NORMAL
MCHC RBC AUTO-ENTMCNC: 31.6 G/DL (ref 32–36)
MCHC, POC: NORMAL
MCV RBC AUTO: 103 FL (ref 82–98)
MCV, POC: NORMAL
MPV, POC: NORMAL
NITRITE UR QL STRIP: NEGATIVE
NITRITE, POC UA: NEGATIVE
NUCLEATED RBC (/100WBC) (OHS): 0 /100 WBC
OHS QRS DURATION: 88 MS
OHS QTC CALCULATION: 455 MS
PCO2 BLDA: 35 MMHG (ref 35–45)
PH SMN: 7.47 [PH] (ref 7.35–7.45)
PH UR STRIP: 6 [PH]
PH UR STRIP: 6 [PH] (ref 5–8)
PLATELET # BLD AUTO: 98 K/UL (ref 150–450)
PMV BLD AUTO: 13.3 FL (ref 9.2–12.9)
PO2 BLDA: 69 MMHG (ref 40–60)
POC ALT (SGPT): 22 U/L (ref 10–47)
POC AST (SGOT): 46 U/L (ref 11–38)
POC BE: 2 MMOL/L (ref -2–2)
POC PLATELET COUNT: NORMAL
POC SATURATED O2: 95 % (ref 95–100)
POC TCO2: 27 MMOL/L (ref 18–33)
POC TCO2: 27 MMOL/L (ref 24–29)
POTASSIUM BLD-SCNC: 4.4 MMOL/L (ref 3.6–5.1)
POTASSIUM SERPL-SCNC: 3.6 MMOL/L (ref 3.5–5.1)
PROT SERPL-MCNC: 7.1 GM/DL (ref 6–8.4)
PROT UR QL STRIP: ABNORMAL
PROTEIN, POC UA: NEGATIVE
PROTEIN, POC: 7.1 G/DL (ref 6.4–8.1)
RBC # BLD AUTO: 2.65 M/UL (ref 4–5.4)
RBC, POC: NORMAL
RDW, POC: NORMAL
RELATIVE EOSINOPHIL (OHS): 0.6 %
RELATIVE LYMPHOCYTE (OHS): 6.6 % (ref 18–48)
RELATIVE MONOCYTE (OHS): 29.8 % (ref 4–15)
RELATIVE NEUTROPHIL (OHS): 59.2 % (ref 38–73)
SAMPLE: ABNORMAL
SARS-COV-2 RDRP RESP QL NAA+PROBE: NEGATIVE
SITE: ABNORMAL
SODIUM BLD-SCNC: 140 MMOL/L (ref 128–145)
SODIUM SERPL-SCNC: 138 MMOL/L (ref 136–145)
SP GR UR STRIP: >=1.03
SPECIFIC GRAVITY, POC UA: 1.02 (ref 1–1.03)
TROPONIN I SERPL DL<=0.01 NG/ML-MCNC: 0.01 NG/ML
UROBILINOGEN UR STRIP-ACNC: NEGATIVE EU/DL
UROBILINOGEN, POC UA: 0.2 E.U./DL
WBC # BLD AUTO: 23.93 K/UL (ref 3.9–12.7)
WBC, POC: NORMAL

## 2025-07-05 PROCEDURE — 96365 THER/PROPH/DIAG IV INF INIT: CPT | Mod: 59

## 2025-07-05 PROCEDURE — 97161 PT EVAL LOW COMPLEX 20 MIN: CPT | Performed by: PHYSICAL THERAPIST

## 2025-07-05 PROCEDURE — 99285 EMERGENCY DEPT VISIT HI MDM: CPT | Mod: 25,ER

## 2025-07-05 PROCEDURE — 84484 ASSAY OF TROPONIN QUANT: CPT | Performed by: STUDENT IN AN ORGANIZED HEALTH CARE EDUCATION/TRAINING PROGRAM

## 2025-07-05 PROCEDURE — 83605 ASSAY OF LACTIC ACID: CPT | Performed by: STUDENT IN AN ORGANIZED HEALTH CARE EDUCATION/TRAINING PROGRAM

## 2025-07-05 PROCEDURE — 93005 ELECTROCARDIOGRAM TRACING: CPT | Mod: ER

## 2025-07-05 PROCEDURE — 96366 THER/PROPH/DIAG IV INF ADDON: CPT

## 2025-07-05 PROCEDURE — 93010 ELECTROCARDIOGRAM REPORT: CPT | Mod: ,,, | Performed by: INTERNAL MEDICINE

## 2025-07-05 PROCEDURE — 82803 BLOOD GASES ANY COMBINATION: CPT | Mod: ER

## 2025-07-05 PROCEDURE — 25000003 PHARM REV CODE 250: Mod: ER | Performed by: EMERGENCY MEDICINE

## 2025-07-05 PROCEDURE — 63600175 PHARM REV CODE 636 W HCPCS: Performed by: STUDENT IN AN ORGANIZED HEALTH CARE EDUCATION/TRAINING PROGRAM

## 2025-07-05 PROCEDURE — 36415 COLL VENOUS BLD VENIPUNCTURE: CPT | Performed by: STUDENT IN AN ORGANIZED HEALTH CARE EDUCATION/TRAINING PROGRAM

## 2025-07-05 PROCEDURE — G0378 HOSPITAL OBSERVATION PER HR: HCPCS | Mod: ER

## 2025-07-05 PROCEDURE — 96375 TX/PRO/DX INJ NEW DRUG ADDON: CPT

## 2025-07-05 PROCEDURE — 97166 OT EVAL MOD COMPLEX 45 MIN: CPT

## 2025-07-05 PROCEDURE — 96374 THER/PROPH/DIAG INJ IV PUSH: CPT | Mod: ER

## 2025-07-05 PROCEDURE — 96365 THER/PROPH/DIAG IV INF INIT: CPT | Mod: ER

## 2025-07-05 PROCEDURE — G0378 HOSPITAL OBSERVATION PER HR: HCPCS

## 2025-07-05 PROCEDURE — 81003 URINALYSIS AUTO W/O SCOPE: CPT | Mod: ER,MUE

## 2025-07-05 PROCEDURE — 82040 ASSAY OF SERUM ALBUMIN: CPT | Performed by: STUDENT IN AN ORGANIZED HEALTH CARE EDUCATION/TRAINING PROGRAM

## 2025-07-05 PROCEDURE — 96372 THER/PROPH/DIAG INJ SC/IM: CPT | Mod: 59 | Performed by: STUDENT IN AN ORGANIZED HEALTH CARE EDUCATION/TRAINING PROGRAM

## 2025-07-05 PROCEDURE — 25500020 PHARM REV CODE 255: Mod: ER | Performed by: EMERGENCY MEDICINE

## 2025-07-05 PROCEDURE — 96375 TX/PRO/DX INJ NEW DRUG ADDON: CPT | Mod: ER

## 2025-07-05 PROCEDURE — 87040 BLOOD CULTURE FOR BACTERIA: CPT | Performed by: STUDENT IN AN ORGANIZED HEALTH CARE EDUCATION/TRAINING PROGRAM

## 2025-07-05 PROCEDURE — 87635 SARS-COV-2 COVID-19 AMP PRB: CPT | Mod: ER | Performed by: EMERGENCY MEDICINE

## 2025-07-05 PROCEDURE — 85025 COMPLETE CBC W/AUTO DIFF WBC: CPT | Mod: ER

## 2025-07-05 PROCEDURE — 96361 HYDRATE IV INFUSION ADD-ON: CPT

## 2025-07-05 PROCEDURE — 80053 COMPREHEN METABOLIC PANEL: CPT | Mod: ER

## 2025-07-05 PROCEDURE — 63600175 PHARM REV CODE 636 W HCPCS: Mod: ER | Performed by: EMERGENCY MEDICINE

## 2025-07-05 PROCEDURE — 21400001 HC TELEMETRY ROOM

## 2025-07-05 PROCEDURE — 85025 COMPLETE CBC W/AUTO DIFF WBC: CPT | Performed by: STUDENT IN AN ORGANIZED HEALTH CARE EDUCATION/TRAINING PROGRAM

## 2025-07-05 PROCEDURE — 87804 INFLUENZA ASSAY W/OPTIC: CPT | Mod: 59,ER

## 2025-07-05 PROCEDURE — 81003 URINALYSIS AUTO W/O SCOPE: CPT | Performed by: STUDENT IN AN ORGANIZED HEALTH CARE EDUCATION/TRAINING PROGRAM

## 2025-07-05 PROCEDURE — 25000003 PHARM REV CODE 250: Performed by: STUDENT IN AN ORGANIZED HEALTH CARE EDUCATION/TRAINING PROGRAM

## 2025-07-05 RX ORDER — MORPHINE SULFATE 4 MG/ML
2 INJECTION, SOLUTION INTRAMUSCULAR; INTRAVENOUS EVERY 4 HOURS PRN
Status: DISCONTINUED | OUTPATIENT
Start: 2025-07-05 | End: 2025-07-06 | Stop reason: HOSPADM

## 2025-07-05 RX ORDER — METRONIDAZOLE 500 MG/100ML
500 INJECTION, SOLUTION INTRAVENOUS
Status: DISCONTINUED | OUTPATIENT
Start: 2025-07-05 | End: 2025-07-06 | Stop reason: HOSPADM

## 2025-07-05 RX ORDER — SODIUM CHLORIDE 9 MG/ML
INJECTION, SOLUTION INTRAVENOUS CONTINUOUS
Status: ACTIVE | OUTPATIENT
Start: 2025-07-05 | End: 2025-07-06

## 2025-07-05 RX ORDER — ATORVASTATIN CALCIUM 10 MG/1
10 TABLET, FILM COATED ORAL NIGHTLY
Status: DISCONTINUED | OUTPATIENT
Start: 2025-07-05 | End: 2025-07-06 | Stop reason: HOSPADM

## 2025-07-05 RX ORDER — KETOROLAC TROMETHAMINE 30 MG/ML
15 INJECTION, SOLUTION INTRAMUSCULAR; INTRAVENOUS
Status: COMPLETED | OUTPATIENT
Start: 2025-07-05 | End: 2025-07-05

## 2025-07-05 RX ORDER — FOLIC ACID 1 MG/1
1 TABLET ORAL DAILY
Status: DISCONTINUED | OUTPATIENT
Start: 2025-07-05 | End: 2025-07-06 | Stop reason: HOSPADM

## 2025-07-05 RX ORDER — MEMANTINE HYDROCHLORIDE 5 MG/1
5 TABLET ORAL DAILY
Status: DISCONTINUED | OUTPATIENT
Start: 2025-07-05 | End: 2025-07-06 | Stop reason: HOSPADM

## 2025-07-05 RX ORDER — PROCHLORPERAZINE EDISYLATE 5 MG/ML
5 INJECTION INTRAMUSCULAR; INTRAVENOUS EVERY 6 HOURS PRN
Status: DISCONTINUED | OUTPATIENT
Start: 2025-07-05 | End: 2025-07-06 | Stop reason: HOSPADM

## 2025-07-05 RX ORDER — GLUCAGON 1 MG
1 KIT INJECTION
Status: DISCONTINUED | OUTPATIENT
Start: 2025-07-05 | End: 2025-07-06 | Stop reason: HOSPADM

## 2025-07-05 RX ORDER — ENOXAPARIN SODIUM 100 MG/ML
40 INJECTION SUBCUTANEOUS EVERY 24 HOURS
Status: DISCONTINUED | OUTPATIENT
Start: 2025-07-05 | End: 2025-07-06 | Stop reason: HOSPADM

## 2025-07-05 RX ORDER — METRONIDAZOLE 500 MG/100ML
500 INJECTION, SOLUTION INTRAVENOUS
Status: COMPLETED | OUTPATIENT
Start: 2025-07-05 | End: 2025-07-05

## 2025-07-05 RX ORDER — SODIUM CHLORIDE 0.9 % (FLUSH) 0.9 %
10 SYRINGE (ML) INJECTION EVERY 12 HOURS PRN
Status: DISCONTINUED | OUTPATIENT
Start: 2025-07-05 | End: 2025-07-06 | Stop reason: HOSPADM

## 2025-07-05 RX ORDER — HYDROXYCHLOROQUINE SULFATE 200 MG/1
200 TABLET, FILM COATED ORAL NIGHTLY
Status: DISCONTINUED | OUTPATIENT
Start: 2025-07-05 | End: 2025-07-06 | Stop reason: HOSPADM

## 2025-07-05 RX ORDER — ACETAMINOPHEN 500 MG
1000 TABLET ORAL
Status: COMPLETED | OUTPATIENT
Start: 2025-07-05 | End: 2025-07-05

## 2025-07-05 RX ORDER — CEFTRIAXONE 1 G/1
1 INJECTION, POWDER, FOR SOLUTION INTRAMUSCULAR; INTRAVENOUS
Status: DISCONTINUED | OUTPATIENT
Start: 2025-07-06 | End: 2025-07-06 | Stop reason: HOSPADM

## 2025-07-05 RX ORDER — IBUPROFEN 200 MG
16 TABLET ORAL
Status: DISCONTINUED | OUTPATIENT
Start: 2025-07-05 | End: 2025-07-06 | Stop reason: HOSPADM

## 2025-07-05 RX ORDER — CEFTRIAXONE 2 G/1
2 INJECTION, POWDER, FOR SOLUTION INTRAMUSCULAR; INTRAVENOUS ONCE
Status: COMPLETED | OUTPATIENT
Start: 2025-07-05 | End: 2025-07-05

## 2025-07-05 RX ORDER — ONDANSETRON HYDROCHLORIDE 2 MG/ML
4 INJECTION, SOLUTION INTRAVENOUS EVERY 8 HOURS PRN
Status: DISCONTINUED | OUTPATIENT
Start: 2025-07-05 | End: 2025-07-06 | Stop reason: HOSPADM

## 2025-07-05 RX ORDER — ACETAMINOPHEN 325 MG/1
650 TABLET ORAL EVERY 4 HOURS PRN
Status: DISCONTINUED | OUTPATIENT
Start: 2025-07-05 | End: 2025-07-06 | Stop reason: HOSPADM

## 2025-07-05 RX ORDER — AMOXICILLIN 250 MG
1 CAPSULE ORAL DAILY PRN
Status: DISCONTINUED | OUTPATIENT
Start: 2025-07-05 | End: 2025-07-06 | Stop reason: HOSPADM

## 2025-07-05 RX ORDER — PANTOPRAZOLE SODIUM 40 MG/1
40 TABLET, DELAYED RELEASE ORAL
Status: DISCONTINUED | OUTPATIENT
Start: 2025-07-05 | End: 2025-07-06 | Stop reason: HOSPADM

## 2025-07-05 RX ORDER — IBUPROFEN 200 MG
24 TABLET ORAL
Status: DISCONTINUED | OUTPATIENT
Start: 2025-07-05 | End: 2025-07-06 | Stop reason: HOSPADM

## 2025-07-05 RX ORDER — NALOXONE HCL 0.4 MG/ML
0.02 VIAL (ML) INJECTION
Status: DISCONTINUED | OUTPATIENT
Start: 2025-07-05 | End: 2025-07-06 | Stop reason: HOSPADM

## 2025-07-05 RX ORDER — DICLOFENAC SODIUM 10 MG/G
2 GEL TOPICAL 4 TIMES DAILY PRN
Status: DISCONTINUED | OUTPATIENT
Start: 2025-07-05 | End: 2025-07-06 | Stop reason: HOSPADM

## 2025-07-05 RX ORDER — LANOLIN ALCOHOL/MO/W.PET/CERES
1 CREAM (GRAM) TOPICAL DAILY
Status: DISCONTINUED | OUTPATIENT
Start: 2025-07-05 | End: 2025-07-06 | Stop reason: HOSPADM

## 2025-07-05 RX ORDER — LISINOPRIL 20 MG/1
20 TABLET ORAL DAILY
Status: DISCONTINUED | OUTPATIENT
Start: 2025-07-05 | End: 2025-07-06 | Stop reason: HOSPADM

## 2025-07-05 RX ORDER — TALC
6 POWDER (GRAM) TOPICAL NIGHTLY PRN
Status: DISCONTINUED | OUTPATIENT
Start: 2025-07-05 | End: 2025-07-06 | Stop reason: HOSPADM

## 2025-07-05 RX ORDER — MORPHINE SULFATE 4 MG/ML
1 INJECTION, SOLUTION INTRAMUSCULAR; INTRAVENOUS EVERY 4 HOURS PRN
Status: DISCONTINUED | OUTPATIENT
Start: 2025-07-05 | End: 2025-07-06 | Stop reason: HOSPADM

## 2025-07-05 RX ORDER — GABAPENTIN 300 MG/1
600 CAPSULE ORAL NIGHTLY
Status: DISCONTINUED | OUTPATIENT
Start: 2025-07-05 | End: 2025-07-06 | Stop reason: HOSPADM

## 2025-07-05 RX ADMIN — METRONIDAZOLE 500 MG: 500 INJECTION, SOLUTION INTRAVENOUS at 06:07

## 2025-07-05 RX ADMIN — GABAPENTIN 600 MG: 300 CAPSULE ORAL at 08:07

## 2025-07-05 RX ADMIN — ONDANSETRON 4 MG: 2 INJECTION INTRAMUSCULAR; INTRAVENOUS at 01:07

## 2025-07-05 RX ADMIN — ACETAMINOPHEN 1000 MG: 500 TABLET ORAL at 03:07

## 2025-07-05 RX ADMIN — IOHEXOL 75 ML: 350 INJECTION, SOLUTION INTRAVENOUS at 04:07

## 2025-07-05 RX ADMIN — METRONIDAZOLE 500 MG: 500 INJECTION, SOLUTION INTRAVENOUS at 01:07

## 2025-07-05 RX ADMIN — SODIUM CHLORIDE: 9 INJECTION, SOLUTION INTRAVENOUS at 11:07

## 2025-07-05 RX ADMIN — METRONIDAZOLE 500 MG: 500 INJECTION, SOLUTION INTRAVENOUS at 09:07

## 2025-07-05 RX ADMIN — LISINOPRIL 20 MG: 20 TABLET ORAL at 11:07

## 2025-07-05 RX ADMIN — KETOROLAC TROMETHAMINE 15 MG: 30 INJECTION, SOLUTION INTRAMUSCULAR; INTRAVENOUS at 03:07

## 2025-07-05 RX ADMIN — Medication 6 MG: at 08:07

## 2025-07-05 RX ADMIN — ENOXAPARIN SODIUM 40 MG: 40 INJECTION SUBCUTANEOUS at 04:07

## 2025-07-05 RX ADMIN — FOLIC ACID 1 MG: 1 TABLET ORAL at 11:07

## 2025-07-05 RX ADMIN — CEFTRIAXONE SODIUM 2 G: 2 INJECTION, POWDER, FOR SOLUTION INTRAMUSCULAR; INTRAVENOUS at 03:07

## 2025-07-05 RX ADMIN — MORPHINE SULFATE 2 MG: 4 INJECTION, SOLUTION INTRAMUSCULAR; INTRAVENOUS at 01:07

## 2025-07-05 RX ADMIN — HYDROXYCHLOROQUINE SULFATE 200 MG: 200 TABLET ORAL at 08:07

## 2025-07-05 RX ADMIN — ATORVASTATIN CALCIUM 10 MG: 10 TABLET, FILM COATED ORAL at 08:07

## 2025-07-05 RX ADMIN — FERROUS SULFATE TAB 325 MG (65 MG ELEMENTAL FE) 1 EACH: 325 (65 FE) TAB at 11:07

## 2025-07-05 RX ADMIN — SODIUM CHLORIDE: 9 INJECTION, SOLUTION INTRAVENOUS at 08:07

## 2025-07-05 RX ADMIN — MEMANTINE HYDROCHLORIDE 5 MG: 5 TABLET ORAL at 11:07

## 2025-07-05 NOTE — PT/OT/SLP EVAL
Occupational Therapy Evaluation     Name: Alba M Reyes  MRN: [id number]  Admitting Diagnosis: Sepsis  Recent Surgery: * No surgery found *      Recommendations:     Discharge Recommendations: Low Intensity Therapy  Level of Assistance Recommended: none  Discharge Equipment Recommendations: none  Barriers to discharge: None    Assessment:     Alba M Reyes is a 74 y.o. female with a medical diagnosis of Sepsis. She presents with performance deficits affecting function including weakness, impaired endurance, impaired self care skills, impaired functional mobility, impaired balance, decreased lower extremity function, pain, edema, impaired cardiopulmonary response to activity.     Pt with reports of stomach discomfort upon arrival of OT. Cues provided for encouragement with pt agreeable to OT eval. Pt sat EOB with SBA with good sitting balance noted. Sit to stand CGA with Fair balance noted as pt side stepped to HOB for improved positioning. During UB dress management increased edema of R forearm noted near IV site. Nursing staff notifed. Pt returned to bed with SBA and adjust for comfort. Pt with limited functional mob and ax tolerance d/t complaints of stomach ache.    Rehab Prognosis: Good; patient would benefit from acute OT services to address these deficits and reach maximum level of function.    Plan:     Patient to be seen 4 x/week to address the above listed problems via self-care/home management, therapeutic activities, therapeutic exercises  Plan of Care Expires: 07/19/25  Plan of Care Reviewed with: patient    Subjective     Chief Complaint: stomach ache, L hip pain  Patient Comments/Goals: return to PLOF  Pain/Comfort:  Pain Rating 1: 8/10  Location 1: abdomen  Location - Side 2: Left  Location 2: hip  Pain Rating Post-Intervention 2: 6/10    Patients cultural, spiritual, Uatsdin conflicts given the current situation:      Social History:  Living Environment: Patient lives with their spouse in a single  story home with number of outside stair(s): 0  Prior Level of Function: Prior to admission, patient was modified independent  Roles and Routines: Patient was perform selfcare and light IADLs prior to admission.  Equipment Used at Home: rollator, wheelchair, shower chair  DME owned (not currently used): none  Assistance Upon Discharge: family    Objective:     Communicated with Nurse prior to session. Patient found HOB elevated with telemetry, peripheral IV upon OT entry to room.    General Precautions: Standard, fall   Orthopedic Precautions: N/A   Braces: N/A    Respiratory Status: Room air    Occupational Performance    Bed Mobility:   Supine to sit from left side of bed with stand by assistance  Sit to Supine with stand by assistance on left side of bed    Functional Mobility/Transfers:  Sit <> Stand Transfer with contact guard assistance with no AD  Functional Mobility: Pt performed side steps to HOB with hand held assistance.    Activities of Daily Living:  Upper Body Dressing: minimum assistance  Lower Body Dressing: stand by assistance as pt doffed natalia socks while in long sit.     Cognitive/Visual Perceptual:  Cognitive/Psychosocial Skills:    -     Oriented to: Person, Place, Time, Situation  -     Follows Commands/attention: Attentive and able to follow verbal commands  -     Safety awareness/insight to disability: intact  -     Mood/Affect/Coping skills/emotional control: Appropriate to situation and Cooperative    Physical Exam:  Balance:    -     Sitting: independence  -     Standing: stand by assistance and contact guard assistance  Upper Extremity Range of Motion:     -       Right Upper Extremity: WFL  -       Left Upper Extremity: WFL  Upper Extremity Strength:    -       Right Upper Extremity: 4-/5  -       Left Upper Extremity: 4-/5    AMPAC 6 Click ADL:  AMPAC Total Score: 22    Treatment & Education:  Therapist provided facilitation and instruction of proper body mechanics, energy conservation,  and fall prevention strategies during tasks listed above  Patient educated on role of OT, POC, and goals for therapy  Patient educated on importance of OOB activities with staff member assistance and sitting OOB majority of the day      Patient left HOB elevated with all lines intact, call button in reach, and RN present.    GOALS:   Multidisciplinary Problems       Occupational Therapy Goals          Problem: Occupational Therapy    Goal Priority Disciplines Outcome Interventions   Occupational Therapy Goal     OT, PT/OT Progressing    Description: Goals to be met by: 07/19/2025     Patient will increase functional independence with ADLs by performing:    LE Dressing with [place].  Toileting from toilet with Modified [place] for hygiene and clothing management.   Bathing from  edge of bed with Supervision.  Toilet transfer to toilet with Modified [place].  Upper extremity exercise program x10 reps per handout, with independence.  Pt will demonstrate GOOD standing balance when performing g/h task at sink.                          DME Justifications:  No DME recommended requiring DME justifications    History:     Past Medical History:   Diagnosis Date    Arthritis     Hypertension          Past Surgical History:   Procedure Laterality Date    CHOLECYSTECTOMY      EPIDURAL STEROID INJECTION Left 03/22/2023    Procedure: Left L5 + S1 Transforaminal Epidural Steroid Injections;  Surgeon: Jim Hector Jr., MD;  Location: [facility];  Service: Pain Management;  Laterality: Left;  @1330  No ATC or DM    INJECTION Left 12/19/2022    Procedure: INJECTION, LEFT HIP INTRA-ARTICULAR STEROID CONTRAST DIRECT REF  confirmed;  Surgeon: Hailey Valladares MD;  [facility];  Service: Pain Management;  Laterality: Left;       Time Tracking:     OT Date of Treatment: 07/05/25  OT Start Time: 1403  OT Stop Time: 1416  OT Total Time (min): 13 min    Billable Minutes: Evaluation  13    7/5/2025

## 2025-07-05 NOTE — PLAN OF CARE
Problem: Adult Inpatient Plan of Care  Goal: Plan of Care Review  Outcome: Progressing  Goal: Patient-Specific Goal (Individualized)  Outcome: Progressing  Goal: Absence of Hospital-Acquired Illness or Injury  Outcome: Progressing  Goal: Optimal Comfort and Wellbeing  Outcome: Progressing  Goal: Readiness for Transition of Care  Outcome: Progressing     Problem: Sepsis/Septic Shock  Goal: Optimal Coping  Outcome: Progressing  Goal: Absence of Bleeding  Outcome: Progressing  Goal: Blood Glucose Level Within Targeted Range  Outcome: Progressing  Goal: Absence of Infection Signs and Symptoms  Outcome: Progressing  Goal: Optimal Nutrition Intake  Outcome: Progressing

## 2025-07-05 NOTE — PLAN OF CARE
Problem: Occupational Therapy  Goal: Occupational Therapy Goal  Description: Goals to be met by: 07/19/2025     Patient will increase functional independence with ADLs by performing:    LE Dressing with [place].  Toileting from toilet with Modified [place] for hygiene and clothing management.   Bathing from  edge of bed with Supervision.  Toilet transfer to toilet with Modified [place].  Upper extremity exercise program x10 reps per handout, with independence.  Pt will demonstrate GOOD standing balance when performing g/h task at sink.     Outcome: Progressing    OT recommending LIT at this time.

## 2025-07-05 NOTE — ASSESSMENT & PLAN NOTE
-Presented with nausea, vomiting, abdominal pain, diarrhea, fevers.    -WBC 10917.  CT abdomen/pelvis negative.    -Check C diff, follow stool cultures.    -Continue IV ceftriaxone and IV Flagyl empirically.    -Continue maintenance IV fluids.

## 2025-07-05 NOTE — PLAN OF CARE
Problem: Physical Therapy  Goal: Physical Therapy Goal  Description: Goals to be met by: 25     Patient will increase functional independence with mobility by performin. Supine to sit with Modified [place]  2. Rolling to Left and Right with Modified [place].  3. Sit to stand transfer with Modified [place]  4. Bed to chair transfer with Modified [place] using Rolling Walker  5. Gait  x 300 feet with Modified [place] using Rolling Walker.     Outcome: Progressing     PT eval completed today, pt declined . Pt performs bed mobility with set up assist. Pt transfers sit<>stand w/ RW and supervision. Pt amb 15 ft to bathroom w/ RW and supervision. Pt performed toilet transfer w/ RW and supervision. Pt amb an additional 20 ft w/ RW and supervision. Pt reports chronic L hip and LBP limiting further activity but no obvious antalgic gait with mobility. Pt would benefit from skilled therapy to maximize functional mobility and improve activity tolerance. Pt would benefit from low intensity therapy, no DME needs.

## 2025-07-05 NOTE — HPI
74-year-old female with PMH of rheumatoid arthritis currently on Plaquenil and methotrexate, dementia, hypertension, hyperlipidemia, iron deficiency anemia presented to ER on 07/05/2025 with complaints of lower abdominal pain and increased urinary frequency.      Patient reports that she has been having lower abdominal pain for last 3 weeks.  She reports pain as constant, dull aching, nonradiating with no aggravating or relieving factors.  She also has a poor appetite and poor p.o. intake.  She also reported on and off fevers for last 2 days along with loose watery diarrhea.  Overnight she also had lot of palpitations when she was febrile.  She denies any chest pain, shortness of breath, nausea, vomiting, hematemesis, melena, hematochezia.  She reports increased urinary frequency over the last few days although denies any dysuria or hematuria.  She denies any similar symptoms in the past.      On presentation to ER she was tachycardic, satting well on room air, lab work was significant for WBC 52055, CT abdomen/pelvis was done which was negative for any acute process.  She was given 1.6 L LR bolus and IV Flagyl in the ER.  She will be admitted to medicine service for further management.      PMH:  Rheumatoid arthritis, dementia, hypertension, hyperlipidemia, iron deficiency anemia   PSH:  Hip replacement, cholecystectomy   SH:  Denies smoking, denies alcohol use, denies recreational drug use.    FH: Reviewed and not pertinent.

## 2025-07-05 NOTE — H&P
PATIENT INFORMATION  Follow-Up  - Return in 1 year for your yearly well visit.    13-17 years old Health and Safety Tips - The following hyperlinks are available to access via blogTV    Parent Education from [employer]    Educación para padres sobre niños sanos    Additional Educational Resources:  For additional resources regarding your symptoms, diagnosis, or further health information, please visit the Discover a Healthier You section on [email or web address] or the Online Health Resources section in blogTV.       [facility]  History & Physical    Patient Name: Alba M Reyes  MRN: [id number]  Patient Class: IP- Inpatient  Admission Date: 7/5/2025  Attending Physician: Andressa Rivas MD   Primary Care Provider: Gideon France MD         Patient information was obtained from patient and ER records.     Subjective:     Principal Problem:Sepsis    Chief Complaint:   Chief Complaint   Patient presents with    Fever     C/O FEVER TODAY WITH CHRONIC ABDOMINAL PAIN        HPI: 74-year-old female with PMH of rheumatoid arthritis currently on Plaquenil and methotrexate, dementia, hypertension, hyperlipidemia, iron deficiency anemia presented to ER on 07/05/2025 with complaints of lower abdominal pain and increased urinary frequency.      Patient reports that she has been having lower abdominal pain for last 3 weeks.  She reports pain as constant, dull aching, nonradiating with no aggravating or relieving factors.  She also has a poor appetite and poor p.o. intake.  She also reported on and off fevers for last 2 days along with loose watery diarrhea.  Overnight she also had lot of palpitations when she was febrile.  She denies any chest pain, shortness of breath, nausea, vomiting, hematemesis, melena, hematochezia.  She reports increased urinary frequency over the last few days although denies any dysuria or hematuria.  She denies any similar symptoms in the past.      On presentation to ER she was tachycardic, satting well on room air, lab work was significant for WBC 87106, CT abdomen/pelvis was done which was negative for any acute process.  She was given 1.6 L LR bolus and IV Flagyl in the ER.  She will be admitted to medicine service for further management.      PMH:  Rheumatoid arthritis, dementia, hypertension, hyperlipidemia, iron deficiency anemia   PSH:  Hip replacement, cholecystectomy   SH:  Denies smoking, denies alcohol use, denies recreational drug use.    FH: Reviewed and not  pertinent.    Past Medical History:   Diagnosis Date    Arthritis     Hypertension        Past Surgical History:   Procedure Laterality Date    CHOLECYSTECTOMY      EPIDURAL STEROID INJECTION Left 03/22/2023    Procedure: Left L5 + S1 Transforaminal Epidural Steroid Injections;  Surgeon: Jim Hector Jr., MD;  Location: [facility];  Service: Pain Management;  Laterality: Left;  @1330  No ATC or DM    INJECTION Left 12/19/2022    Procedure: INJECTION, LEFT HIP INTRA-ARTICULAR STEROID CONTRAST DIRECT REF  confirmed;  Surgeon: Hailey Valladares MD;  Location: [facility], [place], operated [facility];  Service: Pain Management;  Laterality: Left;       Review of patient's allergies indicates:  No Known Allergies    No current facility-administered medications on file prior to encounter.     Current Outpatient Medications on File Prior to Encounter   Medication Sig    acetaminophen (TYLENOL) 500 MG tablet Take 1 tablet (500 mg total) by mouth every 6 (six) hours as needed for Pain (As needed for mild-to-moderate pain).    baclofen (LIORESAL) 5 mg Tab tablet Take 1 tablet (5 mg total) by mouth 2 (two) times daily as needed.    diclofenac sodium (VOLTAREN) 1 % Gel Apply 2 g topically 4 (four) times daily as needed (Apply to painful area 4 times a day as needed for pain).    estradioL (ESTRACE) 0.01 % (0.1 mg/gram) vaginal cream Place 1 g vaginally twice a week.    ferrous sulfate (FEOSOL) 325 mg (65 mg iron) Tab tablet Take 1 tablet (325 mg total) by mouth once daily.    folic acid (FOLVITE) 1 MG tablet Take 1 tablet (1 mg total) by mouth once daily.    gabapentin (NEURONTIN) 300 MG capsule Take 2 capsules (600 mg total) by mouth every evening.    hydroxychloroquine (PLAQUENIL) 200 mg tablet TAKE 1 & 1/2 (ONE & ONE-HALF) TABLETS BY MOUTH NIGHTLY    ibuprofen (ADVIL,MOTRIN) 600 MG tablet Take 1 tablet (600 mg total) by mouth every 8 (eight) hours as needed for Pain (Take with food as needed for mild-to-moderate pain).    ibuprofen  (ADVIL,MOTRIN) 800 MG tablet Take 800 mg by mouth 2 (two) times daily as needed.    ketoconazole (NIZORAL) 2 % cream     lisinopriL (PRINIVIL,ZESTRIL) 20 MG tablet Take 1 tablet (20 mg total) by mouth once daily.    lovastatin (MEVACOR) 10 MG tablet Take 1 tablet (10 mg total) by mouth every evening.    memantine (NAMENDA) 5 MG Tab     memantine (NAMENDA) 7 mg CSpX     methocarbamoL (ROBAXIN) 500 MG Tab Take 1,000 mg by mouth.    methotrexate 25 mg/mL injection Inject 1 mL (25 mg total) into the skin every 7 days.    omega-3 acid ethyl esters (LOVAZA) 1 gram capsule Take 1 capsule by mouth once daily.    predniSONE (DELTASONE) 5 MG tablet Take 5 mg by mouth.    triamcinolone acetonide 0.1% (KENALOG) 0.1 % cream     [DISCONTINUED] vibegron (GEMTESA) 75 mg Tab Take 1 tablet (75 mg total) by mouth once daily.     Family History    None       Tobacco Use    Smoking status: Never    Smokeless tobacco: Never   Substance and Sexual Activity    Alcohol use: Not Currently    Drug use: Not Currently    Sexual activity: Not Currently     Review of Systems   Constitutional:  Positive for appetite change (Decreased appetite), chills, fatigue and fever.   HENT: Negative.     Eyes: Negative.    Respiratory:  Negative for chest tightness and shortness of breath.    Cardiovascular:  Positive for palpitations. Negative for chest pain.   Gastrointestinal:  Positive for abdominal pain, diarrhea and nausea.   Endocrine: Negative.    Genitourinary:  Positive for frequency. Negative for dysuria.   Musculoskeletal:  Negative for arthralgias and back pain.   Allergic/Immunologic: Negative.    Neurological:  Negative for dizziness and syncope.   Hematological: Negative.    Psychiatric/Behavioral:  Negative for agitation and behavioral problems.      Objective:     Vital Signs (Most Recent):  Temp: 97.5 °F (36.4 °C) (07/05/25 1116)  Pulse: 77 (07/05/25 1116)  Resp: 20 (07/05/25 1116)  BP: (!) 146/70 (07/05/25 1116)  SpO2: 99 % (07/05/25 1116)  Vital Signs (24h Range):  Temp:  [97.5 °F (36.4 °C)-102.5 °F (39.2 °C)] 97.5 °F (36.4 °C)  Pulse:  [] 77  Resp:  [19-29] 20  SpO2:  [93 %-99 %] 99 %  BP: (110-157)/(54-70) 146/70     Weight: 54.4 kg (120 lb)  Body mass index is 21.95 kg/m².     Physical Exam  Vitals and nursing note reviewed.   Constitutional:       General: She is not in acute distress.     Appearance: Normal appearance.   HENT:      Head: Normocephalic and atraumatic.      Nose: Nose normal.   Eyes:      Extraocular Movements: Extraocular movements intact.      Pupils: Pupils are equal, round, and reactive to light.   Cardiovascular:      Rate and Rhythm: Regular rhythm. Tachycardia present.      Pulses: Normal pulses.      Heart sounds: Normal heart sounds.   Pulmonary:      Effort: Pulmonary effort is normal.      Breath sounds: Normal breath sounds. No wheezing.   Abdominal:      Palpations: Abdomen is soft. There is no mass.      Tenderness: There is abdominal tenderness (More in the left lower quadrant).   Musculoskeletal:         General: No swelling. Normal range of motion.      Cervical back: Normal range of motion and neck supple.   Skin:     General: Skin is warm.      Capillary Refill: Capillary refill takes less than 2 seconds.   Neurological:      General: No focal deficit present.      Mental Status: She is alert and oriented to person, place, and time.   Psychiatric:         Mood and Affect: Mood normal.              CRANIAL NERVES     CN III, IV, VI   Pupils are equal, round, and reactive to light.       Significant Labs: All pertinent labs within the past 24 hours have been reviewed.  CBC:   Recent Labs   Lab 07/05/25  0932   WBC 23.93*   HGB 8.6*   HCT 27.2*   PLT 98*     CMP:   Recent Labs   Lab 07/05/25  0927      K 3.6      CO2 23      BUN 22   CREATININE 0.7   CALCIUM 8.8   PROT 7.1   ALBUMIN 3.3*   BILITOT 0.4   ALKPHOS 75   AST 22   ALT 14   ANIONGAP 10       Significant Imaging: I have reviewed all  "pertinent imaging results/findings within the past 24 hours.  Assessment/Plan:     Assessment & Plan  Sepsis  -Patient has sepsis without organ dysfunction secondary to Intra-abdominal Infection. A review of systems was completed. Patient's sepsis is improving.  -Most likely related to enteritis.  -WBC 10583, CT abdomen/pelvis negative.    Current Antibiotics    cefTRIAXone injection 1 g, Every 24 hours (non-standard times), Intravenous  metronidazole IVPB 500 mg, Every 8 hours (non-standard times), Intravenous    Lactate  Recent Labs   Lab 07/05/25  0339 07/05/25  0932   LACTATE  --  0.8   POCLAC 0.93  --      Culture Data  Blood Cultures No results found for: "CULTBLD"   Urine Culture   Urine Culture, Routine   Date Value Ref Range Status   01/03/2025 No significant growth  Final      mSOFA  MSOFA Total  Min: 0   Min taken time: 07/05/25 1300  Max: 3   Max taken time: 07/05/25 1001    Plan  - Antibiotics as listed above  - Fluid resuscitation as follows:Ideal Body Weight- The patient is obese (BMI>30) and their ideal body weight of Ideal body weight: 50.1 kg (110 lb 7.2 oz) will be used to calculate fluid bolus of 30 ml/kg.   - Trend lactate to resolution  - Follow up culture data  - Vasopressors were not needed  - The following services were consulted:None.  -     Enteritis  -Presented with nausea, vomiting, abdominal pain, diarrhea, fevers.    -WBC 10011.  CT abdomen/pelvis negative.    -Check C diff, follow stool cultures.    -Continue IV ceftriaxone and IV Flagyl empirically.    -Continue maintenance IV fluids.  Hypertension  -Continue home lisinopril.  Rheumatoid arthritis  -On methotrexate weekly and Plaquenil at home.    -Continue home Plaquenil.  Dementia  -Currently at baseline.    -Continue home memantine.  VTE Risk Mitigation (From admission, onward)           Ordered     enoxaparin injection 40 mg  Daily         07/05/25 0909     IP VTE HIGH RISK PATIENT  Once         07/05/25 0909     Place sequential " compression device  Until discontinued         07/05/25 0953                    SDOH Screening:  The patient was screened for utility difficulties, food insecurity, transport difficulties, housing insecurity, and interpersonal safety and there were no concerns identified this admission.          Andressa Rivas MD  Department of Hospital Medicine  [facility]

## 2025-07-05 NOTE — SUBJECTIVE & OBJECTIVE
Past Medical History:   Diagnosis Date    Arthritis     Hypertension        Past Surgical History:   Procedure Laterality Date    CHOLECYSTECTOMY      EPIDURAL STEROID INJECTION Left 03/22/2023    Procedure: Left L5 + S1 Transforaminal Epidural Steroid Injections;  Surgeon: Jim Hector Jr., MD;  Location: [facility];  Service: Pain Management;  Laterality: Left;  @1330  No ATC or DM    INJECTION Left 12/19/2022    Procedure: INJECTION, LEFT HIP INTRA-ARTICULAR STEROID CONTRAST DIRECT REF  confirmed;  Surgeon: Hailey Valladares MD;  Location: [facility];  Service: Pain Management;  Laterality: Left;       Review of patient's allergies indicates:  No Known Allergies    No current facility-administered medications on file prior to encounter.     Current Outpatient Medications on File Prior to Encounter   Medication Sig    acetaminophen (TYLENOL) 500 MG tablet Take 1 tablet (500 mg total) by mouth every 6 (six) hours as needed for Pain (As needed for mild-to-moderate pain).    baclofen (LIORESAL) 5 mg Tab tablet Take 1 tablet (5 mg total) by mouth 2 (two) times daily as needed.    diclofenac sodium (VOLTAREN) 1 % Gel Apply 2 g topically 4 (four) times daily as needed (Apply to painful area 4 times a day as needed for pain).    estradioL (ESTRACE) 0.01 % (0.1 mg/gram) vaginal cream Place 1 g vaginally twice a week.    ferrous sulfate (FEOSOL) 325 mg (65 mg iron) Tab tablet Take 1 tablet (325 mg total) by mouth once daily.    folic acid (FOLVITE) 1 MG tablet Take 1 tablet (1 mg total) by mouth once daily.    gabapentin (NEURONTIN) 300 MG capsule Take 2 capsules (600 mg total) by mouth every evening.    hydroxychloroquine (PLAQUENIL) 200 mg tablet TAKE 1 & 1/2 (ONE & ONE-HALF) TABLETS BY MOUTH NIGHTLY    ibuprofen (ADVIL,MOTRIN) 600 MG tablet Take 1 tablet (600 mg total) by mouth every 8 (eight) hours as needed for Pain (Take with food as needed for mild-to-moderate pain).    ibuprofen (ADVIL,MOTRIN) 800 MG  tablet Take 800 mg by mouth 2 (two) times daily as needed.    ketoconazole (NIZORAL) 2 % cream     lisinopriL (PRINIVIL,ZESTRIL) 20 MG tablet Take 1 tablet (20 mg total) by mouth once daily.    lovastatin (MEVACOR) 10 MG tablet Take 1 tablet (10 mg total) by mouth every evening.    memantine (NAMENDA) 5 MG Tab     memantine (NAMENDA) 7 mg CSpX     methocarbamoL (ROBAXIN) 500 MG Tab Take 1,000 mg by mouth.    methotrexate 25 mg/mL injection Inject 1 mL (25 mg total) into the skin every 7 days.    omega-3 acid ethyl esters (LOVAZA) 1 gram capsule Take 1 capsule by mouth once daily.    predniSONE (DELTASONE) 5 MG tablet Take 5 mg by mouth.    triamcinolone acetonide 0.1% (KENALOG) 0.1 % cream     [DISCONTINUED] vibegron (GEMTESA) 75 mg Tab Take 1 tablet (75 mg total) by mouth once daily.     Family History    None       Tobacco Use    Smoking status: Never    Smokeless tobacco: Never   Substance and Sexual Activity    Alcohol use: Not Currently    Drug use: Not Currently    Sexual activity: Not Currently     Review of Systems   Constitutional:  Positive for appetite change (Decreased appetite), chills, fatigue and fever.   HENT: Negative.     Eyes: Negative.    Respiratory:  Negative for chest tightness and shortness of breath.    Cardiovascular:  Positive for palpitations. Negative for chest pain.   Gastrointestinal:  Positive for abdominal pain, diarrhea and nausea.   Endocrine: Negative.    Genitourinary:  Positive for frequency. Negative for dysuria.   Musculoskeletal:  Negative for arthralgias and back pain.   Allergic/Immunologic: Negative.    Neurological:  Negative for dizziness and syncope.   Hematological: Negative.    Psychiatric/Behavioral:  Negative for agitation and behavioral problems.      Objective:     Vital Signs (Most Recent):  Temp: 97.5 °F (36.4 °C) (07/05/25 1116)  Pulse: 77 (07/05/25 1116)  Resp: 20 (07/05/25 1116)  BP: (!) 146/70 (07/05/25 1116)  SpO2: 99 % (07/05/25 1116) Vital Signs (24h  Range):  Temp:  [97.5 °F (36.4 °C)-102.5 °F (39.2 °C)] 97.5 °F (36.4 °C)  Pulse:  [] 77  Resp:  [19-29] 20  SpO2:  [93 %-99 %] 99 %  BP: (110-157)/(54-70) 146/70     Weight: 54.4 kg (120 lb)  Body mass index is 21.95 kg/m².     Physical Exam  Vitals and nursing note reviewed.   Constitutional:       General: She is not in acute distress.     Appearance: Normal appearance.   HENT:      Head: Normocephalic and atraumatic.      Nose: Nose normal.   Eyes:      Extraocular Movements: Extraocular movements intact.      Pupils: Pupils are equal, round, and reactive to light.   Cardiovascular:      Rate and Rhythm: Regular rhythm. Tachycardia present.      Pulses: Normal pulses.      Heart sounds: Normal heart sounds.   Pulmonary:      Effort: Pulmonary effort is normal.      Breath sounds: Normal breath sounds. No wheezing.   Abdominal:      Palpations: Abdomen is soft. There is no mass.      Tenderness: There is abdominal tenderness (More in the left lower quadrant).   Musculoskeletal:         General: No swelling. Normal range of motion.      Cervical back: Normal range of motion and neck supple.   Skin:     General: Skin is warm.      Capillary Refill: Capillary refill takes less than 2 seconds.   Neurological:      General: No focal deficit present.      Mental Status: She is alert and oriented to person, place, and time.   Psychiatric:         Mood and Affect: Mood normal.              CRANIAL NERVES     CN III, IV, VI   Pupils are equal, round, and reactive to light.       Significant Labs: All pertinent labs within the past 24 hours have been reviewed.  CBC:   Recent Labs   Lab 07/05/25  0932   WBC 23.93*   HGB 8.6*   HCT 27.2*   PLT 98*     CMP:   Recent Labs   Lab 07/05/25  0927      K 3.6      CO2 23      BUN 22   CREATININE 0.7   CALCIUM 8.8   PROT 7.1   ALBUMIN 3.3*   BILITOT 0.4   ALKPHOS 75   AST 22   ALT 14   ANIONGAP 10       Significant Imaging: I have reviewed all pertinent  imaging results/findings within the past 24 hours.

## 2025-07-05 NOTE — ED PROVIDER NOTES
Encounter Date: 7/5/2025       History     Chief Complaint   Patient presents with    Fever     C/O FEVER TODAY WITH CHRONIC ABDOMINAL PAIN     74-year-old female history of rheumatoid arthritis, dementia, cholecystectomy.  The patient reports she multiple days of lower abdominal pain that is now severe.  The patient reports suprapubic abdominal pain.  The patient denies any radiation.  The patient reports urinary frequency but denies dysuria.  The patient reports loose stools without melena or hematochezia.  Patient also reports occasional cough.      6/10/25:   Primary care provider visit. Diagnosed with right foot cellulitis.  The patient was started on doxycycline.      Review of patient's allergies indicates:  No Known Allergies  Past Medical History:   Diagnosis Date    Arthritis     Hypertension      Past Surgical History:   Procedure Laterality Date    CHOLECYSTECTOMY      EPIDURAL STEROID INJECTION Left 03/22/2023    Procedure: Left L5 + S1 Transforaminal Epidural Steroid Injections;  Surgeon: Jim Hector Jr., MD;  Location: [facility] ENDO;  Service: Pain Management;  Laterality: Left;  @1330  No ATC or DM    INJECTION Left 12/19/2022    Procedure: INJECTION, LEFT HIP INTRA-ARTICULAR STEROID CONTRAST DIRECT REF  confirmed;  Surgeon: Hailey Valladares MD;  Location: [facility] PAIN MGT;  Service: Pain Management;  Laterality: Left;     No family history on file.  Social History[1]  Review of Systems   Constitutional:  Positive for chills and fever.   HENT:  Negative for congestion.    Respiratory:  Positive for cough. Negative for shortness of breath.    Cardiovascular:  Negative for chest pain.   Gastrointestinal:  Positive for abdominal pain and diarrhea. Negative for nausea and vomiting.   Genitourinary:  Positive for frequency. Negative for difficulty urinating, dysuria and flank pain.   Musculoskeletal:  Negative for back pain.   Skin:  Negative for rash.   Neurological:  Negative for dizziness,  light-headedness and headaches.       Physical Exam     Initial Vitals [07/05/25 0259]   BP Pulse Resp Temp SpO2   (!) 157/69 (!) 127 20 (!) 102.5 °F (39.2 °C) 96 %      MAP       --         Physical Exam    Nursing note and vitals reviewed.  Constitutional: She appears well-developed and well-nourished.  Non-toxic appearance. She does not appear ill. No distress.   Conversational lying in bed.   HENT:   Head: Normocephalic.   Eyes: EOM are normal.   Neck:   Normal range of motion.  Cardiovascular:  Regular rhythm and normal heart sounds.   Tachycardia present.         No murmur heard.  Pulmonary/Chest: Breath sounds normal. No respiratory distress. She has no wheezes.   Abdominal: Abdomen is soft and flat. She exhibits no mass. There is abdominal tenderness in the suprapubic area. No hernia.   No right CVA tenderness.  No left CVA tenderness. There is no rebound and no guarding.   Musculoskeletal:         General: No edema.      Cervical back: Normal range of motion.     Neurological: She is alert.   Skin: Skin is warm.         ED Course   Critical Care    Date/Time: 7/5/2025 8:07 AM    Performed by: Ubaldo Persaud MD  Authorized by: Ubaldo Persaud MD  Direct patient critical care time: 20 minutes  Additional history critical care time: 5 minutes  Ordering / reviewing critical care time: 10 minutes  Documentation critical care time: 10 minutes  Consulting other physicians critical care time: 10 minutes  Total critical care time (exclusive of procedural time) : 55 minutes  Critical care time was exclusive of teaching time and separately billable procedures and treating other patients.  Critical care was necessary to treat or prevent imminent or life-threatening deterioration of the following conditions: sepsis.  Critical care was time spent personally by me on the following activities: development of treatment plan with patient or surrogate, discussions with consultants, evaluation of patient's  response to treatment, examination of patient, obtaining history from patient or surrogate, ordering and performing treatments and interventions, ordering and review of laboratory studies, ordering and review of radiographic studies, pulse oximetry, re-evaluation of patient's condition and review of old charts.        Labs Reviewed   POCT URINALYSIS W/O SCOPE - Abnormal       Result Value    Glucose, UA Negative      Bilirubin, UA Negative      Ketones, UA Negative      Spec Grav UA 1.020      Blood, UA Trace-intact (*)     PH, UA 6.0      Protein, UA Negative      Urobilinogen, UA 0.2      Nitrite, UA Negative      Leukocytes, UA Negative      Color, UA POC Yellow      Clarity, UA, POC Clear     ISTAT PROCEDURE - Abnormal    POC PH 7.475 (*)     POC PCO2 35.0      POC PO2 69 (*)     POC HCO3 25.8      POC BE 2      POC SATURATED O2 95      POC Lactate 0.93      POC TCO2 27      Sample VENOUS      Site Other      Allens Test N/A     POCT CMP - Abnormal    Albumin, POC 3.7      Alkaline Phosphatase, POC 76      ALT (SGPT), POC 22      AST (SGOT), POC 46 (*)     POC BUN 21      Calcium, POC 9.7      POC Chloride 105      POC Creatinine 0.8      POC Glucose 129 (*)     POC Potassium 4.4      POC Sodium 140      Bilirubin, POC 0.6      POC TCO2 27      Protein, POC 7.1     CULTURE, BLOOD   CULTURE, BLOOD   CLOSTRIDIOIDES DIFFICILE   POCT CBC    Hematocrit        Hemoglobin        RBC        WBC        MCV        MCH, POC        MCHC        RDW-CV        Platelet Count, POC        MPV       SARS-COV-2 RDRP GENE    POC Rapid COVID Negative       Acceptable Yes      Narrative:     This test utilizes isothermal nucleic acid amplification technology to detect the SARS-CoV-2 RdRp nucleic acid segment. The analytical sensitivity (limit of detection) is 500 copies/swab.     A POSITIVE result is indicative of the presence of SARS-CoV-2 RNA; clinical correlation with patient history and other diagnostic information  is necessary to determine patient infection status.    A NEGATIVE result means that SARS-CoV-2 nucleic acids are not present above the limit of detection. A NEGATIVE result should be treated as presumptive. It does not rule out the possibility of COVID-19 and should not be the sole basis for treatment decisions. If COVID-19 is strongly suspected based on clinical and exposure history, re-testing using an alternate molecular assay should be considered.     Commercial kits are provided by [employer].       POCT URINALYSIS(INSTRUMENT)   POCT INFLUENZA A/B MOLECULAR   POCT CMP   POCT RAPID INFLUENZA A/B    Influenza B Ag negative      Inflenza A Ag negative            Imaging Results              X-Ray Chest AP Portable (Final result)  Result time 07/05/25 05:19:21      Final result by Freddie Moore MD (07/05/25 05:19:21)                   Impression:      No consolidation or other acute radiographic abnormality in the visualized chest.      Electronically signed by: Freddie Moore  Date:    07/05/2025  Time:    05:19               Narrative:    EXAMINATION:  XR CHEST AP PORTABLE    CLINICAL HISTORY:  Sepsis;    TECHNIQUE:  Single frontal view of the chest was performed.    COMPARISON:  PA and lateral chest x-ray 04/20/2024    FINDINGS:  Midline thoracic trachea.    Allowing for leftward patient rotation, the cardiomediastinal silhouette and hilar contours demonstrate no adverse interval changes.    No acute pulmonary, pleural, skeletal, or upper abdominal abnormality is apparent radiographically.    External leads project upon the torso.                                       CT Abdomen Pelvis With IV Contrast NO Oral Contrast (Final result)  Result time 07/05/25 05:39:17      Final result by David Catherine MD (07/05/25 05:39:17)                   Impression:    IMPRESSION:  1.  No CT evidence of pyelonephritis or definite cystitis. No hydronephrosis or urolithiasis. Correlate with urinalysis.  2.  Colonic  diverticulosis.  3.  Other findings as above.    -Electronically Signed By: David Catherine MD   -Electronically Signed On:  7/5/2025 5:39 AM      Report Ends               Narrative:    EXAM: CT ABDOMEN PELVIS WITH IV CONTRAST    HISTORY: 74 years -old Female with UTI, recurrent/complicated (Female)    TECHNIQUE: Axial CT of the abdomen and pelvis with multiplanar reformats. All CT scans are performed using dose optimization techniques as appropriate to a performed exam including automated exposure control and/or standardized protocols for targeted exams where dose is matched to indication/reason for exam/patient size.    COMPARISON: None    FINDINGS:    Lower Chest: Minimal bibasilar atelectasis.     Hepatobiliary: The hepatic parenchyma is unremarkable. Nonvisualization of the gallbladder. No biliary dilation.    Pancreas: Unremarkable.    Spleen: Unremarkable.    Adrenals: Unremarkable.    Genitourinary: Small right renal cysts. Subcentimeter left renal hypodensity is too small to characterize. No hydronephrosis. No urolithiasis. The bladder is nondistended. Small calcified uterine fibroids. No adnexal mass.    Gastrointestinal: Colonic diverticulosis. No bowel obstruction. No inflammatory changes. The appendix is unremarkable.    Vascular/Lymphatics: No lymphadenopathy identified.  No aortic aneurysm.    Musculoskeletal: No acute abnormality. Multilevel degenerative changes of the spine. Degenerative changes of the hips, severe on the left.    Other: No extraluminal air. No free fluid.                                       Medications   lactated ringers bolus 1,632 mL (has no administration in time range)   acetaminophen tablet 1,000 mg (1,000 mg Oral Given 7/5/25 0308)   cefTRIAXone injection 2 g (2 g Intravenous Given 7/5/25 0341)   iohexoL (OMNIPAQUE 350) injection 75 mL (75 mLs Intravenous Given 7/5/25 0404)   ketorolac injection 15 mg (15 mg Intravenous Given 7/5/25 0339)   metronidazole IVPB 500 mg (0 mg  Intravenous Stopped 7/5/25 0717)     Medical Decision Making  Amount and/or Complexity of Data Reviewed  Labs: ordered. Decision-making details documented in ED Course.  Radiology: ordered.  ECG/medicine tests:  Decision-making details documented in ED Course.    Risk  OTC drugs.  Prescription drug management.  Decision regarding hospitalization.    Ubaldo DUKES, the assumed care of this patient at 6:00 a.m..  Patient states she feels reasonably well.  No significant rebound or guarding or abdominal tenderness at this time.  She reports diarrhea.  She also reports dysuria.  White cell count of 24,000.  Patient is also anemic and thrombocytopenic.  Possible primary blood disorder however given fever will admit for further evaluation of sepsis.  Patient has been normotensive.  Lactic acid is normal.  Tachycardia has resolved.  Repeat perfusion exam is normal.  Patient accepted to [facility] by Dr. Monson.            ED Course as of 07/05/25 0808   Sat Jul 05, 2025   0329 EKG 12-lead  Time 3:29 a.m.     Rate 118, sinus, regular rhythm, normal axis.   QRS 88 .  No ST elevation or depression.  No T-wave inversion no hyperacute T-waves.      Sinus tachycardia. [JM]   0341 SARS-CoV-2 RNA, Amplification, Qual: Negative [JM]   0342 POC Lactate: 0.93 [JM]   0355 POCT CBC  WBC 24.2 hemoglobin 9.4 platelet 88 [JM]   0551 POC Creatinine: 0.8 [JM]   0552 POC BUN: 21 [JM]   0552 ALT (SGPT), POC: 22 [JM]   0552 AST (SGOT), POC(!): 46 [JM]      ED Course User Index  [JM] Shaheed Larson MD                           Clinical Impression:  Final diagnoses:  [Z13.6] Screening for cardiovascular condition  [R65.10] SIRS (systemic inflammatory response syndrome)  [R50.9] Fever, unspecified fever cause (Primary)  [R19.7] Diarrhea, unspecified type          ED Disposition Condition    Admit                       [1]   Social History  Tobacco Use    Smoking status: Never    Smokeless tobacco: Never   Substance  Use Topics    Alcohol use: Not Currently    Drug use: Not Currently        Ubaldo Persaud MD  07/05/25 0848

## 2025-07-05 NOTE — NURSING
[facility]  Nurses Note -- 4 Eyes      7/5/2025       Skin assessed on: Admit    Discoloration to bilateral lower extremities, open wound to right lower extremity  [x] No Pressure Injuries Present    [x]Prevention Measures Documented    [] Yes LDA  for Pressure Injury Previously documented     [] Yes New Pressure Injury Discovered   [] LDA for New Pressure Injury Added      Attending RN:  Colette Pineda LPN     Second RN:  DINORAH Stone

## 2025-07-05 NOTE — NURSING
Received report from DINORAH Young in [facility]. Patient arrived to floor via EMS stretcher. Patient AAOx4. Patient is Macanese speaking, however, she does understand English. Patient not showing signs of distress. Bed in lowest position, wheels locked, call bell in reach, side rails up x2.

## 2025-07-05 NOTE — ASSESSMENT & PLAN NOTE
"-Patient has sepsis without organ dysfunction secondary to Intra-abdominal Infection. A review of systems was completed. Patient's sepsis is improving.  -Most likely related to enteritis.  -WBC 41314, CT abdomen/pelvis negative.    Current Antibiotics    cefTRIAXone injection 1 g, Every 24 hours (non-standard times), Intravenous  metronidazole IVPB 500 mg, Every 8 hours (non-standard times), Intravenous    Lactate  Recent Labs   Lab 07/05/25  0339 07/05/25  0932   LACTATE  --  0.8   POCLAC 0.93  --      Culture Data  Blood Cultures No results found for: "CULTBLD"   Urine Culture   Urine Culture, Routine   Date Value Ref Range Status   01/03/2025 No significant growth  Final      mSOFA  MSOFA Total  Min: 0   Min taken time: 07/05/25 1300  Max: 3   Max taken time: 07/05/25 1001    Plan  - Antibiotics as listed above  - Fluid resuscitation as follows:Ideal Body Weight- The patient is obese (BMI>30) and their ideal body weight of Ideal body weight: 50.1 kg (110 lb 7.2 oz) will be used to calculate fluid bolus of 30 ml/kg.   - Trend lactate to resolution  - Follow up culture data  - Vasopressors were not needed  - The following services were consulted:None.  -     "

## 2025-07-05 NOTE — PLAN OF CARE
Case Management Assessment - [facility]    PCP: Gideon France MD   Pharmacy:   [facility] - [place] [facility] [address]  [address] [facility]  [place] [address]  Phone: [phone] Fax: [phone]    [facility] - [place], [place] - [address]  [address] [facility] [address] [facility] [address]  Phone: [phone] Fax: [phone]       Patient Arrived From: Home   Existing Help at Home: Family    Barriers to Discharge: None    Discharge Plan:    A. Home   B. Home          CM Initial Assessment completed. Pt is independent, uses a cane as needed. Family will provide transportation at discharge .  CM will continue to follow patient.   07/05/25 1446   Discharge Assessment   Assessment Type Discharge Planning Assessment   Confirmed/corrected address, phone number and insurance Yes   Confirmed Demographics Correct on Facesheet   Source of Information patient   People in Home spouse   Name(s) of People in Home Saji   Facility Arrived From: Home   Do you expect to return to your current living situation? Yes   Prior to hospitilization cognitive status: Alert/Oriented;No Deficits   Current cognitive status: Alert/Oriented;No Deficits   Walking or Climbing Stairs Difficulty yes   Walking or Climbing Stairs ambulation difficulty, requires equipment   Dressing/Bathing Difficulty no   Equipment Currently Used at Home cane, straight   Do you currently have service(s) that help you manage your care at home? No   Do you take prescription medications? Yes   Do you have prescription coverage? Yes   Do you have any problems affording any of your prescribed medications? No   Is the patient taking medications as prescribed? yes   Who is going to help you get home at discharge? Spouse   How do you get to doctors appointments? car, drives self;family or friend will provide   Are you on dialysis? No   Do you take coumadin? No   Discharge Plan A Home   Discharge Plan B Home   DME Needed Upon Discharge   none   Discharge Plan discussed with: Spouse/sig other   Transition of Care Barriers None

## 2025-07-05 NOTE — NURSING
Received report from DINORAH Young in [place] [facility] ED. Patient arrived to floor via EMS stretcher. Patient is Danish speaking, however, she does understand English. 0.9% NS @ 125mL/hr ordered. PT worked with patient. Patient tolerated well. Patient complained of nausea. Gave PRN zofran. When reassessed, patient stated moderate relief was obtained. Patient complained of 8/10 pain. Gave PRN morphine 2mg. When reassessed, patient stated pain was 4/10. OT worked with patient. Patient tolerated well. 22G LFA IV started. Urine sample sent to lab for urinalysis. Patient AAOx4. Patient not showing signs of distress. Bed in lowest position, wheels locked, call bell in reach, side rails up x2.

## 2025-07-05 NOTE — NURSING
Patient AAOx4. Patient not showing signs of distress. Bed in lowest position, wheels locked, call bell in reach, side rails up x2.

## 2025-07-05 NOTE — PT/OT/SLP EVAL
Physical Therapy Evaluation    Patient Name:  Alba M Reyes   MRN:  [id number]    Recommendations:     Discharge Recommendations: Low Intensity Therapy   Discharge Equipment Recommendations: none   Barriers to discharge: None from mobility perspective    Assessment:     Alba M Reyes is a 74 y.o. female admitted with a medical diagnosis of <principal problem not specified>.  She presents with the following impairments/functional limitations: weakness, impaired endurance, impaired self care skills, impaired functional mobility, gait instability, decreased lower extremity function, decreased safety awareness, impaired cardiopulmonary response to activity Pt performs bed mobility with set up assist. Pt transfers sit<>stand w/ RW and supervision. Pt amb 15 ft to bathroom w/ RW and supervision. Pt performed toilet transfer w/ RW and supervision. Pt amb an additional 20 ft w/ RW and supervision. Pt reports chronic L hip and LBP limiting further activity but no obvious antalgic gait with mobility.    Rehab Prognosis: Good; patient would benefit from acute skilled PT services to address these deficits and reach maximum level of function.    Recent Surgery: * No surgery found *      Plan:     During this hospitalization, patient to be seen  (3-5x/wk) to address the identified rehab impairments via gait training, therapeutic activities, therapeutic exercises and progress toward the following goals:    Plan of Care Expires:  07/19/25    Subjective     Chief Complaint: chronic L hip and LBP  Patient/Family Comments/goals: regain strength/ endurance to d/c home to help   Pain/Comfort:  Pain Rating 1: 10/10  Location - Side 1: Left  Location 1: hip  Pain Addressed 1: Reposition, Distraction, Cessation of Activity  Pain Rating Post-Intervention 1: 10/10    Patients cultural, spiritual, Oriental orthodox conflicts given the current situation: no    Living Environment:  Pt lives with her  in [facility] w/ 0 MAR[address]. Pt has tub/shower combo  w/ shower chair. Pt's  has Parkinson's and hx of falls but is able to do most ADL's independently  Prior to admission, patients level of function was modified independent amb w/ rollator, w/c for community distances. Pt drives herself.  Equipment used at home: wheelchair, rollator, shower chair.  DME owned (not currently used): none.  Upon discharge, patient will have assistance from pt's children/grandchildren will come and stay with pt at d/c.    Objective:     Communicated with Colette conrad prior to session.  Patient found HOB elevated with telemetry  upon PT entry to room.    General Precautions: Standard,    Orthopedic Precautions:N/A   Braces: N/A  Respiratory Status: Room air    Exams:  Cognitive Exam:  Patient is oriented to Person, Place, Time, and Situation  RLE ROM: WFL  RLE Strength: WFL  LLE ROM: Deficits: restricted motion at hip, limited by hip pain  LLE Strength: Deficits: at least 3/5 hip strength, limited by pain    Functional Mobility:  Bed Mobility:     Rolling Left:  supervision  Scooting: supervision  Bridging: supervision  Supine to Sit: supervision  Sit to Supine: supervision  Transfers:     Sit to Stand:  stand by assistance with rolling walker  Toilet Transfer: stand by assistance with  rolling walker  using  Step Transfer  Gait: Pt amb 15 ft, then 20 ft w/ RW and supervision. No obvious antalgic gait noted but inc step length on L vs R  Balance: good in sitting, fair in standing      AM-PAC 6 CLICK MOBILITY  Total Score:23       Treatment & Education:  Pt educated on fall prevention strategies with transfers/ ambulation    Patient left HOB elevated with all lines intact, call button in reach, and nsg notified.    GOALS:   Multidisciplinary Problems       Physical Therapy Goals          Problem: Physical Therapy    Goal Priority Disciplines Outcome Interventions   Physical Therapy Goal     PT, PT/OT Progressing    Description: Goals to be met by: 7/19/25     Patient will increase  functional independence with mobility by performin. Supine to sit with Modified [place]  2. Rolling to Left and Right with Modified [place].  3. Sit to stand transfer with Modified [place]  4. Bed to chair transfer with Modified [place] using Rolling Walker  5. Gait  x 300 feet with Modified [place] using Rolling Walker.                          DME Justifications:  No DME recommended requiring DME justifications    History:     Past Medical History:   Diagnosis Date    Arthritis     Hypertension        Past Surgical History:   Procedure Laterality Date    CHOLECYSTECTOMY      EPIDURAL STEROID INJECTION Left 2023    Procedure: Left L5 + S1 Transforaminal Epidural Steroid Injections;  Surgeon: Jim Hector Jr., MD;  Location: [facility] ENDO;  Service: Pain Management;  Laterality: Left;  @1330  No ATC or DM    INJECTION Left 2022    Procedure: INJECTION, LEFT HIP INTRA-ARTICULAR STEROID CONTRAST DIRECT REF  confirmed;  Surgeon: Hailey Valladares MD;  Location: [facility] MGT;  Service: Pain Management;  Laterality: Left;       Time Tracking:     PT Received On: 25  PT Start Time: 1115     PT Stop Time: 1130  PT Total Time (min): 15 min     Billable Minutes: Evaluation 15      2025

## 2025-07-06 VITALS
HEIGHT: 62 IN | WEIGHT: 120 LBS | HEART RATE: 75 BPM | TEMPERATURE: 98 F | OXYGEN SATURATION: 96 % | BODY MASS INDEX: 22.08 KG/M2 | RESPIRATION RATE: 15 BRPM | SYSTOLIC BLOOD PRESSURE: 106 MMHG | DIASTOLIC BLOOD PRESSURE: 60 MMHG

## 2025-07-06 LAB
ABSOLUTE EOSINOPHIL (OHS): 0.08 K/UL
ABSOLUTE MONOCYTE (OHS): 1.62 K/UL (ref 0.3–1)
ABSOLUTE NEUTROPHIL COUNT (OHS): 5.63 K/UL (ref 1.8–7.7)
ALBUMIN SERPL BCP-MCNC: 3 G/DL (ref 3.5–5.2)
ALP SERPL-CCNC: 71 UNIT/L (ref 40–150)
ALT SERPL W/O P-5'-P-CCNC: 14 UNIT/L (ref 10–44)
ANION GAP (OHS): 11 MMOL/L (ref 8–16)
AST SERPL-CCNC: 20 UNIT/L (ref 11–45)
BASOPHILS # BLD AUTO: 0.05 K/UL
BASOPHILS NFR BLD AUTO: 0.5 %
BILIRUB SERPL-MCNC: 0.3 MG/DL (ref 0.1–1)
BUN SERPL-MCNC: 14 MG/DL (ref 8–23)
CALCIUM SERPL-MCNC: 8.1 MG/DL (ref 8.7–10.5)
CHLORIDE SERPL-SCNC: 110 MMOL/L (ref 95–110)
CO2 SERPL-SCNC: 21 MMOL/L (ref 23–29)
CREAT SERPL-MCNC: 0.6 MG/DL (ref 0.5–1.4)
ERYTHROCYTE [DISTWIDTH] IN BLOOD BY AUTOMATED COUNT: 18 % (ref 11.5–14.5)
GFR SERPLBLD CREATININE-BSD FMLA CKD-EPI: >60 ML/MIN/1.73/M2
GLUCOSE SERPL-MCNC: 80 MG/DL (ref 70–110)
HCT VFR BLD AUTO: 25.2 % (ref 37–48.5)
HGB BLD-MCNC: 7.8 GM/DL (ref 12–16)
IMM GRANULOCYTES # BLD AUTO: 0.42 K/UL (ref 0–0.04)
IMM GRANULOCYTES NFR BLD AUTO: 4.2 % (ref 0–0.5)
LYMPHOCYTES # BLD AUTO: 2.17 K/UL (ref 1–4.8)
MCH RBC QN AUTO: 32.2 PG (ref 27–31)
MCHC RBC AUTO-ENTMCNC: 31 G/DL (ref 32–36)
MCV RBC AUTO: 104 FL (ref 82–98)
NUCLEATED RBC (/100WBC) (OHS): 0 /100 WBC
PLATELET # BLD AUTO: ABNORMAL 10*3/UL
PMV BLD AUTO: ABNORMAL FL
POTASSIUM SERPL-SCNC: 3.6 MMOL/L (ref 3.5–5.1)
PROT SERPL-MCNC: 6.6 GM/DL (ref 6–8.4)
RBC # BLD AUTO: 2.42 M/UL (ref 4–5.4)
RELATIVE EOSINOPHIL (OHS): 0.8 %
RELATIVE LYMPHOCYTE (OHS): 21.8 % (ref 18–48)
RELATIVE MONOCYTE (OHS): 16.2 % (ref 4–15)
RELATIVE NEUTROPHIL (OHS): 56.5 % (ref 38–73)
SODIUM SERPL-SCNC: 142 MMOL/L (ref 136–145)
WBC # BLD AUTO: 9.97 K/UL (ref 3.9–12.7)

## 2025-07-06 PROCEDURE — 85025 COMPLETE CBC W/AUTO DIFF WBC: CPT | Performed by: STUDENT IN AN ORGANIZED HEALTH CARE EDUCATION/TRAINING PROGRAM

## 2025-07-06 PROCEDURE — 96376 TX/PRO/DX INJ SAME DRUG ADON: CPT

## 2025-07-06 PROCEDURE — G0378 HOSPITAL OBSERVATION PER HR: HCPCS

## 2025-07-06 PROCEDURE — 63600175 PHARM REV CODE 636 W HCPCS: Performed by: STUDENT IN AN ORGANIZED HEALTH CARE EDUCATION/TRAINING PROGRAM

## 2025-07-06 PROCEDURE — 96366 THER/PROPH/DIAG IV INF ADDON: CPT

## 2025-07-06 PROCEDURE — 25000003 PHARM REV CODE 250: Performed by: STUDENT IN AN ORGANIZED HEALTH CARE EDUCATION/TRAINING PROGRAM

## 2025-07-06 PROCEDURE — 36415 COLL VENOUS BLD VENIPUNCTURE: CPT | Performed by: STUDENT IN AN ORGANIZED HEALTH CARE EDUCATION/TRAINING PROGRAM

## 2025-07-06 PROCEDURE — 80053 COMPREHEN METABOLIC PANEL: CPT | Performed by: STUDENT IN AN ORGANIZED HEALTH CARE EDUCATION/TRAINING PROGRAM

## 2025-07-06 RX ORDER — CIPROFLOXACIN 500 MG/1
500 TABLET, FILM COATED ORAL 2 TIMES DAILY
Qty: 8 TABLET | Refills: 0 | Status: SHIPPED | OUTPATIENT
Start: 2025-07-06 | End: 2025-07-10

## 2025-07-06 RX ORDER — METRONIDAZOLE 500 MG/1
500 TABLET ORAL 3 TIMES DAILY
Qty: 15 TABLET | Refills: 0 | Status: SHIPPED | OUTPATIENT
Start: 2025-07-06 | End: 2025-07-11

## 2025-07-06 RX ORDER — PANTOPRAZOLE SODIUM 40 MG/1
40 TABLET, DELAYED RELEASE ORAL DAILY
Qty: 90 TABLET | Refills: 3 | Status: SHIPPED | OUTPATIENT
Start: 2025-07-06 | End: 2026-07-06

## 2025-07-06 RX ADMIN — FOLIC ACID 1 MG: 1 TABLET ORAL at 09:07

## 2025-07-06 RX ADMIN — CEFTRIAXONE 1 G: 1 INJECTION, POWDER, FOR SOLUTION INTRAMUSCULAR; INTRAVENOUS at 04:07

## 2025-07-06 RX ADMIN — METRONIDAZOLE 500 MG: 500 INJECTION, SOLUTION INTRAVENOUS at 01:07

## 2025-07-06 RX ADMIN — ACETAMINOPHEN 650 MG: 325 TABLET ORAL at 06:07

## 2025-07-06 RX ADMIN — FERROUS SULFATE TAB 325 MG (65 MG ELEMENTAL FE) 1 EACH: 325 (65 FE) TAB at 09:07

## 2025-07-06 RX ADMIN — METRONIDAZOLE 500 MG: 500 INJECTION, SOLUTION INTRAVENOUS at 05:07

## 2025-07-06 RX ADMIN — MEMANTINE HYDROCHLORIDE 5 MG: 5 TABLET ORAL at 09:07

## 2025-07-06 RX ADMIN — PANTOPRAZOLE SODIUM 40 MG: 40 TABLET, DELAYED RELEASE ORAL at 05:07

## 2025-07-06 NOTE — NURSING
Regular diet ordered. Discharge orders placed. IV removed and tele removed. Patient ready for discharge teaching. Patient AAOx4. Patient did not complain of any pain throughout shift. Patient not showing signs of distress. Bed in lowest position, wheels locked, call bell in reach, side rails up x2. Family at bedside.

## 2025-07-06 NOTE — PLAN OF CARE
Problem: Adult Inpatient Plan of Care  Goal: Plan of Care Review  Outcome: Met  Goal: Patient-Specific Goal (Individualized)  Outcome: Met  Goal: Absence of Hospital-Acquired Illness or Injury  Outcome: Met  Goal: Optimal Comfort and Wellbeing  Outcome: Met  Goal: Readiness for Transition of Care  Outcome: Met     Problem: Sepsis/Septic Shock  Goal: Optimal Coping  Outcome: Met  Goal: Absence of Bleeding  Outcome: Met  Goal: Blood Glucose Level Within Targeted Range  Outcome: Met  Goal: Absence of Infection Signs and Symptoms  Outcome: Met  Goal: Optimal Nutrition Intake  Outcome: Met

## 2025-07-06 NOTE — DISCHARGE SUMMARY
[facility] Medicine  Discharge Summary      Patient Name: Alba M Reyes  MRN: [id number]  Admission Date: 7/5/2025  Hospital Length of Stay: 1 days  Discharge Date and Time: 07/06/2025 2:10 PM  Attending Physician: Andressa Rivas MD   Discharging Provider: Andressa Rivas MD  Discharge Provider Team: Networked reference to record PCT   Primary Care Provider: Gideon France MD      Hospital Course: ***    Consults:     Final Active Diagnoses:    Diagnosis Date Noted POA    PRINCIPAL PROBLEM:  Sepsis [A41.9] 07/05/2025 Yes    Enteritis [K52.9] 07/05/2025 Yes    Hypertension [I10] 07/05/2025 Yes    Rheumatoid arthritis [M06.9] 03/27/2014 Yes    Dementia [F03.90] 07/05/2025 Yes      Problems Resolved During this Admission:      Discharged Condition: good    Disposition: Home or Self Care    Follow Up:    Patient Instructions:      Diet Adult Regular     Notify your health care provider if you experience any of the following:  temperature >100.4     Notify your health care provider if you experience any of the following:  persistent nausea and vomiting or diarrhea     Notify your health care provider if you experience any of the following:  severe uncontrolled pain     Notify your health care provider if you experience any of the following:  redness, tenderness, or signs of infection (pain, swelling, redness, odor or green/yellow discharge around incision site)     Notify your health care provider if you experience any of the following:  difficulty breathing or increased cough     Notify your health care provider if you experience any of the following:  severe persistent headache     Notify your health care provider if you experience any of the following:  worsening rash     Notify your health care provider if you experience any of the following:  persistent dizziness, light-headedness, or visual disturbances     Notify your health care provider if you experience any of the following:   increased confusion or weakness     Notify your health care provider if you experience any of the following:     Activity as tolerated     Medications:  Reconciled Home Medications:      Medication List        START taking these medications      ciprofloxacin HCl 500 MG tablet  Commonly known as: CIPRO  Take 1 tablet (500 mg total) by mouth 2 (two) times daily. for 4 days     metroNIDAZOLE 500 MG tablet  Commonly known as: FLAGYL  Take 1 tablet (500 mg total) by mouth 3 (three) times daily. for 5 days     pantoprazole 40 MG tablet  Commonly known as: PROTONIX  Take 1 tablet (40 mg total) by mouth once daily.            CHANGE how you take these medications      ibuprofen 800 MG tablet  Commonly known as: ADVIL,MOTRIN  Take 800 mg by mouth 2 (two) times daily as needed.  What changed: Another medication with the same name was removed. Continue taking this medication, and follow the directions you see here.            CONTINUE taking these medications      acetaminophen 500 MG tablet  Commonly known as: TYLENOL  Take 1 tablet (500 mg total) by mouth every 6 (six) hours as needed for Pain (As needed for mild-to-moderate pain).     baclofen 5 mg Tab tablet  Commonly known as: LIORESAL  Take 1 tablet (5 mg total) by mouth 2 (two) times daily as needed.     diclofenac sodium 1 % Gel  Commonly known as: VOLTAREN  Apply 2 g topically 4 (four) times daily as needed (Apply to painful area 4 times a day as needed for pain).     estradioL 0.01 % (0.1 mg/gram) vaginal cream  Commonly known as: ESTRACE  Place 1 g vaginally twice a week.     ferrous sulfate 325 mg (65 mg iron) Tab tablet  Commonly known as: FEOSOL  Take 1 tablet (325 mg total) by mouth once daily.     folic acid 1 MG tablet  Commonly known as: FOLVITE  Take 1 tablet (1 mg total) by mouth once daily.     gabapentin 300 MG capsule  Commonly known as: NEURONTIN  Take 2 capsules (600 mg total) by mouth every evening.     hydroxychloroquine 200 mg tablet  Commonly  known as: PLAQUENIL  TAKE 1 & 1/2 (ONE & ONE-HALF) TABLETS BY MOUTH NIGHTLY     ketoconazole 2 % cream  Commonly known as: NIZORAL     lisinopriL 20 MG tablet  Commonly known as: PRINIVIL,ZESTRIL  Take 1 tablet (20 mg total) by mouth once daily.     lovastatin 10 MG tablet  Commonly known as: MEVACOR  Take 1 tablet (10 mg total) by mouth every evening.     * memantine 5 MG Tab  Commonly known as: NAMENDA     * memantine 7 mg Cspx  Commonly known as: NAMENDA     methocarbamoL 500 MG Tab  Commonly known as: Robaxin  Take 1,000 mg by mouth.     methotrexate 25 mg/mL injection  Inject 1 mL (25 mg total) into the skin every 7 days.     omega-3 acid ethyl esters 1 gram capsule  Commonly known as: LOVAZA  Take 1 capsule by mouth once daily.     predniSONE 5 MG tablet  Commonly known as: DELTASONE  Take 5 mg by mouth.     triamcinolone acetonide 0.1% 0.1 % cream  Commonly known as: KENALOG           * This list has 2 medication(s) that are the same as other medications prescribed for you. Read the directions carefully, and ask your doctor or other care provider to review them with you.                  Significant Diagnostic Studies: N/A    Pending Diagnostic Studies:       None          Indwelling Lines/Drains at time of discharge:   Lines/Drains/Airways       None                   Time spent on the discharge of patient: 40 minutes         Andressa Rivas MD  Department of Hospital Medicine  [facility]

## 2025-07-06 NOTE — PLAN OF CARE
Case Management Final Discharge Note    Discharge Disposition: To home    New DME ordered / company name: None    Relevant SDOH / Transition of Care Barriers:  None    Person available to provide assistance at home when needed and their contact information: Family    Scheduled followup appointment: Dr. Arshad, Heme/Onc 7/14/2025 at 10:40, CHRISTINE Escalante, [facility] 7/30/25 at 2:30.    Referrals placed: Hematology and Oncology     Transportation: Private        Patient and family educated on discharge services and updated on DC plan. Bedside RN notified, patient clear to discharge from Case Management Perspective.     07/06/25 1436   Final Note   Assessment Type Final Discharge Note   Anticipated Discharge Disposition Home   Hospital Resources/Appts/Education Provided Provided patient/caregiver with written discharge plan information   Post-Acute Status   Discharge Delays None known at this time

## 2025-07-06 NOTE — NURSING
[facility], [facility] - [place], [place]  Nurses Note -- 4 Eyes      7/5/2025       Skin assessed on: Q Shift      [x] No Pressure Injuries Present    []Prevention Measures Documented    [] Yes LDA  for Pressure Injury Previously documented     [] Yes New Pressure Injury Discovered   [] LDA for New Pressure Injury Added      Attending RN:  Shaggy France RN     Second RN:  Colette      Discoloration noted to BLE

## 2025-07-06 NOTE — NURSING
[facility], [facility] - [place]  Nurses Note -- 4 Eyes      7/6/2025       Skin assessed on: Q Shift      [x] No Pressure Injuries Present    [x]Prevention Measures Documented    [] Yes LDA  for Pressure Injury Previously documented     [] Yes New Pressure Injury Discovered   [] LDA for New Pressure Injury Added      Attending RN:  Colette Pineda LPN     Second RN:  DINORAH Coon

## 2025-07-06 NOTE — PLAN OF CARE
Problem: Sepsis/Septic Shock  Goal: Absence of Infection Signs and Symptoms  Intervention: Initiate Sepsis Management  Flowsheets (Taken 7/6/2025 0643)  Infection Management: aseptic technique maintained  Intervention: Promote Stabilization  Flowsheets (Taken 7/6/2025 0643)  Fluid/Electrolyte Management: intravenous fluids adjusted  Lung Protection Measures: fluid excess minimized     Problem: Sepsis/Septic Shock  Goal: Absence of Infection Signs and Symptoms  Intervention: Promote Stabilization  Flowsheets (Taken 7/6/2025 0643)  Fluid/Electrolyte Management: intravenous fluids adjusted  Lung Protection Measures: fluid excess minimized

## 2025-07-10 LAB
BACTERIA BLD CULT: NORMAL
BACTERIA BLD CULT: NORMAL

## 2025-07-10 NOTE — PLAN OF CARE
Through communication with [facility], the Inpatient order is being changed to observation as the payer will not authorize Inpatient and the facility agrees not to appeal or challenge the change in status. The account will be changed to Observation for billing purposes.

## 2025-07-11 ENCOUNTER — OFFICE VISIT (OUTPATIENT)
Dept: URGENT CARE | Facility: CLINIC | Age: 75
End: 2025-07-11
Payer: MEDICARE

## 2025-07-11 VITALS
HEIGHT: 62 IN | SYSTOLIC BLOOD PRESSURE: 137 MMHG | TEMPERATURE: 98 F | RESPIRATION RATE: 19 BRPM | OXYGEN SATURATION: 98 % | DIASTOLIC BLOOD PRESSURE: 73 MMHG | HEART RATE: 88 BPM | BODY MASS INDEX: 22.08 KG/M2 | WEIGHT: 120 LBS

## 2025-07-11 DIAGNOSIS — J02.9 SORE THROAT: ICD-10-CM

## 2025-07-11 DIAGNOSIS — K12.0 APHTHOUS ULCER: Primary | ICD-10-CM

## 2025-07-11 DIAGNOSIS — K05.00 ACUTE GINGIVITIS: ICD-10-CM

## 2025-07-11 LAB
CTP QC/QA: YES
CTP QC/QA: YES
MOLECULAR STREP A: NEGATIVE
SARS-COV+SARS-COV-2 AG RESP QL IA.RAPID: NEGATIVE

## 2025-07-11 RX ORDER — CHLORHEXIDINE GLUCONATE ORAL RINSE 1.2 MG/ML
15 SOLUTION DENTAL 2 TIMES DAILY
Qty: 473 ML | Refills: 0 | Status: SHIPPED | OUTPATIENT
Start: 2025-07-11 | End: 2025-07-25

## 2025-07-11 NOTE — PATIENT INSTRUCTIONS
You can use the magic mouthwash as needed to help with sore throat and oral pain.    Use the chlorhexidine rinse as prescribed.  You can continue taking Tylenol as needed for pain.    - Follow up with your PCP or specialty clinic as directed in the next 1-2 weeks if not improved or as needed.  You can call [phone] to schedule an appointment with the appropriate provider.    - Go to the ER or seek medical attention immediately if you develop new or worsening symptoms.    - You must understand that you have received an Urgent Care treatment only and that you may be released before all of your medical problems are known or treated.   - You, the patient, will arrange for follow up care as instructed.   - If your condition worsens or fails to improve we recommend that you receive another evaluation at the ER immediately or contact your PCP to discuss your concerns or return here.

## 2025-07-11 NOTE — PROGRESS NOTES
"Subjective:      Patient ID: Alba M Reyes is a 74 y.o. female.    Vitals:  height is 5' 2" (1.575 m) and weight is 54.4 kg (120 lb). Her oral temperature is 98.3 °F (36.8 °C). Her blood pressure is 137/73 and her pulse is 88. Her respiration is 19 and oxygen saturation is 98%.     Chief Complaint: Oral Pain    74-year-old female with history of rheumatoid arthritis, hypertension here today for tongue pain, gum pain, sore throat, headaches, right-sided ear pain that started yesterday.  There is a painful ulcer to her tongue. She has been using orajel. One-week ago she was experiencing abdominal pain, fevers, diarrhea, and she was recently admitted on 07/05/2025 for sepsis.  She was treated for enteritis with Cipro and Flagyl.  She completed a few doses of Cipro and Flagyl but states that she was not tolerating them too well because it was causing her nausea.  Abdominal pain has resolved, and denies any further fevers, nausea, vomiting, diarrhea.  She denies any coughing, congestion, chest pain, SOB.    Oral Pain   This is a new problem. The current episode started yesterday. The problem occurs constantly. The problem has been unchanged. The pain is at a severity of 10/10. The pain is severe. Associated symptoms include difficulty swallowing. Pertinent negatives include no fever. She has tried nothing for the symptoms. The treatment provided no relief.       Constitution: Negative for chills and fever.   HENT:  Positive for ear pain, tongue pain and sore throat. Negative for facial swelling, trouble swallowing and voice change.    Cardiovascular:  Negative for chest pain.   Respiratory:  Negative for cough and shortness of breath.    Gastrointestinal:  Negative for abdominal pain, nausea, vomiting and diarrhea.   Skin:  Negative for rash.   Neurological:  Positive for headaches. Negative for dizziness.      Objective:     Physical Exam   Constitutional: She is oriented to person, place, and time. She appears " well-developed.   HENT:   Head: Normocephalic and atraumatic.   Ears:   Right Ear: Tympanic membrane, external ear and ear canal normal.   Left Ear: External ear and ear canal normal.   Nose: Nose normal.   Mouth/Throat: Uvula is midline and oropharynx is clear and moist. Mucous membranes are moist. No trismus in the jaw. No dental abscesses or uvula swelling. No oropharyngeal exudate, posterior oropharyngeal erythema or tonsillar abscesses. Tonsils are 1+ on the right. Tonsils are 1+ on the left. No tonsillar exudate. Oropharynx is clear.   Ulcer noted to right side of tongue.  There are no other intraoral lesions.  There is some tenderness to the upper and lower right-sided gingiva.      Comments: Ulcer noted to right side of tongue.  There are no other intraoral lesions.  There is some tenderness to the upper and lower right-sided gingiva.  Eyes: Conjunctivae, EOM and lids are normal. Pupils are equal, round, and reactive to light.   Neck: Trachea normal and phonation normal. Neck supple.   Cardiovascular: Normal rate, regular rhythm, normal heart sounds and normal pulses.   Pulmonary/Chest: Effort normal and breath sounds normal. No respiratory distress.   Abdominal: She exhibits no distension. Soft. There is no abdominal tenderness.   Musculoskeletal: Normal range of motion.         General: Normal range of motion.   Neurological: no focal deficit. She is alert and oriented to person, place, and time.   Skin: Skin is warm, dry and intact. Capillary refill takes less than 2 seconds.   Psychiatric: Her speech is normal and behavior is normal. Judgment and thought content normal.   Nursing note and vitals reviewed.    Results for orders placed or performed in visit on 07/11/25   POCT Strep A, Molecular    Collection Time: 07/11/25 10:54 AM   Result Value Ref Range    Molecular Strep A, POC Negative Negative     Acceptable Yes    SARS Coronavirus 2 Antigen, POCT Manual Read    Collection Time:  07/11/25 11:00 AM   Result Value Ref Range    SARS Coronavirus 2 Antigen Negative Negative, Presumptive Negative     Acceptable Yes          Assessment:     1. Aphthous ulcer    2. Sore throat    3. Acute gingivitis        Plan:       Aphthous ulcer  -     (Magic mouthwash) 1:1:1 diphenhydrAMINE(Benadryl) 12.5mg/5ml liq, aluminum & magnesium hydroxide-simethicone (Maalox), LIDOcaine viscous 2%; Swish and spit 10 mLs every 4 (four) hours as needed (sore throat/oral pain).  Dispense: 180 mL; Refill: 0    Sore throat  -     POCT Strep A, Molecular  -     SARS Coronavirus 2 Antigen, POCT Manual Read  -     (Magic mouthwash) 1:1:1 diphenhydrAMINE(Benadryl) 12.5mg/5ml liq, aluminum & magnesium hydroxide-simethicone (Maalox), LIDOcaine viscous 2%; Swish and spit 10 mLs every 4 (four) hours as needed (sore throat/oral pain).  Dispense: 180 mL; Refill: 0    Acute gingivitis  -     chlorhexidine (PERIDEX) 0.12 % solution; Use as directed 15 mLs in the mouth or throat 2 (two) times daily. for 14 days  Dispense: 473 mL; Refill: 0      74-year-old female with history of rheumatoid arthritis, hypertension here today for tongue pain, gum pain, sore throat, headaches, right-sided ear pain that started yesterday. Recent admission for sepsis and enteritis. Reviewed these notes. Abd pain, n/v/d has resolved. No recent fevers. There is an ulcer to the right side of the tongue. Will treat with magic mouthwash and peridex.          Patient Instructions   You can use the magic mouthwash as needed to help with sore throat and oral pain.    Use the chlorhexidine rinse as prescribed.  You can continue taking Tylenol as needed for pain.    - Follow up with your PCP or specialty clinic as directed in the next 1-2 weeks if not improved or as needed.  You can call [phone] to schedule an appointment with the appropriate provider.    - Go to the ER or seek medical attention immediately if you develop new or worsening  symptoms.    - You must understand that you have received an Urgent Care treatment only and that you may be released before all of your medical problems are known or treated.   - You, the patient, will arrange for follow up care as instructed.   - If your condition worsens or fails to improve we recommend that you receive another evaluation at the ER immediately or contact your PCP to discuss your concerns or return here.

## 2025-07-14 ENCOUNTER — OFFICE VISIT (OUTPATIENT)
Dept: HEMATOLOGY/ONCOLOGY | Facility: CLINIC | Age: 75
End: 2025-07-14
Payer: MEDICARE

## 2025-07-14 VITALS
SYSTOLIC BLOOD PRESSURE: 148 MMHG | WEIGHT: 122.38 LBS | TEMPERATURE: 98 F | HEART RATE: 98 BPM | HEIGHT: 62 IN | OXYGEN SATURATION: 99 % | DIASTOLIC BLOOD PRESSURE: 61 MMHG | BODY MASS INDEX: 22.52 KG/M2

## 2025-07-14 DIAGNOSIS — D61.818 PANCYTOPENIA: Primary | ICD-10-CM

## 2025-07-14 DIAGNOSIS — K12.0 APHTHOUS ULCER: ICD-10-CM

## 2025-07-14 DIAGNOSIS — D69.6 THROMBOCYTOPENIA, UNSPECIFIED: ICD-10-CM

## 2025-07-14 DIAGNOSIS — M06.9 RHEUMATOID ARTHRITIS, INVOLVING UNSPECIFIED SITE, UNSPECIFIED WHETHER RHEUMATOID FACTOR PRESENT: ICD-10-CM

## 2025-07-14 DIAGNOSIS — J02.9 SORE THROAT: ICD-10-CM

## 2025-07-14 DIAGNOSIS — D75.9 CLONAL CYTOPENIA OF UNDETERMINED SIGNIFICANCE (CCUS): ICD-10-CM

## 2025-07-14 PROCEDURE — 99999 PR PBB SHADOW E&M-EST. PATIENT-LVL V: CPT | Mod: PBBFAC,,, | Performed by: STUDENT IN AN ORGANIZED HEALTH CARE EDUCATION/TRAINING PROGRAM

## 2025-07-14 PROCEDURE — 3288F FALL RISK ASSESSMENT DOCD: CPT | Mod: CPTII,S$GLB,, | Performed by: STUDENT IN AN ORGANIZED HEALTH CARE EDUCATION/TRAINING PROGRAM

## 2025-07-14 PROCEDURE — 3078F DIAST BP <80 MM HG: CPT | Mod: CPTII,S$GLB,, | Performed by: STUDENT IN AN ORGANIZED HEALTH CARE EDUCATION/TRAINING PROGRAM

## 2025-07-14 PROCEDURE — 1126F AMNT PAIN NOTED NONE PRSNT: CPT | Mod: CPTII,S$GLB,, | Performed by: STUDENT IN AN ORGANIZED HEALTH CARE EDUCATION/TRAINING PROGRAM

## 2025-07-14 PROCEDURE — 99214 OFFICE O/P EST MOD 30 MIN: CPT | Mod: S$GLB,,, | Performed by: STUDENT IN AN ORGANIZED HEALTH CARE EDUCATION/TRAINING PROGRAM

## 2025-07-14 PROCEDURE — 3077F SYST BP >= 140 MM HG: CPT | Mod: CPTII,S$GLB,, | Performed by: STUDENT IN AN ORGANIZED HEALTH CARE EDUCATION/TRAINING PROGRAM

## 2025-07-14 PROCEDURE — 3008F BODY MASS INDEX DOCD: CPT | Mod: CPTII,S$GLB,, | Performed by: STUDENT IN AN ORGANIZED HEALTH CARE EDUCATION/TRAINING PROGRAM

## 2025-07-14 PROCEDURE — 4010F ACE/ARB THERAPY RXD/TAKEN: CPT | Mod: CPTII,S$GLB,, | Performed by: STUDENT IN AN ORGANIZED HEALTH CARE EDUCATION/TRAINING PROGRAM

## 2025-07-14 PROCEDURE — 1101F PT FALLS ASSESS-DOCD LE1/YR: CPT | Mod: CPTII,S$GLB,, | Performed by: STUDENT IN AN ORGANIZED HEALTH CARE EDUCATION/TRAINING PROGRAM

## 2025-07-14 PROCEDURE — 1159F MED LIST DOCD IN RCRD: CPT | Mod: CPTII,S$GLB,, | Performed by: STUDENT IN AN ORGANIZED HEALTH CARE EDUCATION/TRAINING PROGRAM

## 2025-07-14 PROCEDURE — 1111F DSCHRG MED/CURRENT MED MERGE: CPT | Mod: CPTII,S$GLB,, | Performed by: STUDENT IN AN ORGANIZED HEALTH CARE EDUCATION/TRAINING PROGRAM

## 2025-07-14 NOTE — PROGRESS NOTES
Hematology- Oncology Clinic Note :     7/14/2025    Chief Complaint   Patient presents with    Follow-up    Anemia      used for this encoutner    HPI  Pt is a 74 y.o. female who  has a past medical history of Arthritis and Hypertension. Who presents to establish care for pancytopenia/CCUS noted on bmbx last year.  Pt has no new issues and only complaint art time of exam is chronic joint pain.  Pt has been off RA tx/MTX since last Nov due to concerns for pancytopenia but takes ibuprofen regularly.  BMBX results reviewed and mildly hypercellular for age and NGS demonstrated CCUS but no other abnormalities seen.  Pt informed it is most likely caused by a combination of age, co morbidities and medications.        Interval hx:    Pt presents with fatigue and was recently admitted for enteritis and given antibiotics.  Pt off mtx but on/off plaquenil.  CBC is worsening and pt agreeable to new bmbx for further investigation after missing last one.  Pt overall feels better since discharge but still having sore throat, magic mouthwash prescribed.  Pt agreeable to plan and all questions answered to her satisfaction.        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    Osteoarthritis of left hip 12/02/2022    Lumbar spondylosis 08/08/2022    Lumbar radiculopathy 08/08/2022    DDD (degenerative disc disease), lumbar 08/08/2022    Lack of coordination 03/02/2020    Posture abnormality 03/02/2020    Weakness of both hips 03/02/2020    GERD (gastroesophageal reflux disease) 04/15/2019    Osteoporosis 04/15/2019    Mixed incontinence 03/20/2019    Varicose veins of legs 11/20/2018    Incarcerated umbilical hernia 01/03/2017    Right upper quadrant pain 12/29/2016     Acute cholecystitis due to biliary calculus 12/29/2016    Hypovitaminosis D 03/27/2014    Osteopenia 03/27/2014    Rheumatoid arthritis 03/27/2014     Overview:   Removed deleted/inactive dx from new diagnosis load  Overview:   ICD10 update         Patient Active Problem List    Diagnosis Date Noted    Sepsis 07/05/2025    Enteritis 07/05/2025    Hypertension 07/05/2025    Dementia 07/05/2025    Pancytopenia 04/25/2024    Thrombocytopenia, unspecified 04/25/2024    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    Osteoarthritis of left hip 12/02/2022    Lumbar spondylosis 08/08/2022    Lumbar radiculopathy 08/08/2022    DDD (degenerative disc disease), lumbar 08/08/2022    Lack of coordination 03/02/2020    Posture abnormality 03/02/2020    Weakness of both hips 03/02/2020    GERD (gastroesophageal reflux disease) 04/15/2019    Osteoporosis 04/15/2019    Mixed incontinence 03/20/2019    Varicose veins of legs 11/20/2018    Incarcerated umbilical hernia 01/03/2017    Right upper quadrant pain 12/29/2016    Acute cholecystitis due to biliary calculus 12/29/2016    Hypovitaminosis D 03/27/2014    Osteopenia 03/27/2014    Rheumatoid arthritis 03/27/2014     Past Medical History:   Diagnosis Date    Arthritis     Hypertension       Past Surgical History:   Procedure Laterality Date    CHOLECYSTECTOMY      EPIDURAL STEROID INJECTION Left 03/22/2023    Procedure: Left L5 + S1 Transforaminal Epidural Steroid Injections;  Surgeon: Jim Hector Jr., MD;  Location: [facility] ENDO;  Service: Pain Management;  Laterality: Left;  @1330  No ATC or DM    INJECTION Left 12/19/2022    Procedure: INJECTION, LEFT HIP INTRA-ARTICULAR STEROID CONTRAST DIRECT REF  confirmed;  Surgeon: Hailey Valladares MD;  Location: [facility] PAIN MGT;  Service: Pain Management;  Laterality: Left;      (Not in a hospital admission)    Review of patient's allergies indicates:  No Known Allergies   Social History     Tobacco Use     Smoking status: Never    Smokeless tobacco: Never   Substance Use Topics    Alcohol use: Not Currently      No family history on file.     Review of Systems :  Review of Systems   Constitutional:  Negative for fever, malaise/fatigue and weight loss.   HENT:  Negative for hearing loss.    Eyes:  Negative for blurred vision and discharge.   Respiratory:  Negative for cough and shortness of breath.    Cardiovascular:  Negative for chest pain and leg swelling.   Gastrointestinal:  Negative for abdominal pain, blood in stool, constipation, diarrhea, heartburn, melena, nausea and vomiting.   Genitourinary:  Negative for dysuria.   Musculoskeletal:  Positive for joint pain and myalgias.   Skin:  Negative for itching and rash.   Neurological:  Negative for dizziness and focal weakness.   Endo/Heme/Allergies:  Does not bruise/bleed easily.   Psychiatric/Behavioral:  Negative for depression. The patient is nervous/anxious.        Physical Exam :  Wt Readings from Last 3 Encounters:   07/11/25 54.4 kg (120 lb)   07/05/25 54.4 kg (120 lb)   06/19/25 55.3 kg (121 lb 14.6 oz)     Temp Readings from Last 3 Encounters:   07/11/25 98.3 °F (36.8 °C) (Oral)   07/06/25 97.8 °F (36.6 °C) (Oral)   06/10/25 98.3 °F (36.8 °C) (Oral)     BP Readings from Last 3 Encounters:   07/11/25 137/73   07/06/25 106/60   06/10/25 130/80     Pulse Readings from Last 3 Encounters:   07/11/25 88   07/06/25 75   06/10/25 87     There is no height or weight on file to calculate BMI.    Physical Exam  Vitals reviewed.   Constitutional:       Comments: Uses walker   HENT:      Head: Normocephalic and atraumatic.      Nose: Nose normal.      Mouth/Throat:      Mouth: Mucous membranes are moist.   Eyes:      Pupils: Pupils are equal, round, and reactive to light.   Cardiovascular:      Rate and Rhythm: Normal rate and regular rhythm.   Pulmonary:      Effort: Pulmonary effort is normal.      Breath sounds: Normal breath sounds.   Abdominal:      General:  Abdomen is flat.   Musculoskeletal:         General: Normal range of motion.      Cervical back: Normal range of motion and neck supple.   Skin:     General: Skin is warm and dry.   Neurological:      Mental Status: She is alert.   Psychiatric:         Mood and Affect: Mood normal.         Behavior: Behavior normal.           Pertinent Diagnostic studies:    BMBX 08/02/23:    Final Diagnosis    Bone marrow, right iliac crest, aspirate smears and core biopsy, and peripheral blood smear:  - Hypercellular marrow for age (cellularity 50%) with trilineage hematopoiesis, M:E ratio 2.4:1, adequate megakaryocytes with occasional atypical forms and adequate iron stores.     Lymphocytes are a mix of T, B and NK cells with T cells predominating. T cells do not show aberrant loss of any antigens. B cells do not show evidence of monoclonality based on surface light chain expression.     CD34+ blasts are 1.0%.      Molecular Studies (Sliced Apples Myeloid NGS) Report:   SUMMARY  At least one variant of strong clinical significance (Tier I) and one variant of potential clinical significance (Tier II) were detected.   Gene Variant Frequency (%) Clinical Impact   SRSF2 39 Tier I   TET2 47 Tier I   CBL 7 Tier II         See complete scanned linked reports.     ADDENDUM NOTE:  A clonal molecular abnormality is detected. 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 Clinical correlation and follow-up is recommended.        No results found for this or any previous visit (from the past 24 hours).    Assessment/Plan :     Pancytopenia/CCUS  -chronic and worsening  -Prior work-up: B12, folate and iron all WNL; Path smear review with mature WBC/no concern for malignancy   -Patient has had anemia  since 2014 but pancytopenia appears new since 2021 per record review  -Pt off methotrexate sicne Nov 2023 with mild improvement in pancytopenia, now on plaquenil on/off most likely RA and meds contributing to pancytopenia   -BM biopsy performed 8/10. Flow cytometry showing no clonal population (see above)  -Will cut back on ibuprofen use  -Pt agreeable to repeat bmbx but missed last apt as anemia has worsened  Plan:  -bmbx with IR in 2-3 weeks  -RTC in 6 weeks for MD visit and cbc with Dr. Richard       RA  -RA followed by outside rheumatology service  -On plaquenil but infrequently   -Pt takes ibuprofen regularly so will cut back on usage to see if improvement        Time spent on case: 45 minutes    Summary of orders placed this encounter:  Orders Placed This Encounter   Procedures    Bone marrow    CT Dx Bone Marrow Biopsy(ies) w/ Aspiration; Right(XPD)    Leukemia/Lymphoma Screen - Bone Marrow Right Posterior Iliac Crest    Heme Disorders DNA/RNA Hold -Bone Marrow       Future Appointments   Date Time Provider Department Center   7/30/2025  2:30 PM Kristy Flannery FNP [facility] MED [facility]   8/7/2025  3:00 PM Yoly Evans FNP-BC [facility] RHEUM [facility]   8/25/2025  9:00 AM Yunior Cantor MD [facility] VE[facility]CL [facility]   9/9/2025 11:00 AM Kristy Flannery FNP [facility] MED [facility] Arshad MD   Hematology/oncology, [facility]

## 2025-07-15 ENCOUNTER — TELEPHONE (OUTPATIENT)
Dept: FAMILY MEDICINE | Facility: CLINIC | Age: 75
End: 2025-07-15
Payer: MEDICARE

## 2025-07-15 ENCOUNTER — TELEPHONE (OUTPATIENT)
Dept: INTERVENTIONAL RADIOLOGY/VASCULAR | Facility: CLINIC | Age: 75
End: 2025-07-15
Payer: MEDICARE

## 2025-07-15 DIAGNOSIS — D61.818 PANCYTOPENIA: Primary | ICD-10-CM

## 2025-07-15 NOTE — TELEPHONE ENCOUNTER
Spoke with patient she stated that the medication she received from [facility] is too strong. Scheduled patient for a follow up and she was told to bring all medications with her.

## 2025-07-15 NOTE — TELEPHONE ENCOUNTER
Copied from CRM #[id number]. Topic: General Inquiry - Patient Advice  >> Jul 10, 2025  8:44 AM Med Assistant Latha wrote:  Type:  Needs Medical Advice/Symptom-based Call    Who Called: Self    Symptoms (please be specific): state that the medication she was given at the hospital is too strong and it has her not feeling well .. She would like to speak to a nurse ..      How long has patient had these symptoms:  since leaving out of the hospital ...    Would the patient rather a call back or a response via [facility]? Yes, call     Best Call Back Number:  [phone] (home)

## 2025-07-16 ENCOUNTER — TELEPHONE (OUTPATIENT)
Dept: HEMATOLOGY/ONCOLOGY | Facility: CLINIC | Age: 75
End: 2025-07-16
Payer: MEDICARE

## 2025-07-17 ENCOUNTER — LAB VISIT (OUTPATIENT)
Dept: LAB | Facility: HOSPITAL | Age: 75
End: 2025-07-17
Payer: MEDICARE

## 2025-07-17 ENCOUNTER — OFFICE VISIT (OUTPATIENT)
Dept: FAMILY MEDICINE | Facility: CLINIC | Age: 75
End: 2025-07-17
Payer: MEDICARE

## 2025-07-17 VITALS
DIASTOLIC BLOOD PRESSURE: 66 MMHG | BODY MASS INDEX: 22.62 KG/M2 | OXYGEN SATURATION: 97 % | SYSTOLIC BLOOD PRESSURE: 112 MMHG | WEIGHT: 122.94 LBS | HEIGHT: 62 IN | HEART RATE: 79 BPM | TEMPERATURE: 98 F

## 2025-07-17 DIAGNOSIS — Z09 HOSPITAL DISCHARGE FOLLOW-UP: ICD-10-CM

## 2025-07-17 DIAGNOSIS — H60.501 ACUTE OTITIS EXTERNA OF RIGHT EAR, UNSPECIFIED TYPE: ICD-10-CM

## 2025-07-17 DIAGNOSIS — R79.9 ABNORMAL FINDING OF BLOOD CHEMISTRY, UNSPECIFIED: ICD-10-CM

## 2025-07-17 DIAGNOSIS — Z09 HOSPITAL DISCHARGE FOLLOW-UP: Primary | ICD-10-CM

## 2025-07-17 DIAGNOSIS — K52.9 ENTERITIS: ICD-10-CM

## 2025-07-17 LAB
ABSOLUTE EOSINOPHIL (OHS): 0.08 K/UL
ABSOLUTE MONOCYTE (OHS): 1.65 K/UL (ref 0.3–1)
ABSOLUTE NEUTROPHIL COUNT (OHS): 3.51 K/UL (ref 1.8–7.7)
ALBUMIN SERPL BCP-MCNC: 3.6 G/DL (ref 3.5–5.2)
ALP SERPL-CCNC: 95 UNIT/L (ref 40–150)
ALT SERPL W/O P-5'-P-CCNC: 26 UNIT/L (ref 10–44)
ANION GAP (OHS): 8 MMOL/L (ref 8–16)
AST SERPL-CCNC: 25 UNIT/L (ref 11–45)
BASOPHILS # BLD AUTO: 0.03 K/UL
BASOPHILS NFR BLD AUTO: 0.5 %
BILIRUB SERPL-MCNC: 0.3 MG/DL (ref 0.1–1)
BUN SERPL-MCNC: 32 MG/DL (ref 8–23)
CALCIUM SERPL-MCNC: 9 MG/DL (ref 8.7–10.5)
CHLORIDE SERPL-SCNC: 106 MMOL/L (ref 95–110)
CO2 SERPL-SCNC: 25 MMOL/L (ref 23–29)
CREAT SERPL-MCNC: 0.8 MG/DL (ref 0.5–1.4)
ERYTHROCYTE [DISTWIDTH] IN BLOOD BY AUTOMATED COUNT: 18.3 % (ref 11.5–14.5)
GFR SERPLBLD CREATININE-BSD FMLA CKD-EPI: >60 ML/MIN/1.73/M2
GLUCOSE SERPL-MCNC: 123 MG/DL (ref 70–110)
HCT VFR BLD AUTO: 26.5 % (ref 37–48.5)
HGB BLD-MCNC: 8.4 GM/DL (ref 12–16)
IMM GRANULOCYTES # BLD AUTO: 0.13 K/UL (ref 0–0.04)
IMM GRANULOCYTES NFR BLD AUTO: 2 % (ref 0–0.5)
LYMPHOCYTES # BLD AUTO: 1.2 K/UL (ref 1–4.8)
MCH RBC QN AUTO: 32.2 PG (ref 27–31)
MCHC RBC AUTO-ENTMCNC: 31.7 G/DL (ref 32–36)
MCV RBC AUTO: 102 FL (ref 82–98)
NUCLEATED RBC (/100WBC) (OHS): 0 /100 WBC
PLATELET # BLD AUTO: 84 K/UL (ref 150–450)
PMV BLD AUTO: ABNORMAL FL
POTASSIUM SERPL-SCNC: 3.7 MMOL/L (ref 3.5–5.1)
PROT SERPL-MCNC: 7.1 GM/DL (ref 6–8.4)
RBC # BLD AUTO: 2.61 M/UL (ref 4–5.4)
RELATIVE EOSINOPHIL (OHS): 1.2 %
RELATIVE LYMPHOCYTE (OHS): 18.2 % (ref 18–48)
RELATIVE MONOCYTE (OHS): 25 % (ref 4–15)
RELATIVE NEUTROPHIL (OHS): 53.1 % (ref 38–73)
SODIUM SERPL-SCNC: 139 MMOL/L (ref 136–145)
WBC # BLD AUTO: 6.6 K/UL (ref 3.9–12.7)

## 2025-07-17 PROCEDURE — 3078F DIAST BP <80 MM HG: CPT | Mod: CPTII,S$GLB,,

## 2025-07-17 PROCEDURE — 1160F RVW MEDS BY RX/DR IN RCRD: CPT | Mod: CPTII,S$GLB,,

## 2025-07-17 PROCEDURE — 3074F SYST BP LT 130 MM HG: CPT | Mod: CPTII,S$GLB,,

## 2025-07-17 PROCEDURE — 4010F ACE/ARB THERAPY RXD/TAKEN: CPT | Mod: CPTII,S$GLB,,

## 2025-07-17 PROCEDURE — 85025 COMPLETE CBC W/AUTO DIFF WBC: CPT

## 2025-07-17 PROCEDURE — 99999 PR PBB SHADOW E&M-EST. PATIENT-LVL V: CPT | Mod: PBBFAC,,,

## 2025-07-17 PROCEDURE — 36415 COLL VENOUS BLD VENIPUNCTURE: CPT | Mod: PO

## 2025-07-17 PROCEDURE — 80053 COMPREHEN METABOLIC PANEL: CPT

## 2025-07-17 PROCEDURE — G2211 COMPLEX E/M VISIT ADD ON: HCPCS | Mod: S$GLB,,,

## 2025-07-17 PROCEDURE — 3288F FALL RISK ASSESSMENT DOCD: CPT | Mod: CPTII,S$GLB,,

## 2025-07-17 PROCEDURE — 1125F AMNT PAIN NOTED PAIN PRSNT: CPT | Mod: CPTII,S$GLB,,

## 2025-07-17 PROCEDURE — 1101F PT FALLS ASSESS-DOCD LE1/YR: CPT | Mod: CPTII,S$GLB,,

## 2025-07-17 PROCEDURE — 3008F BODY MASS INDEX DOCD: CPT | Mod: CPTII,S$GLB,,

## 2025-07-17 PROCEDURE — 1159F MED LIST DOCD IN RCRD: CPT | Mod: CPTII,S$GLB,,

## 2025-07-17 PROCEDURE — 99214 OFFICE O/P EST MOD 30 MIN: CPT | Mod: S$GLB,,,

## 2025-07-17 RX ORDER — ONDANSETRON 4 MG/1
4 TABLET, ORALLY DISINTEGRATING ORAL EVERY 8 HOURS PRN
Qty: 30 TABLET | Refills: 0 | Status: SHIPPED | OUTPATIENT
Start: 2025-07-17

## 2025-07-17 RX ORDER — NEOMYCIN SULFATE, POLYMYXIN B SULFATE AND HYDROCORTISONE 10; 3.5; 1 MG/ML; MG/ML; [USP'U]/ML
3 SUSPENSION/ DROPS AURICULAR (OTIC) 3 TIMES DAILY
Qty: 10 ML | Refills: 1 | Status: SHIPPED | OUTPATIENT
Start: 2025-07-17 | End: 2025-07-24

## 2025-07-17 NOTE — PROGRESS NOTES
HPI     Chief Complaint:  Hospital follow up    Alba M Reyes is a 74 y.o. female with multiple medical diagnoses as listed in the medical history and problem list that presents for hospital follow up.  PCP Dr. France with last visit in this clinic on 6/10/25.     HPI    Patient presents for hospital follow up. She reports feeling well overall, with abdominal symptoms much improved. She is taking Cipro and Flagyl as ordered, but having nausea with them. She has pain in her mouth and throat, and has multiple aphthous ulcers on tongue and gums. She went to urgent care for these and was given magic mouthwash and chlorhexidine which is helping.     She has pain in her right ear and reports a sharp spasm in it every few hours. She denies fevers or trouble hearing.     Recent hospital encounter. See below encounter note from 7/6/2025:    Hospital Course:   74-year-old female with PMH of rheumatoid arthritis currently on Plaquenil and methotrexate, dementia, hypertension, hyperlipidemia, iron deficiency anemia presented to ER on 07/05/2025 with complaints of lower abdominal pain and increased urinary frequency.       Patient reports that she has been having lower abdominal pain for last 3 weeks.  She reports pain as constant, dull aching, nonradiating with no aggravating or relieving factors.  She also has a poor appetite and poor p.o. intake.  She also reported on and off fevers for last 2 days along with loose watery diarrhea.  Overnight she also had lot of palpitations when she was febrile.  She denies any chest pain, shortness of breath, nausea, vomiting, hematemesis, melena, hematochezia.  She reports increased urinary frequency over the last few days although denies any dysuria or hematuria.  She denies any similar symptoms in the past.       On presentation to ER she was tachycardic, satting well on room air, lab work was significant for WBC 93940, CT abdomen/pelvis was done which was negative for any acute  process.  She was given 1.6 L LR bolus and IV Flagyl in the ER.  She was admitted to medicine service for further management sepsis in the setting of enteritis.       Today morning patient reports feeling much better.  She denies any abdominal pain.  Denies any ongoing diarrhea or urinary symptoms.  Her white count is back to normal.  Rest of the lab work is unremarkable.  She has been able to tolerate diet.  Her symptoms were thought to be secondary to gastroenteritis.  She is being prescribed short course of ciprofloxacin and Flagyl.  She was educated about return precautions to the ER and voiced understanding.  She is stable to discharge home today and is agreeable with the plan.  She will follow up with her PCP in next 1 week.     Assessment & Plan     1. Hospital discharge follow-up    Will obtain follow up labs today (CBC/CMP). Reports symptoms are improving greatly.      Family and/or Caretaker present at visit? No  Medication Reconciliation:  Completed  New Prescriptions filled after discharge: Yes  Hospital encounter notes, objective/subjective data, diagnostics, and plan of care from recent hospital encounter reviewed: Yes  Discharge summary reviewed:  Yes  Disease/illness education: completed  Follow up appointments scheduled:  Yes  Follow up labs/tests ordered: Yes  Home Health ordered on discharge: Patient does not have home health established from hospital visit.  They do not need home health.  If needed, we will set up home health for the patient  Establishment or re-establishment of referral orders for community resources: No  DME ordered at discharge: No  How patient is feeling since discharge from the hospital?  Reports symptoms have improved    Discussion with other health care providers: No    - CBC Auto Differential; Future  - Comprehensive Metabolic Panel; Future    2. Enteritis  3. Abnormal finding of blood chemistry, unspecified    Afebrile, VSS in clinic today. Abdomen soft, non tender, non  distended with +bowel sounds. Last BM yesterday. Reports symptoms are much improved overall, denies abdominal pain. Is having nausea from antibiotics (Cipro and Flagyl) that she was discharged home with. Will prescribe zofran, encouraged to take 30 minutes before antibiotics. Follow up with clinic for any worsening symptoms, or if symptoms fail to improve.       - ondansetron (ZOFRAN-ODT) 4 MG TbDL; Take 1 tablet (4 mg total) by mouth every 8 (eight) hours as needed (nausea).  Dispense: 30 tablet; Refill: 0  - CBC Auto Differential; Future    4. Acute otitis externa of right ear, unspecified type    Tenderness with manipulation of outer ear; noted with swelling and redness to ear canal. Will treat for OE with Cortisporin drops. Follow up with clinic for any worsening symptoms, or if symptoms fail to improve.       - neomycin-polymyxin-hydrocortisone (CORTISPORIN) 3.5-10,000-1 mg/mL-unit/mL-% otic suspension; Place 3 drops into both ears 3 (three) times daily. for 7 days  Dispense: 10 mL; Refill: 1            Discussed condition, and treatment.   Education sent to patient portal/included in after visit summary.  ED precautions given.   Notify provider if symptoms do not resolve or increase in severity.   Patient verbalizes understanding and agrees with plan of care.  --------------------------------------------      Health Maintenance:  Health Maintenance         Date Due Completion Date    Shingles Vaccine (1 of 2) Never done ---    Colorectal Cancer Screening Never done ---    RSV Vaccine (Age 60+ and Pregnant patients) (1 - Risk 60-74 years 1-dose series) Never done ---    DEXA Scan 03/14/2024 3/14/2022    COVID-19 Vaccine (4 - 2024-25 season) 09/01/2024 1/7/2022    Influenza Vaccine (1) 09/01/2025 12/6/2023    Mammogram 12/03/2025 12/3/2024    Lipid Panel 05/02/2029 5/2/2024    TETANUS VACCINE 04/11/2033 4/11/2023            Discussed the importance of overdue vaccines which were offered during this encounter.  Patient declined overdue vaccines at this time and Advised patient on the importance of completing overdue health maintenance items    Follow Up:  Follow up if symptoms worsen or fail to improve.    Exam     Review of Systems:  (as noted above)  Review of Systems   Constitutional:  Negative for fever.   HENT:  Positive for mouth sores. Negative for trouble swallowing.    Respiratory:  Negative for shortness of breath.    Cardiovascular:  Negative for chest pain.   Gastrointestinal:  Positive for abdominal pain and nausea. Negative for blood in stool and vomiting.   Genitourinary:  Negative for hematuria.   Skin:  Negative for rash.       Physical Exam:   Physical Exam  Constitutional:       General: She is not in acute distress.     Appearance: Normal appearance. She is not ill-appearing.   HENT:      Head: Normocephalic and atraumatic.      Right Ear: Tympanic membrane normal. Swelling and tenderness present.      Left Ear: Hearing and tympanic membrane normal.      Mouth/Throat:      Mouth: Oral lesions present.      Tongue: Lesions present.   Cardiovascular:      Rate and Rhythm: Normal rate and regular rhythm.      Pulses: Normal pulses.      Heart sounds: Normal heart sounds. No murmur heard.  Pulmonary:      Effort: Pulmonary effort is normal. No respiratory distress.      Breath sounds: Normal breath sounds. No wheezing.   Abdominal:      General: Abdomen is flat. Bowel sounds are normal. There is no distension.      Palpations: Abdomen is soft.      Tenderness: There is no abdominal tenderness.   Musculoskeletal:      Cervical back: Normal range of motion and neck supple. Tenderness present.   Lymphadenopathy:      Cervical: Cervical adenopathy present.   Skin:     General: Skin is warm and dry.      Capillary Refill: Capillary refill takes less than 2 seconds.   Neurological:      General: No focal deficit present.      Mental Status: She is alert and oriented to person, place, and time.   Psychiatric:     "     Mood and Affect: Mood normal.         Behavior: Behavior normal.       Vitals:    07/17/25 1339   BP: 112/66   Pulse: 79   Temp: 98.2 °F (36.8 °C)   TempSrc: Oral   SpO2: 97%   Weight: 55.7 kg (122 lb 14.5 oz)   Height: 5' 2" (1.575 m)      Body mass index is 22.48 kg/m².      History     Past Medical History:  Past Medical History:   Diagnosis Date    Arthritis     Hypertension        Past Surgical History:  Past Surgical History:   Procedure Laterality Date    CHOLECYSTECTOMY      EPIDURAL STEROID INJECTION Left 03/22/2023    Procedure: Left L5 + S1 Transforaminal Epidural Steroid Injections;  Surgeon: Jim Hector Jr., MD;  Location: [facility] ENDO;  Service: Pain Management;  Laterality: Left;  @1330  No ATC or DM    INJECTION Left 12/19/2022    Procedure: INJECTION, LEFT HIP INTRA-ARTICULAR STEROID CONTRAST DIRECT REF  confirmed;  Surgeon: Hailey Valladares MD;  Location: [facility] PAIN MGT;  Service: Pain Management;  Laterality: Left;       Social History:  Social History[1]    Family History:  No family history on file.    Allergies and Medications: (updated and reviewed)  Review of patient's allergies indicates:  No Known Allergies  Current Medications[2]    Patient Care Team:  Gideon France MD as PCP - General (Internal Medicine)      - The patient is given an After Visit Summary that lists all medications with directions, allergies, education, orders placed during this encounter and follow-up instructions.      - I have reviewed the patient's medical information including past medical, family, and social history sections including the medications and allergies.      - We discussed the patient's current medications.     This note was created by combination of typed  and MModal dictation.  Transcription errors may be present.  If there are any questions, please contact me.                  CHRISTINE Escalante         [1]   Social History  Socioeconomic History    Marital status:  "   Tobacco Use    Smoking status: Never    Smokeless tobacco: Never   Substance and Sexual Activity    Alcohol use: Not Currently    Drug use: Not Currently    Sexual activity: Not Currently     Social Drivers of Health     Financial Resource Strain: Low Risk  (7/5/2025)    Overall Financial Resource Strain (CARDIA)     Difficulty of Paying Living Expenses: Not hard at all   Food Insecurity: No Food Insecurity (7/5/2025)    Hunger Vital Sign     Worried About Running Out of Food in the Last Year: Never true     Ran Out of Food in the Last Year: Never true   Transportation Needs: No Transportation Needs (7/5/2025)    PRAPARE - Transportation     Lack of Transportation (Medical): No     Lack of Transportation (Non-Medical): No   Stress: No Stress Concern Present (7/5/2025)    [employer] - Occupational Stress Questionnaire     Feeling of Stress : Not at all   Housing Stability: Low Risk  (7/5/2025)    Housing Stability Vital Sign     Unable to Pay for Housing in the Last Year: No     Number of Times Moved in the Last Year: 0     Homeless in the Last Year: No   [2]   Current Outpatient Medications   Medication Sig Dispense Refill    (Magic mouthwash) 1:1:1 diphenhydrAMINE(Benadryl) 12.5mg/5ml liq, aluminum & magnesium hydroxide-simethicone (Maalox), LIDOcaine viscous 2% Swish and spit 10 mLs every 4 (four) hours as needed (sore throat/oral pain). 180 mL 0    acetaminophen (TYLENOL) 500 MG tablet Take 1 tablet (500 mg total) by mouth every 6 (six) hours as needed for Pain (As needed for mild-to-moderate pain). 30 tablet 0    baclofen (LIORESAL) 5 mg Tab tablet Take 1 tablet (5 mg total) by mouth 2 (two) times daily as needed. 60 tablet 1    chlorhexidine (PERIDEX) 0.12 % solution Use as directed 15 mLs in the mouth or throat 2 (two) times daily. for 14 days 473 mL 0    diclofenac sodium (VOLTAREN) 1 % Gel Apply 2 g topically 4 (four) times daily as needed (Apply to painful area 4 times a day  as needed for pain). 200 g 0    estradioL (ESTRACE) 0.01 % (0.1 mg/gram) vaginal cream Place 1 g vaginally twice a week. 42.5 g 3    ferrous sulfate (FEOSOL) 325 mg (65 mg iron) Tab tablet Take 1 tablet (325 mg total) by mouth once daily. 90 tablet 1    gabapentin (NEURONTIN) 300 MG capsule Take 2 capsules (600 mg total) by mouth every evening. 60 capsule 11    hydroxychloroquine (PLAQUENIL) 200 mg tablet TAKE 1 & 1/2 (ONE & ONE-HALF) TABLETS BY MOUTH NIGHTLY 45 tablet 1    ibuprofen (ADVIL,MOTRIN) 800 MG tablet Take 800 mg by mouth 2 (two) times daily as needed.      ketoconazole (NIZORAL) 2 % cream       lisinopriL (PRINIVIL,ZESTRIL) 20 MG tablet Take 1 tablet (20 mg total) by mouth once daily. 90 tablet 0    lovastatin (MEVACOR) 10 MG tablet Take 1 tablet (10 mg total) by mouth every evening. 90 tablet 0    magic mouthwash diphen/antac/lidoc Swish and spit 10 mLs every 4 (four) hours as needed (sore throat/oral pain). 180 mL 0    memantine (NAMENDA) 5 MG Tab       memantine (NAMENDA) 7 mg CSpX       methocarbamoL (ROBAXIN) 500 MG Tab Take 1,000 mg by mouth.      methotrexate 25 mg/mL injection Inject 1 mL (25 mg total) into the skin every 7 days. 4 mL 11    omega-3 acid ethyl esters (LOVAZA) 1 gram capsule Take 1 capsule by mouth once daily.      pantoprazole (PROTONIX) 40 MG tablet Take 1 tablet (40 mg total) by mouth once daily. 90 tablet 3    predniSONE (DELTASONE) 5 MG tablet Take 5 mg by mouth.      triamcinolone acetonide 0.1% (KENALOG) 0.1 % cream       folic acid (FOLVITE) 1 MG tablet Take 1 tablet (1 mg total) by mouth once daily. 30 tablet 4    neomycin-polymyxin-hydrocortisone (CORTISPORIN) 3.5-10,000-1 mg/mL-unit/mL-% otic suspension Place 3 drops into both ears 3 (three) times daily. for 7 days 10 mL 1    ondansetron (ZOFRAN-ODT) 4 MG TbDL Take 1 tablet (4 mg total) by mouth every 8 (eight) hours as needed (nausea). 30 tablet 0     Current Facility-Administered Medications   Medication Dose Route  Frequency Provider Last Rate Last Admin    LIDOcaine HCL 10 mg/ml (1%) injection 5 mL  5 mL Other 1 time in Clinic/HOD

## 2025-07-18 ENCOUNTER — TELEPHONE (OUTPATIENT)
Dept: PREADMISSION TESTING | Facility: HOSPITAL | Age: 75
End: 2025-07-18
Payer: MEDICARE

## 2025-07-21 ENCOUNTER — HOSPITAL ENCOUNTER (OUTPATIENT)
Dept: PREADMISSION TESTING | Facility: HOSPITAL | Age: 75
Discharge: HOME | End: 2025-07-21
Attending: STUDENT IN AN ORGANIZED HEALTH CARE EDUCATION/TRAINING PROGRAM
Payer: MEDICARE

## 2025-07-21 VITALS — HEIGHT: 62 IN | WEIGHT: 120 LBS | BODY MASS INDEX: 22.08 KG/M2

## 2025-07-21 NOTE — PRE-PROCEDURE INSTRUCTIONS
Pre-operative instructions, medication directives and pain scales reviewed with patient. All questions the patient had were answered. Re-assurance about surgical procedure and day of surgery routine given as needed, patient verbalized understanding of the pre-op instructions.  Patient instructed to report to [facility] for surgery.   Phone preop done. Patient states that she cannot take her Lisinopril or any medications in the morning unless she eats food.

## 2025-07-28 ENCOUNTER — HOSPITAL ENCOUNTER (EMERGENCY)
Facility: HOSPITAL | Age: 75
Discharge: HOME | End: 2025-07-28
Attending: STUDENT IN AN ORGANIZED HEALTH CARE EDUCATION/TRAINING PROGRAM
Payer: MEDICARE

## 2025-07-28 VITALS
HEIGHT: 64 IN | BODY MASS INDEX: 21.07 KG/M2 | HEART RATE: 74 BPM | OXYGEN SATURATION: 98 % | RESPIRATION RATE: 19 BRPM | TEMPERATURE: 98 F | DIASTOLIC BLOOD PRESSURE: 63 MMHG | SYSTOLIC BLOOD PRESSURE: 139 MMHG | WEIGHT: 123.44 LBS

## 2025-07-28 DIAGNOSIS — E87.6 HYPOKALEMIA: ICD-10-CM

## 2025-07-28 DIAGNOSIS — R19.7 DIARRHEA, UNSPECIFIED TYPE: Primary | ICD-10-CM

## 2025-07-28 LAB
ABSOLUTE EOSINOPHIL (OHS): 0.08 K/UL
ABSOLUTE MONOCYTE (OHS): 6.28 K/UL (ref 0.3–1)
ABSOLUTE NEUTROPHIL COUNT (OHS): 13.32 K/UL (ref 1.8–7.7)
ALBUMIN SERPL BCP-MCNC: 3.5 G/DL (ref 3.5–5.2)
ALP SERPL-CCNC: 90 UNIT/L (ref 40–150)
ALT SERPL W/O P-5'-P-CCNC: 15 UNIT/L (ref 10–44)
ANION GAP (OHS): 9 MMOL/L (ref 8–16)
AST SERPL-CCNC: 31 UNIT/L (ref 11–45)
BACTERIA #/AREA URNS AUTO: NORMAL /HPF
BASOPHILS # BLD AUTO: 0.07 K/UL
BASOPHILS NFR BLD AUTO: 0.3 %
BILIRUB SERPL-MCNC: 0.6 MG/DL (ref 0.1–1)
BILIRUB UR QL STRIP.AUTO: NEGATIVE
BUN SERPL-MCNC: 20 MG/DL (ref 8–23)
CALCIUM SERPL-MCNC: 8.8 MG/DL (ref 8.7–10.5)
CHLORIDE SERPL-SCNC: 109 MMOL/L (ref 95–110)
CLARITY UR: CLEAR
CO2 SERPL-SCNC: 23 MMOL/L (ref 23–29)
COLOR UR AUTO: YELLOW
CREAT SERPL-MCNC: 0.6 MG/DL (ref 0.5–1.4)
ERYTHROCYTE [DISTWIDTH] IN BLOOD BY AUTOMATED COUNT: 18.4 % (ref 11.5–14.5)
GFR SERPLBLD CREATININE-BSD FMLA CKD-EPI: >60 ML/MIN/1.73/M2
GLUCOSE SERPL-MCNC: 100 MG/DL (ref 70–110)
GLUCOSE UR QL STRIP: NEGATIVE
HCT VFR BLD AUTO: 25.4 % (ref 37–48.5)
HGB BLD-MCNC: 8.1 GM/DL (ref 12–16)
HGB UR QL STRIP: ABNORMAL
HYALINE CASTS UR QL AUTO: 1 /LPF (ref 0–1)
IMM GRANULOCYTES # BLD AUTO: 0.99 K/UL (ref 0–0.04)
IMM GRANULOCYTES NFR BLD AUTO: 4.4 % (ref 0–0.5)
KETONES UR QL STRIP: NEGATIVE
LEUKOCYTE ESTERASE UR QL STRIP: NEGATIVE
LYMPHOCYTES # BLD AUTO: 1.53 K/UL (ref 1–4.8)
MAGNESIUM SERPL-MCNC: 2 MG/DL (ref 1.6–2.6)
MCH RBC QN AUTO: 32.5 PG (ref 27–31)
MCHC RBC AUTO-ENTMCNC: 31.9 G/DL (ref 32–36)
MCV RBC AUTO: 102 FL (ref 82–98)
MICROSCOPIC COMMENT: NORMAL
NITRITE UR QL STRIP: NEGATIVE
NUCLEATED RBC (/100WBC) (OHS): 0 /100 WBC
PH UR STRIP: 6 [PH]
PLATELET # BLD AUTO: 118 K/UL (ref 150–450)
PMV BLD AUTO: 13.2 FL (ref 9.2–12.9)
POTASSIUM SERPL-SCNC: 3 MMOL/L (ref 3.5–5.1)
PROT SERPL-MCNC: 7.2 GM/DL (ref 6–8.4)
PROT UR QL STRIP: ABNORMAL
RBC # BLD AUTO: 2.49 M/UL (ref 4–5.4)
RBC #/AREA URNS AUTO: 0 /HPF (ref 0–4)
RELATIVE EOSINOPHIL (OHS): 0.4 %
RELATIVE LYMPHOCYTE (OHS): 6.9 % (ref 18–48)
RELATIVE MONOCYTE (OHS): 28.2 % (ref 4–15)
RELATIVE NEUTROPHIL (OHS): 59.8 % (ref 38–73)
SODIUM SERPL-SCNC: 141 MMOL/L (ref 136–145)
SP GR UR STRIP: 1.03
SQUAMOUS #/AREA URNS AUTO: 2 /HPF
UROBILINOGEN UR STRIP-ACNC: NEGATIVE EU/DL
WBC # BLD AUTO: 22.27 K/UL (ref 3.9–12.7)
WBC #/AREA URNS AUTO: 2 /HPF (ref 0–5)

## 2025-07-28 PROCEDURE — 63600175 PHARM REV CODE 636 W HCPCS: Performed by: STUDENT IN AN ORGANIZED HEALTH CARE EDUCATION/TRAINING PROGRAM

## 2025-07-28 PROCEDURE — 84132 ASSAY OF SERUM POTASSIUM: CPT

## 2025-07-28 PROCEDURE — 81003 URINALYSIS AUTO W/O SCOPE: CPT

## 2025-07-28 PROCEDURE — 25500020 PHARM REV CODE 255: Performed by: STUDENT IN AN ORGANIZED HEALTH CARE EDUCATION/TRAINING PROGRAM

## 2025-07-28 PROCEDURE — 25000003 PHARM REV CODE 250: Performed by: STUDENT IN AN ORGANIZED HEALTH CARE EDUCATION/TRAINING PROGRAM

## 2025-07-28 PROCEDURE — 85025 COMPLETE CBC W/AUTO DIFF WBC: CPT

## 2025-07-28 PROCEDURE — 99285 EMERGENCY DEPT VISIT HI MDM: CPT | Mod: 25

## 2025-07-28 PROCEDURE — 83735 ASSAY OF MAGNESIUM: CPT | Performed by: STUDENT IN AN ORGANIZED HEALTH CARE EDUCATION/TRAINING PROGRAM

## 2025-07-28 PROCEDURE — 96374 THER/PROPH/DIAG INJ IV PUSH: CPT

## 2025-07-28 RX ORDER — POTASSIUM CHLORIDE 20 MEQ/1
40 TABLET, EXTENDED RELEASE ORAL ONCE
Status: COMPLETED | OUTPATIENT
Start: 2025-07-28 | End: 2025-07-28

## 2025-07-28 RX ORDER — MORPHINE SULFATE 4 MG/ML
4 INJECTION, SOLUTION INTRAMUSCULAR; INTRAVENOUS
Refills: 0 | Status: COMPLETED | OUTPATIENT
Start: 2025-07-28 | End: 2025-07-28

## 2025-07-28 RX ADMIN — POTASSIUM CHLORIDE 40 MEQ: 20 TABLET, EXTENDED RELEASE ORAL at 10:07

## 2025-07-28 RX ADMIN — IOHEXOL 60 ML: 350 INJECTION, SOLUTION INTRAVENOUS at 09:07

## 2025-07-28 RX ADMIN — MORPHINE SULFATE 4 MG: 4 INJECTION, SOLUTION INTRAMUSCULAR; INTRAVENOUS at 08:07

## 2025-07-29 LAB — HOLD SPECIMEN: NORMAL

## 2025-07-29 NOTE — ED PROVIDER NOTES
Encounter Date: 7/28/2025       History     Chief Complaint   Patient presents with    Abdominal Pain     Pt hospitalized and diagnosed w/ UTI on 7/4 started on abx, 2 days later changed to a different abx and completed 3 days ago.  Pt presents w/ c/o same dysuria.  Denies hematuria, flank pain or fever.  Pt also now c/o N/V and diffuse lower abd pain and diarrhea since yesterday.      Patient is a 74-year-old with history of arthritis, HTN, recent UTI who presents with diarrhea and abdominal pain.  Patient states she has been having intermittent, diffuse, cramping abdominal pain and multiple episodes of diarrhea.  She states the pain was diffuse but was worst in the right lower quadrant.  She had an episode of nausea and vomiting but is not currently nauseous.  She also denies currently having abdominal pain.  She denies fevers, chills, chest pain, shortness of breath.  She endorses mild dysuria but no urinary frequency or urgency.  She recently was treated for a urinary tract infection.    The history is provided by the patient and a relative. No  was used.     Review of patient's allergies indicates:  No Known Allergies  Past Medical History:   Diagnosis Date    Arthritis     Hypertension      Past Surgical History:   Procedure Laterality Date    CHOLECYSTECTOMY      EPIDURAL STEROID INJECTION Left 03/22/2023    Procedure: Left L5 + S1 Transforaminal Epidural Steroid Injections;  Surgeon: Jim Hector Jr., MD;  Location: [facility];  Service: Pain Management;  Laterality: Left;  @1330  No ATC or DM    INJECTION Left 12/19/2022    Procedure: INJECTION, LEFT HIP INTRA-ARTICULAR STEROID CONTRAST DIRECT REF  confirmed;  Surgeon: Hailey Valladares MD;  Location: [facility], [place], operated by [facility];  Service: Pain Management;  Laterality: Left;     No family history on file.  Social History[1]  Review of Systems   Constitutional:  Negative for fever.   HENT:  Negative for sore throat.    Respiratory:   Negative for shortness of breath.    Cardiovascular:  Negative for chest pain.   Gastrointestinal:  Positive for diarrhea, nausea and vomiting.   Genitourinary:  Positive for dysuria.   Musculoskeletal:  Negative for back pain.   Skin:  Negative for rash.   Neurological:  Negative for weakness.       Physical Exam     Initial Vitals   BP Pulse Resp Temp SpO2   07/28/25 1738 07/28/25 1738 07/28/25 1738 07/28/25 1738 07/28/25 2200   (!) 110/54 87 18 98.2 °F (36.8 °C) 97 %      MAP       --                Physical Exam    Vitals reviewed.  Constitutional: No distress.   HENT:   Head: Normocephalic and atraumatic.   Eyes: EOM are normal.   Neck:   Normal range of motion.  Cardiovascular:  Normal rate, regular rhythm and intact distal pulses.           Pulmonary/Chest: Breath sounds normal. No respiratory distress.   Abdominal: Abdomen is soft. There is no abdominal tenderness.   Musculoskeletal:         General: No edema. Normal range of motion.      Cervical back: Normal range of motion.     Neurological: She is alert and oriented to person, place, and time.   No focal neurologic deficits   Skin: Skin is warm.         ED Course   Procedures  Labs Reviewed   COMPREHENSIVE METABOLIC PANEL - Abnormal       Result Value    Sodium 141      Potassium 3.0 (*)     Chloride 109      CO2 23      Glucose 100      BUN 20      Creatinine 0.6      Calcium 8.8      Protein Total 7.2      Albumin 3.5      Bilirubin Total 0.6      ALP 90      AST 31      ALT 15      Anion Gap 9      eGFR >60     URINALYSIS, REFLEX TO URINE CULTURE - Abnormal    Color, UA Yellow      Appearance, UA Clear      pH, UA 6.0      Spec Grav UA 1.030      Protein, UA 1+ (*)     Glucose, UA Negative      Ketones, UA Negative      Bilirubin, UA Negative      Blood, UA Trace (*)     Nitrites, UA Negative      Urobilinogen, UA Negative      Leukocyte Esterase, UA Negative     CBC WITH DIFFERENTIAL - Abnormal    WBC 22.27 (*)     RBC 2.49 (*)     HGB 8.1 (*)      HCT 25.4 (*)      (*)     MCH 32.5 (*)     MCHC 31.9 (*)     RDW 18.4 (*)     Platelet Count 118 (*)     MPV 13.2 (*)     Nucleated RBC 0      Neut % 59.8      Lymph % 6.9 (*)     Mono % 28.2 (*)     Eos % 0.4      Basophil % 0.3      Imm Grans % 4.4 (*)     Neut # 13.32 (*)     Lymph # 1.53      Mono # 6.28 (*)     Eos # 0.08      Baso # 0.07      Imm Grans # 0.99 (*)    MAGNESIUM - Normal    Magnesium  2.0     CBC W/ AUTO DIFFERENTIAL    Narrative:     The following orders were created for panel order CBC auto differential.  Procedure                               Abnormality         Status                     ---------                               -----------         ------                     CBC with Differential[[id number]]       Abnormal            Final result                 Please view results for these tests on the individual orders.   URINALYSIS MICROSCOPIC    RBC, UA 0      WBC, UA 2      Bacteria, UA Rare      Squamous Epithelial Cells, UA 2      Hyaline Casts, UA 1      Microscopic Comment       GREY TOP URINE HOLD          Imaging Results              CT Abdomen Pelvis With IV Contrast NO Oral Contrast (Final result)  Result time 07/28/25 22:04:05      Final result by Kennedi Goode MD (07/28/25 22:04:05)                   Impression:      1. Appendix identified and is of minimally greater than normal diameter measuring 7 mm but no periappendiceal inflammatory changes are seen to suggest acute appendicitis.  2. Diverticulosis but no evidence of diverticulitis  3. Solitary enlarged right groin node of uncertain clinical significance.  Correlate with physical exam to determine if this is a reactionary node.  4. No evidence of acute inflammation in the abdomen nor pelvis.  No fluid collections.      Electronically signed by: Kennedi Goode  Date:    07/28/2025  Time:    22:04               Narrative:    EXAMINATION:  CT ABDOMEN PELVIS WITH IV CONTRAST    CLINICAL HISTORY:  RLQ  abdominal pain (Age >= 14y);    TECHNIQUE:  Low dose axial images, sagittal and coronal reformations were obtained from the lung bases to the pubic symphysis following the IV administration of 60 mL of Omnipaque 350 .  Oral contrast was not given.    COMPARISON:  Prior study dated 07/05/2025.    FINDINGS:  Heart: Normal in size. No pericardial effusion.    Lung Bases: Well aerated, without consolidation or pleural fluid.  Minimal patch of opacity in the right middle lobe/atelectasis.    Liver: Normal in size and attenuation, with no focal hepatic lesions.    Gallbladder: No gallbladder visualized.    Bile Ducts: No evidence of dilated ducts.    Pancreas: No mass or peripancreatic fat stranding.    Spleen: Unremarkable.  Assess Ree spleen is noted.    Adrenals: Unremarkable.    Kidneys/ Ureters: 2 hypodensities again noted within the right kidney, likely cyst given Hounsfield unit measurements.  The largest measures 1.3 cm in diameter.  There is no hydronephrosis.  There is bilateral equal enhancement of the left and right kidney.  A few hypodensities are also noted in the left kidney too small to characterize.  No urinary calculi are noted.    Bladder: No evidence of wall thickening.    Reproductive organs: A fibroid uterus is again noted with calcifications within the fibroids.  The adnexal regions are within normal size limits.    GI Tract/Mesentery: No evidence of bowel obstruction or inflammation.  There is diverticulosis again noted but no evidence of diverticulitis.  The appendix is once again identified and contains high density material but has no Edith appendiceal inflammatory changes.  This may be residual contrast within the appendix from a prior study.  Or may represent an appendicoliths without any evidence for acute inflammation.    Peritoneal Space: No ascites. No free air.    Retroperitoneum: No significant adenopathy.    Abdominal wall: Unremarkable.  There is a prominent right groin node measuring  1.4 cm unchanged from prior exam allowing for differences in slice thickness.    Vasculature: No significant atherosclerosis or aneurysm.    Bones: No acute fracture.  Multilevel degenerative change of the spine noted.                                       Medications   potassium chloride SA CR tablet 40 mEq (40 mEq Oral Given 7/28/25 2208)   morphine injection 4 mg (4 mg Intravenous Given 7/28/25 2043)   iohexoL (OMNIPAQUE 350) injection 60 mL (60 mLs Intravenous Given 7/28/25 2100)     Medical Decision Making  Alba M Reyes is a 74 y.o. female with h/o arthritis, HTN, recent UTI who presents to the ED with abdominal pain, nausea, vomiting, diarrhea, dysuria    Initial vitals reassuring. Physical exam reveals pleasant, well-appearing elderly woman with a reassuring exam.     Patient presentation most concerning for gastroenteritis. DDx of abdominal pain is broad and includes pancreatitis, GERD, cholecystitis, cholelithiasis, appendicitis, pyelonephritis, kidney stone, diverticulitis, diverticulosis, constipation and UTI. Also considered SBO, abdominal aortic aneurysm, aortic dissection.     I will obtain the following to better assess:  CBC, CMP, UA, Mag, CTA/P,we will order stool studies if patient can give a sample. Immediate interventions include morphine.       Amount and/or Complexity of Data Reviewed  Labs: ordered.  Radiology: ordered.    Risk  Prescription drug management.               ED Course as of 07/29/25 0246   Mon Jul 28, 2025   2310 On re-evaluation, patient feels well.  Workup and exam were overall reassuring and consistent with simple gastroenteritis.  Her blood work shows potassium of 3.0 due to her diarrhea, we repleted orally and have instructed her on a potassium rich diet.  She does have a leukocytosis however no overt signs of bacterial infection at this time.  Her hemoglobin is at her baseline.  Her abdominal exam is still benign on re-evaluation.  She has not been able to provide a stool  sample for the 6 hours she has been in the hospital, low suspicion for C diff . Will be discharged with PCP follow up and instructions for potassium rich diet.  All questions answered.  Return precautions given.  Patient is in agreement with the plan.   [KI]      ED Course User Index  [KI] Kathya Reza MD                               Clinical Impression:  Final diagnoses:  [R19.7] Diarrhea, unspecified type (Primary)  [E87.6] Hypokalemia          ED Disposition Condition    Discharge Stable          ED Prescriptions    None       Follow-up Information       Follow up With Specialties Details Why Contact Info    Gideon France MD Internal Medicine, Pediatrics Schedule an appointment as soon as possible for a visit   [address] [facility] [id number] [phone]                     [1]   Social History  Tobacco Use    Smoking status: Never    Smokeless tobacco: Never   Substance Use Topics    Alcohol use: Not Currently    Drug use: Not Currently        Kathya Reza MD  07/29/25 0243

## 2025-07-29 NOTE — ED TRIAGE NOTES
Patient presented to ED via POV with chief complain of abdominal discomfort, reports that she was admitted and prescribed with antibiotic which she feels weak and nausea. Denies SOB, fever, and headache. HOB elivated, SR up x2, call bell with in reach Pt is AAOx3, resp even and unlabored, skin warm and dry. NAD noted

## 2025-07-29 NOTE — DISCHARGE INSTRUCTIONS

## 2025-08-05 ENCOUNTER — HOSPITAL ENCOUNTER (OUTPATIENT)
Dept: RADIOLOGY | Facility: HOSPITAL | Age: 75
Discharge: HOME | End: 2025-08-05
Payer: MEDICARE

## 2025-08-05 ENCOUNTER — OFFICE VISIT (OUTPATIENT)
Dept: FAMILY MEDICINE | Facility: CLINIC | Age: 75
End: 2025-08-05
Payer: MEDICARE

## 2025-08-05 VITALS
DIASTOLIC BLOOD PRESSURE: 60 MMHG | OXYGEN SATURATION: 97 % | WEIGHT: 118.25 LBS | SYSTOLIC BLOOD PRESSURE: 108 MMHG | TEMPERATURE: 98 F | HEIGHT: 64 IN | BODY MASS INDEX: 20.19 KG/M2 | HEART RATE: 85 BPM

## 2025-08-05 DIAGNOSIS — K52.9 ENTERITIS: ICD-10-CM

## 2025-08-05 DIAGNOSIS — R51.9 NONINTRACTABLE HEADACHE, UNSPECIFIED CHRONICITY PATTERN, UNSPECIFIED HEADACHE TYPE: ICD-10-CM

## 2025-08-05 DIAGNOSIS — Z78.0 ASYMPTOMATIC MENOPAUSAL STATE: ICD-10-CM

## 2025-08-05 DIAGNOSIS — Z00.00 ANNUAL PHYSICAL EXAM: Primary | ICD-10-CM

## 2025-08-05 DIAGNOSIS — D61.818 PANCYTOPENIA: ICD-10-CM

## 2025-08-05 DIAGNOSIS — R10.13 EPIGASTRIC PAIN: ICD-10-CM

## 2025-08-05 DIAGNOSIS — M81.0 OSTEOPOROSIS, UNSPECIFIED OSTEOPOROSIS TYPE, UNSPECIFIED PATHOLOGICAL FRACTURE PRESENCE: ICD-10-CM

## 2025-08-05 DIAGNOSIS — M06.9 RHEUMATOID ARTHRITIS, INVOLVING UNSPECIFIED SITE, UNSPECIFIED WHETHER RHEUMATOID FACTOR PRESENT: ICD-10-CM

## 2025-08-05 DIAGNOSIS — R10.9 ABDOMINAL PAIN, UNSPECIFIED ABDOMINAL LOCATION: ICD-10-CM

## 2025-08-05 DIAGNOSIS — R19.7 DIARRHEA, UNSPECIFIED TYPE: ICD-10-CM

## 2025-08-05 DIAGNOSIS — I10 ESSENTIAL HYPERTENSION: ICD-10-CM

## 2025-08-05 PROCEDURE — 74019 RADEX ABDOMEN 2 VIEWS: CPT | Mod: 26,,, | Performed by: RADIOLOGY

## 2025-08-05 PROCEDURE — 3044F HG A1C LEVEL LT 7.0%: CPT | Mod: CPTII,S$GLB,,

## 2025-08-05 PROCEDURE — 4010F ACE/ARB THERAPY RXD/TAKEN: CPT | Mod: CPTII,S$GLB,,

## 2025-08-05 PROCEDURE — 1126F AMNT PAIN NOTED NONE PRSNT: CPT | Mod: CPTII,S$GLB,,

## 2025-08-05 PROCEDURE — 3008F BODY MASS INDEX DOCD: CPT | Mod: CPTII,S$GLB,,

## 2025-08-05 PROCEDURE — 3288F FALL RISK ASSESSMENT DOCD: CPT | Mod: CPTII,S$GLB,,

## 2025-08-05 PROCEDURE — 3078F DIAST BP <80 MM HG: CPT | Mod: CPTII,S$GLB,,

## 2025-08-05 PROCEDURE — 74019 RADEX ABDOMEN 2 VIEWS: CPT | Mod: TC,PO

## 2025-08-05 PROCEDURE — 1159F MED LIST DOCD IN RCRD: CPT | Mod: CPTII,S$GLB,,

## 2025-08-05 PROCEDURE — 1101F PT FALLS ASSESS-DOCD LE1/YR: CPT | Mod: CPTII,S$GLB,,

## 2025-08-05 PROCEDURE — 99397 PER PM REEVAL EST PAT 65+ YR: CPT | Mod: 25,S$GLB,,

## 2025-08-05 PROCEDURE — 3074F SYST BP LT 130 MM HG: CPT | Mod: CPTII,S$GLB,,

## 2025-08-05 PROCEDURE — 99999 PR PBB SHADOW E&M-EST. PATIENT-LVL V: CPT | Mod: PBBFAC,,,

## 2025-08-05 PROCEDURE — 1160F RVW MEDS BY RX/DR IN RCRD: CPT | Mod: CPTII,S$GLB,,

## 2025-08-05 NOTE — PROGRESS NOTES
HPI     Alba M Reyes is a 74 y.o. female with multiple medical diagnoses as listed in the medical history and problem list that presents for annual exam. PCP Dr. France with last visit in this clinic on 7/17/25.     Chief Complaint   Patient presents with    Annual Exam     HPI    CHIEF COMPLAINT:  Patient presents for annual visit and with persistent diarrhea, abdominal pain, and headaches.    HPI:  Patient reports persistent diarrhea for several months, occurring daily. All food intake results in a bowel movement, described as watery and liquid in consistency. The diarrhea is accompanied by abdominal pain, localized to the upper middle part of her abdomen. She reports feeling unwell due to these symptoms and has lost weight as a result, though she views the weight loss positively. She has lost about 5 lbs since her last visit in July.  She complains of frequent headaches, particularly in the mornings. She took Tylenol for a headache today, which provided some relief. She has been to the ER twice for these symptoms, and was treated for enteritis with Flagyl, Cipro, and Protonix. Last CT A/P from 7/28/25 showed minimally enlarged appendix with no evidence of appendicitis, diverticulosis without diverticulitis, and right groin lymph node enlargement. She has had a cholecystectomy in the past.     She has attempted to manage symptoms with various medications. She reports taking Imodium, which caused side effects including a severe headache and fever, leading to an emergency room visit. She also mentions taking an Pepto-Bismol, which has helped with her symptoms. She has not had any diarrhea today. She has been avoiding certain medications, such as diclofenac, due to concerns about blood-related side effects.    Due to her GI symptoms, she reports significant dietary changes and reduced food intake. She mentions only drinking coffee and eating small amounts of plain foods like potato with butter, pasta with butter,  "and dry toast. She expresses apprehension about food choices due to her symptoms.    She notes that these symptoms are unusual for her, stating that she has never had illness of this nature before. She mentions having arthritis and sciatica, which caused her to retire from work 4 years ago, but emphasizes that current symptoms are different and more severe than her usual health issues.    She denies having migraines and blood in her stool.    MEDICATIONS:  Patient is on Tylenol as needed for headache and an expectorant, taking 2 pills in the morning and 2 at night. She has Gatorade, with 4 bottles in the refrigerator, for hydration. Imodium, previously used as needed for diarrhea, has been discontinued due to side effects including severe headache and fever. Diclofenac has also been discontinued following doctor's advice regarding blood-related concerns.    MEDICAL HISTORY:  Patient has a history of Rheumatoid arthritis and sciatica.    TEST RESULTS:  Patient's kidney function was previously slightly decreased but returned to normal when checked in the hospital. Her blood counts were slightly high during the hospital check.    SOCIAL HISTORY:  Occupation: Retired 4 years ago    Marital status:          Social Factors  Tobacco use: no  Alcohol: Zero drinks per week  Intimate partner violence screening  "Do you feel safe in your current relationship?" Yes   "Have you ever been in a relationship in which your partner frightened you or hurt you?" No  Living Will/POA: No  Regular Exercise: Walks with walker    Depression  Over the past two weeks, have you felt down, depressed, or hopeless? No  Over the past two weeks, have you felt little interest or pleasure in doing things? No    Reproductive Health  Last menstrual period began: postmenopausal.  On Daily folic acid:  reviewed   STD screening in last year: declined  Last PAP: not on file; will refer to GYN for annual/well woman exam.  HIV screening: reviewed " "    CHD, HTN, DM2  CHD Risk Factors: advanced age (older than 55 for men, 65 for women) and sedentary lifestyle  Women 45 years and older should be screened for dyslipidemia if at increased risk of CHD  Women 20 to 45 years of age should be screened for dyslipidemia if at increased risk of CHD  Asymptomatic adults with sustained blood pressure greater than 135/80 mm Hg (treated or untreated) should be screened for type 2 diabetes mellitus    Estimated body mass index is 20.3 kg/m² as calculated from the following:    Height as of this encounter: 5' 4" (1.626 m).    Weight as of this encounter: 53.7 kg (118 lb 4.4 oz).    Screening  Mammogram needed: reviewed   Colonoscopy needed: reviewed   Osteoporosis screen needed: reviewed      Women 50 to 74 years of age should be screened for breast cancer with mammography biennially.  Women should be screened for cervical cancer with Pap tests beginning at 21 years of age. Low-risk women should receive Pap testing every three years. Co-testing for human papillomavirus is an option beginning at 30 years of age, and can extend the screening interval to five years. Cervical cancer screening should be discontinued at 65 years of age or after total hysterectomy if the woman has a benign gynecologic history  Adults 50 to 75 years of age should be screened for colorectal cancer with an FOBT annually, sigmoidoscopy every five years with an FOBT every three years, or colonoscopy every 10 years.  Women 65 years and older should be screened for osteoporosis. Women younger than 65 years should be screened if the risk of fracture is greater than or equal to that of a 65-year-old white woman without additional risk factors.    Immunizations  Reviewed    PHYSICAL EXAM  VITAL SIGNS: /60   Pulse 85   Temp 97.8 °F (36.6 °C) (Oral)   Ht 5' 4" (1.626 m)   Wt 53.7 kg (118 lb 4.4 oz)   SpO2 97%   BMI 20.30 kg/m²     Assessment & Plan     1. Annual physical exam    Physical exam WNL, " except as noted in PE. Will check lab work today. All medications refilled.  Counseled on age appropriate medical preventative services including age appropriate cancer screenings, age appropriate eye and dental exams, over all nutritional health, need for a consistent exercise regimen, and an over all push towards maintaining a vigorous and active lifestyle.  Counseled on age appropriate vaccines and discussed upcoming health care needs based on age/gender. Discussed good sleep hygiene and stress management.    - CBC Auto Differential; Future  - Comprehensive Metabolic Panel; Future  - Lipid Panel; Future  - Hemoglobin A1C; Future  - Ambulatory referral/consult to Obstetrics / Gynecology; Future    2. Epigastric pain     Patient reports abdominal pain and nausea, especially after taking medication.   Abdominal exam revealed mild TTP of epigastric region; abdomen otherwise soft, non tender, non distended. Bowel sounds present x 4 quadrants upon auscultation.   Ordered XR Abdomen to evaluate for potential blockages and labs to reassess previously elevated WBC count and slightly decreased renal function.   Prescribed Gaviscon and recommended continuing Pepto-Bismol for abdominal pain relief.  Will refer to GI for further follow up given ongoing symptoms.    - Ambulatory referral/consult to Gastroenterology; Future  - X-Ray Abdomen Flat And Erect; Future    3. Diarrhea, unspecified type     Patient reports watery, liquid diarrhea occurring daily for months, even when not eating.   Ordered stool studies to rule out infectious etiology, particularly C. difficile. Considered chronic diarrhea as primary concern, potentially related to recent antibiotic use.   Instructed patient to return stool sample in provided specimen cups.   Educated on importance of hydration with water, Gatorade, and Pedialyte due to ongoing fluid loss.   Recommend dietary modifications including dry toast, applesauce, or bananas to help manage  symptoms. GI referral placed for ongoing symptoms.    - Ambulatory referral/consult to Gastroenterology; Future  - H. pylori antigen, stool; Future  - Pancreatic elastase, fecal; Future  - Fecal fat, qualitative; Future  - Occult blood x 1, stool; Future  - WBC, Stool; Future  - Rotavirus antigen, stool; Future  - Adenovirus Molecular Detection, PCR, Non-Blood Stool; Future  - Calprotectin, Stool; Future  - Giardia / Cryptosporidum, EIA; Future  - Stool Exam-Ova,Cysts,Parasites; Future  - Clostridium difficile EIA; Future  - Stool culture; Future    4. Nonintractable headache, unspecified chronicity pattern, unspecified headache type     Patient reports daily headaches, sometimes severe, with relief after taking Tylenol.   Discussed potential link between dehydration and headaches. Neuro exam WNL today. VSS, afebrile.    Advised to continue Tylenol for headache management.    5. Essential hypertension    BP normal today in clinic. Patient hasn't been taking Lisinopril recently due to nausea. Encouraged to resume once feeling better. Continue hydration and bland diet.     BP Readings from Last 3 Encounters:   08/05/25 108/60   07/28/25 139/63   07/17/25 112/66     -continue current medication regimen  -DASH diet, regular cardiovascular exercises, portion control    6. Rheumatoid arthritis, involving unspecified site, unspecified whether rheumatoid factor present    Stable, managed by Rheumatology. Not currently taking prednisone or plaquenil due to nausea/GI upset. Encouraged to resume once feeling better.     7. Pancytopenia    Stable, managed by Hem/Onc. Will recheck CBC today due to elevated WBC on recent ED visit.     8. Osteoporosis, unspecified osteoporosis type, unspecified pathological fracture presence  9. Asymptomatic menopausal state    Stable, has taken Fosamax in the past, not currently taking. Due for DEXA scan; will order today. Recommended calcium/vitamin D.    - DXA Bone Density Axial Skeleton 1 or  more sites; Future                Discussed DDx, condition, and treatment.   Education sent to patient portal/included in after visit summary.  ED precautions given.   Notify provider if symptoms do not resolve or increase in severity.   Patient verbalizes understanding and agrees with plan of care.  --------------------------------------------      Health Maintenance:  Health Maintenance         Date Due Completion Date    Shingles Vaccine (1 of 2) Never done ---    Colorectal Cancer Screening Never done ---    RSV Vaccine (Age 60+ and Pregnant patients) (1 - Risk 60-74 years 1-dose series) Never done ---    DEXA Scan 03/14/2024 3/14/2022    COVID-19 Vaccine (4 - 2024-25 season) 09/01/2024 1/7/2022    Influenza Vaccine (1) 09/01/2025 12/6/2023    Mammogram 12/03/2025 12/3/2024    Lipid Panel 08/05/2030 8/5/2025    TETANUS VACCINE 04/11/2033 4/11/2023            Discussed the importance of overdue vaccines which were offered during this encounter. Patient declined overdue vaccines at this time and Advised patient on the importance of completing overdue health maintenance items    Follow Up:  Follow up in about 3 months (around 11/5/2025), or if symptoms worsen or fail to improve.    Exam     Review of Systems:  (as noted above)  Review of Systems   Constitutional:  Positive for appetite change. Negative for fever.   HENT:  Negative for trouble swallowing.    Respiratory:  Negative for shortness of breath.    Cardiovascular:  Negative for chest pain.   Gastrointestinal:  Positive for abdominal pain and diarrhea. Negative for blood in stool and vomiting.   Genitourinary:  Negative for hematuria.   Skin:  Negative for rash.   Neurological:  Positive for headaches.       Physical Exam:   Physical Exam  Constitutional:       General: She is not in acute distress.     Appearance: Normal appearance. She is not ill-appearing.   HENT:      Head: Normocephalic and atraumatic.      Right Ear: Tympanic membrane normal.       "Left Ear: Tympanic membrane normal.      Nose: Nose normal.      Mouth/Throat:      Mouth: Mucous membranes are moist.      Pharynx: No oropharyngeal exudate or posterior oropharyngeal erythema.   Eyes:      Conjunctiva/sclera: Conjunctivae normal.      Pupils: Pupils are equal, round, and reactive to light.   Cardiovascular:      Rate and Rhythm: Normal rate and regular rhythm.      Pulses: Normal pulses.      Heart sounds: Normal heart sounds. No murmur heard.  Pulmonary:      Effort: Pulmonary effort is normal. No respiratory distress.      Breath sounds: Normal breath sounds. No wheezing.   Abdominal:      General: Abdomen is flat. Bowel sounds are normal. There is no distension.      Palpations: There is no mass.      Tenderness: There is abdominal tenderness (mild epigastric TTP). There is no right CVA tenderness, left CVA tenderness, guarding or rebound.      Hernia: No hernia is present.   Musculoskeletal:         General: Normal range of motion.      Cervical back: Normal range of motion and neck supple. No rigidity or tenderness.   Lymphadenopathy:      Cervical: No cervical adenopathy.   Skin:     General: Skin is warm and dry.      Capillary Refill: Capillary refill takes less than 2 seconds.   Neurological:      General: No focal deficit present.      Mental Status: She is alert and oriented to person, place, and time.      Gait: Gait abnormal (uses rollator).   Psychiatric:         Mood and Affect: Mood normal.         Behavior: Behavior normal.       Vitals:    08/05/25 1320   BP: 108/60   Pulse: 85   Temp: 97.8 °F (36.6 °C)   TempSrc: Oral   SpO2: 97%   Weight: 53.7 kg (118 lb 4.4 oz)   Height: 5' 4" (1.626 m)      Body mass index is 20.3 kg/m².      History     Past Medical History:  Past Medical History:   Diagnosis Date    Arthritis     Hypertension        Past Surgical History:  Past Surgical History:   Procedure Laterality Date    CHOLECYSTECTOMY      EPIDURAL STEROID INJECTION Left 03/22/2023 "    Procedure: Left L5 + S1 Transforaminal Epidural Steroid Injections;  Surgeon: Jim Hector Jr., MD;  Location: [facility] ENDO;  Service: Pain Management;  Laterality: Left;  @1330  No ATC or DM    INJECTION Left 12/19/2022    Procedure: INJECTION, LEFT HIP INTRA-ARTICULAR STEROID CONTRAST DIRECT REF  confirmed;  Surgeon: Hailey Valladares MD;  Location: [facility] PAIN MGT;  Service: Pain Management;  Laterality: Left;       Social History:  Social History[1]    Family History:  No family history on file.    Allergies and Medications: (updated and reviewed)  Review of patient's allergies indicates:  No Known Allergies  Current Medications[2]    Patient Care Team:  Gideon France MD as PCP - General (Internal Medicine)      - The patient is given an After Visit Summary that lists all medications with directions, allergies, education, orders placed during this encounter and follow-up instructions.      - I have reviewed the patient's medical information including past medical, family, and social history sections including the medications and allergies.      - We discussed the patient's current medications.     This note was created by combination of typed  and MModal dictation.  Transcription errors may be present.  If there are any questions, please contact me.     This note was generated with the assistance of ambient listening technology. Verbal consent was obtained by the patient and accompanying visitor(s) for the recording of patient appointment to facilitate this note. I attest to having reviewed and edited the generated note for accuracy, though some syntax or spelling errors may persist. Please contact the author of this note for any clarification.                  CHRISTINE Escalante         [1]   Social History  Socioeconomic History    Marital status:    Tobacco Use    Smoking status: Never    Smokeless tobacco: Never   Substance and Sexual Activity    Alcohol use: Not Currently     Drug use: Not Currently    Sexual activity: Not Currently     Social Drivers of Health     Financial Resource Strain: Low Risk  (7/5/2025)    Overall Financial Resource Strain (CARDIA)     Difficulty of Paying Living Expenses: Not hard at all   Food Insecurity: No Food Insecurity (7/5/2025)    Hunger Vital Sign     Worried About Running Out of Food in the Last Year: Never true     Ran Out of Food in the Last Year: Never true   Transportation Needs: No Transportation Needs (7/5/2025)    PRAPARE - Transportation     Lack of Transportation (Medical): No     Lack of Transportation (Non-Medical): No   Stress: No Stress Concern Present (7/5/2025)    [employer] - Occupational Stress Questionnaire     Feeling of Stress : Not at all   Housing Stability: Low Risk  (7/5/2025)    Housing Stability Vital Sign     Unable to Pay for Housing in the Last Year: No     Number of Times Moved in the Last Year: 0     Homeless in the Last Year: No   [2]   Current Outpatient Medications   Medication Sig Dispense Refill    (Magic mouthwash) 1:1:1 diphenhydrAMINE(Benadryl) 12.5mg/5ml liq, aluminum & magnesium hydroxide-simethicone (Maalox), LIDOcaine viscous 2% Swish and spit 10 mLs every 4 (four) hours as needed (sore throat/oral pain). 180 mL 0    acetaminophen (TYLENOL) 500 MG tablet Take 1 tablet (500 mg total) by mouth every 6 (six) hours as needed for Pain (As needed for mild-to-moderate pain). 30 tablet 0    baclofen (LIORESAL) 5 mg Tab tablet Take 1 tablet (5 mg total) by mouth 2 (two) times daily as needed. (Patient not taking: Reported on 8/5/2025) 60 tablet 1    diclofenac sodium (VOLTAREN) 1 % Gel Apply 2 g topically 4 (four) times daily as needed (Apply to painful area 4 times a day as needed for pain). (Patient not taking: Reported on 8/5/2025) 200 g 0    estradioL (ESTRACE) 0.01 % (0.1 mg/gram) vaginal cream Place 1 g vaginally twice a week. (Patient not taking: Reported on 8/5/2025) 42.5 g 3     ferrous sulfate (FEOSOL) 325 mg (65 mg iron) Tab tablet Take 1 tablet (325 mg total) by mouth once daily. (Patient not taking: Reported on 8/5/2025) 90 tablet 1    folic acid (FOLVITE) 1 MG tablet Take 1 tablet (1 mg total) by mouth once daily. 30 tablet 4    gabapentin (NEURONTIN) 300 MG capsule Take 2 capsules (600 mg total) by mouth every evening. (Patient not taking: Reported on 8/5/2025) 60 capsule 11    hydroxychloroquine (PLAQUENIL) 200 mg tablet TAKE 1 & 1/2 (ONE & ONE-HALF) TABLETS BY MOUTH NIGHTLY (Patient not taking: Reported on 8/5/2025) 45 tablet 1    ibuprofen (ADVIL,MOTRIN) 800 MG tablet Take 800 mg by mouth 2 (two) times daily as needed. (Patient not taking: Reported on 8/5/2025)      ketoconazole (NIZORAL) 2 % cream  (Patient not taking: Reported on 8/5/2025)      lisinopriL (PRINIVIL,ZESTRIL) 20 MG tablet Take 1 tablet (20 mg total) by mouth once daily. 90 tablet 0    lovastatin (MEVACOR) 10 MG tablet Take 1 tablet (10 mg total) by mouth every evening. 90 tablet 0    magic mouthwash diphen/antac/lidoc Swish and spit 10 mLs every 4 (four) hours as needed (sore throat/oral pain). (Patient not taking: Reported on 8/5/2025) 180 mL 0    memantine (NAMENDA) 5 MG Tab  (Patient not taking: Reported on 8/5/2025)      memantine (NAMENDA) 7 mg CSpX  (Patient not taking: Reported on 8/5/2025)      methocarbamoL (ROBAXIN) 500 MG Tab Take 1,000 mg by mouth. (Patient not taking: Reported on 8/5/2025)      methotrexate 25 mg/mL injection Inject 1 mL (25 mg total) into the skin every 7 days. (Patient not taking: Reported on 8/5/2025) 4 mL 11    omega-3 acid ethyl esters (LOVAZA) 1 gram capsule Take 1 capsule by mouth once daily. (Patient not taking: Reported on 8/5/2025)      ondansetron (ZOFRAN-ODT) 4 MG TbDL Take 1 tablet (4 mg total) by mouth every 8 (eight) hours as needed (nausea). 30 tablet 0    pantoprazole (PROTONIX) 40 MG tablet Take 1 tablet (40 mg total) by mouth once daily. (Patient not taking: Reported  on 8/5/2025) 90 tablet 3    predniSONE (DELTASONE) 5 MG tablet Take 5 mg by mouth. (Patient not taking: Reported on 8/5/2025)      triamcinolone acetonide 0.1% (KENALOG) 0.1 % cream  (Patient not taking: Reported on 8/5/2025)       No current facility-administered medications for this visit.

## 2025-08-05 NOTE — PATIENT INSTRUCTIONS
Try Gaviscon and Pepto Bismol for symptoms    Stay hydrated with Pedialyte, Gatorade, or Powerade and water

## 2025-08-06 RX ORDER — ONDANSETRON 4 MG/1
4 TABLET, ORALLY DISINTEGRATING ORAL EVERY 8 HOURS PRN
Qty: 30 TABLET | Refills: 0 | Status: SHIPPED | OUTPATIENT
Start: 2025-08-06

## 2025-08-06 RX ORDER — LISINOPRIL 20 MG/1
20 TABLET ORAL DAILY
Qty: 90 TABLET | Refills: 0 | Status: SHIPPED | OUTPATIENT
Start: 2025-08-06

## 2025-08-06 RX ORDER — LOVASTATIN 10 MG/1
10 TABLET ORAL NIGHTLY
Qty: 90 TABLET | Refills: 0 | Status: SHIPPED | OUTPATIENT
Start: 2025-08-06

## 2025-08-07 ENCOUNTER — TELEPHONE (OUTPATIENT)
Dept: FAMILY MEDICINE | Facility: CLINIC | Age: 75
End: 2025-08-07
Payer: MEDICARE

## 2025-08-07 ENCOUNTER — LAB VISIT (OUTPATIENT)
Dept: LAB | Facility: HOSPITAL | Age: 75
End: 2025-08-07
Payer: MEDICARE

## 2025-08-07 ENCOUNTER — OFFICE VISIT (OUTPATIENT)
Dept: RHEUMATOLOGY | Facility: CLINIC | Age: 75
End: 2025-08-07
Payer: MEDICARE

## 2025-08-07 ENCOUNTER — PATIENT MESSAGE (OUTPATIENT)
Dept: ORTHOPEDICS | Facility: CLINIC | Age: 75
End: 2025-08-07
Payer: MEDICARE

## 2025-08-07 VITALS
DIASTOLIC BLOOD PRESSURE: 60 MMHG | BODY MASS INDEX: 20.25 KG/M2 | WEIGHT: 118.63 LBS | HEART RATE: 70 BPM | SYSTOLIC BLOOD PRESSURE: 103 MMHG | HEIGHT: 64 IN

## 2025-08-07 DIAGNOSIS — M06.9 RHEUMATOID ARTHRITIS, INVOLVING UNSPECIFIED SITE, UNSPECIFIED WHETHER RHEUMATOID FACTOR PRESENT: Primary | ICD-10-CM

## 2025-08-07 DIAGNOSIS — M06.9 RHEUMATOID ARTHRITIS, INVOLVING UNSPECIFIED SITE, UNSPECIFIED WHETHER RHEUMATOID FACTOR PRESENT: ICD-10-CM

## 2025-08-07 LAB
CRP SERPL-MCNC: 11 MG/L
ERYTHROCYTE [SEDIMENTATION RATE] IN BLOOD BY PHOTOMETRIC METHOD: 26 MM/HR

## 2025-08-07 PROCEDURE — 36415 COLL VENOUS BLD VENIPUNCTURE: CPT

## 2025-08-07 PROCEDURE — 85652 RBC SED RATE AUTOMATED: CPT

## 2025-08-07 PROCEDURE — 99999 PR PBB SHADOW E&M-EST. PATIENT-LVL V: CPT | Mod: PBBFAC,,,

## 2025-08-07 PROCEDURE — 86140 C-REACTIVE PROTEIN: CPT

## 2025-08-07 RX ORDER — CELECOXIB 200 MG/1
200 CAPSULE ORAL 2 TIMES DAILY PRN
Qty: 60 CAPSULE | Refills: 2 | Status: SHIPPED | OUTPATIENT
Start: 2025-08-07

## 2025-08-07 NOTE — Clinical Note
Hello, I'm seeing this mutual pt for RA, but her main concern today is OA in left hip. She says she's probably ready to have surgery now. Would your staff be able to schedule her to be seen?  Sincerely, Yoly Evans, CHRISTINE-BC [facility]

## 2025-08-07 NOTE — PROGRESS NOTES
Subjective:      Patient ID: Alba M Reyes is a 74 y.o. female.    Chief Complaint: Follow up on RA    HPI  Alba Reyes is a 72yo F with a h/o HTN, L hip OA with sciatica, and osteopenia who presents for follow up on seropositive RA.      Rheum History:  - Seropositive RA dx around 2013  - Serologies: +RF, negative CCP, +CRYSTAL (1:320), +SSA, +SSB  - Previously was following with [facility] rheumatology ([id number]), then [facility] ([phone]), then [facility] again (5-8/2023)    Previously:  - Methotrexate (most recently d/c'd in 11/2023 due to pancytopenia)  - Humira (self d/c'd in early 2023 with pancytopenia)  - Rinvoq (d/c'd due to GI intolerance)  - Hydroxychloroquine (self d/c'd due to pancytopenia, did not start taking again due to pt concern related to pancytopenia)     Currently:  - Ibuprofen & acetaminophen daily     Pt is here to re-establish care at [facility] today. Was previously following with [facility], then Dr. Youssef here, then [facility] again during last year. She has been dealing with a lot of pain, especially in her hands and knees. Endorses notable AM stiffness for a couple hours. She has been having some pain around the MCPs and also around the knees. Pt does notice some intermittent swelling. She also has been noticing a lot of LLE neuropathic pain related to sciatica as well as severe L hip OA. She had previously seen orthopedic surgery about this a couple months ago. Pt states that she has been extremely scared about re-starting any RA meds due to her pancytopenia. She is very worried about the blood counts and does not want to take any meds with any side effects.     Today:  Pt notes losing weight, doesn't have a good appetite.  She was having diarrhea so presented to ED in July. IV fluids administered.  Recently not having as much RA joint pain since she's not eating much. She denies AM joint pain/stiffness/swelling at this time.    Her main concern today is L hip pain. Xray with severe OA.  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  Now she thinks she is ready to have this.    Review of Systems   Constitutional:  Negative for fever.   Respiratory:  Negative for cough and shortness of breath.    Gastrointestinal:  Positive for diarrhea.   Musculoskeletal:  Positive for arthralgias and back pain. Negative for joint swelling.   Skin:  Negative for rash.   Psychiatric/Behavioral:  Negative for behavioral problems and confusion.         Objective:   /60   Pulse 70   Ht 5' 4" (1.626 m)   Wt 53.8 kg (118 lb 9.7 oz)   BMI 20.36 kg/m²   Physical Exam   Constitutional: She is oriented to person, place, and time. normal appearance.   Cardiovascular: Normal rate, regular rhythm, normal heart sounds and normal pulses.   Pulmonary/Chest: Effort normal and breath sounds normal.   Musculoskeletal:         General: Tenderness present.      Cervical back: Normal range of motion and neck supple.   Neurological: She is alert and oriented to person, place, and time.   Skin: Skin is warm and dry.   Psychiatric: Her behavior is normal. Mood normal.        5/16/2024 8/7/2025   Tender (SANTAMARIA-28) 6 / 28  2 / 28    Swollen (SANTAMARIA-28) 0 / 28  0 / 28    Provider Global -- --   Patient Global -- --   ESR 16 mm/hr --   CRP 2.7 mg/L --   SANTAMARIA-28 (ESR) -- --   SANTAMARIA-28 (CRP) -- --   CDAI Score -- --        Assessment:     1. Rheumatoid arthritis, involving unspecified site, unspecified whether rheumatoid factor present          Plan:     Problem List Items Addressed This Visit       Rheumatoid arthritis - Primary    Relevant Medications    celecoxib (CELEBREX) 200 MG capsule    Other Relevant Orders    C-Reactive Protein    Sedimentation rate     Her main concern today is severe L hip pain.  Pt denies AM joint pain/stiffness/swelling since she's not eating as much lately.  Will refer her back to orthopedics for consultation on hip replacement surgery.  DC " ibuprofen and start celebrex 200mg BID PRN for pain.  Check ESR and CRP.    If RA symptoms worsen in the future, consider starting abatacept SQ if cleared by hematology     RTO 4mos

## 2025-08-07 NOTE — TELEPHONE ENCOUNTER
Spoke with patient about lab results per CHRISTINE Escalante. Patient verbalized understanding. Patient has no other questions or concerns at this time.

## 2025-08-07 NOTE — TELEPHONE ENCOUNTER
----- Message from CHRISTINE Escalante sent at 8/7/2025 12:32 PM CDT -----  Please let her know her blood work looks stable. Please encourage her to eat potassium rich foods like bananas, leafy greens, etc, as her potassium level was slightly low. Her A1c and cholesterol   both look good. She can call [phone] to schedule her GI appointment.       Thank you!        ----- Message -----  From: Lab, Background User  Sent: 8/5/2025   8:48 PM CDT  To: CHRISTINE Mandujano

## 2025-08-12 ENCOUNTER — TELEPHONE (OUTPATIENT)
Dept: FAMILY MEDICINE | Facility: CLINIC | Age: 75
End: 2025-08-12
Payer: MEDICARE

## 2025-08-27 ENCOUNTER — TELEPHONE (OUTPATIENT)
Dept: HEMATOLOGY/ONCOLOGY | Facility: CLINIC | Age: 75
End: 2025-08-27
Payer: MEDICARE

## 2025-09-02 ENCOUNTER — HOSPITAL ENCOUNTER (EMERGENCY)
Facility: HOSPITAL | Age: 75
Discharge: HOME | End: 2025-09-02
Attending: EMERGENCY MEDICINE
Payer: MEDICARE

## 2025-09-02 VITALS
BODY MASS INDEX: 19.46 KG/M2 | HEIGHT: 64 IN | WEIGHT: 114 LBS | HEART RATE: 80 BPM | SYSTOLIC BLOOD PRESSURE: 110 MMHG | OXYGEN SATURATION: 99 % | RESPIRATION RATE: 20 BRPM | TEMPERATURE: 98 F | DIASTOLIC BLOOD PRESSURE: 67 MMHG

## 2025-09-02 DIAGNOSIS — R10.9 ABDOMINAL PAIN: ICD-10-CM

## 2025-09-02 LAB
ALBUMIN SERPL-MCNC: 3.8 G/DL (ref 3.3–5.5)
ALBUMIN SERPL-MCNC: 3.8 G/DL (ref 3.3–5.5)
ALP SERPL-CCNC: 57 U/L (ref 42–141)
ALP SERPL-CCNC: 69 U/L (ref 42–141)
BILIRUB SERPL-MCNC: 0.8 MG/DL (ref 0.2–1.6)
BILIRUB SERPL-MCNC: 0.9 MG/DL (ref 0.2–1.6)
BILIRUBIN, POC UA: ABNORMAL
BLOOD, POC UA: ABNORMAL
BUN SERPL-MCNC: 15 MG/DL (ref 7–22)
CALCIUM SERPL-MCNC: 9.8 MG/DL (ref 8–10.3)
CHLORIDE SERPL-SCNC: 106 MMOL/L (ref 98–108)
CLARITY, UA: CLEAR
COLOR, UA: YELLOW
CREAT SERPL-MCNC: 0.8 MG/DL (ref 0.6–1.2)
GLUCOSE SERPL-MCNC: 110 MG/DL (ref 73–118)
GLUCOSE, POC UA: NEGATIVE
KETONES, POC UA: NEGATIVE
LEUKOCYTE EST, POC UA: NEGATIVE
Lab: 1.4 10 9/L (ref 0.5–5)
Lab: 11.3 10 9/L (ref 1.2–8)
Lab: 12.2 FL (ref 8–11)
Lab: 15.9 10 9/L (ref 3.5–10)
Lab: 17.4 % (ref 11–16)
Lab: 19.5 % (ref 2–15)
Lab: 2.15 10 12/L (ref 3.5–5.5)
Lab: 3.2 10 9/L (ref 0.1–1.5)
Lab: 33.8 PG (ref 25–35)
Lab: 35.5 G/DL (ref 31–38)
Lab: 7.2 G/DL (ref 11.5–16.5)
Lab: 71.5 % (ref 35–80)
Lab: 77 10 9/L (ref 100–400)
Lab: 9 % (ref 15–50)
Lab: 95.1 FL (ref 75–100)
NITRITE, POC UA: NEGATIVE
PH UR STRIP: 5.5 [PH] (ref 5–8)
POC ALT (SGPT): 18 U/L (ref 10–47)
POC ALT (SGPT): 19 U/L (ref 10–47)
POC AMYLASE: 318 U/L (ref 14–97)
POC AST (SGOT): 26 U/L (ref 11–38)
POC AST (SGOT): 30 U/L (ref 11–38)
POC GGT: 25 U/L (ref 5–65)
POC HCT: 20.4 % (ref 35–55)
POC PTINR: 1.1 (ref 0.9–1.2)
POC PTWBT: 11.3 SEC (ref 9.7–14.3)
POC TCO2: 30 MMOL/L (ref 18–33)
POTASSIUM BLD-SCNC: 3.4 MMOL/L (ref 3.6–5.1)
PROTEIN, POC UA: ABNORMAL
PROTEIN, POC: 7 G/DL (ref 6.4–8.1)
PROTEIN, POC: 7.1 G/DL (ref 6.4–8.1)
SAMPLE: NORMAL
SODIUM BLD-SCNC: 137 MMOL/L (ref 128–145)
SPECIFIC GRAVITY, POC UA: 1.02 (ref 1–1.03)
UROBILINOGEN, POC UA: 2 E.U./DL

## 2025-09-02 PROCEDURE — 80053 COMPREHEN METABOLIC PANEL: CPT | Mod: ER

## 2025-09-02 PROCEDURE — 63600175 PHARM REV CODE 636 W HCPCS: Mod: ER

## 2025-09-02 PROCEDURE — 93010 ELECTROCARDIOGRAM REPORT: CPT | Mod: ,,, | Performed by: INTERNAL MEDICINE

## 2025-09-02 PROCEDURE — 82150 ASSAY OF AMYLASE: CPT | Mod: ER

## 2025-09-02 PROCEDURE — 82040 ASSAY OF SERUM ALBUMIN: CPT | Mod: XU,ER

## 2025-09-02 PROCEDURE — 96375 TX/PRO/DX INJ NEW DRUG ADDON: CPT | Mod: ER

## 2025-09-02 PROCEDURE — 25500020 PHARM REV CODE 255: Mod: ER | Performed by: EMERGENCY MEDICINE

## 2025-09-02 PROCEDURE — 93005 ELECTROCARDIOGRAM TRACING: CPT | Mod: ER

## 2025-09-02 PROCEDURE — 96374 THER/PROPH/DIAG INJ IV PUSH: CPT | Mod: ER

## 2025-09-02 PROCEDURE — 99285 EMERGENCY DEPT VISIT HI MDM: CPT | Mod: 25,ER

## 2025-09-02 PROCEDURE — 85025 COMPLETE CBC W/AUTO DIFF WBC: CPT | Mod: ER

## 2025-09-02 RX ORDER — AMOXICILLIN AND CLAVULANATE POTASSIUM 875; 125 MG/1; MG/1
1 TABLET, FILM COATED ORAL 2 TIMES DAILY
Qty: 14 TABLET | Refills: 0 | Status: SHIPPED | OUTPATIENT
Start: 2025-09-02

## 2025-09-02 RX ORDER — FAMOTIDINE 10 MG/ML
20 INJECTION, SOLUTION INTRAVENOUS
Status: COMPLETED | OUTPATIENT
Start: 2025-09-02 | End: 2025-09-02

## 2025-09-02 RX ORDER — KETOROLAC TROMETHAMINE 30 MG/ML
15 INJECTION, SOLUTION INTRAMUSCULAR; INTRAVENOUS
Status: COMPLETED | OUTPATIENT
Start: 2025-09-02 | End: 2025-09-02

## 2025-09-02 RX ORDER — ACETAMINOPHEN 500 MG
1000 TABLET ORAL EVERY 8 HOURS PRN
Qty: 30 TABLET | Refills: 0 | Status: SHIPPED | OUTPATIENT
Start: 2025-09-02

## 2025-09-02 RX ORDER — SUCRALFATE 1 G/10ML
1 SUSPENSION ORAL 4 TIMES DAILY
Qty: 414 ML | Refills: 0 | Status: SHIPPED | OUTPATIENT
Start: 2025-09-02

## 2025-09-02 RX ADMIN — FAMOTIDINE 20 MG: 10 INJECTION, SOLUTION INTRAVENOUS at 02:09

## 2025-09-02 RX ADMIN — KETOROLAC TROMETHAMINE 15 MG: 30 INJECTION, SOLUTION INTRAMUSCULAR; INTRAVENOUS at 02:09

## 2025-09-02 RX ADMIN — IOHEXOL 75 ML: 350 INJECTION, SOLUTION INTRAVENOUS at 03:09

## 2025-09-03 LAB
OHS QRS DURATION: 92 MS
OHS QTC CALCULATION: 447 MS

## 2025-09-05 ENCOUNTER — TELEPHONE (OUTPATIENT)
Dept: HEMATOLOGY/ONCOLOGY | Facility: CLINIC | Age: 75
End: 2025-09-05
Payer: MEDICARE

## (undated) DEVICE — DRESSING LEUKOPLAST FLEX 1X3IN